# Patient Record
Sex: FEMALE | Race: WHITE | Employment: OTHER | URBAN - METROPOLITAN AREA
[De-identification: names, ages, dates, MRNs, and addresses within clinical notes are randomized per-mention and may not be internally consistent; named-entity substitution may affect disease eponyms.]

---

## 2017-02-09 ENCOUNTER — ED HISTORICAL/CONVERTED ENCOUNTER (OUTPATIENT)
Dept: OTHER | Age: 67
End: 2017-02-09

## 2017-02-09 ENCOUNTER — OP HISTORICAL/CONVERTED ENCOUNTER (OUTPATIENT)
Dept: OTHER | Age: 67
End: 2017-02-09

## 2017-05-31 ENCOUNTER — IP HISTORICAL/CONVERTED ENCOUNTER (OUTPATIENT)
Dept: OTHER | Age: 67
End: 2017-05-31

## 2017-07-11 ENCOUNTER — OP HISTORICAL/CONVERTED ENCOUNTER (OUTPATIENT)
Dept: OTHER | Age: 67
End: 2017-07-11

## 2017-09-18 ENCOUNTER — ED HISTORICAL/CONVERTED ENCOUNTER (OUTPATIENT)
Dept: OTHER | Age: 67
End: 2017-09-18

## 2017-09-29 ENCOUNTER — OP HISTORICAL/CONVERTED ENCOUNTER (OUTPATIENT)
Dept: OTHER | Age: 67
End: 2017-09-29

## 2017-11-07 ENCOUNTER — IP HISTORICAL/CONVERTED ENCOUNTER (OUTPATIENT)
Dept: OTHER | Age: 67
End: 2017-11-07

## 2017-12-12 ENCOUNTER — IP HISTORICAL/CONVERTED ENCOUNTER (OUTPATIENT)
Dept: OTHER | Age: 67
End: 2017-12-12

## 2018-02-13 ENCOUNTER — OP HISTORICAL/CONVERTED ENCOUNTER (OUTPATIENT)
Dept: OTHER | Age: 68
End: 2018-02-13

## 2018-05-08 ENCOUNTER — OP HISTORICAL/CONVERTED ENCOUNTER (OUTPATIENT)
Dept: OTHER | Age: 68
End: 2018-05-08

## 2018-07-20 ENCOUNTER — OP HISTORICAL/CONVERTED ENCOUNTER (OUTPATIENT)
Dept: OTHER | Age: 68
End: 2018-07-20

## 2018-07-25 ENCOUNTER — OP HISTORICAL/CONVERTED ENCOUNTER (OUTPATIENT)
Dept: OTHER | Age: 68
End: 2018-07-25

## 2018-08-03 ENCOUNTER — OP HISTORICAL/CONVERTED ENCOUNTER (OUTPATIENT)
Dept: OTHER | Age: 68
End: 2018-08-03

## 2018-08-08 ENCOUNTER — IP HISTORICAL/CONVERTED ENCOUNTER (OUTPATIENT)
Dept: OTHER | Age: 68
End: 2018-08-08

## 2018-09-12 ENCOUNTER — OP HISTORICAL/CONVERTED ENCOUNTER (OUTPATIENT)
Dept: OTHER | Age: 68
End: 2018-09-12

## 2018-10-01 ENCOUNTER — OP HISTORICAL/CONVERTED ENCOUNTER (OUTPATIENT)
Dept: OTHER | Age: 68
End: 2018-10-01

## 2018-10-04 ENCOUNTER — OP HISTORICAL/CONVERTED ENCOUNTER (OUTPATIENT)
Dept: OTHER | Age: 68
End: 2018-10-04

## 2018-10-23 ENCOUNTER — OP HISTORICAL/CONVERTED ENCOUNTER (OUTPATIENT)
Dept: OTHER | Age: 68
End: 2018-10-23

## 2018-10-31 ENCOUNTER — OP HISTORICAL/CONVERTED ENCOUNTER (OUTPATIENT)
Dept: OTHER | Age: 68
End: 2018-10-31

## 2018-11-01 ENCOUNTER — OP HISTORICAL/CONVERTED ENCOUNTER (OUTPATIENT)
Dept: OTHER | Age: 68
End: 2018-11-01

## 2018-11-07 ENCOUNTER — OP HISTORICAL/CONVERTED ENCOUNTER (OUTPATIENT)
Dept: OTHER | Age: 68
End: 2018-11-07

## 2018-11-14 ENCOUNTER — IP HISTORICAL/CONVERTED ENCOUNTER (OUTPATIENT)
Dept: OTHER | Age: 68
End: 2018-11-14

## 2018-11-28 ENCOUNTER — OP HISTORICAL/CONVERTED ENCOUNTER (OUTPATIENT)
Dept: OTHER | Age: 68
End: 2018-11-28

## 2018-12-01 ENCOUNTER — OP HISTORICAL/CONVERTED ENCOUNTER (OUTPATIENT)
Dept: OTHER | Age: 68
End: 2018-12-01

## 2019-01-01 ENCOUNTER — OP HISTORICAL/CONVERTED ENCOUNTER (OUTPATIENT)
Dept: OTHER | Age: 69
End: 2019-01-01

## 2019-03-05 ENCOUNTER — OP HISTORICAL/CONVERTED ENCOUNTER (OUTPATIENT)
Dept: OTHER | Age: 69
End: 2019-03-05

## 2019-04-24 ENCOUNTER — OP HISTORICAL/CONVERTED ENCOUNTER (OUTPATIENT)
Dept: OTHER | Age: 69
End: 2019-04-24

## 2019-04-29 ENCOUNTER — OP HISTORICAL/CONVERTED ENCOUNTER (OUTPATIENT)
Dept: OTHER | Age: 69
End: 2019-04-29

## 2019-05-16 ENCOUNTER — OP HISTORICAL/CONVERTED ENCOUNTER (OUTPATIENT)
Dept: OTHER | Age: 69
End: 2019-05-16

## 2019-05-21 ENCOUNTER — ED HISTORICAL/CONVERTED ENCOUNTER (OUTPATIENT)
Dept: OTHER | Age: 69
End: 2019-05-21

## 2019-08-20 ENCOUNTER — OP HISTORICAL/CONVERTED ENCOUNTER (OUTPATIENT)
Dept: OTHER | Age: 69
End: 2019-08-20

## 2019-09-05 ENCOUNTER — OP HISTORICAL/CONVERTED ENCOUNTER (OUTPATIENT)
Dept: OTHER | Age: 69
End: 2019-09-05

## 2019-10-30 ENCOUNTER — OP HISTORICAL/CONVERTED ENCOUNTER (OUTPATIENT)
Dept: OTHER | Age: 69
End: 2019-10-30

## 2019-11-12 ENCOUNTER — OP HISTORICAL/CONVERTED ENCOUNTER (OUTPATIENT)
Dept: OTHER | Age: 69
End: 2019-11-12

## 2019-12-13 ENCOUNTER — ED HISTORICAL/CONVERTED ENCOUNTER (OUTPATIENT)
Dept: OTHER | Age: 69
End: 2019-12-13

## 2019-12-26 ENCOUNTER — OP HISTORICAL/CONVERTED ENCOUNTER (OUTPATIENT)
Dept: OTHER | Age: 69
End: 2019-12-26

## 2019-12-29 ENCOUNTER — ED HISTORICAL/CONVERTED ENCOUNTER (OUTPATIENT)
Dept: OTHER | Age: 69
End: 2019-12-29

## 2020-02-19 ENCOUNTER — IP HISTORICAL/CONVERTED ENCOUNTER (OUTPATIENT)
Dept: OTHER | Age: 70
End: 2020-02-19

## 2020-06-02 ENCOUNTER — OP HISTORICAL/CONVERTED ENCOUNTER (OUTPATIENT)
Dept: OTHER | Age: 70
End: 2020-06-02

## 2020-06-25 ENCOUNTER — OP HISTORICAL/CONVERTED ENCOUNTER (OUTPATIENT)
Dept: OTHER | Age: 70
End: 2020-06-25

## 2020-07-02 PROBLEM — R35.1 NOCTURIA: Status: ACTIVE | Noted: 2020-07-02

## 2020-07-02 PROBLEM — N39.46 MIXED STRESS AND URGE URINARY INCONTINENCE: Status: ACTIVE | Noted: 2020-07-02

## 2020-07-02 PROBLEM — N30.20 CHRONIC CYSTITIS: Status: ACTIVE | Noted: 2020-07-02

## 2020-07-02 PROBLEM — R33.9 INCOMPLETE EMPTYING OF BLADDER: Status: ACTIVE | Noted: 2020-07-02

## 2020-07-02 PROBLEM — N13.30 HYDRONEPHROSIS: Status: ACTIVE | Noted: 2020-07-02

## 2020-07-02 PROBLEM — R39.15 URINARY URGENCY: Status: ACTIVE | Noted: 2020-07-02

## 2020-07-02 PROBLEM — R35.0 INCREASED FREQUENCY OF URINATION: Status: ACTIVE | Noted: 2020-07-02

## 2020-07-02 PROBLEM — N31.9 NEUROGENIC BLADDER: Status: ACTIVE | Noted: 2020-07-02

## 2020-07-02 PROBLEM — N39.0 URINARY TRACT INFECTION WITHOUT HEMATURIA: Status: ACTIVE | Noted: 2020-07-02

## 2020-07-23 ENCOUNTER — OP HISTORICAL/CONVERTED ENCOUNTER (OUTPATIENT)
Dept: OTHER | Age: 70
End: 2020-07-23

## 2020-08-17 PROBLEM — J44.9 COPD (CHRONIC OBSTRUCTIVE PULMONARY DISEASE) (HCC): Status: ACTIVE | Noted: 2020-08-17

## 2020-08-17 PROBLEM — N39.0 URINARY TRACT INFECTION WITHOUT HEMATURIA: Status: RESOLVED | Noted: 2020-07-02 | Resolved: 2020-08-17

## 2020-08-17 PROBLEM — R35.1 NOCTURIA: Status: RESOLVED | Noted: 2020-07-02 | Resolved: 2020-08-17

## 2020-08-17 PROBLEM — R39.15 URINARY URGENCY: Status: RESOLVED | Noted: 2020-07-02 | Resolved: 2020-08-17

## 2020-08-17 PROBLEM — R35.0 INCREASED FREQUENCY OF URINATION: Status: RESOLVED | Noted: 2020-07-02 | Resolved: 2020-08-17

## 2020-08-17 PROBLEM — N39.46 MIXED STRESS AND URGE URINARY INCONTINENCE: Status: RESOLVED | Noted: 2020-07-02 | Resolved: 2020-08-17

## 2020-08-17 PROBLEM — R33.9 INCOMPLETE EMPTYING OF BLADDER: Status: RESOLVED | Noted: 2020-07-02 | Resolved: 2020-08-17

## 2020-08-17 PROBLEM — N30.20 CHRONIC CYSTITIS: Status: RESOLVED | Noted: 2020-07-02 | Resolved: 2020-08-17

## 2020-08-17 PROBLEM — Z96.0 RETAINED URETERAL STENT: Status: ACTIVE | Noted: 2020-08-17

## 2020-08-17 PROBLEM — N31.9 NEUROGENIC BLADDER: Status: RESOLVED | Noted: 2020-07-02 | Resolved: 2020-08-17

## 2020-08-18 ENCOUNTER — OFFICE VISIT (OUTPATIENT)
Dept: UROLOGY | Age: 70
End: 2020-08-18
Payer: MEDICARE

## 2020-08-18 VITALS
HEART RATE: 88 BPM | WEIGHT: 132 LBS | TEMPERATURE: 99.2 F | HEIGHT: 64 IN | DIASTOLIC BLOOD PRESSURE: 76 MMHG | BODY MASS INDEX: 22.53 KG/M2 | SYSTOLIC BLOOD PRESSURE: 120 MMHG

## 2020-08-18 DIAGNOSIS — N13.5 UPJ (URETEROPELVIC JUNCTION) OBSTRUCTION: ICD-10-CM

## 2020-08-18 DIAGNOSIS — N30.20 CHRONIC CYSTITIS: Primary | ICD-10-CM

## 2020-08-18 DIAGNOSIS — N31.9 NEUROGENIC BLADDER: ICD-10-CM

## 2020-08-18 DIAGNOSIS — R35.0 INCREASED FREQUENCY OF URINATION: ICD-10-CM

## 2020-08-18 DIAGNOSIS — Z96.0 RETAINED URETERAL STENT: ICD-10-CM

## 2020-08-18 DIAGNOSIS — R35.1 NOCTURIA: ICD-10-CM

## 2020-08-18 DIAGNOSIS — J43.9 PULMONARY EMPHYSEMA, UNSPECIFIED EMPHYSEMA TYPE (HCC): ICD-10-CM

## 2020-08-18 DIAGNOSIS — R39.15 URINARY URGENCY: ICD-10-CM

## 2020-08-18 DIAGNOSIS — R33.9 INCOMPLETE EMPTYING OF BLADDER: ICD-10-CM

## 2020-08-18 DIAGNOSIS — N30.00 ACUTE CYSTITIS WITHOUT HEMATURIA: ICD-10-CM

## 2020-08-18 DIAGNOSIS — N39.46 MIXED STRESS AND URGE URINARY INCONTINENCE: ICD-10-CM

## 2020-08-18 LAB
BILIRUB UR QL STRIP: NEGATIVE
GLUCOSE UR-MCNC: NEGATIVE MG/DL
KETONES P FAST UR STRIP-MCNC: NEGATIVE MG/DL
PH UR STRIP: 6 [PH] (ref 4.6–8)
PROT UR QL STRIP: NEGATIVE
SP GR UR STRIP: 1.01 (ref 1–1.03)
UA UROBILINOGEN AMB POC: NORMAL (ref 0.2–1)
URINALYSIS CLARITY POC: NORMAL
URINALYSIS COLOR POC: YELLOW
URINE BLOOD POC: NORMAL
URINE LEUKOCYTES POC: NORMAL
URINE NITRITES POC: POSITIVE

## 2020-08-18 PROCEDURE — 99024 POSTOP FOLLOW-UP VISIT: CPT | Performed by: UROLOGY

## 2020-08-18 PROCEDURE — 81003 URINALYSIS AUTO W/O SCOPE: CPT | Performed by: UROLOGY

## 2020-08-18 RX ORDER — INSULIN GLARGINE 300 U/ML
20 INJECTION, SOLUTION SUBCUTANEOUS
COMMUNITY
End: 2020-10-13 | Stop reason: ALTCHOICE

## 2020-08-18 RX ORDER — ASPIRIN 81 MG/1
81 TABLET ORAL DAILY
COMMUNITY
End: 2020-11-10

## 2020-08-18 RX ORDER — OXYBUTYNIN CHLORIDE 5 MG/1
5 TABLET ORAL 2 TIMES DAILY
COMMUNITY
End: 2021-09-04

## 2020-08-18 RX ORDER — GABAPENTIN 300 MG/1
300 CAPSULE ORAL 3 TIMES DAILY
COMMUNITY
End: 2020-10-13 | Stop reason: ALTCHOICE

## 2020-08-18 RX ORDER — ATORVASTATIN CALCIUM 20 MG/1
20 TABLET, FILM COATED ORAL DAILY
COMMUNITY
End: 2021-09-04

## 2020-08-18 RX ORDER — CLONAZEPAM 0.5 MG/1
0.5 TABLET ORAL 3 TIMES DAILY
COMMUNITY
End: 2021-09-04

## 2020-08-18 NOTE — PROGRESS NOTES
HISTORY OF PRESENT ILLNESS  Giovanny Anthony is a 68 y.o. female. She is a 68year old with a reported history of left UPJ obstruction managed with stent changes every 3 months. She was seen by Dr. Trisha Lopez for 5 years or more. She has COPD and has had difficulty with general anesthesia in the past.  She has had UTIs. She does not have lower tract irritation such as frequency, urgency or gross hematuria. Problem List  Date Reviewed: 8/18/2020          Codes Class Noted    UPJ (ureteropelvic junction) obstruction ICD-10-CM: N13.5  ICD-9-CM: 593.4  8/18/2020    Overview Addendum 8/18/2020  1:51 PM by Tiffany Romero MD       She has a left sided ureteral stent managed previously by Dr. Trisha Lopez. Stent secondary to chronic left UPJ obstruction with stent changes q 3 months. Last changed 6/2/2020. She has been having stents several years 5-6 per her recollection. Acute cystitis ICD-10-CM: N30.00  ICD-9-CM: 595.0  8/18/2020    Overview Signed 8/18/2020  1:52 PM by Tiffany Romero MD     She gets UTIs with her ureteral stents. She has been on abx. She had sepsis \"a couple years\" ago. Retained ureteral stent ICD-10-CM: Z96.0  ICD-9-CM: V43.89  8/17/2020    Overview Signed 8/17/2020 10:10 AM by Timmy Lopez NP       She has a left sided ureteral stent managed previously by Dr. Trisha Lopez. Stent secondary to chronic left UPJ obstruction with stent changes q 3 months. Last changed 6/2/2020. COPD (chronic obstructive pulmonary disease) ([facility] [address] ICD-10-CM: J44.9  ICD-9-CM: 496  8/17/2020    Overview Signed 8/17/2020 10:12 AM by Timmy Lopez NP     History of COPD with multiple hospitalizations due to exacerbations and hypoxia. Review of Systems   All other systems reviewed and are negative. Physical Exam  Vitals signs reviewed. Constitutional:       General: She is not in acute distress. Appearance: Normal appearance. She is obese.  She is not ill-appearing, toxic-appearing or diaphoretic. HENT:      Head: Normocephalic and atraumatic. Nose: Nose normal.      Mouth/Throat:      Mouth: Mucous membranes are moist.      Pharynx: Oropharynx is clear. Eyes:      Conjunctiva/sclera: Conjunctivae normal.      Pupils: Pupils are equal, round, and reactive to light. Neck:      Musculoskeletal: Normal range of motion. Cardiovascular:      Rate and Rhythm: Normal rate and regular rhythm. Pulmonary:      Effort: Pulmonary effort is normal. No respiratory distress. Breath sounds: Normal breath sounds. Abdominal:      General: Abdomen is flat. Bowel sounds are normal.      Palpations: Abdomen is soft. Musculoskeletal: Normal range of motion. Skin:     General: Skin is warm and dry. Neurological:      General: No focal deficit present. Mental Status: She is alert and oriented to person, place, and time. Psychiatric:         Mood and Affect: Mood normal.       ASSESSMENT and PLAN  Diagnoses and all orders for this visit:    1. Chronic cystitis    2. Retained ureteral stent  Assessment & Plan:  She is due for a ureteral stent change. We can evaluate her anatomy to see if there is another option for repair. Cystoscopy, ureteral stent change, retrograde pyelogram      3. Pulmonary emphysema, unspecified emphysema type ([address]    4. Incomplete emptying of bladder  -     AMB POC URINALYSIS DIP STICK AUTO W/O MICRO  -     CULTURE, URINE    5. Increased frequency of urination  -     AMB POC URINALYSIS DIP STICK AUTO W/O MICRO  -     CULTURE, URINE    6. Nocturia    7. Neurogenic bladder    8. Urinary urgency    9. Mixed stress and urge urinary incontinence    10. UPJ (ureteropelvic junction) obstruction  Assessment & Plan:  She has a reported history of UPJ stenosis. We should evaluate the ureter. She is amenable to that. 11. Acute cystitis without hematuria  Assessment & Plan:  She finished abx from her PCP.           Alicia Auguste Jp Rosas MD

## 2020-08-18 NOTE — ASSESSMENT & PLAN NOTE
She is due for a ureteral stent change. We can evaluate her anatomy to see if there is another option for repair.       Cystoscopy, ureteral stent change, retrograde pyelogram

## 2020-08-21 LAB — BACTERIA UR CULT: ABNORMAL

## 2020-08-25 DIAGNOSIS — N30.00 ACUTE CYSTITIS WITHOUT HEMATURIA: Primary | ICD-10-CM

## 2020-08-25 RX ORDER — DOXYCYCLINE HYCLATE 100 MG
100 TABLET ORAL 2 TIMES DAILY
Qty: 20 TAB | Refills: 0 | Status: SHIPPED | OUTPATIENT
Start: 2020-08-25 | End: 2020-09-04

## 2020-08-25 NOTE — PROGRESS NOTES
LVM for pt notifying of infection and new abx order for 10 days to [facility] in [place]. Asked for a call back for any questions or if pharmacy needs to be changed.

## 2020-08-25 NOTE — PROGRESS NOTES
Can you give her a call and let her know that her urine culture was positive for infection. I will call in abx RX. Lets do 10 days of doxycycline.

## 2020-08-25 NOTE — PROGRESS NOTES
I added [address] to the RX, let me know if that needs to be changed please.

## 2020-09-08 ENCOUNTER — OP HISTORICAL/CONVERTED ENCOUNTER (OUTPATIENT)
Dept: OTHER | Age: 70
End: 2020-09-08

## 2020-09-10 ENCOUNTER — DOCUMENTATION ONLY (OUTPATIENT)
Dept: UROLOGY | Age: 70
End: 2020-09-10

## 2020-09-10 DIAGNOSIS — N30.00 ACUTE CYSTITIS WITHOUT HEMATURIA: Primary | ICD-10-CM

## 2020-09-10 RX ORDER — NITROFURANTOIN 25; 75 MG/1; MG/1
100 CAPSULE ORAL 2 TIMES DAILY
Qty: 8 CAP | Refills: 0 | Status: SHIPPED | OUTPATIENT
Start: 2020-09-10 | End: 2020-09-14

## 2020-09-10 NOTE — PERIOP NOTES
[phone] Dr. Rosemarie Mancini office called, spoke with Sharmila Jacinto LPN, made aware of urine culture report, greater than 100,000 enterococcus faecalis, Justin Mccracken stated to get a message to Dr. Caitlin Joseph re: results.

## 2020-09-11 RX ORDER — BUDESONIDE AND FORMOTEROL FUMARATE DIHYDRATE 80; 4.5 UG/1; UG/1
2 AEROSOL RESPIRATORY (INHALATION)
COMMUNITY
End: 2020-10-13 | Stop reason: ALTCHOICE

## 2020-09-11 RX ORDER — SODIUM CHLORIDE, SODIUM LACTATE, POTASSIUM CHLORIDE, CALCIUM CHLORIDE 600; 310; 30; 20 MG/100ML; MG/100ML; MG/100ML; MG/100ML
20 INJECTION, SOLUTION INTRAVENOUS CONTINUOUS
Status: DISCONTINUED | OUTPATIENT
Start: 2020-09-14 | End: 2020-09-16 | Stop reason: HOSPADM

## 2020-09-11 RX ORDER — PANTOPRAZOLE SODIUM 20 MG/1
TABLET, DELAYED RELEASE ORAL DAILY
COMMUNITY
End: 2020-10-13 | Stop reason: ALTCHOICE

## 2020-09-11 RX ORDER — CALCIUM CARBONATE/VITAMIN D3 600 MG-125
1 TABLET ORAL DAILY
COMMUNITY
End: 2020-11-24

## 2020-09-11 RX ORDER — INSULIN LISPRO 100 [IU]/ML
INJECTION, SOLUTION INTRAVENOUS; SUBCUTANEOUS
Status: ON HOLD | COMMUNITY
End: 2021-08-31

## 2020-09-14 ENCOUNTER — OP HISTORICAL/CONVERTED ENCOUNTER (OUTPATIENT)
Dept: OTHER | Age: 70
End: 2020-09-14

## 2020-09-14 ENCOUNTER — ANESTHESIA (OUTPATIENT)
Dept: SURGERY | Age: 70
End: 2020-09-14
Payer: MEDICARE

## 2020-09-14 ENCOUNTER — HOSPITAL ENCOUNTER (OUTPATIENT)
Age: 70
Setting detail: OUTPATIENT SURGERY
Discharge: HOME | End: 2020-09-14
Attending: UROLOGY | Admitting: UROLOGY
Payer: MEDICARE

## 2020-09-14 ENCOUNTER — ANESTHESIA EVENT (OUTPATIENT)
Dept: SURGERY | Age: 70
End: 2020-09-14
Payer: MEDICARE

## 2020-09-14 VITALS
HEART RATE: 85 BPM | OXYGEN SATURATION: 98 % | RESPIRATION RATE: 17 BRPM | SYSTOLIC BLOOD PRESSURE: 139 MMHG | DIASTOLIC BLOOD PRESSURE: 72 MMHG | WEIGHT: 131.84 LBS | HEIGHT: 63 IN | TEMPERATURE: 98.1 F | BODY MASS INDEX: 23.36 KG/M2

## 2020-09-14 LAB
GLUCOSE BLD STRIP.AUTO-MCNC: 112 MG/DL (ref 65–100)
PERFORMED BY, TECHID: ABNORMAL

## 2020-09-14 PROCEDURE — C1758 CATHETER, URETERAL: HCPCS | Performed by: UROLOGY

## 2020-09-14 PROCEDURE — 84132 ASSAY OF SERUM POTASSIUM: CPT

## 2020-09-14 PROCEDURE — 74011250636 HC RX REV CODE- 250/636: Performed by: ANESTHESIOLOGY

## 2020-09-14 PROCEDURE — 74011000636 HC RX REV CODE- 636: Performed by: UROLOGY

## 2020-09-14 PROCEDURE — 77030020268 HC MISC GENERAL SUPPLY: Performed by: UROLOGY

## 2020-09-14 PROCEDURE — 82962 GLUCOSE BLOOD TEST: CPT

## 2020-09-14 PROCEDURE — 76210000023 HC REC RM PH II 2 TO 2.5 HR: Performed by: UROLOGY

## 2020-09-14 PROCEDURE — 74011000250 HC RX REV CODE- 250: Performed by: NURSE ANESTHETIST, CERTIFIED REGISTERED

## 2020-09-14 PROCEDURE — 74011250636 HC RX REV CODE- 250/636: Performed by: NURSE ANESTHETIST, CERTIFIED REGISTERED

## 2020-09-14 PROCEDURE — 2709999900 HC NON-CHARGEABLE SUPPLY: Performed by: UROLOGY

## 2020-09-14 PROCEDURE — 76210000006 HC OR PH I REC 0.5 TO 1 HR: Performed by: UROLOGY

## 2020-09-14 PROCEDURE — 76060000031 HC ANESTHESIA FIRST 0.5 HR: Performed by: UROLOGY

## 2020-09-14 PROCEDURE — 76010000154 HC OR TIME FIRST 0.5 HR: Performed by: UROLOGY

## 2020-09-14 PROCEDURE — 36415 COLL VENOUS BLD VENIPUNCTURE: CPT

## 2020-09-14 PROCEDURE — 74011250636 HC RX REV CODE- 250/636: Performed by: UROLOGY

## 2020-09-14 PROCEDURE — C2617 STENT, NON-COR, TEM W/O DEL: HCPCS | Performed by: UROLOGY

## 2020-09-14 PROCEDURE — 76060000032 HC ANESTHESIA 0.5 TO 1 HR: Performed by: UROLOGY

## 2020-09-14 DEVICE — UNIVERSA FIRM URETERAL STENT AND POSITIONER WITH HYDROPHILIC COATING AND MONOFILAMENT TETHER
Type: IMPLANTABLE DEVICE | Site: URETER | Status: FUNCTIONAL
Brand: UNIVERSA

## 2020-09-14 RX ORDER — PROPOFOL 10 MG/ML
INJECTION, EMULSION INTRAVENOUS AS NEEDED
Status: DISCONTINUED | OUTPATIENT
Start: 2020-09-14 | End: 2020-09-14 | Stop reason: HOSPADM

## 2020-09-14 RX ORDER — ONDANSETRON 2 MG/ML
INJECTION INTRAMUSCULAR; INTRAVENOUS AS NEEDED
Status: DISCONTINUED | OUTPATIENT
Start: 2020-09-14 | End: 2020-09-14 | Stop reason: HOSPADM

## 2020-09-14 RX ORDER — EPHEDRINE SULFATE/0.9% NACL/PF 50 MG/5 ML
SYRINGE (ML) INTRAVENOUS AS NEEDED
Status: DISCONTINUED | OUTPATIENT
Start: 2020-09-14 | End: 2020-09-14 | Stop reason: HOSPADM

## 2020-09-14 RX ORDER — LIDOCAINE HYDROCHLORIDE 20 MG/ML
INJECTION, SOLUTION EPIDURAL; INFILTRATION; INTRACAUDAL; PERINEURAL AS NEEDED
Status: DISCONTINUED | OUTPATIENT
Start: 2020-09-14 | End: 2020-09-14 | Stop reason: HOSPADM

## 2020-09-14 RX ORDER — ONDANSETRON 2 MG/ML
4 INJECTION INTRAMUSCULAR; INTRAVENOUS AS NEEDED
Status: DISCONTINUED | OUTPATIENT
Start: 2020-09-14 | End: 2020-09-16 | Stop reason: HOSPADM

## 2020-09-14 RX ORDER — FENTANYL CITRATE 50 UG/ML
50 INJECTION, SOLUTION INTRAMUSCULAR; INTRAVENOUS AS NEEDED
Status: CANCELLED | OUTPATIENT
Start: 2020-09-14

## 2020-09-14 RX ORDER — SODIUM CHLORIDE 0.9 % (FLUSH) 0.9 %
5-40 SYRINGE (ML) INJECTION EVERY 8 HOURS
Status: CANCELLED | OUTPATIENT
Start: 2020-09-14

## 2020-09-14 RX ORDER — MIDAZOLAM HYDROCHLORIDE 1 MG/ML
1 INJECTION, SOLUTION INTRAMUSCULAR; INTRAVENOUS AS NEEDED
Status: CANCELLED | OUTPATIENT
Start: 2020-09-14

## 2020-09-14 RX ORDER — SODIUM CHLORIDE 0.9 % (FLUSH) 0.9 %
5-40 SYRINGE (ML) INJECTION AS NEEDED
Status: CANCELLED | OUTPATIENT
Start: 2020-09-14

## 2020-09-14 RX ORDER — FENTANYL CITRATE 50 UG/ML
25 INJECTION, SOLUTION INTRAMUSCULAR; INTRAVENOUS
Status: COMPLETED | OUTPATIENT
Start: 2020-09-14 | End: 2020-09-14

## 2020-09-14 RX ORDER — DEXAMETHASONE SODIUM PHOSPHATE 4 MG/ML
INJECTION, SOLUTION INTRA-ARTICULAR; INTRALESIONAL; INTRAMUSCULAR; INTRAVENOUS; SOFT TISSUE AS NEEDED
Status: DISCONTINUED | OUTPATIENT
Start: 2020-09-14 | End: 2020-09-14 | Stop reason: HOSPADM

## 2020-09-14 RX ORDER — ACETAMINOPHEN 325 MG/1
650 TABLET ORAL
Status: DISCONTINUED | OUTPATIENT
Start: 2020-09-14 | End: 2020-09-16 | Stop reason: HOSPADM

## 2020-09-14 RX ORDER — LIDOCAINE HYDROCHLORIDE 10 MG/ML
0.1 INJECTION, SOLUTION EPIDURAL; INFILTRATION; INTRACAUDAL; PERINEURAL AS NEEDED
Status: CANCELLED | OUTPATIENT
Start: 2020-09-14

## 2020-09-14 RX ADMIN — SODIUM CHLORIDE 1 G: 9 INJECTION, SOLUTION INTRAVENOUS at 11:32

## 2020-09-14 RX ADMIN — FENTANYL CITRATE 25 MCG: 50 INJECTION, SOLUTION INTRAMUSCULAR; INTRAVENOUS at 12:20

## 2020-09-14 RX ADMIN — Medication 10 MG: at 11:56

## 2020-09-14 RX ADMIN — SODIUM CHLORIDE, POTASSIUM CHLORIDE, SODIUM LACTATE AND CALCIUM CHLORIDE: 600; 310; 30; 20 INJECTION, SOLUTION INTRAVENOUS at 11:32

## 2020-09-14 RX ADMIN — DEXAMETHASONE SODIUM PHOSPHATE 4 MG: 4 INJECTION, SOLUTION INTRA-ARTICULAR; INTRALESIONAL; INTRAMUSCULAR; INTRAVENOUS; SOFT TISSUE at 11:46

## 2020-09-14 RX ADMIN — ONDANSETRON 4 MG: 2 INJECTION INTRAMUSCULAR; INTRAVENOUS at 11:47

## 2020-09-14 RX ADMIN — LIDOCAINE HYDROCHLORIDE 60 MG: 20 INJECTION, SOLUTION EPIDURAL; INFILTRATION; INTRACAUDAL; PERINEURAL at 11:37

## 2020-09-14 RX ADMIN — PROPOFOL 120 MG: 10 INJECTION, EMULSION INTRAVENOUS at 11:37

## 2020-09-14 NOTE — OP NOTES
UROLOGY OPERATIVE NOTE    Patient: Ml Leiva MRN: [id number]  SSN: [id number]    YOB: 1950  Age: 65 y.o. Sex: female          Pre-operative Diagnosis: Obstruction of left ureter [N13.5]  Post-operative Diagnosis:UPJ obstruction, chronic UTI2  Surgeon: Patsy Alfaro MD  Procedure: Cystoscopy, left retrograde pyelogram, left ureteral stent exchange  Anesthesia:  General  Findings: Left UPJ obstruction with low-lying left kidney  Estimated Blood Loss:      None      Specimens: none   Implants:   Implant Name Type Inv. Item Serial No.  Lot No. LRB No. Used Action   SET URET STENT 6FR L22CM AQ FIRM MFIL TETH GRAD [facility] W/ PGTL - [id number] Stent SET URET STENT 6FR L22CM AQ FIRM MFIL TETH GRAD [facility] W/ PGTL  [place] [facility]_WD [id number] Left 1 Implanted              Procedure Details: The patient was seen in the pre-operative area. The risks, benefits, complications, alternative treatment options, and expected outcomes were again discussed with the patient. The possibilities of reaction to medication, pain, infection, bleeding, major cardiovascular event, death, damage to surrounding structures were specifically addressed. Informed consent was then obtained. Upon arrival to the operative suite, the patient, procedure, and side were confirmed via a pre-operative \"time-out\". All were in agreement. The patient was carefully positioned and anethesia was undertaken, . Sterile prep and drape was accomplished. Patient is placed in a lithotomy position and prepped and draped in your sterile manner. Using a 21 [place] Mary scope cystourethroscopy was performed. The bladder urine was cloudy and was irrigated and aspirated to clear. The left ureteral stent was visualized. Fluoroscopic imaging revealed to be in adequate position. Stent was grasped withdrawn intact. An open ended catheter was used and gentle retrograde pyelogram performed.   There is a mucus coming out of the orifice and there was a filling defect in the distal ureter consistent with mucousy debris. The proximal ureter is patent up to the UPJ where this narrowed down to a tight jet of contrast.  The renal pelvis was dilated. The calyces were moderate to severely dilated. A 0.035 guidewire was placed. The catheter was removed and over the wire a 6 [place] Mary by 22 cm double-J ureteral stent was placed. The proximal curl was noted in the upper renal pelvis and in the bladder on fluoroscopy and direct visualization. The patient tolerated well and was transported in stable motion recovery.      Eric Simon MD

## 2020-09-14 NOTE — ANESTHESIA PREPROCEDURE EVALUATION
Relevant Problems   No relevant active problems       Anesthetic History     PONV          Review of Systems / Medical History  Patient summary reviewed and pertinent labs reviewed    Pulmonary    COPD: severe    Sleep apnea  Pneumonia (ON HOME O2 AT 3l/m at night) and smoker ([employer])         Neuro/Psych         Psychiatric history    Comments: HEARING LOSS Cardiovascular    Hypertension          CAD and hyperlipidemia         GI/Hepatic/Renal     GERD          Comments: DYSPHAGIA.  Endo/Other    Diabetes    Arthritis     Other Findings            Physical Exam    Airway  Mallampati: I  TM Distance: > 6 cm  Neck ROM: normal range of motion        Cardiovascular    Rhythm: regular  Rate: normal         Dental    Dentition: Poor dentition and Bridges     Pulmonary    Rhonchi:bilateral             Abdominal         Other Findings            Anesthetic Plan    ASA: 3  Anesthesia type: general            Anesthetic plan and risks discussed with: Patient

## 2020-09-14 NOTE — ANESTHESIA POSTPROCEDURE EVALUATION
Procedure(s):  Cystourethroscopy With Left Ureteral Stent Change And Left Retrograde Pyelogram.    general    Anesthesia Post Evaluation      Multimodal analgesia: multimodal analgesia used between 6 hours prior to anesthesia start to PACU discharge  Patient location during evaluation: PACU  Patient participation: complete - patient participated  Level of consciousness: awake  Pain score: 0  Pain management: adequate  Airway patency: patent  Anesthetic complications: no  Cardiovascular status: acceptable  Respiratory status: acceptable  Hydration status: acceptable  Post anesthesia nausea and vomiting:  controlled  Final Post Anesthesia Temperature Assessment:  Normothermia (36.0-37.5 degrees C)      INITIAL Post-op Vital signs:   Vitals Value Taken Time   /63 9/14/2020 12:10 PM   Temp 36.7 °C (98.1 °F) 9/14/2020 12:05 PM   Pulse 74 9/14/2020 12:10 PM   Resp 20 9/14/2020 12:10 PM   SpO2 98 % 9/14/2020 12:10 PM

## 2020-10-01 ENCOUNTER — OP HISTORICAL/CONVERTED ENCOUNTER (OUTPATIENT)
Dept: OTHER | Age: 70
End: 2020-10-01

## 2020-10-05 ENCOUNTER — HOSPITAL ENCOUNTER (OUTPATIENT)
Dept: PREADMISSION TESTING | Age: 70
Discharge: HOME | End: 2020-10-05
Payer: MEDICARE

## 2020-10-05 LAB — SARS-COV-2, COV2: NORMAL

## 2020-10-05 PROCEDURE — 87635 SARS-COV-2 COVID-19 AMP PRB: CPT

## 2020-10-07 LAB — SARS-COV-2, COV2NT: NOT DETECTED

## 2020-10-09 ENCOUNTER — APPOINTMENT (OUTPATIENT)
Dept: ENDOSCOPY | Age: 70
End: 2020-10-09
Attending: INTERNAL MEDICINE
Payer: MEDICARE

## 2020-10-09 ENCOUNTER — ANESTHESIA EVENT (OUTPATIENT)
Dept: ENDOSCOPY | Age: 70
End: 2020-10-09
Payer: MEDICARE

## 2020-10-09 ENCOUNTER — ANESTHESIA (OUTPATIENT)
Dept: ENDOSCOPY | Age: 70
End: 2020-10-09
Payer: MEDICARE

## 2020-10-09 ENCOUNTER — HOSPITAL ENCOUNTER (OUTPATIENT)
Age: 70
Setting detail: OUTPATIENT SURGERY
Discharge: HOME | End: 2020-10-09
Attending: INTERNAL MEDICINE | Admitting: INTERNAL MEDICINE
Payer: MEDICARE

## 2020-10-09 VITALS
HEIGHT: 63 IN | TEMPERATURE: 97.5 F | DIASTOLIC BLOOD PRESSURE: 68 MMHG | BODY MASS INDEX: 24.1 KG/M2 | WEIGHT: 136 LBS | HEART RATE: 75 BPM | SYSTOLIC BLOOD PRESSURE: 120 MMHG | OXYGEN SATURATION: 100 % | RESPIRATION RATE: 18 BRPM

## 2020-10-09 LAB
GLUCOSE BLD STRIP.AUTO-MCNC: 136 MG/DL (ref 65–100)
PERFORMED BY, TECHID: ABNORMAL
POTASSIUM SERPL-SCNC: 4.6 MMOL/L (ref 3.5–5.1)

## 2020-10-09 PROCEDURE — 76040000007: Performed by: INTERNAL MEDICINE

## 2020-10-09 PROCEDURE — 74011250636 HC RX REV CODE- 250/636: Performed by: NURSE ANESTHETIST, CERTIFIED REGISTERED

## 2020-10-09 PROCEDURE — 76060000032 HC ANESTHESIA 0.5 TO 1 HR: Performed by: INTERNAL MEDICINE

## 2020-10-09 PROCEDURE — 74011000250 HC RX REV CODE- 250: Performed by: NURSE ANESTHETIST, CERTIFIED REGISTERED

## 2020-10-09 PROCEDURE — 88305 TISSUE EXAM BY PATHOLOGIST: CPT

## 2020-10-09 PROCEDURE — 74011250636 HC RX REV CODE- 250/636: Performed by: INTERNAL MEDICINE

## 2020-10-09 PROCEDURE — 88312 SPECIAL STAINS GROUP 1: CPT

## 2020-10-09 PROCEDURE — 2709999900 HC NON-CHARGEABLE SUPPLY: Performed by: INTERNAL MEDICINE

## 2020-10-09 PROCEDURE — 82962 GLUCOSE BLOOD TEST: CPT

## 2020-10-09 PROCEDURE — 77030019988 HC FCPS ENDOSC DISP BSC -B: Performed by: INTERNAL MEDICINE

## 2020-10-09 RX ORDER — SODIUM CHLORIDE 9 MG/ML
25 INJECTION, SOLUTION INTRAVENOUS CONTINUOUS
Status: DISCONTINUED | OUTPATIENT
Start: 2020-10-09 | End: 2020-10-09 | Stop reason: HOSPADM

## 2020-10-09 RX ORDER — MIDAZOLAM HYDROCHLORIDE 1 MG/ML
INJECTION, SOLUTION INTRAMUSCULAR; INTRAVENOUS AS NEEDED
Status: DISCONTINUED | OUTPATIENT
Start: 2020-10-09 | End: 2020-10-09 | Stop reason: HOSPADM

## 2020-10-09 RX ORDER — SODIUM CHLORIDE, SODIUM LACTATE, POTASSIUM CHLORIDE, CALCIUM CHLORIDE 600; 310; 30; 20 MG/100ML; MG/100ML; MG/100ML; MG/100ML
50 INJECTION, SOLUTION INTRAVENOUS CONTINUOUS
Status: DISCONTINUED | OUTPATIENT
Start: 2020-10-09 | End: 2020-10-09 | Stop reason: HOSPADM

## 2020-10-09 RX ORDER — PROPOFOL 10 MG/ML
INJECTION, EMULSION INTRAVENOUS
Status: COMPLETED
Start: 2020-10-09 | End: 2020-10-09

## 2020-10-09 RX ORDER — LIDOCAINE HYDROCHLORIDE 20 MG/ML
INJECTION, SOLUTION EPIDURAL; INFILTRATION; INTRACAUDAL; PERINEURAL
Status: COMPLETED
Start: 2020-10-09 | End: 2020-10-09

## 2020-10-09 RX ORDER — LIDOCAINE HYDROCHLORIDE 20 MG/ML
INJECTION, SOLUTION EPIDURAL; INFILTRATION; INTRACAUDAL; PERINEURAL AS NEEDED
Status: DISCONTINUED | OUTPATIENT
Start: 2020-10-09 | End: 2020-10-09 | Stop reason: HOSPADM

## 2020-10-09 RX ORDER — PROPOFOL 10 MG/ML
INJECTION, EMULSION INTRAVENOUS AS NEEDED
Status: DISCONTINUED | OUTPATIENT
Start: 2020-10-09 | End: 2020-10-09 | Stop reason: HOSPADM

## 2020-10-09 RX ORDER — MIDAZOLAM HYDROCHLORIDE 1 MG/ML
INJECTION, SOLUTION INTRAMUSCULAR; INTRAVENOUS
Status: DISCONTINUED
Start: 2020-10-09 | End: 2020-10-09 | Stop reason: HOSPADM

## 2020-10-09 RX ADMIN — PROPOFOL 70 MG: 10 INJECTION, EMULSION INTRAVENOUS at 09:03

## 2020-10-09 RX ADMIN — PROPOFOL 20 MG: 10 INJECTION, EMULSION INTRAVENOUS at 09:26

## 2020-10-09 RX ADMIN — MIDAZOLAM HYDROCHLORIDE 2 MG: 2 INJECTION, SOLUTION INTRAMUSCULAR; INTRAVENOUS at 09:02

## 2020-10-09 RX ADMIN — PROPOFOL 30 MG: 10 INJECTION, EMULSION INTRAVENOUS at 09:21

## 2020-10-09 RX ADMIN — PROPOFOL 30 MG: 10 INJECTION, EMULSION INTRAVENOUS at 09:18

## 2020-10-09 RX ADMIN — LIDOCAINE HYDROCHLORIDE 100 MG: 20 INJECTION, SOLUTION EPIDURAL; INFILTRATION; INTRACAUDAL; PERINEURAL at 09:03

## 2020-10-09 RX ADMIN — SODIUM CHLORIDE, POTASSIUM CHLORIDE, SODIUM LACTATE AND CALCIUM CHLORIDE 50 ML/HR: 600; 310; 30; 20 INJECTION, SOLUTION INTRAVENOUS at 08:31

## 2020-10-09 NOTE — ANESTHESIA POSTPROCEDURE EVALUATION
Procedure(s):  COLONOSCOPY  ESOPHAGOGASTRODUODENOSCOPY (EGD).     total IV anesthesia, general - backup    Anesthesia Post Evaluation      Multimodal analgesia: multimodal analgesia not used between 6 hours prior to anesthesia start to PACU discharge  Patient location during evaluation: bedside  Level of consciousness: sleepy but conscious  Pain score: 0  Airway patency: patent  Anesthetic complications: no  Cardiovascular status: stable  Respiratory status: spontaneous ventilation  Hydration status: stable  Comments: stable  Post anesthesia nausea and vomiting:  none  Final Post Anesthesia Temperature Assessment:  Normothermia (36.0-37.5 degrees C)      INITIAL Post-op Vital signs:   Vitals Value Taken Time   /66 10/9/2020  9:29 AM   Temp 36.5 °C (97.7 °F) 10/9/2020  9:29 AM   Pulse 69 10/9/2020  9:29 AM   Resp 18 10/9/2020  9:29 AM   SpO2 100 % 10/9/2020  9:29 AM

## 2020-10-09 NOTE — PROGRESS NOTES
Spoke with Dr. Pruitt  and informed him of pt's axillary temp of 95.9. Heated blankets applied and Arjun Paws in current use. No new orders given. Will continue to monitor.

## 2020-10-09 NOTE — ANESTHESIA PREPROCEDURE EVALUATION
Relevant Problems   No relevant active problems       Anesthetic History     PONV          Review of Systems / Medical History  Patient summary reviewed, nursing notes reviewed and pertinent labs reviewed    Pulmonary    COPD    Sleep apnea        Comments: On home O2 3 l/m at night. .    Neuro/Psych         Psychiatric history (SUICIDAL THOUGHTS. )    Comments: MACULAR DEGENERATION. hearing loss Cardiovascular    Hypertension          CAD         GI/Hepatic/Renal     GERD           Endo/Other    Diabetes    Arthritis     Other Findings   Comments: DDD         Physical Exam    Airway  Mallampati: I  TM Distance: > 6 cm    Mouth opening: Normal     Cardiovascular    Rhythm: regular  Rate: normal         Dental    Dentition: Full upper dentures     Pulmonary    Rhonchi  Decreased breath sounds           Abdominal         Other Findings            Anesthetic Plan    ASA: 3  Anesthesia type: total IV anesthesia and general - backup          Induction: Intravenous  Anesthetic plan and risks discussed with: Patient

## 2020-10-13 ENCOUNTER — OFFICE VISIT (OUTPATIENT)
Dept: UROLOGY | Age: 70
End: 2020-10-13
Payer: MEDICARE

## 2020-10-13 VITALS — WEIGHT: 132 LBS | TEMPERATURE: 98.1 F | BODY MASS INDEX: 22.53 KG/M2 | HEIGHT: 64 IN

## 2020-10-13 DIAGNOSIS — N30.00 ACUTE CYSTITIS WITHOUT HEMATURIA: ICD-10-CM

## 2020-10-13 DIAGNOSIS — N13.5 UPJ (URETEROPELVIC JUNCTION) OBSTRUCTION: ICD-10-CM

## 2020-10-13 DIAGNOSIS — Z96.0 RETAINED URETERAL STENT: ICD-10-CM

## 2020-10-13 DIAGNOSIS — J43.9 PULMONARY EMPHYSEMA, UNSPECIFIED EMPHYSEMA TYPE (HCC): ICD-10-CM

## 2020-10-13 LAB
BILIRUB UR QL STRIP: NEGATIVE
GLUCOSE UR-MCNC: NORMAL MG/DL
KETONES P FAST UR STRIP-MCNC: NEGATIVE MG/DL
PH UR STRIP: 5.5 [PH] (ref 4.6–8)
PROT UR QL STRIP: NORMAL
SP GR UR STRIP: 1.02 (ref 1–1.03)
UA UROBILINOGEN AMB POC: NORMAL (ref 0.2–1)
URINALYSIS CLARITY POC: CLEAR
URINALYSIS COLOR POC: YELLOW
URINE BLOOD POC: NORMAL
URINE LEUKOCYTES POC: NORMAL
URINE NITRITES POC: NEGATIVE

## 2020-10-13 PROCEDURE — 99214 OFFICE O/P EST MOD 30 MIN: CPT | Performed by: UROLOGY

## 2020-10-13 PROCEDURE — 81003 URINALYSIS AUTO W/O SCOPE: CPT | Performed by: UROLOGY

## 2020-10-13 PROCEDURE — G8427 DOCREV CUR MEDS BY ELIG CLIN: HCPCS | Performed by: UROLOGY

## 2020-10-13 NOTE — PROGRESS NOTES
HISTORY OF PRESENT ILLNESS  Luc Muñiz is a 65 y.o. female. Chief Complaint   Patient presents with    Post OP Follow Up     She is s/p cystoscopy, left retrograde pyelogram, left ureteral stent exchange on 9/14/2020. Findings: left UPJ obstruction with low-lying left kidney. She is on antibiotics now for a UTI; Cipro prescribed by PCP last week for 7 day course. She was symptomatic in that she had frequency and incontinence. She still feels bad- tired and incontinent of urine. She is on oxybutynin which helps. Her UA dip positive for blood and leukocytes today (with ureteral stent). Problem List  Date Reviewed: 8/18/2020          Codes Class Noted    UPJ (ureteropelvic junction) obstruction ICD-10-CM: N13.5  ICD-9-CM: 593.4  8/18/2020    Overview Addendum 10/9/2020  2:29 PM by Lorenza Muñoz NP       She has a left sided ureteral stent managed previously by Dr. Kerwin Stephen. Stent secondary to chronic left UPJ obstruction with stent changes q 3 months. Last changed 6/2/2020. She has been having stents several years 5-6 per her recollection. She is s/p cystoscopy, left retrograde pyelogram, left ureteral stent exchange on 9/14/2020. Findings: left UPJ obstruction with low-lying left kidney. Acute cystitis ICD-10-CM: N30.00  ICD-9-CM: 595.0  8/18/2020    Overview Addendum 10/9/2020  2:30 PM by Lorenza Muñoz NP     She gets UTIs with her ureteral stents. She has been on abx. She had sepsis \"a couple years\" ago. Urine culture 8/18/2020: klebsiella oxytoca. Retained ureteral stent ICD-10-CM: Z96.0  ICD-9-CM: V43.89  8/17/2020    Overview Addendum 10/9/2020  2:28 PM by Lorenza Muñoz NP       She has a left sided ureteral stent managed previously by Dr. Kerwin Stephen. Stent secondary to chronic left UPJ obstruction with stent changes q 3 months. Last changed on 9/14/2020.              COPD (chronic obstructive pulmonary disease) (HCC) ICD-10-CM: J44.9  ICD-9-CM: 496  8/17/2020    Overview Signed 8/17/2020 10:12 AM by Mane Vitale NP     History of COPD with multiple hospitalizations due to exacerbations and hypoxia. Review of Systems   Constitutional: Positive for malaise/fatigue. HENT: Positive for hearing loss. Gastrointestinal:        Per HPI   Genitourinary:        Per HPI   All other systems reviewed and are negative. Past Medical History:   Diagnosis Date    Arthritis     Bacterial meningitis     Hx of    CAD (coronary artery disease)     Chronic cystitis 7/2/2020    Chronic kidney disease     stent in kidney due to several UTI.   Change stent every 6mos    Chronic obstructive pulmonary disease (HCC)     Chronic pain     DDD (degenerative disc disease), cervical     Diabetes ([address]     Environmental allergies     Esophageal disorder     GERD (gastroesophageal reflux disease)     Hydronephrosis 7/2/2020    Hypercholesterolemia     Hypertension     Ill-defined condition     dysphagia    Incomplete emptying of bladder 7/2/2020    Increased frequency of urination 7/2/2020    Kidney stones     hx of    Macular degeneration     Mixed stress and urge urinary incontinence 7/2/2020    Nausea & vomiting     Neurogenic bladder 7/2/2020    Nocturia 7/2/2020    Oxygen dependent     uses 2-3 liters as needed    Psychiatric disorder     Rheumatoid arthritis (HCC)     Scoliosis     Shingles     Hx of    Shortness of breath on exertion     Sleep apnea     Suicidal thoughts     pt stated during PAT visit , she has hx of suicidal thoughts age 17's, pt stated never attempted and further thoughts of suicide since and none that day    Urinary tract infection without hematuria 7/2/2020    Urinary urgency 7/2/2020      Past Surgical History:   Procedure Laterality Date    BREAST SURGERY PROCEDURE UNLISTED      COLONOSCOPY N/A 10/9/2020    COLONOSCOPY performed by Chloe Pina MD at [address] [place] [address] URETERAL STENT  5/8/2018, 7/11/2017, 2/9/2019, 10/11/2016, 6/7/2016, 1/14/2016, 7/14/2015    HX CARPAL TUNNEL RELEASE      HX DILATION AND CURETTAGE      HX JOINT REPLACEMENT SURGERY      knee both sides    HX LUMBAR FUSION      HX ORTHOPAEDIC      total knee replacements    HX ORTHOPAEDIC      back surgery     HX UROLOGICAL      stent in ureter    HX UROLOGICAL  09/14/2020    Cystoscopy    HX UROLOGICAL Left 09/14/2020    retrograde pyelogram    HX UROLOGICAL Left 09/14/2020    ureteral stent exchange    NEUROLOGICAL PROCEDURE UNLISTED      back surg     Family History   Problem Relation Age of Onset    Hypertension Mother     Heart Disease Mother     Diabetes Mother     Liver Disease Mother     Diabetes Father     Heart Disease Father     Heart Disease Brother     Diabetes Brother     COPD Brother     Liver Disease Brother     Hypertension Sister     Elevated Lipids Sister         Physical Exam  Vitals signs and nursing note reviewed. Constitutional:       Appearance: Normal appearance. HENT:      Head: Normocephalic and atraumatic. Eyes:      Extraocular Movements: Extraocular movements intact. Neck:      Musculoskeletal: Normal range of motion. Cardiovascular:      Heart sounds: Normal heart sounds. Pulmonary:      Breath sounds: Normal breath sounds. Abdominal:      Palpations: Abdomen is soft. Musculoskeletal: Normal range of motion. Comments: Walks with cane   Skin:     General: Skin is warm and dry. Neurological:      General: No focal deficit present. Mental Status: She is alert and oriented to person, place, and time. Psychiatric:         Mood and Affect: Mood normal.         Behavior: Behavior normal.         Thought Content: Thought content normal.         Judgment: Judgment normal.                     ASSESSMENT and PLAN  Diagnoses and all orders for this visit:    1.  UPJ (ureteropelvic junction) obstruction  Assessment & Plan:  She is s/p cystoscopy, left RPG and left ureteral stent change on 9/14/2020. We discussed surgical correction with a robotic left pyeloplasty. The patient was counseled on the risks, benefits and expected course of surgery. Surgery has risks of bleeding, infection, injury, pain, death or other consequences. Some specific risks of surgery were discussed as well. She will see her pulmonologist for preoperative evaluation. She wishes to proceed. 2. Retained ureteral stent  Assessment & Plan:  Retained left sided ureteral stent. Last changed 9/14/2020. She will need a stent perioperatively and hopefully remove it a few weeks postop. 3. Acute cystitis without hematuria  Assessment & Plan:  Currently on a course of antibiotics from PCP.  7 day course of Cipro. Patient reports culture was done and Cipro was prescribed. I advised the patient and daughter that diagnosis is more complicated due to an internal stent. Symptoms of a ureteral stent as well as urine dipstick signs mimic a UTI when a patient has a stent. We should go by symptoms and culture to ascertain the correct diagnosis. Send urine culture today and fungal culture. Treat as appropriate. Orders:  -     AMB POC URINALYSIS DIP STICK AUTO W/O MICRO  -     CULTURE, URINE  -     CULTURE, FUNGUS    4. Pulmonary emphysema, unspecified emphysema type ([address]  Assessment & Plan:  She is stable currently. Home O2 at night. She has tolerated her last anesthetic. Pulmonary evaluation prior to next procedure.      Orders:  -     Maddy Alatorre MD

## 2020-10-13 NOTE — ASSESSMENT & PLAN NOTE
She is stable currently. Home O2 at night. She has tolerated her last anesthetic. Pulmonary evaluation prior to next procedure.

## 2020-10-13 NOTE — ASSESSMENT & PLAN NOTE
Currently on a course of antibiotics from PCP.  7 day course of Cipro. Patient reports culture was done and Cipro was prescribed. I advised the patient and daughter that diagnosis is more complicated due to an internal stent. Symptoms of a ureteral stent as well as urine dipstick signs mimic a UTI when a patient has a stent. We should go by symptoms and culture to ascertain the correct diagnosis. Send urine culture today and fungal culture. Treat as appropriate.

## 2020-10-13 NOTE — ASSESSMENT & PLAN NOTE
Retained left sided ureteral stent. Last changed 9/14/2020. She will need a stent perioperatively and hopefully remove it a few weeks postop.

## 2020-10-13 NOTE — ASSESSMENT & PLAN NOTE
She is s/p cystoscopy, left RPG and left ureteral stent change on 9/14/2020. We discussed surgical correction with a robotic left pyeloplasty. The patient was counseled on the risks, benefits and expected course of surgery. Surgery has risks of bleeding, infection, injury, pain, death or other consequences. Some specific risks of surgery were discussed as well. She will see her pulmonologist for preoperative evaluation. She wishes to proceed.

## 2020-10-15 LAB — BACTERIA UR CULT: NORMAL

## 2020-10-20 ENCOUNTER — TELEPHONE (OUTPATIENT)
Dept: UROLOGY | Age: 70
End: 2020-10-20

## 2020-10-21 NOTE — TELEPHONE ENCOUNTER
Her culture had mixed greg 10-25K cfu/ml. This could indicated contamination or in some cases mixed bacterial infection. She should've finished her course of antibiotics now.     Her fungal urine culture has not resulted yet, and can take sometimes a month to result per Chance Tolentino at [address] [place] [address].

## 2020-10-21 NOTE — TELEPHONE ENCOUNTER
Pt left messages requesting urine culture results. Pt says she is hurting in lower part of stomach when using bathroom. She is miserable!

## 2020-10-22 ENCOUNTER — CLINICAL SUPPORT (OUTPATIENT)
Dept: UROLOGY | Age: 70
End: 2020-10-22
Payer: MEDICARE

## 2020-10-22 DIAGNOSIS — R31.9 URINARY TRACT INFECTION WITH HEMATURIA, SITE UNSPECIFIED: Primary | ICD-10-CM

## 2020-10-22 DIAGNOSIS — N39.0 URINARY TRACT INFECTION WITH HEMATURIA, SITE UNSPECIFIED: Primary | ICD-10-CM

## 2020-10-22 LAB
BILIRUB UR QL STRIP: NEGATIVE
GLUCOSE UR-MCNC: NEGATIVE MG/DL
KETONES P FAST UR STRIP-MCNC: NEGATIVE MG/DL
PH UR STRIP: 5.5 [PH] (ref 4.6–8)
PROT UR QL STRIP: NEGATIVE
SP GR UR STRIP: 1.01 (ref 1–1.03)
UA UROBILINOGEN AMB POC: NORMAL (ref 0.2–1)
URINALYSIS CLARITY POC: CLEAR
URINALYSIS COLOR POC: YELLOW
URINE BLOOD POC: NORMAL
URINE LEUKOCYTES POC: NORMAL
URINE NITRITES POC: NEGATIVE

## 2020-10-22 PROCEDURE — 81003 URINALYSIS AUTO W/O SCOPE: CPT | Performed by: UROLOGY

## 2020-10-22 RX ORDER — CIPROFLOXACIN 500 MG/1
500 TABLET ORAL 2 TIMES DAILY
Qty: 20 TAB | Refills: 0 | Status: SHIPPED | OUTPATIENT
Start: 2020-10-22 | End: 2020-11-01

## 2020-10-22 NOTE — TELEPHONE ENCOUNTER
She feels like she has a UTI; frequency, small volume voiding, dysuria, suprapubic discomfort. Some incontinence. She reports it is small volume, like she normally has when she has a UTI. She is now off of antibiotics. She will drop a urine sample off today-please send for culture and let me know. I will send RX after.

## 2020-10-23 LAB — FUNGUS SPEC CULT: ABNORMAL

## 2020-10-23 NOTE — PROGRESS NOTES
Large leukocytes; sent culture as patient is symptomatic. Treated with Cipro. Pyeloplasty is scheduled. I will have [facility] look into pulmonary evaluation.

## 2020-10-24 LAB — BACTERIA UR CULT: NORMAL

## 2020-10-27 ENCOUNTER — APPOINTMENT (OUTPATIENT)
Dept: GENERAL RADIOLOGY | Age: 70
End: 2020-10-27
Attending: EMERGENCY MEDICINE
Payer: MEDICARE

## 2020-10-27 ENCOUNTER — HOSPITAL ENCOUNTER (EMERGENCY)
Age: 70
Discharge: HOME | End: 2020-10-27
Payer: MEDICARE

## 2020-10-27 VITALS
HEART RATE: 88 BPM | RESPIRATION RATE: 20 BRPM | BODY MASS INDEX: 22.53 KG/M2 | SYSTOLIC BLOOD PRESSURE: 121 MMHG | OXYGEN SATURATION: 100 % | HEIGHT: 64 IN | TEMPERATURE: 98.3 F | DIASTOLIC BLOOD PRESSURE: 70 MMHG | WEIGHT: 132 LBS

## 2020-10-27 DIAGNOSIS — R10.2 SUPRAPUBIC PAIN: ICD-10-CM

## 2020-10-27 DIAGNOSIS — N13.5 URETERAL STRICTURE, LEFT: ICD-10-CM

## 2020-10-27 DIAGNOSIS — N30.01 ACUTE CYSTITIS WITH HEMATURIA: Primary | ICD-10-CM

## 2020-10-27 DIAGNOSIS — N30.00 ACUTE CYSTITIS WITHOUT HEMATURIA: Primary | ICD-10-CM

## 2020-10-27 LAB
ALBUMIN SERPL-MCNC: 2.9 G/DL (ref 3.5–5)
ALBUMIN/GLOB SERPL: 0.6 {RATIO} (ref 1.1–2.2)
ALP SERPL-CCNC: 100 U/L (ref 45–117)
ALT SERPL-CCNC: 14 U/L (ref 12–78)
ANION GAP SERPL CALC-SCNC: 6 MMOL/L (ref 5–15)
AST SERPL W P-5'-P-CCNC: 8 U/L (ref 15–37)
BASOPHILS # BLD: 0 K/UL (ref 0–0.1)
BASOPHILS NFR BLD: 0 % (ref 0–1)
BILIRUB SERPL-MCNC: 0.3 MG/DL (ref 0.2–1)
BUN SERPL-MCNC: 19 MG/DL (ref 6–20)
BUN/CREAT SERPL: 17 (ref 12–20)
CA-I BLD-MCNC: 9.4 MG/DL (ref 8.5–10.1)
CHLORIDE SERPL-SCNC: 102 MMOL/L (ref 97–108)
CO2 SERPL-SCNC: 28 MMOL/L (ref 21–32)
CREAT SERPL-MCNC: 1.13 MG/DL (ref 0.55–1.02)
DIFFERENTIAL METHOD BLD: ABNORMAL
EOSINOPHIL # BLD: 0 K/UL (ref 0–0.4)
EOSINOPHIL NFR BLD: 0 % (ref 0–7)
ERYTHROCYTE [DISTWIDTH] IN BLOOD BY AUTOMATED COUNT: 12.7 % (ref 11.5–14.5)
GLOBULIN SER CALC-MCNC: 4.5 G/DL (ref 2–4)
GLUCOSE SERPL-MCNC: 148 MG/DL (ref 65–100)
HCT VFR BLD AUTO: 38.1 % (ref 35–47)
HGB BLD-MCNC: 12.5 G/DL (ref 11.5–16)
IMM GRANULOCYTES # BLD AUTO: 0 K/UL (ref 0–0.04)
IMM GRANULOCYTES NFR BLD AUTO: 0 % (ref 0–0.5)
LACTATE SERPL-SCNC: 1.1 MMOL/L (ref 0.4–2)
LYMPHOCYTES # BLD: 1.5 K/UL (ref 0.8–3.5)
LYMPHOCYTES NFR BLD: 18 % (ref 12–49)
MCH RBC QN AUTO: 30.9 PG (ref 26–34)
MCHC RBC AUTO-ENTMCNC: 32.8 G/DL (ref 30–36.5)
MCV RBC AUTO: 94.3 FL (ref 80–99)
MONOCYTES # BLD: 1.2 K/UL (ref 0–1)
MONOCYTES NFR BLD: 14 % (ref 5–13)
NEUTS SEG # BLD: 5.7 K/UL (ref 1.8–8)
NEUTS SEG NFR BLD: 68 % (ref 32–75)
PLATELET # BLD AUTO: 304 K/UL (ref 150–400)
PMV BLD AUTO: 11.5 FL (ref 8.9–12.9)
POTASSIUM SERPL-SCNC: 4.7 MMOL/L (ref 3.5–5.1)
PROT SERPL-MCNC: 7.4 G/DL (ref 6.4–8.2)
RBC # BLD AUTO: 4.04 M/UL (ref 3.8–5.2)
SODIUM SERPL-SCNC: 136 MMOL/L (ref 136–145)
WBC # BLD AUTO: 8.4 K/UL (ref 3.6–11)

## 2020-10-27 PROCEDURE — 74011250636 HC RX REV CODE- 250/636: Performed by: EMERGENCY MEDICINE

## 2020-10-27 PROCEDURE — 83605 ASSAY OF LACTIC ACID: CPT

## 2020-10-27 PROCEDURE — 96374 THER/PROPH/DIAG INJ IV PUSH: CPT

## 2020-10-27 PROCEDURE — 99284 EMERGENCY DEPT VISIT MOD MDM: CPT

## 2020-10-27 PROCEDURE — 85025 COMPLETE CBC W/AUTO DIFF WBC: CPT

## 2020-10-27 PROCEDURE — 74011000258 HC RX REV CODE- 258: Performed by: PHYSICIAN ASSISTANT

## 2020-10-27 PROCEDURE — 80053 COMPREHEN METABOLIC PANEL: CPT

## 2020-10-27 PROCEDURE — 71045 X-RAY EXAM CHEST 1 VIEW: CPT

## 2020-10-27 PROCEDURE — 87040 BLOOD CULTURE FOR BACTERIA: CPT

## 2020-10-27 PROCEDURE — 36415 COLL VENOUS BLD VENIPUNCTURE: CPT

## 2020-10-27 PROCEDURE — 74011250636 HC RX REV CODE- 250/636: Performed by: PHYSICIAN ASSISTANT

## 2020-10-27 RX ORDER — FLUCONAZOLE 200 MG/1
200 TABLET ORAL DAILY
Qty: 7 TAB | Refills: 0 | Status: SHIPPED | OUTPATIENT
Start: 2020-10-27 | End: 2020-11-03

## 2020-10-27 RX ADMIN — CEFTRIAXONE 1 G: 1 INJECTION, POWDER, FOR SOLUTION INTRAMUSCULAR; INTRAVENOUS at 19:13

## 2020-10-27 RX ADMIN — SODIUM CHLORIDE 500 ML: 9 INJECTION, SOLUTION INTRAVENOUS at 19:17

## 2020-10-27 NOTE — ED TRIAGE NOTES
Pt states x1 week she's had fevers, general malaise, decreased urination. Being treated for UTI and seen by Dr. Julio Wilkinson for a \"blocked kidney\".

## 2020-10-28 NOTE — ED PROVIDER NOTES
EMERGENCY DEPARTMENT HISTORY AND PHYSICAL EXAM      Date: 10/27/2020  Patient Name: Danette Godoy    History of Presenting Illness     Chief Complaint   Patient presents with    Fever    Bladder Infection       History Provided By: Patient and patient's neice    HPI: Danette Godoy, 65 y.o. female with a past medical history significant diabetes and COPD, Anxiety, CKD, left ureteral stricture with frequent stent placement presents to the ED with cc of intermittent fever, generalized weakness, body aches x1 week. Family at bedside also reporting some mild confusion with high fevers at home. Fevers treated with anti-pyretics at home. Patient is currently taking Cipro for a diagnosed UTI. This was diagnosed by her urologist, Dr. Rosario Deng. She has a longstanding urological history to include left-sided ureteral stricture and stenosis with multiple stent placements and replacements. She states that Dr. Rosario Deng may need to perform a surgery soon. She was also called this morning to be told that yeast was found in her urine. Dr. Rosario Deng called in 1 week of Diflucan. Her family became concerned today when she continued to state that she did not feel well. Patient tachycardic and slightly hypotensive on arrival.  She states that her blood pressure is always low. Associated symptoms include suprapubic pressure. She specifically denies chest pain, shortness of breath, recent travel, URI symptoms, syncope, nausea, vomiting, diarrhea. There are no other complaints, changes, or physical findings at this time. PCP: Patrice Gonzales MD    Current Outpatient Medications   Medication Sig Dispense Refill    fluconazole (DIFLUCAN) 200 mg tablet Take 1 Tab by mouth daily for 7 days. FDA advises cautious prescribing of oral fluconazole in pregnancy. 7 Tab 0    ciprofloxacin HCl (CIPRO) 500 mg tablet Take 1 Tab by mouth two (2) times a day for 10 days.  20 Tab 0    insulin lispro (HUMALOG) 100 unit/mL injection by SubCUTAneous route. 5 units before lunch, 8 units before supper and 3 units before breakfast      calcium-cholecalciferol, d3, (CALCIUM 600 + D) 600-125 mg-unit tab Take 1 Tab by mouth daily.  clonazePAM (KlonoPIN) 0.5 mg tablet Take  by mouth nightly as needed for Anxiety.  oxybutynin (DITROPAN) 5 mg tablet Take 5 mg by mouth three (3) times daily.  atorvastatin (LIPITOR) 20 mg tablet Take  by mouth daily.  aspirin delayed-release 81 mg tablet Take  by mouth daily.  insulin lispro protamine/insulin lispro (HumaLOG Mix 50-50 Insuln U-100) 100 unit/mL (50-50) injection by SubCUTAneous route.  furosemide (LASIX) 20 mg tablet Take 20 mg by mouth daily.  FLUoxetine (PROZAC) 40 mg capsule Take 40 mg by mouth daily.  montelukast (SINGULAIR) 10 mg tablet Take 10 mg by mouth daily.  pramipexole (MIRAPEX) 0.5 mg tablet Take 0.5 mg by mouth three (3) times daily.  DULoxetine (CYMBALTA) 20 mg capsule Take 20 mg by mouth daily.  albuterol (PROVENTIL HFA, VENTOLIN HFA, PROAIR HFA) 90 mcg/actuation inhaler Take 1 Puff by inhalation.  ipratropium-albuterol (COMBIVENT)  mcg/actuation inhaler Take 1 Puff by inhalation every six (6) hours as needed for Wheezing or Shortness of Breath. Indications: CHRONIC OBSTRUCTIVE PULMONARY DISEASE WITH BRONCHOSPASMS      Biotin 2,500 mcg cap Take 5,000 mcg/day by mouth daily. Past History     Past Medical History:  Past Medical History:   Diagnosis Date    Arthritis     Bacterial meningitis     Hx of    CAD (coronary artery disease)     Chronic cystitis 7/2/2020    Chronic kidney disease     stent in kidney due to several UTI.   Change stent every 6mos    Chronic obstructive pulmonary disease (HCC)     Chronic pain     DDD (degenerative disc disease), cervical     Diabetes ([facility] [address]     Environmental allergies     Esophageal disorder     GERD (gastroesophageal reflux disease)     Hydronephrosis 7/2/2020    Hypercholesterolemia     Hypertension     Ill-defined condition     dysphagia    Incomplete emptying of bladder 7/2/2020    Increased frequency of urination 7/2/2020    Kidney stones     hx of    Macular degeneration     Mixed stress and urge urinary incontinence 7/2/2020    Nausea & vomiting     Neurogenic bladder 7/2/2020    Nocturia 7/2/2020    Oxygen dependent     uses 2-3 liters as needed    Psychiatric disorder     Rheumatoid arthritis ([address]     Scoliosis     Shingles     Hx of    Shortness of breath on exertion     Sleep apnea     Suicidal thoughts     pt stated during PAT visit , she has hx of suicidal thoughts age 17's, pt stated never attempted and further thoughts of suicide since and none that day    Urinary tract infection without hematuria 7/2/2020    Urinary urgency 7/2/2020       Past Surgical History:  Past Surgical History:   Procedure Laterality Date    BREAST SURGERY PROCEDURE UNLISTED      COLONOSCOPY N/A 10/9/2020    COLONOSCOPY performed by Azucena Cole MD at [address] [place] [address]  5/8/2018, 7/11/2017, 2/9/2019, 10/11/2016, 6/7/2016, 1/14/2016, 7/14/2015    HX CARPAL TUNNEL RELEASE      HX DILATION AND CURETTAGE      HX JOINT REPLACEMENT SURGERY      knee both sides    HX LUMBAR FUSION      HX ORTHOPAEDIC      total knee replacements    HX ORTHOPAEDIC      back surgery     HX UROLOGICAL      stent in ureter    HX UROLOGICAL  09/14/2020    Cystoscopy    HX UROLOGICAL Left 09/14/2020    retrograde pyelogram    HX UROLOGICAL Left 09/14/2020    ureteral stent exchange    NEUROLOGICAL PROCEDURE UNLISTED      back surg       Family History:  Family History   Problem Relation Age of Onset    Hypertension Mother     Heart Disease Mother     Diabetes Mother     Liver Disease Mother     Diabetes Father     Heart Disease Father     Heart Disease Brother     Diabetes Brother     COPD Brother     Liver Disease Brother     Hypertension Sister     Elevated Lipids Sister        Social History:  Social History     Tobacco Use    Smoking status: Former Smoker     Years: 55.00     Last attempt to quit: 5/4/2005     Years since quitting: 15.4    Smokeless tobacco: Never Used   Substance Use Topics    Alcohol use: Yes     Comment: special occasions    Drug use: Never       Allergies: Allergies   Allergen Reactions    Codeine Other (comments)     hallucinations         Review of Systems   Review of Systems   Constitutional: Positive for activity change, fatigue and fever. Negative for chills. Body aches   HENT: Negative for congestion, ear pain, rhinorrhea, sneezing and sore throat. Eyes: Negative for pain and visual disturbance. Respiratory: Negative for cough and shortness of breath. Cardiovascular: Negative for chest pain. Gastrointestinal: Positive for abdominal pain. Negative for diarrhea, nausea and vomiting. Genitourinary: Positive for difficulty urinating and dysuria. Negative for flank pain and hematuria. Musculoskeletal: Negative for gait problem. Skin: Negative for rash. Neurological: Positive for weakness. Negative for speech difficulty and headaches. Psychiatric/Behavioral: The patient is not nervous/anxious. All other systems reviewed and are negative. Physical Exam   Physical Exam  Vitals signs and nursing note reviewed. Constitutional:       General: She is not in acute distress. Appearance: Normal appearance. She is not toxic-appearing. HENT:      Head: Normocephalic and atraumatic. Nose: Nose normal.      Mouth/Throat:      Mouth: Mucous membranes are moist.   Eyes:      Extraocular Movements: Extraocular movements intact. Conjunctiva/sclera: Conjunctivae normal.      Pupils: Pupils are equal, round, and reactive to light. Neck:      Musculoskeletal: Normal range of motion. Cardiovascular:      Rate and Rhythm: Normal rate. Pulses: Normal pulses.       Heart sounds: Normal heart sounds. Pulmonary:      Effort: Pulmonary effort is normal. No respiratory distress. Breath sounds: Normal breath sounds. Abdominal:      General: Bowel sounds are normal. There is distension. Palpations: Abdomen is soft. Tenderness: There is abdominal tenderness in the suprapubic area. There is no right CVA tenderness, left CVA tenderness or guarding. Comments: Mild LLQ tenderness to palpation without rebound or guarding   Musculoskeletal: Normal range of motion. General: No deformity or signs of injury. Skin:     General: Skin is warm and dry. Capillary Refill: Capillary refill takes less than 2 seconds. Findings: No rash. Neurological:      General: No focal deficit present. Mental Status: She is alert and oriented to person, place, and time. Cranial Nerves: No cranial nerve deficit. Psychiatric:         Mood and Affect: Mood normal.         Diagnostic Study Results     Labs -     Recent Results (from the past 48 hour(s))   CBC WITH AUTOMATED DIFF    Collection Time: 10/27/20  7:30 PM   Result Value Ref Range    WBC 8.4 3.6 - 11.0 K/uL    RBC 4.04 3.80 - 5.20 M/uL    HGB 12.5 11.5 - 16.0 g/dL    HCT 38.1 35.0 - 47.0 %    MCV 94.3 80.0 - 99.0 FL    MCH 30.9 26.0 - 34.0 PG    MCHC 32.8 30.0 - 36.5 g/dL    RDW 12.7 11.5 - 14.5 %    PLATELET 527 887 - 867 K/uL    MPV 11.5 8.9 - 12.9 FL    NEUTROPHILS 68 32 - 75 %    LYMPHOCYTES 18 12 - 49 %    MONOCYTES 14 (H) 5 - 13 %    EOSINOPHILS 0 0 - 7 %    BASOPHILS 0 0 - 1 %    IMMATURE GRANULOCYTES 0 0.0 - 0.5 %    ABS. NEUTROPHILS 5.7 1.8 - 8.0 K/UL    ABS. LYMPHOCYTES 1.5 0.8 - 3.5 K/UL    ABS. MONOCYTES 1.2 (H) 0.0 - 1.0 K/UL    ABS. EOSINOPHILS 0.0 0.0 - 0.4 K/UL    ABS. BASOPHILS 0.0 0.0 - 0.1 K/UL    ABS. IMM.  GRANS. 0.0 0.00 - 0.04 K/UL    DF AUTOMATED     METABOLIC PANEL, COMPREHENSIVE    Collection Time: 10/27/20  7:30 PM   Result Value Ref Range    Sodium 136 136 - 145 mmol/L Potassium 4.7 3.5 - 5.1 mmol/L    Chloride 102 97 - 108 mmol/L    CO2 28 21 - 32 mmol/L    Anion gap 6 5 - 15 mmol/L    Glucose 148 (H) 65 - 100 mg/dL    BUN 19 6 - 20 mg/dL    Creatinine 1.13 (H) 0.55 - 1.02 mg/dL    BUN/Creatinine ratio 17 12 - 20      GFR est AA 57 (L) >60 ml/min/1.73m2    GFR est non-AA 47 (L) >60 ml/min/1.73m2    Calcium 9.4 8.5 - 10.1 mg/dL    Bilirubin, total 0.3 0.2 - 1.0 mg/dL    AST (SGOT) 8 (L) 15 - 37 U/L    ALT (SGPT) 14 12 - 78 U/L    Alk. phosphatase 100 45 - 117 U/L    Protein, total 7.4 6.4 - 8.2 g/dL    Albumin 2.9 (L) 3.5 - 5.0 g/dL    Globulin 4.5 (H) 2.0 - 4.0 g/dL    A-G Ratio 0.6 (L) 1.1 - 2.2     LACTIC ACID    Collection Time: 10/27/20  7:30 PM   Result Value Ref Range    Lactic acid 1.1 0.4 - 2.0 mmol/L       Radiologic Studies -   XR Results (most recent):  Results from Hospital Encounter encounter on 10/27/20   XR CHEST SNGL V    Narrative 1 view comparison July 23    Essentially clear lungs. No effusion or pneumothorax. Normal cardiomediastinal      CT Results  (Last 48 hours)    None            Medical Decision Making and ED Course   I am the first provider for this patient. I reviewed the vital signs, available nursing notes, past medical history, past surgical history, family history and social history. Vital Signs-Reviewed the patient's vital signs.   Patient Vitals for the past 12 hrs:   Temp Pulse Resp BP SpO2   10/27/20 2242 98.3 °F (36.8 °C) 88 -- 121/70 100 %   10/27/20 1830 99 °F (37.2 °C) 90 20 124/75 100 %   10/27/20 1716 98.3 °F (36.8 °C) (!) 112 22 (!) 98/56 99 %       Records Reviewed: Nursing Notes, Old Medical Records and Previous Laboratory Studies    Provider Notes (Medical Decision Making):       MDM  Number of Diagnoses or Management Options  Acute cystitis without hematuria:   Suprapubic pain:   Ureteral stricture, left:   Diagnosis management comments: DDX: Sepsis, bacteremia, failure of outpatient treatment, worsening cystitis, hydronephrosis, pyelonephritis, constipation    Patient presented with mild hypotension, tachycardia, and reports of recent fevers and generalized weakness. Currently being treated for UTI outpatient. Urine culture resulted. Extensive urologic hx. No other signs or sxs of infection. No leukocytosis. No known sick contacts. Lactic acid without signs of end organ damage. Patient and family members agreeable to discharge home with close f/u with Dr. Vale Proctor. Close return precautions given to include fevers not amendable to anti-pyretics, worsening sxs, vomiting, worsening abdominal pain, severe hypotension. Patient and family voice understanding. Patient was provided with dose of Rocephin prior to d/c. Amount and/or Complexity of Data Reviewed  Clinical lab tests: ordered and reviewed  Tests in the radiology section of CPT®: ordered and reviewed  Review and summarize past medical records: yes          ED Course:   Initial assessment performed. The patients presenting problems have been discussed, and they are in agreement with the care plan formulated and outlined with them. I have encouraged them to ask questions as they arise throughout their visit. Consult Note:  10:11 PM  Cesar Juarez PA-C spoke with Dr. Maurilio Willams  Specialty: Internal Medicine  Discussed pt's hx, disposition, and available diagnostic and imaging results. Reviewed care plans. Dr. Maurilio Willams advises that without a white count and with improvement of sxs and VS after IV fluids, she feels the patient is safe for discharge with close follow up with Dr. Vale Proctor outpatient. Patient is no longer taking Lasix. We reviewed the patient's urine culture which revealed mixed greg. Will have her continue Abx as prescribed by Dr. Vale Proctor. Procedures       Cesar Juarez PA-C    Procedures     Cesar Juarez PA-C        Disposition       Discharged      DISCHARGE PLAN:  1.    Discharge Medication List as of 10/27/2020 10:33 PM        Continue Cipro and Diflucan     2. Follow-up Information     Follow up With Specialties Details Why Contact Info    Catrachito Hammer MD Urology Schedule an appointment as soon as possible for a visit for follow up from ER visit [facility] [id number] [phone]      [address] [place] [address] DEPT Emergency Medicine  As needed, If symptoms worsen [address] [facility] [address] [phone]        3. Return to ED if worse     Diagnosis     Clinical Impression:   1. Acute cystitis without hematuria    2. Suprapubic pain    3. Ureteral stricture, left        Attestations:    Salazar Alicea PA-C    Please note that this dictation was completed with [employer], the computer voice recognition software. Quite often unanticipated grammatical, syntax, homophones, and other interpretive errors are inadvertently transcribed by the computer software. Please disregard these errors. Please excuse any errors that have escaped final proofreading. Thank you.

## 2020-10-28 NOTE — PROGRESS NOTES
Reviewed; fungal culture positive. Discussed results with patient who stopped by clinic on 10/27/2020. Reported fevers, feeling bad, possible hallucinations- recommended that she be seen and formally evaluated in the ER.

## 2020-10-28 NOTE — DISCHARGE INSTRUCTIONS

## 2020-10-29 ENCOUNTER — HOSPITAL ENCOUNTER (OUTPATIENT)
Dept: PREADMISSION TESTING | Age: 70
Discharge: HOME | End: 2020-10-29
Payer: MEDICARE

## 2020-10-29 LAB — SARS-COV-2, COV2: NORMAL

## 2020-10-29 PROCEDURE — 87635 SARS-COV-2 COVID-19 AMP PRB: CPT

## 2020-10-31 LAB — SARS-COV-2, COV2NT: NOT DETECTED

## 2020-11-02 ENCOUNTER — HOSPITAL ENCOUNTER (OUTPATIENT)
Dept: PULMONOLOGY | Age: 70
Discharge: HOME | End: 2020-11-02
Payer: MEDICARE

## 2020-11-02 PROCEDURE — 94727 GAS DIL/WSHOT DETER LNG VOL: CPT

## 2020-11-02 PROCEDURE — 94729 DIFFUSING CAPACITY: CPT

## 2020-11-02 PROCEDURE — 94060 EVALUATION OF WHEEZING: CPT

## 2020-11-04 LAB
BACTERIA SPEC CULT: NORMAL
SPECIAL REQUESTS,SREQ: NORMAL

## 2020-11-05 ENCOUNTER — HOSPITAL ENCOUNTER (OUTPATIENT)
Dept: GENERAL RADIOLOGY | Age: 70
Discharge: HOME | End: 2020-11-05
Attending: UROLOGY
Payer: MEDICARE

## 2020-11-05 ENCOUNTER — HOSPITAL ENCOUNTER (OUTPATIENT)
Dept: PREADMISSION TESTING | Age: 70
Discharge: HOME | End: 2020-11-05
Payer: MEDICARE

## 2020-11-05 VITALS
WEIGHT: 131.17 LBS | SYSTOLIC BLOOD PRESSURE: 126 MMHG | RESPIRATION RATE: 18 BRPM | OXYGEN SATURATION: 98 % | HEART RATE: 78 BPM | TEMPERATURE: 97.3 F | HEIGHT: 63 IN | BODY MASS INDEX: 23.24 KG/M2 | DIASTOLIC BLOOD PRESSURE: 84 MMHG

## 2020-11-05 LAB
ABO + RH BLD: NORMAL
ANION GAP SERPL CALC-SCNC: 4 MMOL/L (ref 5–15)
APPEARANCE UR: ABNORMAL
BACTERIA URNS QL MICRO: ABNORMAL /HPF
BILIRUB UR QL: NEGATIVE
BLOOD GROUP ANTIBODIES SERPL: NEGATIVE
BUN SERPL-MCNC: 22 MG/DL (ref 6–20)
BUN/CREAT SERPL: 22 (ref 12–20)
CA-I BLD-MCNC: 9.2 MG/DL (ref 8.5–10.1)
CHLORIDE SERPL-SCNC: 102 MMOL/L (ref 97–108)
CO2 SERPL-SCNC: 32 MMOL/L (ref 21–32)
COLOR UR: ABNORMAL
CREAT SERPL-MCNC: 1.02 MG/DL (ref 0.55–1.02)
ERYTHROCYTE [DISTWIDTH] IN BLOOD BY AUTOMATED COUNT: 13 % (ref 11.5–14.5)
GLUCOSE SERPL-MCNC: 118 MG/DL (ref 65–100)
GLUCOSE UR STRIP.AUTO-MCNC: NEGATIVE MG/DL
HCT VFR BLD AUTO: 39.7 % (ref 35–47)
HGB BLD-MCNC: 12.5 G/DL (ref 11.5–16)
HGB UR QL STRIP: ABNORMAL
KETONES UR QL STRIP.AUTO: NEGATIVE MG/DL
LEUKOCYTE ESTERASE UR QL STRIP.AUTO: ABNORMAL
MCH RBC QN AUTO: 30 PG (ref 26–34)
MCHC RBC AUTO-ENTMCNC: 31.5 G/DL (ref 30–36.5)
MCV RBC AUTO: 95.4 FL (ref 80–99)
NITRITE UR QL STRIP.AUTO: NEGATIVE
PH UR STRIP: 5 [PH] (ref 5–8)
PLATELET # BLD AUTO: 387 K/UL (ref 150–400)
PMV BLD AUTO: 10.2 FL (ref 8.9–12.9)
POTASSIUM SERPL-SCNC: 5.1 MMOL/L (ref 3.5–5.1)
PROT UR STRIP-MCNC: 30 MG/DL
RBC # BLD AUTO: 4.16 M/UL (ref 3.8–5.2)
RBC #/AREA URNS HPF: ABNORMAL /HPF (ref 0–5)
SARS-COV-2, COV2: NORMAL
SODIUM SERPL-SCNC: 138 MMOL/L (ref 136–145)
SP GR UR REFRACTOMETRY: 1.01 (ref 1–1.03)
SPECIMEN EXP DATE BLD: NORMAL
UROBILINOGEN UR QL STRIP.AUTO: 0.1 EU/DL (ref 0.1–1)
WBC # BLD AUTO: 7.6 K/UL (ref 3.6–11)
WBC URNS QL MICRO: >100 /HPF (ref 0–4)
YEAST URNS QL MICRO: PRESENT

## 2020-11-05 PROCEDURE — 80048 BASIC METABOLIC PNL TOTAL CA: CPT

## 2020-11-05 PROCEDURE — 87635 SARS-COV-2 COVID-19 AMP PRB: CPT

## 2020-11-05 PROCEDURE — 85027 COMPLETE CBC AUTOMATED: CPT

## 2020-11-05 PROCEDURE — 81001 URINALYSIS AUTO W/SCOPE: CPT

## 2020-11-05 PROCEDURE — 86900 BLOOD TYPING SEROLOGIC ABO: CPT

## 2020-11-05 PROCEDURE — 87086 URINE CULTURE/COLONY COUNT: CPT

## 2020-11-05 PROCEDURE — 71046 X-RAY EXAM CHEST 2 VIEWS: CPT

## 2020-11-05 PROCEDURE — 36415 COLL VENOUS BLD VENIPUNCTURE: CPT

## 2020-11-05 RX ORDER — ASPIRIN 325 MG
TABLET, DELAYED RELEASE (ENTERIC COATED) ORAL
COMMUNITY
End: 2021-09-04

## 2020-11-05 NOTE — PROGRESS NOTES
She has ureteral stents, on abx and antifungal.  Please review and let me know if you want additional treatment.

## 2020-11-05 NOTE — PERIOP NOTES
[address] Dr[address] Shant Nova office called, spoke with Claudette Osuna, RNA, made aware pt stated she stopped her apsirin, but could not remember how long she has held it, Keily to verify length of time pt to hold aspirin and call patient to review instructions, also made aware of urinalysis result and urine culture done today, Rashid Kimble stated Dr. Archer Prom will review lab results.

## 2020-11-07 LAB — SARS-COV-2, COV2NT: NOT DETECTED

## 2020-11-08 LAB
BACTERIA SPEC CULT: NORMAL
SPECIAL REQUESTS,SREQ: NORMAL

## 2020-11-09 ENCOUNTER — ANESTHESIA EVENT (OUTPATIENT)
Dept: SURGERY | Age: 70
End: 2020-11-09
Payer: MEDICARE

## 2020-11-09 ENCOUNTER — ANESTHESIA (OUTPATIENT)
Dept: SURGERY | Age: 70
End: 2020-11-09
Payer: MEDICARE

## 2020-11-09 ENCOUNTER — HOSPITAL ENCOUNTER (OUTPATIENT)
Age: 70
Discharge: HOME | End: 2020-11-10
Attending: UROLOGY | Admitting: UROLOGY
Payer: MEDICARE

## 2020-11-09 ENCOUNTER — APPOINTMENT (OUTPATIENT)
Dept: GENERAL RADIOLOGY | Age: 70
End: 2020-11-09
Attending: UROLOGY
Payer: MEDICARE

## 2020-11-09 DIAGNOSIS — Z96.0 RETAINED URETERAL STENT: ICD-10-CM

## 2020-11-09 DIAGNOSIS — N30.00 ACUTE CYSTITIS WITHOUT HEMATURIA: ICD-10-CM

## 2020-11-09 DIAGNOSIS — N13.5 UPJ (URETEROPELVIC JUNCTION) OBSTRUCTION: ICD-10-CM

## 2020-11-09 DIAGNOSIS — N13.5 STRICTURE OR KINKING OF URETER: Primary | ICD-10-CM

## 2020-11-09 LAB
APPEARANCE UR: ABNORMAL
BACTERIA URNS QL MICRO: NEGATIVE /HPF
BILIRUB UR QL: NEGATIVE
COLOR UR: ABNORMAL
ERYTHROCYTE [DISTWIDTH] IN BLOOD BY AUTOMATED COUNT: 13.4 % (ref 11.5–14.5)
GLUCOSE BLD STRIP.AUTO-MCNC: 142 MG/DL (ref 65–100)
GLUCOSE BLD STRIP.AUTO-MCNC: 153 MG/DL (ref 65–100)
GLUCOSE BLD STRIP.AUTO-MCNC: 204 MG/DL (ref 65–100)
GLUCOSE BLD STRIP.AUTO-MCNC: 413 MG/DL (ref 65–100)
GLUCOSE UR STRIP.AUTO-MCNC: NEGATIVE MG/DL
HCT VFR BLD AUTO: 38.6 % (ref 35–47)
HGB BLD-MCNC: 12 G/DL (ref 11.5–16)
HGB UR QL STRIP: ABNORMAL
KETONES UR QL STRIP.AUTO: NEGATIVE MG/DL
LEUKOCYTE ESTERASE UR QL STRIP.AUTO: ABNORMAL
MCH RBC QN AUTO: 30.2 PG (ref 26–34)
MCHC RBC AUTO-ENTMCNC: 31.1 G/DL (ref 30–36.5)
MCV RBC AUTO: 97 FL (ref 80–99)
NITRITE UR QL STRIP.AUTO: NEGATIVE
PERFORMED BY, TECHID: ABNORMAL
PH UR STRIP: 5 [PH] (ref 5–8)
PLATELET # BLD AUTO: 310 K/UL (ref 150–400)
PMV BLD AUTO: 10.9 FL (ref 8.9–12.9)
PROT UR STRIP-MCNC: NEGATIVE MG/DL
RBC # BLD AUTO: 3.98 M/UL (ref 3.8–5.2)
RBC #/AREA URNS HPF: ABNORMAL /HPF (ref 0–5)
SP GR UR REFRACTOMETRY: <1.005 (ref 1–1.03)
UROBILINOGEN UR QL STRIP.AUTO: 0.1 EU/DL (ref 0.1–1)
WBC # BLD AUTO: 10 K/UL (ref 3.6–11)
WBC URNS QL MICRO: >100 /HPF (ref 0–4)

## 2020-11-09 PROCEDURE — 77030016151 HC PROTCTR LNS DFOG COVD -B: Performed by: UROLOGY

## 2020-11-09 PROCEDURE — 74011250636 HC RX REV CODE- 250/636: Performed by: NURSE ANESTHETIST, CERTIFIED REGISTERED

## 2020-11-09 PROCEDURE — 74011250636 HC RX REV CODE- 250/636: Performed by: UROLOGY

## 2020-11-09 PROCEDURE — 76210000016 HC OR PH I REC 1 TO 1.5 HR: Performed by: UROLOGY

## 2020-11-09 PROCEDURE — 85027 COMPLETE CBC AUTOMATED: CPT

## 2020-11-09 PROCEDURE — 77030005515 HC CATH URETH FOL14 BARD -B: Performed by: UROLOGY

## 2020-11-09 PROCEDURE — 74011250637 HC RX REV CODE- 250/637: Performed by: ANESTHESIOLOGY

## 2020-11-09 PROCEDURE — 2709999900 HC NON-CHARGEABLE SUPPLY: Performed by: UROLOGY

## 2020-11-09 PROCEDURE — 74011250637 HC RX REV CODE- 250/637: Performed by: UROLOGY

## 2020-11-09 PROCEDURE — 77030008606 HC TRCR ENDOSC KII AMR -B: Performed by: UROLOGY

## 2020-11-09 PROCEDURE — 77030020703 HC SEAL CANN DISP INTU -B: Performed by: UROLOGY

## 2020-11-09 PROCEDURE — C1758 CATHETER, URETERAL: HCPCS | Performed by: UROLOGY

## 2020-11-09 PROCEDURE — C1769 GUIDE WIRE: HCPCS | Performed by: UROLOGY

## 2020-11-09 PROCEDURE — 74011000636 HC RX REV CODE- 636: Performed by: UROLOGY

## 2020-11-09 PROCEDURE — 77030002935 HC SUT MCRYL J&J -C: Performed by: UROLOGY

## 2020-11-09 PROCEDURE — 81001 URINALYSIS AUTO W/SCOPE: CPT

## 2020-11-09 PROCEDURE — 77030008518 HC TBNG INSUF ENDO STRY -B: Performed by: UROLOGY

## 2020-11-09 PROCEDURE — 77030031139 HC SUT VCRL2 J&J -A: Performed by: UROLOGY

## 2020-11-09 PROCEDURE — 87086 URINE CULTURE/COLONY COUNT: CPT

## 2020-11-09 PROCEDURE — C2617 STENT, NON-COR, TEM W/O DEL: HCPCS | Performed by: UROLOGY

## 2020-11-09 PROCEDURE — 76000 FLUOROSCOPY <1 HR PHYS/QHP: CPT

## 2020-11-09 PROCEDURE — 77030010507 HC ADH SKN DERMBND J&J -B: Performed by: UROLOGY

## 2020-11-09 PROCEDURE — 84132 ASSAY OF SERUM POTASSIUM: CPT

## 2020-11-09 PROCEDURE — 74011000250 HC RX REV CODE- 250: Performed by: NURSE ANESTHETIST, CERTIFIED REGISTERED

## 2020-11-09 PROCEDURE — 77030018813 HC SCIS LAPSCP EPIX DISP AMR -B: Performed by: UROLOGY

## 2020-11-09 PROCEDURE — 74011000258 HC RX REV CODE- 258: Performed by: NURSE ANESTHETIST, CERTIFIED REGISTERED

## 2020-11-09 PROCEDURE — 77030041703 HC SLV COMPR DVT ARJO -B: Performed by: UROLOGY

## 2020-11-09 PROCEDURE — 74011250636 HC RX REV CODE- 250/636: Performed by: ANESTHESIOLOGY

## 2020-11-09 PROCEDURE — 77030003578 HC NDL INSUF VERES AMR -B: Performed by: UROLOGY

## 2020-11-09 PROCEDURE — 82962 GLUCOSE BLOOD TEST: CPT

## 2020-11-09 PROCEDURE — 82803 BLOOD GASES ANY COMBINATION: CPT

## 2020-11-09 PROCEDURE — 77030002933 HC SUT MCRYL J&J -A: Performed by: UROLOGY

## 2020-11-09 PROCEDURE — 52332 CYSTOSCOPY AND TREATMENT: CPT | Performed by: UROLOGY

## 2020-11-09 PROCEDURE — 77030010935 HC CLP LIG ABSRB TELE -B: Performed by: UROLOGY

## 2020-11-09 PROCEDURE — 76060000037 HC ANESTHESIA 3 TO 3.5 HR: Performed by: UROLOGY

## 2020-11-09 PROCEDURE — 50544 LAPAROSCOPY PYELOPLASTY: CPT | Performed by: UROLOGY

## 2020-11-09 PROCEDURE — 74011636637 HC RX REV CODE- 636/637: Performed by: UROLOGY

## 2020-11-09 PROCEDURE — 88305 TISSUE EXAM BY PATHOLOGIST: CPT

## 2020-11-09 PROCEDURE — 76010000878 HC OR TIME 3 TO 3.5HR INTENSV - TIER 2: Performed by: UROLOGY

## 2020-11-09 PROCEDURE — 77030035489 HC REDUCR CANN ENDOWR INTU -C: Performed by: UROLOGY

## 2020-11-09 PROCEDURE — 74011000272 HC RX REV CODE- 272: Performed by: UROLOGY

## 2020-11-09 PROCEDURE — 36415 COLL VENOUS BLD VENIPUNCTURE: CPT

## 2020-11-09 PROCEDURE — 77030008756 HC TU IRR SUC STRY -B: Performed by: UROLOGY

## 2020-11-09 PROCEDURE — 74011000258 HC RX REV CODE- 258: Performed by: UROLOGY

## 2020-11-09 PROCEDURE — S2900 ROBOTIC SURGICAL SYSTEM: HCPCS | Performed by: UROLOGY

## 2020-11-09 DEVICE — IMPLANTABLE DEVICE: Type: IMPLANTABLE DEVICE | Site: URETER | Status: FUNCTIONAL

## 2020-11-09 RX ORDER — SODIUM CHLORIDE 9 MG/ML
INJECTION, SOLUTION INTRAVENOUS
Status: COMPLETED | OUTPATIENT
Start: 2020-11-09 | End: 2020-11-09

## 2020-11-09 RX ORDER — FENTANYL CITRATE 50 UG/ML
50 INJECTION, SOLUTION INTRAMUSCULAR; INTRAVENOUS
Status: DISCONTINUED | OUTPATIENT
Start: 2020-11-09 | End: 2020-11-09 | Stop reason: HOSPADM

## 2020-11-09 RX ORDER — ATORVASTATIN CALCIUM 20 MG/1
20 TABLET, FILM COATED ORAL DAILY
Status: DISCONTINUED | OUTPATIENT
Start: 2020-11-10 | End: 2020-11-10 | Stop reason: HOSPADM

## 2020-11-09 RX ORDER — MIDAZOLAM HYDROCHLORIDE 1 MG/ML
INJECTION, SOLUTION INTRAMUSCULAR; INTRAVENOUS AS NEEDED
Status: DISCONTINUED | OUTPATIENT
Start: 2020-11-09 | End: 2020-11-09 | Stop reason: HOSPADM

## 2020-11-09 RX ORDER — PANTOPRAZOLE SODIUM 40 MG/1
40 TABLET, DELAYED RELEASE ORAL
Status: DISCONTINUED | OUTPATIENT
Start: 2020-11-10 | End: 2020-11-10 | Stop reason: HOSPADM

## 2020-11-09 RX ORDER — SODIUM CHLORIDE 0.9 % (FLUSH) 0.9 %
5-40 SYRINGE (ML) INJECTION EVERY 8 HOURS
Status: DISCONTINUED | OUTPATIENT
Start: 2020-11-09 | End: 2020-11-09 | Stop reason: HOSPADM

## 2020-11-09 RX ORDER — INSULIN LISPRO 100 [IU]/ML
5 INJECTION, SOLUTION INTRAVENOUS; SUBCUTANEOUS
Status: DISCONTINUED | OUTPATIENT
Start: 2020-11-10 | End: 2020-11-10 | Stop reason: HOSPADM

## 2020-11-09 RX ORDER — GENTAMICIN SULFATE 80 MG/100ML
INJECTION, SOLUTION INTRAVENOUS AS NEEDED
Status: DISCONTINUED | OUTPATIENT
Start: 2020-11-09 | End: 2020-11-09 | Stop reason: HOSPADM

## 2020-11-09 RX ORDER — OXYBUTYNIN CHLORIDE 5 MG/1
5 TABLET ORAL 2 TIMES DAILY
Status: DISCONTINUED | OUTPATIENT
Start: 2020-11-09 | End: 2020-11-10 | Stop reason: HOSPADM

## 2020-11-09 RX ORDER — PROPOFOL 10 MG/ML
INJECTION, EMULSION INTRAVENOUS AS NEEDED
Status: DISCONTINUED | OUTPATIENT
Start: 2020-11-09 | End: 2020-11-09 | Stop reason: HOSPADM

## 2020-11-09 RX ORDER — CEFAZOLIN SODIUM 1 G/3ML
INJECTION, POWDER, FOR SOLUTION INTRAMUSCULAR; INTRAVENOUS AS NEEDED
Status: DISCONTINUED | OUTPATIENT
Start: 2020-11-09 | End: 2020-11-09 | Stop reason: HOSPADM

## 2020-11-09 RX ORDER — ONDANSETRON 2 MG/ML
INJECTION INTRAMUSCULAR; INTRAVENOUS AS NEEDED
Status: DISCONTINUED | OUTPATIENT
Start: 2020-11-09 | End: 2020-11-09 | Stop reason: HOSPADM

## 2020-11-09 RX ORDER — LABETALOL HCL 20 MG/4 ML
10 SYRINGE (ML) INTRAVENOUS
Status: DISCONTINUED | OUTPATIENT
Start: 2020-11-09 | End: 2020-11-10 | Stop reason: HOSPADM

## 2020-11-09 RX ORDER — DEXAMETHASONE SODIUM PHOSPHATE 4 MG/ML
INJECTION, SOLUTION INTRA-ARTICULAR; INTRALESIONAL; INTRAMUSCULAR; INTRAVENOUS; SOFT TISSUE AS NEEDED
Status: DISCONTINUED | OUTPATIENT
Start: 2020-11-09 | End: 2020-11-09 | Stop reason: HOSPADM

## 2020-11-09 RX ORDER — ONDANSETRON 2 MG/ML
4 INJECTION INTRAMUSCULAR; INTRAVENOUS AS NEEDED
Status: DISCONTINUED | OUTPATIENT
Start: 2020-11-09 | End: 2020-11-09 | Stop reason: HOSPADM

## 2020-11-09 RX ORDER — INSULIN LISPRO 100 [IU]/ML
8 INJECTION, SOLUTION INTRAVENOUS; SUBCUTANEOUS
Status: DISCONTINUED | OUTPATIENT
Start: 2020-11-10 | End: 2020-11-10 | Stop reason: HOSPADM

## 2020-11-09 RX ORDER — MONTELUKAST SODIUM 10 MG/1
10 TABLET ORAL
Status: DISCONTINUED | OUTPATIENT
Start: 2020-11-09 | End: 2020-11-10 | Stop reason: HOSPADM

## 2020-11-09 RX ORDER — SODIUM CHLORIDE 0.9 % (FLUSH) 0.9 %
5-40 SYRINGE (ML) INJECTION AS NEEDED
Status: DISCONTINUED | OUTPATIENT
Start: 2020-11-09 | End: 2020-11-10 | Stop reason: HOSPADM

## 2020-11-09 RX ORDER — DEXTROSE, SODIUM CHLORIDE, AND POTASSIUM CHLORIDE 5; .45; .15 G/100ML; G/100ML; G/100ML
100 INJECTION INTRAVENOUS CONTINUOUS
Status: DISCONTINUED | OUTPATIENT
Start: 2020-11-09 | End: 2020-11-10 | Stop reason: HOSPADM

## 2020-11-09 RX ORDER — HYDROMORPHONE HYDROCHLORIDE 1 MG/ML
0.5 INJECTION, SOLUTION INTRAMUSCULAR; INTRAVENOUS; SUBCUTANEOUS
Status: DISCONTINUED | OUTPATIENT
Start: 2020-11-09 | End: 2020-11-09 | Stop reason: HOSPADM

## 2020-11-09 RX ORDER — DULOXETIN HYDROCHLORIDE 20 MG/1
20 CAPSULE, DELAYED RELEASE ORAL DAILY
Status: DISCONTINUED | OUTPATIENT
Start: 2020-11-10 | End: 2020-11-10 | Stop reason: HOSPADM

## 2020-11-09 RX ORDER — SODIUM CHLORIDE 9 MG/ML
20 INJECTION, SOLUTION INTRAVENOUS CONTINUOUS
Status: DISCONTINUED | OUTPATIENT
Start: 2020-11-09 | End: 2020-11-09 | Stop reason: HOSPADM

## 2020-11-09 RX ORDER — GUAIFENESIN 100 MG/5ML
81 LIQUID (ML) ORAL DAILY
Status: DISCONTINUED | OUTPATIENT
Start: 2020-11-10 | End: 2020-11-10 | Stop reason: HOSPADM

## 2020-11-09 RX ORDER — SUCCINYLCHOLINE CHLORIDE 20 MG/ML
INJECTION INTRAMUSCULAR; INTRAVENOUS AS NEEDED
Status: DISCONTINUED | OUTPATIENT
Start: 2020-11-09 | End: 2020-11-09 | Stop reason: HOSPADM

## 2020-11-09 RX ORDER — CALCIUM CARBONATE/VITAMIN D3 600MG-5MCG
1 TABLET ORAL 2 TIMES DAILY WITH MEALS
Status: DISCONTINUED | OUTPATIENT
Start: 2020-11-10 | End: 2020-11-10 | Stop reason: HOSPADM

## 2020-11-09 RX ORDER — CLONAZEPAM 0.5 MG/1
0.5 TABLET ORAL 3 TIMES DAILY
Status: DISCONTINUED | OUTPATIENT
Start: 2020-11-09 | End: 2020-11-10 | Stop reason: HOSPADM

## 2020-11-09 RX ORDER — CEFAZOLIN SODIUM 2 G/100ML
2 INJECTION, SOLUTION INTRAVENOUS ONCE
Status: DISCONTINUED | OUTPATIENT
Start: 2020-11-09 | End: 2020-11-09 | Stop reason: HOSPADM

## 2020-11-09 RX ORDER — DIPHENHYDRAMINE HYDROCHLORIDE 50 MG/ML
25 INJECTION, SOLUTION INTRAMUSCULAR; INTRAVENOUS
Status: DISCONTINUED | OUTPATIENT
Start: 2020-11-09 | End: 2020-11-10 | Stop reason: HOSPADM

## 2020-11-09 RX ORDER — PRAMIPEXOLE DIHYDROCHLORIDE 1 MG/1
1 TABLET ORAL
Status: DISCONTINUED | OUTPATIENT
Start: 2020-11-09 | End: 2020-11-10 | Stop reason: HOSPADM

## 2020-11-09 RX ORDER — FUROSEMIDE 40 MG/1
20 TABLET ORAL DAILY
Status: DISCONTINUED | OUTPATIENT
Start: 2020-11-10 | End: 2020-11-10 | Stop reason: HOSPADM

## 2020-11-09 RX ORDER — FLUCONAZOLE 100 MG/1
200 TABLET ORAL
Status: COMPLETED | OUTPATIENT
Start: 2020-11-09 | End: 2020-11-09

## 2020-11-09 RX ORDER — INSULIN LISPRO 100 [IU]/ML
3 INJECTION, SOLUTION INTRAVENOUS; SUBCUTANEOUS
Status: DISCONTINUED | OUTPATIENT
Start: 2020-11-10 | End: 2020-11-10 | Stop reason: HOSPADM

## 2020-11-09 RX ORDER — SODIUM CHLORIDE 9 MG/ML
INJECTION, SOLUTION INTRAVENOUS
Status: DISCONTINUED | OUTPATIENT
Start: 2020-11-09 | End: 2020-11-09 | Stop reason: HOSPADM

## 2020-11-09 RX ORDER — MORPHINE SULFATE 2 MG/ML
2 INJECTION, SOLUTION INTRAMUSCULAR; INTRAVENOUS
Status: DISCONTINUED | OUTPATIENT
Start: 2020-11-09 | End: 2020-11-10 | Stop reason: HOSPADM

## 2020-11-09 RX ORDER — ROCURONIUM BROMIDE 10 MG/ML
INJECTION, SOLUTION INTRAVENOUS AS NEEDED
Status: DISCONTINUED | OUTPATIENT
Start: 2020-11-09 | End: 2020-11-09 | Stop reason: HOSPADM

## 2020-11-09 RX ORDER — WATER 1 ML/ML
IRRIGANT IRRIGATION AS NEEDED
Status: DISCONTINUED | OUTPATIENT
Start: 2020-11-09 | End: 2020-11-09 | Stop reason: HOSPADM

## 2020-11-09 RX ORDER — KETAMINE HYDROCHLORIDE 10 MG/ML
INJECTION, SOLUTION INTRAMUSCULAR; INTRAVENOUS AS NEEDED
Status: DISCONTINUED | OUTPATIENT
Start: 2020-11-09 | End: 2020-11-09 | Stop reason: HOSPADM

## 2020-11-09 RX ORDER — SODIUM CHLORIDE 9 MG/ML
1000 INJECTION, SOLUTION INTRAVENOUS CONTINUOUS
Status: DISCONTINUED | OUTPATIENT
Start: 2020-11-09 | End: 2020-11-09 | Stop reason: HOSPADM

## 2020-11-09 RX ORDER — EPHEDRINE SULFATE/0.9% NACL/PF 50 MG/5 ML
SYRINGE (ML) INTRAVENOUS AS NEEDED
Status: DISCONTINUED | OUTPATIENT
Start: 2020-11-09 | End: 2020-11-09 | Stop reason: HOSPADM

## 2020-11-09 RX ORDER — METOPROLOL TARTRATE 5 MG/5ML
2.5 INJECTION INTRAVENOUS
Status: DISCONTINUED | OUTPATIENT
Start: 2020-11-09 | End: 2020-11-10 | Stop reason: HOSPADM

## 2020-11-09 RX ORDER — FLUOXETINE HYDROCHLORIDE 20 MG/1
40 CAPSULE ORAL DAILY
Status: DISCONTINUED | OUTPATIENT
Start: 2020-11-10 | End: 2020-11-10 | Stop reason: HOSPADM

## 2020-11-09 RX ORDER — DIPHENHYDRAMINE HCL 25 MG
25 TABLET ORAL
Status: DISCONTINUED | OUTPATIENT
Start: 2020-11-09 | End: 2020-11-10 | Stop reason: HOSPADM

## 2020-11-09 RX ORDER — ACETAMINOPHEN 325 MG/1
650 TABLET ORAL
Status: DISCONTINUED | OUTPATIENT
Start: 2020-11-09 | End: 2020-11-10 | Stop reason: HOSPADM

## 2020-11-09 RX ORDER — INSULIN GLARGINE 100 [IU]/ML
20 INJECTION, SOLUTION SUBCUTANEOUS
Status: DISCONTINUED | OUTPATIENT
Start: 2020-11-09 | End: 2020-11-10 | Stop reason: HOSPADM

## 2020-11-09 RX ORDER — SODIUM CHLORIDE 0.9 % (FLUSH) 0.9 %
5-40 SYRINGE (ML) INJECTION AS NEEDED
Status: DISCONTINUED | OUTPATIENT
Start: 2020-11-09 | End: 2020-11-09 | Stop reason: HOSPADM

## 2020-11-09 RX ORDER — SODIUM CHLORIDE 0.9 % (FLUSH) 0.9 %
5-40 SYRINGE (ML) INJECTION EVERY 8 HOURS
Status: DISCONTINUED | OUTPATIENT
Start: 2020-11-09 | End: 2020-11-10 | Stop reason: HOSPADM

## 2020-11-09 RX ORDER — FENTANYL CITRATE 50 UG/ML
INJECTION, SOLUTION INTRAMUSCULAR; INTRAVENOUS AS NEEDED
Status: DISCONTINUED | OUTPATIENT
Start: 2020-11-09 | End: 2020-11-09 | Stop reason: HOSPADM

## 2020-11-09 RX ORDER — HYDROCODONE BITARTRATE AND ACETAMINOPHEN 5; 325 MG/1; MG/1
1 TABLET ORAL
Status: DISCONTINUED | OUTPATIENT
Start: 2020-11-09 | End: 2020-11-10 | Stop reason: HOSPADM

## 2020-11-09 RX ADMIN — FENTANYL CITRATE 25 MCG: 50 INJECTION, SOLUTION INTRAMUSCULAR; INTRAVENOUS at 11:09

## 2020-11-09 RX ADMIN — Medication 10 ML: at 23:05

## 2020-11-09 RX ADMIN — NITROGLYCERIN 0.5 INCH: 20 OINTMENT TOPICAL at 17:14

## 2020-11-09 RX ADMIN — SODIUM CHLORIDE: 9 INJECTION, SOLUTION INTRAVENOUS at 12:47

## 2020-11-09 RX ADMIN — ROCURONIUM BROMIDE 10 MG: 10 SOLUTION INTRAVENOUS at 11:18

## 2020-11-09 RX ADMIN — FENTANYL CITRATE 25 MCG: 50 INJECTION, SOLUTION INTRAMUSCULAR; INTRAVENOUS at 11:47

## 2020-11-09 RX ADMIN — KETAMINE HYDROCHLORIDE 20 MG: 10 INJECTION INTRAMUSCULAR; INTRAVENOUS at 11:25

## 2020-11-09 RX ADMIN — PHENYLEPHRINE HYDROCHLORIDE 50 MCG: 10 INJECTION INTRAVENOUS at 11:13

## 2020-11-09 RX ADMIN — PROPOFOL 130 MG: 10 INJECTION, EMULSION INTRAVENOUS at 11:09

## 2020-11-09 RX ADMIN — CEFAZOLIN SODIUM 2 G: 1 INJECTION, POWDER, FOR SOLUTION INTRAMUSCULAR; INTRAVENOUS at 11:15

## 2020-11-09 RX ADMIN — GENTAMICIN SULFATE 80 MG: 80 INJECTION, SOLUTION INTRAVENOUS at 11:32

## 2020-11-09 RX ADMIN — INSULIN GLARGINE 20 UNITS: 100 INJECTION, SOLUTION SUBCUTANEOUS at 21:50

## 2020-11-09 RX ADMIN — MIDAZOLAM HYDROCHLORIDE 1 MG: 2 INJECTION, SOLUTION INTRAMUSCULAR; INTRAVENOUS at 11:05

## 2020-11-09 RX ADMIN — FENTANYL CITRATE 50 MCG: 50 INJECTION, SOLUTION INTRAMUSCULAR; INTRAVENOUS at 14:52

## 2020-11-09 RX ADMIN — NITROGLYCERIN 0.5 INCH: 20 OINTMENT TOPICAL at 22:48

## 2020-11-09 RX ADMIN — KETAMINE HYDROCHLORIDE 20 MG: 10 INJECTION INTRAMUSCULAR; INTRAVENOUS at 12:03

## 2020-11-09 RX ADMIN — PRAMIPEXOLE DIHYDROCHLORIDE 1 MG: 1 TABLET ORAL at 22:47

## 2020-11-09 RX ADMIN — FENTANYL CITRATE 50 MCG: 50 INJECTION, SOLUTION INTRAMUSCULAR; INTRAVENOUS at 14:31

## 2020-11-09 RX ADMIN — PIPERACILLIN AND TAZOBACTAM 3.38 G: 3; .375 INJECTION, POWDER, LYOPHILIZED, FOR SOLUTION INTRAVENOUS at 17:13

## 2020-11-09 RX ADMIN — PHENYLEPHRINE HYDROCHLORIDE 200 MCG: 10 INJECTION INTRAVENOUS at 13:13

## 2020-11-09 RX ADMIN — OXYBUTYNIN CHLORIDE 5 MG: 5 TABLET ORAL at 22:47

## 2020-11-09 RX ADMIN — DEXMEDETOMIDINE HYDROCHLORIDE 5 MCG: 100 INJECTION, SOLUTION, CONCENTRATE INTRAVENOUS at 13:40

## 2020-11-09 RX ADMIN — Medication 5 MG: at 11:54

## 2020-11-09 RX ADMIN — PHENYLEPHRINE HYDROCHLORIDE 50 MCG: 10 INJECTION INTRAVENOUS at 13:52

## 2020-11-09 RX ADMIN — KETAMINE HYDROCHLORIDE 20 MG: 10 INJECTION INTRAMUSCULAR; INTRAVENOUS at 12:35

## 2020-11-09 RX ADMIN — DEXAMETHASONE SODIUM PHOSPHATE 4 MG: 4 INJECTION, SOLUTION INTRA-ARTICULAR; INTRALESIONAL; INTRAMUSCULAR; INTRAVENOUS; SOFT TISSUE at 11:16

## 2020-11-09 RX ADMIN — PHENYLEPHRINE HYDROCHLORIDE 50 MCG: 10 INJECTION INTRAVENOUS at 13:28

## 2020-11-09 RX ADMIN — PHENYLEPHRINE HYDROCHLORIDE 100 MCG: 10 INJECTION INTRAVENOUS at 11:32

## 2020-11-09 RX ADMIN — PHENYLEPHRINE HYDROCHLORIDE 100 MCG: 10 INJECTION INTRAVENOUS at 11:54

## 2020-11-09 RX ADMIN — SUCCINYLCHOLINE CHLORIDE 100 MG: 20 INJECTION, SOLUTION INTRAMUSCULAR; INTRAVENOUS at 11:10

## 2020-11-09 RX ADMIN — SUGAMMADEX 100 MG: 100 INJECTION, SOLUTION INTRAVENOUS at 13:44

## 2020-11-09 RX ADMIN — SODIUM CHLORIDE: 9 INJECTION, SOLUTION INTRAVENOUS at 10:30

## 2020-11-09 RX ADMIN — ONDANSETRON 4 MG: 2 INJECTION INTRAMUSCULAR; INTRAVENOUS at 13:36

## 2020-11-09 RX ADMIN — FENTANYL CITRATE 25 MCG: 50 INJECTION, SOLUTION INTRAMUSCULAR; INTRAVENOUS at 12:42

## 2020-11-09 RX ADMIN — HYDROCODONE BITARTRATE AND ACETAMINOPHEN 1 TABLET: 5; 325 TABLET ORAL at 21:50

## 2020-11-09 RX ADMIN — HYDROCODONE BITARTRATE AND ACETAMINOPHEN 1 TABLET: 5; 325 TABLET ORAL at 17:31

## 2020-11-09 RX ADMIN — DEXMEDETOMIDINE HYDROCHLORIDE 5 MCG: 100 INJECTION, SOLUTION, CONCENTRATE INTRAVENOUS at 11:10

## 2020-11-09 RX ADMIN — POTASSIUM CHLORIDE, DEXTROSE MONOHYDRATE AND SODIUM CHLORIDE 100 ML/HR: 150; 5; 450 INJECTION, SOLUTION INTRAVENOUS at 17:13

## 2020-11-09 RX ADMIN — FENTANYL CITRATE 25 MCG: 50 INJECTION, SOLUTION INTRAMUSCULAR; INTRAVENOUS at 12:13

## 2020-11-09 RX ADMIN — MONTELUKAST 10 MG: 10 TABLET, FILM COATED ORAL at 21:50

## 2020-11-09 RX ADMIN — ROCURONIUM BROMIDE 20 MG: 10 SOLUTION INTRAVENOUS at 11:47

## 2020-11-09 RX ADMIN — PHENYLEPHRINE HYDROCHLORIDE 50 MCG: 10 INJECTION INTRAVENOUS at 11:19

## 2020-11-09 RX ADMIN — KETAMINE HYDROCHLORIDE 20 MG: 10 INJECTION INTRAMUSCULAR; INTRAVENOUS at 13:23

## 2020-11-09 RX ADMIN — SODIUM CHLORIDE 20 ML/HR: 9 INJECTION, SOLUTION INTRAVENOUS at 09:18

## 2020-11-09 RX ADMIN — Medication 5 MG: at 11:35

## 2020-11-09 RX ADMIN — MIDAZOLAM HYDROCHLORIDE 1 MG: 2 INJECTION, SOLUTION INTRAMUSCULAR; INTRAVENOUS at 11:48

## 2020-11-09 RX ADMIN — FLUCONAZOLE 200 MG: 100 TABLET ORAL at 21:50

## 2020-11-09 RX ADMIN — PROPOFOL 20 MG: 10 INJECTION, EMULSION INTRAVENOUS at 11:47

## 2020-11-09 RX ADMIN — ROCURONIUM BROMIDE 10 MG: 10 SOLUTION INTRAVENOUS at 12:33

## 2020-11-09 RX ADMIN — CLONAZEPAM 0.5 MG: 0.5 TABLET ORAL at 22:47

## 2020-11-09 NOTE — PROGRESS NOTES
4 3Y3 SKIN ASSESSMENT DONE WITH TUNDE CEDILLO RN SKIN WARM DRY AND INTACT.  KACY FEET RED, BLANCHES UNDER 3SECS. 5 INCISION SITES WITH DERMABOND

## 2020-11-09 NOTE — ANESTHESIA PREPROCEDURE EVALUATION
Relevant Problems   No relevant active problems       Anesthetic History     PONV          Review of Systems / Medical History  Patient summary reviewed, nursing notes reviewed and pertinent labs reviewed    Pulmonary    COPD: severe    Sleep apnea  Shortness of breath      Comments: Home Oxygen   Neuro/Psych         Psychiatric history    Comments: Anxiety   Depression  Fibromyalgia Cardiovascular    Hypertension          CAD and hyperlipidemia         GI/Hepatic/Renal     GERD    Renal disease: CRI      Comments: Chronic Cystitis  Hydronephrosis  Neurogenic Bladder  Urge and Stress incontinence\  Dysphagia Endo/Other    Diabetes: type 2    Arthritis    Comments: RA Other Findings            Physical Exam    Airway  Mallampati: II  TM Distance: 4 - 6 cm  Neck ROM: normal range of motion   Mouth opening: Normal     Cardiovascular    Rhythm: regular  Rate: normal         Dental    Dentition: Poor dentition     Pulmonary    Rhonchi  Decreased breath sounds      Prolonged expiration     Abdominal  GI exam deferred       Other Findings            Anesthetic Plan    ASA: 4  Anesthesia type: general          Induction: Intravenous  Anesthetic plan and risks discussed with: Patient

## 2020-11-09 NOTE — OP NOTES
UROLOGY OPERATIVE NOTE    Patient: Ana Ryan MRN: [id number]  SSN: [id number]    YOB: 1950  Age: 65 y.o. Sex: female          Pre-operative Diagnosis: left UPJ obstruction, chronic pyelonephritis, incontinence  Post-operative Diagnosis: same  Procedure: Cystoscopy, retrograde pyelograms  robotic left pyeloplasty  LEFT  ureteral stent exchange    Surgeon: Nani Simmons MD    Anesthesia:  General  Findings: About 2cm stricture at the UPJ and proximal ureter, cloudy urine   Estimated Blood Loss:       <30cc   Drains: Urinary Catheter (Aguilar)  Specimens: UPJ for pathology, urine culture  Implants:   Implant Name Type Inv. Item Serial No.  Lot No. LRB No. Used Action   STENT URET 8FR L22CM LUBRICIOUS HYDRPHLC TAPR TIP SMOOTH - SN/A  STENT URET 8FR L22CM LUBRICIOUS HYDRPHLC TAPR TIP SMOOTH N/A [employer][id number] Left 1 Implanted     Complications: none           Procedure Details: The patient was seen in the pre-operative area. The risks, benefits, complications, alternative treatment options, and expected outcomes were again discussed with the patient. The possibilities of reaction to medication, pain, infection, bleeding, major cardiovascular event, death, damage to surrounding structures were specifically addressed. Informed consent was then obtained. Upon arrival to the operative suite, the patient, procedure, and side were confirmed via a pre-operative \"time-out\". All were in agreement. The patient was carefully positioned and anesthesia was undertaken. Sterile prep and drape was accomplished. The patient was placed in the lithotomy position. Using a 21 F cystoscope, cystourethroscopy was performed. She had a left ureteral stent crossing the midline. There was cloudy urine. A urine sample was obtained for culture. The bladder was irrigated to clear. The ureteral stent was grasped and withdrawn intact.       Using a 5F upen ended catheter, a gentle left retrograde pyelogram was performed. There distal ureter appeared patent. At the proximal ureter was about a 2cm stricture with a tight jet of contrast into a dilated renal pelvis. The catheter was advanced over a 0.035 guidewire to the renal pelvis, which was about 21cm from the ureteral orifice. Renal urine was trace cloudy. The wire was left in place. Over the wire a 6F x22cm ureteral stent was placed. A chowdhury catheter was inserted and placed to bag drainage. She was positioned supine with a bump under the left flank. All her pressure points were padded and she was secured to the table. The abdomen was prepped and draped in the usual sterile manner. An 8mm incision was made it the left lower quadrant and a Veress needle was inserted. CO2 insufflation was started to 15mmHG pressure. 8mm robotic trocars were inserted in the left upper and lower quadrants for a total of 4 robotic trocars. A 25EV subumbilical trocar was inserted. The patient was tilted left side up. The Skinny Momi XI surgical robot was docked to the patient. The white line of Toldt was incised and the colon was mobilized to the splenic flexure. There was edema and inflammation around the kidney at [facility] fascia. The ureter was mobilized from below the iliac crossing to the UPJ. The renal pelvis was mobilized off the hilum. This was more tedious due to inflammation. There were some crossing vessels anterior to the UPJ. The kidney was mobilized by incising the fascial attachments at the superior pole and laterally. The ureter was incised and the UPJ was dismembered. The scarred UPJ was excised and sent for specimen. The UPJ was opened medially. The ureter was trimmed and spatulated laterally. The ureter and renal pelvis were thickened with scar. The renal pelvis and ureter were approximated with 3-0 Vicryl. 4-0 monocryl was used to anastomosed the ureter to the renal pelvis.   This appeared under no significant tension and water tight. The robot was then undocked from the patient. The trocars were removed. 0 Vicryl was used to close the subumbilical fascia. The skin incisions were closed with 4-0 Monocryl suture and Dermabond skin glue. At the conclusion of the case, all needle counts, instrument counts, and sponge counts were correct. The patient was transported in stable condition to the recovery room.        Aneesh Paul MD

## 2020-11-09 NOTE — ANESTHESIA POSTPROCEDURE EVALUATION
Procedure(s):  Robotic Assisted Laparoscopic Left Pyeloplasty With Cystourethroscopy, Left Retrograde Pyelogram & Left Ureteral Stent Exchange.     general    Anesthesia Post Evaluation      Multimodal analgesia: multimodal analgesia used between 6 hours prior to anesthesia start to PACU discharge  Patient location during evaluation: PACU  Patient participation: complete - patient participated  Level of consciousness: awake  Pain score: 0  Pain management: adequate  Airway patency: patent  Anesthetic complications: no  Cardiovascular status: acceptable  Respiratory status: acceptable  Hydration status: acceptable  Post anesthesia nausea and vomiting:  controlled  Final Post Anesthesia Temperature Assessment:  Normothermia (36.0-37.5 degrees C)      INITIAL Post-op Vital signs:   Vitals Value Taken Time   /74 11/9/2020  2:45 PM   Temp 36.6 °C (97.8 °F) 11/9/2020  2:06 PM   Pulse 86 11/9/2020  2:45 PM   Resp 17 11/9/2020  2:45 PM   SpO2 97 % 11/9/2020  2:45 PM

## 2020-11-10 VITALS
DIASTOLIC BLOOD PRESSURE: 64 MMHG | TEMPERATURE: 98 F | SYSTOLIC BLOOD PRESSURE: 132 MMHG | HEIGHT: 62 IN | RESPIRATION RATE: 16 BRPM | WEIGHT: 131 LBS | HEART RATE: 74 BPM | BODY MASS INDEX: 24.11 KG/M2 | OXYGEN SATURATION: 94 %

## 2020-11-10 LAB
ANION GAP SERPL CALC-SCNC: 4 MMOL/L (ref 5–15)
BUN SERPL-MCNC: 22 MG/DL (ref 6–20)
BUN/CREAT SERPL: 18 (ref 12–20)
CA-I BLD-MCNC: 8.7 MG/DL (ref 8.5–10.1)
CHLORIDE SERPL-SCNC: 102 MMOL/L (ref 97–108)
CO2 SERPL-SCNC: 30 MMOL/L (ref 21–32)
CREAT SERPL-MCNC: 1.22 MG/DL (ref 0.55–1.02)
GLUCOSE BLD STRIP.AUTO-MCNC: 178 MG/DL (ref 65–100)
GLUCOSE BLD STRIP.AUTO-MCNC: 424 MG/DL (ref 65–100)
GLUCOSE SERPL-MCNC: 260 MG/DL (ref 65–100)
PERFORMED BY, TECHID: ABNORMAL
PERFORMED BY, TECHID: ABNORMAL
POTASSIUM SERPL-SCNC: 4.4 MMOL/L (ref 3.5–5.1)
POTASSIUM SERPL-SCNC: 5.4 MMOL/L (ref 3.5–5.1)
SODIUM SERPL-SCNC: 136 MMOL/L (ref 136–145)

## 2020-11-10 PROCEDURE — 74011250636 HC RX REV CODE- 250/636: Performed by: UROLOGY

## 2020-11-10 PROCEDURE — 74011636637 HC RX REV CODE- 636/637: Performed by: UROLOGY

## 2020-11-10 PROCEDURE — 82962 GLUCOSE BLOOD TEST: CPT

## 2020-11-10 PROCEDURE — 74011000258 HC RX REV CODE- 258: Performed by: UROLOGY

## 2020-11-10 PROCEDURE — 80048 BASIC METABOLIC PNL TOTAL CA: CPT

## 2020-11-10 PROCEDURE — 74011250637 HC RX REV CODE- 250/637: Performed by: UROLOGY

## 2020-11-10 RX ORDER — HYDROCODONE BITARTRATE AND ACETAMINOPHEN 5; 325 MG/1; MG/1
1 TABLET ORAL
Qty: 10 TAB | Refills: 0 | Status: SHIPPED | OUTPATIENT
Start: 2020-11-10 | End: 2020-11-17

## 2020-11-10 RX ORDER — FACIAL-BODY WIPES
10 EACH TOPICAL DAILY
Qty: 10 SUPPOSITORY | Refills: 1 | Status: SHIPPED | OUTPATIENT
Start: 2020-11-10 | End: 2020-11-20

## 2020-11-10 RX ORDER — CEPHALEXIN 500 MG/1
500 CAPSULE ORAL 2 TIMES DAILY
Qty: 10 CAP | Refills: 0 | Status: SHIPPED | OUTPATIENT
Start: 2020-11-10 | End: 2020-11-15

## 2020-11-10 RX ORDER — DOCUSATE SODIUM 100 MG/1
100 CAPSULE, LIQUID FILLED ORAL 2 TIMES DAILY
Qty: 20 CAP | Refills: 0 | Status: SHIPPED | OUTPATIENT
Start: 2020-11-10 | End: 2020-11-20

## 2020-11-10 RX ADMIN — PANTOPRAZOLE SODIUM 40 MG: 40 TABLET, DELAYED RELEASE ORAL at 08:37

## 2020-11-10 RX ADMIN — INSULIN LISPRO 5 UNITS: 100 INJECTION, SOLUTION INTRAVENOUS; SUBCUTANEOUS at 12:33

## 2020-11-10 RX ADMIN — FUROSEMIDE 20 MG: 40 TABLET ORAL at 08:37

## 2020-11-10 RX ADMIN — FLUOXETINE 40 MG: 20 CAPSULE ORAL at 08:37

## 2020-11-10 RX ADMIN — INSULIN LISPRO 3 UNITS: 100 INJECTION, SOLUTION INTRAVENOUS; SUBCUTANEOUS at 08:37

## 2020-11-10 RX ADMIN — OXYBUTYNIN CHLORIDE 5 MG: 5 TABLET ORAL at 08:37

## 2020-11-10 RX ADMIN — HYDROCODONE BITARTRATE AND ACETAMINOPHEN 1 TABLET: 5; 325 TABLET ORAL at 12:19

## 2020-11-10 RX ADMIN — CLONAZEPAM 0.5 MG: 0.5 TABLET ORAL at 08:40

## 2020-11-10 RX ADMIN — PIPERACILLIN AND TAZOBACTAM 3.38 G: 3; .375 INJECTION, POWDER, LYOPHILIZED, FOR SOLUTION INTRAVENOUS at 01:32

## 2020-11-10 RX ADMIN — PIPERACILLIN AND TAZOBACTAM 3.38 G: 3; .375 INJECTION, POWDER, LYOPHILIZED, FOR SOLUTION INTRAVENOUS at 08:37

## 2020-11-10 RX ADMIN — Medication 1 TABLET: at 08:00

## 2020-11-10 RX ADMIN — HYDROCODONE BITARTRATE AND ACETAMINOPHEN 1 TABLET: 5; 325 TABLET ORAL at 02:08

## 2020-11-10 RX ADMIN — DULOXETINE HYDROCHLORIDE 20 MG: 20 CAPSULE, DELAYED RELEASE ORAL at 08:37

## 2020-11-10 RX ADMIN — Medication 10 ML: at 07:01

## 2020-11-10 RX ADMIN — ASPIRIN 81 MG CHEWABLE TABLET 81 MG: 81 TABLET CHEWABLE at 08:37

## 2020-11-10 NOTE — PROGRESS NOTES
Pt discharged home per md order. Discharge instructions reviewed with patient/. She states understanding of follow up appt  date and time, she is aware that rx's were electronically sent to [facility] and which location to pick them up. She states understanding of restrictions for lifting and bathing and was instructed not to drive until  after  her follow up appt with Dr Leobardo Barney and he releases her to do so. I will be wheelingher to her ubigrate private vehicle for discharge when she arrives.

## 2020-11-10 NOTE — DISCHARGE INSTRUCTIONS
Patient Education        Urinary Tract Infection in Women: Care Instructions  Your Care Instructions     A urinary tract infection, or UTI, is a general term for an infection anywhere between the kidneys and the urethra (where urine comes out). Most UTIs are bladder infections. They often cause pain or burning when you urinate. UTIs are caused by bacteria and can be cured with antibiotics. Be sure to complete your treatment so that the infection goes away. Follow-up care is a key part of your treatment and safety. Be sure to make and go to all appointments, and call your doctor if you are having problems. It's also a good idea to know your test results and keep a list of the medicines you take. How can you care for yourself at home? · Take your antibiotics as directed. Do not stop taking them just because you feel better. You need to take the full course of antibiotics. · Drink extra water and other fluids for the next day or two. This may help wash out the bacteria that are causing the infection. (If you have kidney, heart, or liver disease and have to limit fluids, talk with your doctor before you increase your fluid intake.)  · Avoid drinks that are carbonated or have caffeine. They can irritate the bladder. · Urinate often. Try to empty your bladder each time. · To relieve pain, take a hot bath or lay a heating pad set on low over your lower belly or genital area. Never go to sleep with a heating pad in place. To prevent UTIs  · Drink plenty of water each day. This helps you urinate often, which clears bacteria from your system. (If you have kidney, heart, or liver disease and have to limit fluids, talk with your doctor before you increase your fluid intake.)  · Urinate when you need to. · Urinate right after you have sex. · Change sanitary pads often. · Avoid douches, bubble baths, feminine hygiene sprays, and other feminine hygiene products that have deodorants.   · After going to the bathroom, wipe from front to back. When should you call for help? Call your doctor now or seek immediate medical care if:    · Symptoms such as fever, chills, nausea, or vomiting get worse or appear for the first time.     · You have new pain in your back just below your rib cage. This is called flank pain.     · There is new blood or pus in your urine.     · You have any problems with your antibiotic medicine. Watch closely for changes in your health, and be sure to contact your doctor if:    · You are not getting better after taking an antibiotic for 2 days.     · Your symptoms go away but then come back. Where can you learn more? Go to http://www.gray.com/  Enter W053 in the search box to learn more about \"Urinary Tract Infection in Women: Care Instructions. \"  Current as of: June 29, 2020               Content Version: 12.6  © 5316-7011 [employer]. Care instructions adapted under license by [employer] (which disclaims liability or warranty for this information). If you have questions about a medical condition or this instruction, always ask your healthcare professional. [place] [address] any warranty or liability for your use of this information. LEAVING THE HOSPITAL AFTER YOUR PYELOPLASTY  SURGICAL RECONSTRUCTION OF THE RENAL PELVIS  DISCHARGE INSTRUCTIONS FOR DR. HAYES'S PATIENTS    Activity   Refrain from vigorous activity (running, golfing, exercising, horseback riding, bicycling) for 4-6 weeks after your surgery. Walking is fine. You may gradually increase your pace and distance as you feel able.  Do not lift anything over 10 lbs for at least 2 weeks.  Avoid sitting still in one position for prolonged periods of time (more than 45 minutes).  Do not drive while you are taking narcotic pain medications. They may make you drowsy.  Driving, in general, should be avoided for 1-2 weeks if possible.      Most patients can return to work in 2-4 weeks. Bathing   Do not soak in tubs, swim, or submerge yourself in water.  You may shower as soon as you get home from the hospital. Keep the incision sites clean and dry, but do not scrub the glue. It will peel off with time. Use mild soap and water to clean your sites and pat yourself dry. Work   When you may return to work is variable. It will depend on your occupation and how quickly you recover. Specific questions can be addressed at your follow up appointment. Medication   Most patients experience only minimal discomfort. Dr. Kamila Douglas prefers you treat this with Tylenol (avoid ibuprofen or aspirin or other NSAID's as they may cause additional bleeding).  You will be sent home with a stronger, prescription pain reliever. However, it is important to note that these medications tend to be EXTREMELY constipating. It is better to avoid them, and only take them if you are having significant pain.  You may also have several days of an antibiotic.  You can resume most of your home medications as soon as you leave the hospital except for anti-coagulants like Coumadin, aspirin, or Eloquis. Some diabetic medications are also not ok to resume (Metformin). If you have questions about your regular medications, please call the office.  At the time of discharge, you will also have a prescription for the stool softener and the suppository you received during your hospital stay. You may take these daily until you have a bowel movement. You may continue taking the stool softener as needed to combat constipation. Food   We recommend you stick to a bland, soft diet immediately after you are discharged from the hospital.     Do not force yourself to eat. It can contribute to the abdominal bloating you may feel. Once you have had a bowel movement, you can start eating normally, as you feel comfortable.    Avoid gas producing foods (beans, flour, broccoli) that can make you more uncomfortable.  Spread out eating throughout the day with snacks and small meals. Avoid eating large meals at first.    Grady Donohue will likely have a stent in place. This is a small, flexible plastic internal tube placed into the ureter to promote drainage of your kidney down to the bladder.  The amount of time that the stent remains in place is variable.  Usually, you will have your stent removed in the office at one of your follow up visits.  Anytime you have a stent in place, it is important that you urinate often. It is not uncommon to feel like you need to go more frequently or have a fullness in your abdomen or flank.  Do not hold your urine. Do not allow your bladder to get full and uncomfortable. Empty your bladder often and stay well hydrated. THINGS YOU MAY ENCOUNTER AFTER SURGERY   Abdominal distension, constipation, or bloating. Make sure you are taking your stool softener as directed and drinking plenty of water. Avoid carbonated beverages and gas-producing foods. You can use a suppository daily. The prescription pain medicine can contribute to this. Therefore, only take it when you are having significant pain. Walking and moving around help to move the gas out of your belly.  Blood in the urine. You may have blood in your urine off and on for several weeks after a urologic procedure or with a stent in place. The blood can make your urine look tea-colored or pink. This is normal.     Nausea/Fatigue. You may experience some transient nausea in the first few days following surgery with anesthesia. You may also feel fatigued after surgery which will subside with time (4-8 weeks).     WHEN TO CALL THE DOCTOR:   You have a fever over 100.5F   You have nausea or vomiting for more than 24 hours   It becomes hard to breathe   You cannot urinate    [facility] [address], [address]

## 2020-11-10 NOTE — DISCHARGE SUMMARY
UROLOGY Progress Note         OZZY Terry, FNP-C  Urology, Psychiatric  Supervising Physician: Dr. Danyelle Lazo MD  [phone]        PROVIDER DISCHARGE SUMMARY     Patient ID:    Jeanmarie Lopez  [id number]  65 y.o.  1950    Admit date: 11/9/2020    Discharge date : 11/10/2020    Diagnoses:   Obstruction of both ureters [N13.5]  Stricture or kinking of ureter [N13.5]    Reason for Hospitalization: Surgical repair left UPJ obstruction    Procedures: Cystoscopy, retrograde pyelograms, robotic left pyeloplasty, left ureteral stent exchange. Hospital Course: Uncomplicated. She has recovered well overnight with some mild hypotension (asymptomatic and runs low at baseline). She is tolerating a diet. She is ambulatory. Incision sites CDI. BGM elevated; started on insulin  Resume home medications on discharge- hold Metformin (not on med list x 48 hours). Follow up Care: Your appointment with us is on: 11/24/2020 at 08:00    Discharge Medications:   Current Discharge Medication List      START taking these medications    Details   docusate sodium (COLACE) 100 mg capsule Take 1 Cap by mouth two (2) times a day for 10 days. Qty: 20 Cap, Refills: 0      bisacodyL (DULCOLAX) 10 mg supp Insert 10 mg into rectum daily for 10 days. Qty: 10 Suppository, Refills: 1      cephALEXin (KEFLEX) 500 mg capsule Take 1 Cap by mouth two (2) times a day for 5 days. Qty: 10 Cap, Refills: 0      HYDROcodone-acetaminophen (NORCO) 5-325 mg per tablet Take 1 Tab by mouth every six (6) hours as needed for Pain for up to 10 doses. Max Daily Amount: 4 Tabs. Qty: 10 Tab, Refills: 0    Associated Diagnoses: Stricture or kinking of ureter; Retained ureteral stent; UPJ (ureteropelvic junction) obstruction; Acute cystitis without hematuria         CONTINUE these medications which have NOT CHANGED    Details   cholecalciferol (VITAMIN D3) (50,000 UNITS /1250 MCG) capsule Take  by mouth every seven (7) days. omeprazole magnesium (PRILOSEC OTC PO) Take 1 Tab by mouth two (2) times a day. insulin glargine U-300 conc (TOUJEO) 300 unit/mL (1.5 mL) inpn pen 20 Units by SubCUTAneous route nightly. insulin lispro (HUMALOG) 100 unit/mL injection by SubCUTAneous route. 5 units before lunch, 8 units before supper and 3 units before breakfast      calcium-cholecalciferol, d3, (CALCIUM 600 + D) 600-125 mg-unit tab Take 1 Tab by mouth daily. clonazePAM (KlonoPIN) 0.5 mg tablet Take 0.5 mg by mouth three (3) times daily. atorvastatin (LIPITOR) 20 mg tablet Take 20 mg by mouth daily. insulin lispro protamine/insulin lispro (HumaLOG Mix 50-50 Insuln U-100) 100 unit/mL (50-50) injection by SubCUTAneous route. FLUoxetine (PROZAC) 40 mg capsule Take 40 mg by mouth daily. pramipexole (MIRAPEX) 0.5 mg tablet Take 1 mg by mouth nightly. DULoxetine (CYMBALTA) 20 mg capsule Take 20 mg by mouth daily. albuterol (PROVENTIL HFA, VENTOLIN HFA, PROAIR HFA) 90 mcg/actuation inhaler Take 1 Puff by inhalation every six (6) hours as needed. ipratropium-albuterol (COMBIVENT)  mcg/actuation inhaler Take 1 Puff by inhalation every six (6) hours as needed for Wheezing or Shortness of Breath. Indications: CHRONIC OBSTRUCTIVE PULMONARY DISEASE WITH BRONCHOSPASMS      Biotin 2,500 mcg cap Take 5,000 mcg/day by mouth daily. furosemide (LASIX) 20 mg tablet Take 20 mg by mouth daily. As needed         STOP taking these medications       aspirin delayed-release 81 mg tablet Comments:   Reason for Stopping:               Diet:  Regular as tolerated; diabetic preferred    Activity: Per your discharge instructions and as discussed. No heavy lifting, no strenuous exercise, no driving while on narcotic pain meds. Disposition: home    Discharge Exam:  Physical Exam  Vitals signs and nursing note reviewed. Constitutional:       Appearance: Normal appearance.    HENT:      Head: Normocephalic and atraumatic. Ears:      Comments: [place]     Nose: Nose normal.      Mouth/Throat:      Pharynx: Oropharynx is clear. Eyes:      Extraocular Movements: Extraocular movements intact. Conjunctiva/sclera: Conjunctivae normal.      Pupils: Pupils are equal, round, and reactive to light. Neck:      Musculoskeletal: Normal range of motion. Cardiovascular:      Rate and Rhythm: Normal rate and regular rhythm. Heart sounds: Normal heart sounds. Pulmonary:      Effort: Pulmonary effort is normal.   Abdominal:      General: Abdomen is flat. Palpations: Abdomen is soft. Musculoskeletal: Normal range of motion. Skin:     General: Skin is warm and dry. Coloration: Skin is pale. Comments: Incisions CDI   Neurological:      Mental Status: She is alert and oriented to person, place, and time. Mental status is at baseline. Psychiatric:         Behavior: Behavior normal.          Visit Vitals  BP (!) 105/49   Pulse 73   Temp 98 °F (36.7 °C)   Resp 18   Ht 5' 2\" (1.575 m)   Wt 131 lb (59.4 kg)   SpO2 93%   BMI 23.96 kg/m²       CONSULTATIONS: none    Significant Diagnostic Studies:   11/9/2020: HCT 38.6 % (Ref range: 35.0 - 47.0 %); HGB 12.0 g/dL (Ref range: 11.5 - 16.0 g/dL); Potassium 4.4 mmol/L (Ref range: 3.5 - 5.1 mmol/L)  Recent Labs     11/09/20  2044   WBC 10.0   HGB 12.0   HCT 38.6        Recent Labs     11/10/20  0820 11/09/20  0838     --    K 5.4* 4.4     --    CO2 30  --    BUN 22*  --    CREA 1.22*  --    *  --    CA 8.7  --    Pre-op creatinine 10.2  Pre-op K+ was 4.4  K+ PAT labs was 5.1    Lab Results   Component Value Date/Time    Glucose (POC) 424 (H) 11/10/2020 07:43 AM    Glucose (POC) 413 (H) 11/09/2020 09:22 PM    Glucose (POC) 204 (H) 11/09/2020 05:24 PM    Glucose (POC) 153 (H) 11/09/2020 02:51 PM    Glucose (POC) 142 (H) 11/09/2020 09:01 AM     Restart home medications on discharge. SSI while inpatient. Diabetic diet.         Signed:  Virgilio Arellano Kevin Sutherland NP  11/10/2020  9:14 AM

## 2020-11-10 NOTE — PROGRESS NOTES
Labs reviewed. Urine culture 11/9/2020 pending. Patient has an upcoming appointment. Plan to discuss findings at appointment.

## 2020-11-11 ENCOUNTER — TELEPHONE (OUTPATIENT)
Dept: UROLOGY | Age: 70
End: 2020-11-11

## 2020-11-11 LAB
BACTERIA SPEC CULT: NORMAL
SPECIAL REQUESTS,SREQ: NORMAL

## 2020-11-11 NOTE — TELEPHONE ENCOUNTER
Pt stated she wants all of her prescriptions sent to [facility] on [address] in [place], [place]. Pt claims to have changed her pharmacy preference earlier and does not know why it wasn't changed. Thank You!

## 2020-11-12 NOTE — PROGRESS NOTES
Fibromuscular tissue with chronic inflammation. No evidence of epithelial neoplasm.   Can discuss at f/u visit

## 2020-11-23 PROBLEM — B49 FUNGUS PRESENT IN URINE: Status: ACTIVE | Noted: 2020-11-23

## 2020-11-23 PROBLEM — N13.5 STRICTURE OR KINKING OF URETER: Status: RESOLVED | Noted: 2020-11-09 | Resolved: 2020-11-23

## 2020-11-23 NOTE — PROGRESS NOTES
HISTORY OF PRESENT ILLNESS  Ewelina Turner is a 65 y.o. female. Chief Complaint   Patient presents with    Cystoscopy     She is here for routine post-operative follow up and possible left stent removal.    She underwent cystoscopy, retrograde pyelograms, robotic left pyeloplasty, left ureteral stent exchange on 11/9/2020. Patho signficant for fibromuscular tissue with chronic inflammation. No evidence of epithelial neoplasm.      She feels fine. Good appetite. No pain. Chronic Conditions Addressed Today     1. Retained ureteral stent     Overview        She has a left sided ureteral stent managed previously by Dr. Daisy Donaldson. Stent secondary to chronic left UPJ obstruction with stent changes q 3 months. Last changed on 11/9/2020 s/p pyeloplasty. Current Assessment & Plan      Stent removed today. 2. UPJ (ureteropelvic junction) obstruction     Overview        She has a left sided ureteral stent managed previously by Dr. Daisy Donaldson. Stent secondary to chronic left UPJ obstruction with stent changes q 3 months. Last changed 6/2/2020. She has been having stents several years 5-6 per her recollection. She is s/p cystoscopy, left retrograde pyelogram, left ureteral stent exchange on 9/14/2020. Findings: left UPJ obstruction with low-lying left kidney. She is s/p cystoscopy, retrograde pyelograms, robotic left pyeloplasty, left ureteral stent exchange on 11/10/2020. Pathology significant for fibromuscular tissue with chronic inflammation. No evidence of epithelial neoplasm.            Current Assessment & Plan      She is 2-week status post a robotic pyeloplasty. Her stent was removed today. She is doing well. Advised to hydrate well. Plan lasix renogram in . Relevant Orders     NM RENAL SCAN FLOW/FUNC W PHARM SNGL    3. Acute cystitis     Overview      She gets UTIs with her ureteral stents. She has been on abx. She had sepsis \"a couple years\" ago.      Urine culture 8/18/2020: klebsiella oxytoca. Urine culture 10/22/2020: mixed greg. Urine culture 11/5/2020: no growth. Urine culture 11/9/2020: no growth. Current Assessment & Plan      Cloudy urine today. Daughter notes diabetes may not be well controlled. Possible funguria. Repeat course of diflucan. Relevant Medications     fluconazole (DIFLUCAN) 200 mg tablet     doxycycline (ADOXA) 100 mg tablet     Other Relevant Orders     CULTURE, URINE     CULTURE, FUNGUS    4. Fungus present in urine     Overview      She had moderate growth of candida albicans on 10/13/2020 culture. She was treated with Diflucan. Current Assessment & Plan      Fungal culture. Diflucan x 7 days. Relevant Medications     fluconazole (DIFLUCAN) 200 mg tablet     doxycycline (ADOXA) 100 mg tablet     Other Relevant Orders     CULTURE, URINE     CULTURE, FUNGUS            Review of Systems   All other systems reviewed and are negative. Past Medical History:   Diagnosis Date    Arthritis     Bacterial meningitis     Hx of    CAD (coronary artery disease)     Chronic cystitis 7/2/2020    Chronic kidney disease     stent in kidney due to several UTI.   Change stent every 6mos    Chronic obstructive pulmonary disease (HCC)     Chronic pain     DDD (degenerative disc disease), cervical     Diabetes ([address]     Environmental allergies     Esophageal disorder     Fibromyalgia     GERD (gastroesophageal reflux disease)     Hydronephrosis 7/2/2020    Hypercholesterolemia     Hypertension     Ill-defined condition     dysphagia    Incomplete emptying of bladder 7/2/2020    Increased frequency of urination 7/2/2020    Kidney stones     hx of    Macular degeneration     Mixed stress and urge urinary incontinence 7/2/2020    Nausea & vomiting     Neurogenic bladder 7/2/2020    Nocturia 7/2/2020    Oxygen dependent     uses 2-3 liters as needed    Psychiatric disorder     Rheumatoid arthritis ([address]     Scoliosis     Shingles     Hx of    Shortness of breath on exertion     Sleep apnea     pt states uses CPAP    Suicidal thoughts     pt stated during PAT visit , she has hx of suicidal thoughts age 17's, pt stated never attempted and further thoughts of suicide since and none that day    Torn rotator cuff     pt states on right side    Urinary tract infection without hematuria 7/2/2020    Urinary urgency 7/2/2020      Past Surgical History:   Procedure Laterality Date    BREAST SURGERY PROCEDURE UNLISTED      COLONOSCOPY N/A 10/9/2020    COLONOSCOPY performed by Dwaine Matthews MD at [address] [place] [address]  5/8/2018, 7/11/2017, 2/9/2019, 10/11/2016, 6/7/2016, 1/14/2016, 7/14/2015    HX CARPAL TUNNEL RELEASE      HX DILATION AND CURETTAGE      HX JOINT REPLACEMENT SURGERY      knee both sides    HX LUMBAR FUSION      HX ORTHOPAEDIC      total knee replacements    HX ORTHOPAEDIC      back surgery     HX UROLOGICAL      stent in ureter    HX UROLOGICAL  09/14/2020    Cystoscopy    HX UROLOGICAL Left 09/14/2020    retrograde pyelogram    HX UROLOGICAL Left 09/14/2020    ureteral stent exchange    HX UROLOGICAL  11/09/2020    Cystoscopy    HX UROLOGICAL  11/09/2020    retrograde pyelograms    HX UROLOGICAL Left 11/09/2020    robotic left pyeloplasty    HX UROLOGICAL Left 11/09/2020    ureteral stent exchange    HX UROLOGICAL  11/24/2020    cystoscopy stent removal     NEUROLOGICAL PROCEDURE UNLISTED      back surg     Family History   Problem Relation Age of Onset    Hypertension Mother     Heart Disease Mother     Diabetes Mother     Liver Disease Mother     Diabetes Father     Heart Disease Father     Heart Disease Brother     Diabetes Brother     COPD Brother     Liver Disease Brother     Hypertension Sister     Elevated Lipids Sister         Physical Exam    CYSTOSCOPY/ STENT REMOVAL  Patient presented for cystoscopy and ureteral stent removal.  The patient was placed supine on the procedure table and the genitals were prepped and with Betadine. Using 16 Japanese flexible cystoscope, cystourethroscopy was performed. The urethra was patent without stricturing. The visualized portions of the bladder mucosa was without trabeculation, tumors or concerning erythema. There was a stent in the left ureter. It was grasped and withdrawn intact. ASSESSMENT and PLAN  Diagnoses and all orders for this visit:    1. UPJ (ureteropelvic junction) obstruction  Assessment & Plan:  She is 2-week status post a robotic pyeloplasty. Her stent was removed today. She is doing well. Advised to hydrate well. Plan lasix renogram in 3m. Orders:  -     NM RENAL SCAN FLOW/FUNC W PHARM SNGL; Future    2. Fungus present in urine  Assessment & Plan:  Fungal culture. Diflucan x 7 days. Orders:  -     CULTURE, URINE  -     CULTURE, FUNGUS    3. Acute cystitis without hematuria  Assessment & Plan:  Cloudy urine today. Daughter notes diabetes may not be well controlled. Possible funguria. Repeat course of diflucan. Orders:  -     CULTURE, URINE  -     CULTURE, FUNGUS    4. Retained ureteral stent  Assessment & Plan:  Stent removed today. Other orders  -     fluconazole (DIFLUCAN) 200 mg tablet; Take 1 Tab by mouth daily for 7 days. FDA advises cautious prescribing of oral fluconazole in pregnancy. -     doxycycline (ADOXA) 100 mg tablet; Take 1 Tab by mouth two (2) times a day.              Stef Langston MD

## 2020-11-24 ENCOUNTER — OFFICE VISIT (OUTPATIENT)
Dept: UROLOGY | Age: 70
End: 2020-11-24
Payer: MEDICARE

## 2020-11-24 VITALS — TEMPERATURE: 97.2 F

## 2020-11-24 DIAGNOSIS — B49 FUNGUS PRESENT IN URINE: ICD-10-CM

## 2020-11-24 DIAGNOSIS — Z96.0 RETAINED URETERAL STENT: ICD-10-CM

## 2020-11-24 DIAGNOSIS — N30.00 ACUTE CYSTITIS WITHOUT HEMATURIA: ICD-10-CM

## 2020-11-24 DIAGNOSIS — N13.5 UPJ (URETEROPELVIC JUNCTION) OBSTRUCTION: ICD-10-CM

## 2020-11-24 PROCEDURE — 99024 POSTOP FOLLOW-UP VISIT: CPT | Performed by: UROLOGY

## 2020-11-24 PROCEDURE — 50386 REMOVE STENT VIA TRANSURETH: CPT | Performed by: UROLOGY

## 2020-11-24 RX ORDER — FLUCONAZOLE 200 MG/1
200 TABLET ORAL DAILY
Qty: 7 TAB | Refills: 0 | Status: SHIPPED | OUTPATIENT
Start: 2020-11-24 | End: 2020-12-01

## 2020-11-24 RX ORDER — DOXYCYCLINE 100 MG/1
100 TABLET ORAL 2 TIMES DAILY
Qty: 10 TAB | Refills: 0 | Status: ON HOLD
Start: 2020-11-24 | End: 2021-08-31

## 2020-11-24 NOTE — ASSESSMENT & PLAN NOTE
Cloudy urine today. Daughter notes diabetes may not be well controlled. Possible funguria. Repeat course of diflucan.

## 2020-11-24 NOTE — ASSESSMENT & PLAN NOTE
She is 2-week status post a robotic pyeloplasty. Her stent was removed today. She is doing well. Advised to hydrate well. Plan lasix renogram in .

## 2020-12-07 ENCOUNTER — HOSPITAL ENCOUNTER (OUTPATIENT)
Dept: NUCLEAR MEDICINE | Age: 70
Discharge: HOME | End: 2020-12-07
Attending: UROLOGY
Payer: MEDICARE

## 2020-12-07 DIAGNOSIS — N13.5 UPJ (URETEROPELVIC JUNCTION) OBSTRUCTION: ICD-10-CM

## 2020-12-07 LAB
BACTERIA UR CULT: NORMAL
FUNGUS SPEC CULT: NORMAL

## 2020-12-07 PROCEDURE — 78708 K FLOW/FUNCT IMAGE W/DRUG: CPT

## 2020-12-07 PROCEDURE — 74011250636 HC RX REV CODE- 250/636

## 2020-12-07 RX ORDER — FUROSEMIDE 10 MG/ML
INJECTION INTRAMUSCULAR; INTRAVENOUS
Status: COMPLETED
Start: 2020-12-07 | End: 2020-12-07

## 2020-12-07 RX ADMIN — FUROSEMIDE 30.7 MG: 10 INJECTION, SOLUTION INTRAMUSCULAR; INTRAVENOUS at 13:00

## 2021-01-02 ENCOUNTER — APPOINTMENT (OUTPATIENT)
Dept: GENERAL RADIOLOGY | Age: 71
DRG: 190 | End: 2021-01-02
Attending: EMERGENCY MEDICINE
Payer: MEDICARE

## 2021-01-02 ENCOUNTER — HOSPITAL ENCOUNTER (INPATIENT)
Age: 71
LOS: 4 days | Discharge: HOME | DRG: 190 | End: 2021-01-06
Attending: EMERGENCY MEDICINE | Admitting: FAMILY MEDICINE
Payer: MEDICARE

## 2021-01-02 ENCOUNTER — APPOINTMENT (OUTPATIENT)
Dept: CT IMAGING | Age: 71
DRG: 190 | End: 2021-01-02
Attending: EMERGENCY MEDICINE
Payer: MEDICARE

## 2021-01-02 DIAGNOSIS — R06.00 DYSPNEA, UNSPECIFIED TYPE: ICD-10-CM

## 2021-01-02 DIAGNOSIS — R09.02 HYPOXEMIA: ICD-10-CM

## 2021-01-02 DIAGNOSIS — J44.1 COPD EXACERBATION (HCC): Primary | ICD-10-CM

## 2021-01-02 DIAGNOSIS — Z20.822 SUSPECTED COVID-19 VIRUS INFECTION: ICD-10-CM

## 2021-01-02 LAB
ALBUMIN SERPL-MCNC: 3.5 G/DL (ref 3.5–5)
ALBUMIN/GLOB SERPL: 0.9 {RATIO} (ref 1.1–2.2)
ALP SERPL-CCNC: 158 U/L (ref 45–117)
ALT SERPL-CCNC: 26 U/L (ref 12–78)
ANION GAP SERPL CALC-SCNC: 2 MMOL/L (ref 5–15)
AST SERPL W P-5'-P-CCNC: 21 U/L (ref 15–37)
BASOPHILS # BLD: 0 K/UL (ref 0–0.1)
BASOPHILS NFR BLD: 0 % (ref 0–1)
BILIRUB SERPL-MCNC: 0.5 MG/DL (ref 0.2–1)
BUN SERPL-MCNC: 29 MG/DL (ref 6–20)
BUN/CREAT SERPL: 34 (ref 12–20)
CA-I BLD-MCNC: 8.7 MG/DL (ref 8.5–10.1)
CHLORIDE SERPL-SCNC: 100 MMOL/L (ref 97–108)
CK SERPL-CCNC: 66 NG/ML (ref 26–192)
CO2 SERPL-SCNC: 35 MMOL/L (ref 21–32)
COVID-19 RAPID TEST, COVR: NOT DETECTED
CREAT SERPL-MCNC: 0.86 MG/DL (ref 0.55–1.02)
D DIMER PPP FEU-MCNC: 1.36 UG/ML(FEU)
DIFFERENTIAL METHOD BLD: ABNORMAL
EOSINOPHIL # BLD: 0 K/UL (ref 0–0.4)
EOSINOPHIL NFR BLD: 0 % (ref 0–7)
ERYTHROCYTE [DISTWIDTH] IN BLOOD BY AUTOMATED COUNT: 14.4 % (ref 11.5–14.5)
GLOBULIN SER CALC-MCNC: 3.7 G/DL (ref 2–4)
GLUCOSE SERPL-MCNC: 290 MG/DL (ref 65–100)
HCT VFR BLD AUTO: 43.6 % (ref 35–47)
HGB BLD-MCNC: 13.8 G/DL (ref 11.5–16)
IMM GRANULOCYTES # BLD AUTO: 0 K/UL (ref 0–0.04)
IMM GRANULOCYTES NFR BLD AUTO: 0 % (ref 0–0.5)
LYMPHOCYTES # BLD: 1 K/UL (ref 0.8–3.5)
LYMPHOCYTES NFR BLD: 17 % (ref 12–49)
MCH RBC QN AUTO: 30.1 PG (ref 26–34)
MCHC RBC AUTO-ENTMCNC: 31.7 G/DL (ref 30–36.5)
MCV RBC AUTO: 95.2 FL (ref 80–99)
MONOCYTES # BLD: 0.1 K/UL (ref 0–1)
MONOCYTES NFR BLD: 3 % (ref 5–13)
NEUTS SEG # BLD: 4.4 K/UL (ref 1.8–8)
NEUTS SEG NFR BLD: 80 % (ref 32–75)
PLATELET # BLD AUTO: 223 K/UL (ref 150–400)
PMV BLD AUTO: 12.3 FL (ref 8.9–12.9)
POTASSIUM SERPL-SCNC: 5.3 MMOL/L (ref 3.5–5.1)
PROT SERPL-MCNC: 7.2 G/DL (ref 6.4–8.2)
RBC # BLD AUTO: 4.58 M/UL (ref 3.8–5.2)
SARS-COV-2, COV2: NORMAL
SODIUM SERPL-SCNC: 137 MMOL/L (ref 136–145)
SPECIMEN SOURCE: NORMAL
TROPONIN I SERPL-MCNC: <0.05 NG/ML
WBC # BLD AUTO: 5.5 K/UL (ref 3.6–11)

## 2021-01-02 PROCEDURE — 80053 COMPREHEN METABOLIC PANEL: CPT

## 2021-01-02 PROCEDURE — 99285 EMERGENCY DEPT VISIT HI MDM: CPT

## 2021-01-02 PROCEDURE — 36415 COLL VENOUS BLD VENIPUNCTURE: CPT

## 2021-01-02 PROCEDURE — 84484 ASSAY OF TROPONIN QUANT: CPT

## 2021-01-02 PROCEDURE — 65270000029 HC RM PRIVATE

## 2021-01-02 PROCEDURE — 74011636637 HC RX REV CODE- 636/637: Performed by: FAMILY MEDICINE

## 2021-01-02 PROCEDURE — 74011250636 HC RX REV CODE- 250/636: Performed by: EMERGENCY MEDICINE

## 2021-01-02 PROCEDURE — 82550 ASSAY OF CK (CPK): CPT

## 2021-01-02 PROCEDURE — 94640 AIRWAY INHALATION TREATMENT: CPT

## 2021-01-02 PROCEDURE — 85025 COMPLETE CBC W/AUTO DIFF WBC: CPT

## 2021-01-02 PROCEDURE — 71275 CT ANGIOGRAPHY CHEST: CPT

## 2021-01-02 PROCEDURE — 74011000636 HC RX REV CODE- 636: Performed by: FAMILY MEDICINE

## 2021-01-02 PROCEDURE — 87635 SARS-COV-2 COVID-19 AMP PRB: CPT

## 2021-01-02 PROCEDURE — 71045 X-RAY EXAM CHEST 1 VIEW: CPT

## 2021-01-02 PROCEDURE — 96374 THER/PROPH/DIAG INJ IV PUSH: CPT

## 2021-01-02 PROCEDURE — 74011250637 HC RX REV CODE- 250/637: Performed by: EMERGENCY MEDICINE

## 2021-01-02 PROCEDURE — 93005 ELECTROCARDIOGRAM TRACING: CPT

## 2021-01-02 PROCEDURE — 85379 FIBRIN DEGRADATION QUANT: CPT

## 2021-01-02 RX ORDER — DEXAMETHASONE SODIUM PHOSPHATE 4 MG/ML
10 INJECTION, SOLUTION INTRA-ARTICULAR; INTRALESIONAL; INTRAMUSCULAR; INTRAVENOUS; SOFT TISSUE ONCE
Status: COMPLETED | OUTPATIENT
Start: 2021-01-02 | End: 2021-01-02

## 2021-01-02 RX ORDER — SODIUM CHLORIDE 0.9 % (FLUSH) 0.9 %
5-40 SYRINGE (ML) INJECTION EVERY 8 HOURS
Status: DISCONTINUED | OUTPATIENT
Start: 2021-01-02 | End: 2021-01-03

## 2021-01-02 RX ORDER — INSULIN GLARGINE 100 [IU]/ML
0.2 INJECTION, SOLUTION SUBCUTANEOUS
Status: DISCONTINUED | OUTPATIENT
Start: 2021-01-02 | End: 2021-01-06 | Stop reason: HOSPADM

## 2021-01-02 RX ORDER — ALBUTEROL SULFATE 90 UG/1
AEROSOL, METERED RESPIRATORY (INHALATION)
Status: DISPENSED
Start: 2021-01-02 | End: 2021-01-03

## 2021-01-02 RX ORDER — ENOXAPARIN SODIUM 100 MG/ML
40 INJECTION SUBCUTANEOUS DAILY
Status: DISCONTINUED | OUTPATIENT
Start: 2021-01-03 | End: 2021-01-06 | Stop reason: HOSPADM

## 2021-01-02 RX ORDER — ACETAMINOPHEN 325 MG/1
650 TABLET ORAL
Status: DISCONTINUED | OUTPATIENT
Start: 2021-01-02 | End: 2021-01-06 | Stop reason: HOSPADM

## 2021-01-02 RX ORDER — ONDANSETRON 2 MG/ML
4 INJECTION INTRAMUSCULAR; INTRAVENOUS
Status: DISCONTINUED | OUTPATIENT
Start: 2021-01-02 | End: 2021-01-06 | Stop reason: HOSPADM

## 2021-01-02 RX ORDER — IPRATROPIUM BROMIDE AND ALBUTEROL SULFATE 2.5; .5 MG/3ML; MG/3ML
3 SOLUTION RESPIRATORY (INHALATION)
Status: DISPENSED | OUTPATIENT
Start: 2021-01-02 | End: 2021-01-03

## 2021-01-02 RX ORDER — INSULIN LISPRO 100 [IU]/ML
INJECTION, SOLUTION INTRAVENOUS; SUBCUTANEOUS
Status: DISCONTINUED | OUTPATIENT
Start: 2021-01-03 | End: 2021-01-06 | Stop reason: HOSPADM

## 2021-01-02 RX ORDER — DEXTROSE 50 % IN WATER (D50W) INTRAVENOUS SYRINGE
25-50 AS NEEDED
Status: DISCONTINUED | OUTPATIENT
Start: 2021-01-02 | End: 2021-01-06 | Stop reason: HOSPADM

## 2021-01-02 RX ORDER — POLYETHYLENE GLYCOL 3350 17 G/17G
17 POWDER, FOR SOLUTION ORAL DAILY PRN
Status: DISCONTINUED | OUTPATIENT
Start: 2021-01-02 | End: 2021-01-06 | Stop reason: HOSPADM

## 2021-01-02 RX ORDER — ALBUTEROL SULFATE 90 UG/1
2 AEROSOL, METERED RESPIRATORY (INHALATION) ONCE
Status: COMPLETED | OUTPATIENT
Start: 2021-01-02 | End: 2021-01-02

## 2021-01-02 RX ORDER — ALBUTEROL SULFATE 90 UG/1
2 AEROSOL, METERED RESPIRATORY (INHALATION)
Status: DISCONTINUED | OUTPATIENT
Start: 2021-01-02 | End: 2021-01-04

## 2021-01-02 RX ORDER — ACETAMINOPHEN 650 MG/1
650 SUPPOSITORY RECTAL
Status: DISCONTINUED | OUTPATIENT
Start: 2021-01-02 | End: 2021-01-03

## 2021-01-02 RX ORDER — PROMETHAZINE HYDROCHLORIDE 25 MG/1
12.5 TABLET ORAL
Status: DISCONTINUED | OUTPATIENT
Start: 2021-01-02 | End: 2021-01-03

## 2021-01-02 RX ORDER — SODIUM CHLORIDE 0.9 % (FLUSH) 0.9 %
5-40 SYRINGE (ML) INJECTION AS NEEDED
Status: DISCONTINUED | OUTPATIENT
Start: 2021-01-02 | End: 2021-01-06 | Stop reason: HOSPADM

## 2021-01-02 RX ORDER — MAGNESIUM SULFATE 100 %
4 CRYSTALS MISCELLANEOUS AS NEEDED
Status: DISCONTINUED | OUTPATIENT
Start: 2021-01-02 | End: 2021-01-06 | Stop reason: HOSPADM

## 2021-01-02 RX ADMIN — INSULIN GLARGINE 12 UNITS: 100 INJECTION, SOLUTION SUBCUTANEOUS at 23:14

## 2021-01-02 RX ADMIN — DEXAMETHASONE SODIUM PHOSPHATE 10 MG: 4 INJECTION, SOLUTION INTRAMUSCULAR; INTRAVENOUS at 21:02

## 2021-01-02 RX ADMIN — ALBUTEROL SULFATE 2 PUFF: 108 AEROSOL, METERED RESPIRATORY (INHALATION) at 21:18

## 2021-01-02 RX ADMIN — FAMOTIDINE 20 MG: 10 INJECTION, SOLUTION INTRAVENOUS at 22:37

## 2021-01-02 RX ADMIN — Medication 10 ML: at 22:42

## 2021-01-02 RX ADMIN — SODIUM CHLORIDE 1000 ML: 9 INJECTION, SOLUTION INTRAVENOUS at 22:37

## 2021-01-02 RX ADMIN — IOPAMIDOL 100 ML: 755 INJECTION, SOLUTION INTRAVENOUS at 22:23

## 2021-01-02 NOTE — ED TRIAGE NOTES
Pt exposed to family member that tested positive for covid over jodi and is experiencing some shortness of breath, fever, chills, cough.

## 2021-01-03 LAB
ANION GAP SERPL CALC-SCNC: 7 MMOL/L (ref 5–15)
ATRIAL RATE: 69 BPM
BUN SERPL-MCNC: 29 MG/DL (ref 6–20)
BUN/CREAT SERPL: 27 (ref 12–20)
CA-I BLD-MCNC: 8.5 MG/DL (ref 8.5–10.1)
CALCULATED P AXIS, ECG09: 88 DEGREES
CALCULATED R AXIS, ECG10: 85 DEGREES
CALCULATED T AXIS, ECG11: -26 DEGREES
CHLORIDE SERPL-SCNC: 98 MMOL/L (ref 97–108)
CO2 SERPL-SCNC: 33 MMOL/L (ref 21–32)
CREAT SERPL-MCNC: 1.09 MG/DL (ref 0.55–1.02)
DIAGNOSIS, 93000: NORMAL
ERYTHROCYTE [DISTWIDTH] IN BLOOD BY AUTOMATED COUNT: 14.1 % (ref 11.5–14.5)
GLUCOSE BLD STRIP.AUTO-MCNC: 157 MG/DL (ref 65–100)
GLUCOSE BLD STRIP.AUTO-MCNC: 218 MG/DL (ref 65–100)
GLUCOSE BLD STRIP.AUTO-MCNC: 267 MG/DL (ref 65–100)
GLUCOSE BLD STRIP.AUTO-MCNC: 272 MG/DL (ref 65–100)
GLUCOSE SERPL-MCNC: 301 MG/DL (ref 65–100)
HCT VFR BLD AUTO: 41.8 % (ref 35–47)
HGB BLD-MCNC: 13.4 G/DL (ref 11.5–16)
MAGNESIUM SERPL-MCNC: 1.6 MG/DL (ref 1.6–2.4)
MCH RBC QN AUTO: 30.2 PG (ref 26–34)
MCHC RBC AUTO-ENTMCNC: 32.1 G/DL (ref 30–36.5)
MCV RBC AUTO: 94.4 FL (ref 80–99)
P-R INTERVAL, ECG05: 138 MS
PERFORMED BY, TECHID: ABNORMAL
PLATELET # BLD AUTO: 205 K/UL (ref 150–400)
PMV BLD AUTO: 12 FL (ref 8.9–12.9)
POTASSIUM SERPL-SCNC: 4.6 MMOL/L (ref 3.5–5.1)
Q-T INTERVAL, ECG07: 390 MS
QRS DURATION, ECG06: 110 MS
QTC CALCULATION (BEZET), ECG08: 417 MS
RBC # BLD AUTO: 4.43 M/UL (ref 3.8–5.2)
SODIUM SERPL-SCNC: 138 MMOL/L (ref 136–145)
VENTRICULAR RATE, ECG03: 69 BPM
WBC # BLD AUTO: 6.6 K/UL (ref 3.6–11)

## 2021-01-03 PROCEDURE — 83036 HEMOGLOBIN GLYCOSYLATED A1C: CPT

## 2021-01-03 PROCEDURE — 85027 COMPLETE CBC AUTOMATED: CPT

## 2021-01-03 PROCEDURE — 83735 ASSAY OF MAGNESIUM: CPT

## 2021-01-03 PROCEDURE — 74011636637 HC RX REV CODE- 636/637: Performed by: FAMILY MEDICINE

## 2021-01-03 PROCEDURE — 74011000250 HC RX REV CODE- 250: Performed by: NURSE PRACTITIONER

## 2021-01-03 PROCEDURE — 80048 BASIC METABOLIC PNL TOTAL CA: CPT

## 2021-01-03 PROCEDURE — 97116 GAIT TRAINING THERAPY: CPT | Performed by: PHYSICAL THERAPIST

## 2021-01-03 PROCEDURE — 97161 PT EVAL LOW COMPLEX 20 MIN: CPT | Performed by: PHYSICAL THERAPIST

## 2021-01-03 PROCEDURE — 74011250636 HC RX REV CODE- 250/636: Performed by: FAMILY MEDICINE

## 2021-01-03 PROCEDURE — 74011250636 HC RX REV CODE- 250/636: Performed by: EMERGENCY MEDICINE

## 2021-01-03 PROCEDURE — 94640 AIRWAY INHALATION TREATMENT: CPT

## 2021-01-03 PROCEDURE — 65270000029 HC RM PRIVATE

## 2021-01-03 PROCEDURE — 36415 COLL VENOUS BLD VENIPUNCTURE: CPT

## 2021-01-03 PROCEDURE — 77010033678 HC OXYGEN DAILY

## 2021-01-03 PROCEDURE — 94760 N-INVAS EAR/PLS OXIMETRY 1: CPT

## 2021-01-03 PROCEDURE — 82962 GLUCOSE BLOOD TEST: CPT

## 2021-01-03 PROCEDURE — 74011250637 HC RX REV CODE- 250/637: Performed by: NURSE PRACTITIONER

## 2021-01-03 RX ORDER — GABAPENTIN 300 MG/1
300 CAPSULE ORAL 3 TIMES DAILY
Status: ON HOLD | COMMUNITY
End: 2021-08-31

## 2021-01-03 RX ORDER — IPRATROPIUM BROMIDE AND ALBUTEROL SULFATE 2.5; .5 MG/3ML; MG/3ML
3 SOLUTION RESPIRATORY (INHALATION)
Status: DISCONTINUED | OUTPATIENT
Start: 2021-01-03 | End: 2021-01-06 | Stop reason: HOSPADM

## 2021-01-03 RX ORDER — BENZONATATE 100 MG/1
200 CAPSULE ORAL 3 TIMES DAILY
Status: DISCONTINUED | OUTPATIENT
Start: 2021-01-03 | End: 2021-01-06 | Stop reason: HOSPADM

## 2021-01-03 RX ORDER — IPRATROPIUM BROMIDE AND ALBUTEROL SULFATE 2.5; .5 MG/3ML; MG/3ML
3 SOLUTION RESPIRATORY (INHALATION) 4 TIMES DAILY
Status: DISCONTINUED | OUTPATIENT
Start: 2021-01-03 | End: 2021-01-03

## 2021-01-03 RX ORDER — PREDNISONE 10 MG/1
TABLET ORAL
COMMUNITY
End: 2021-01-06

## 2021-01-03 RX ADMIN — METHYLPREDNISOLONE SODIUM SUCCINATE 40 MG: 40 INJECTION, POWDER, FOR SOLUTION INTRAMUSCULAR; INTRAVENOUS at 08:53

## 2021-01-03 RX ADMIN — BENZONATATE 200 MG: 100 CAPSULE ORAL at 21:02

## 2021-01-03 RX ADMIN — ENOXAPARIN SODIUM 40 MG: 40 INJECTION SUBCUTANEOUS at 08:52

## 2021-01-03 RX ADMIN — METHYLPREDNISOLONE SODIUM SUCCINATE 40 MG: 40 INJECTION, POWDER, FOR SOLUTION INTRAMUSCULAR; INTRAVENOUS at 21:02

## 2021-01-03 RX ADMIN — SODIUM CHLORIDE 1000 ML: 9 INJECTION, SOLUTION INTRAVENOUS at 01:19

## 2021-01-03 RX ADMIN — BENZONATATE 200 MG: 100 CAPSULE ORAL at 12:48

## 2021-01-03 RX ADMIN — INSULIN LISPRO 2 UNITS: 100 INJECTION, SOLUTION INTRAVENOUS; SUBCUTANEOUS at 12:34

## 2021-01-03 RX ADMIN — INSULIN LISPRO 3 UNITS: 100 INJECTION, SOLUTION INTRAVENOUS; SUBCUTANEOUS at 08:52

## 2021-01-03 RX ADMIN — BENZONATATE 200 MG: 100 CAPSULE ORAL at 17:00

## 2021-01-03 RX ADMIN — INSULIN LISPRO 3 UNITS: 100 INJECTION, SOLUTION INTRAVENOUS; SUBCUTANEOUS at 21:01

## 2021-01-03 RX ADMIN — INSULIN GLARGINE 12 UNITS: 100 INJECTION, SOLUTION SUBCUTANEOUS at 21:01

## 2021-01-03 RX ADMIN — INSULIN LISPRO 5 UNITS: 100 INJECTION, SOLUTION INTRAVENOUS; SUBCUTANEOUS at 17:00

## 2021-01-03 RX ADMIN — IPRATROPIUM BROMIDE AND ALBUTEROL SULFATE 3 ML: .5; 3 SOLUTION RESPIRATORY (INHALATION) at 12:00

## 2021-01-03 RX ADMIN — IPRATROPIUM BROMIDE AND ALBUTEROL SULFATE 3 ML: .5; 3 SOLUTION RESPIRATORY (INHALATION) at 20:36

## 2021-01-03 RX ADMIN — METHYLPREDNISOLONE SODIUM SUCCINATE 40 MG: 40 INJECTION, POWDER, FOR SOLUTION INTRAMUSCULAR; INTRAVENOUS at 17:01

## 2021-01-03 RX ADMIN — Medication 10 ML: at 08:53

## 2021-01-03 NOTE — PROGRESS NOTES
Hospitalist Progress Note             Daily Progress Note: 1/3/2021      Subjective: The patient is seen for follow  up. Johnny Sharpe is a 65 y.o. female who presents with shortness of breath, nonproductive cough . COVID-19 results were negative. CTA of chest negative for PE or infiltrates. She was admitted for acute COPD exacerbation. Patient seen and examined at bedside  She is on oxygen at 2 L via nasal cannula  Reports nonproductive cough  Reports shortness of breath with exertion.       Problem List:  Patient Active Problem List   Diagnosis Code    Retained ureteral stent Z96.0    COPD (chronic obstructive pulmonary disease) ([facility] [address] J44.9    UPJ (ureteropelvic junction) obstruction N13.5    Acute cystitis N30.00    Fungus present in urine B49    COPD exacerbation ([facility]) J44.1         Medications reviewed  Current Facility-Administered Medications   Medication Dose Route Frequency    methylPREDNISolone (PF) (SOLU-MEDROL) injection 40 mg  40 mg IntraVENous Q8H    albuterol (PROVENTIL HFA, VENTOLIN HFA, PROAIR HFA) inhaler 2 Puff  2 Puff Inhalation Q4H PRN    sodium chloride (NS) flush 5-40 mL  5-40 mL IntraVENous PRN    acetaminophen (TYLENOL) tablet 650 mg  650 mg Oral Q6H PRN    Or    acetaminophen (TYLENOL) suppository 650 mg  650 mg Rectal Q6H PRN    polyethylene glycol (MIRALAX) packet 17 g  17 g Oral DAILY PRN    promethazine (PHENERGAN) tablet 12.5 mg  12.5 mg Oral Q6H PRN    Or    ondansetron (ZOFRAN) injection 4 mg  4 mg IntraVENous Q6H PRN    enoxaparin (LOVENOX) injection 40 mg  40 mg SubCUTAneous DAILY    glucose chewable tablet 16 g  4 Tab Oral PRN    dextrose (D50W) injection syrg 12.5-25 g  25-50 mL IntraVENous PRN    glucagon (GLUCAGEN) injection 1 mg  1 mg IntraMUSCular PRN    insulin glargine (LANTUS) injection 12 Units  0.2 Units/kg SubCUTAneous QHS    insulin lispro (HUMALOG) injection   SubCUTAneous AC&HS       Review of Systems: Review of Systems   Constitutional: Negative for chills, diaphoresis, fever, malaise/fatigue and weight loss. HENT: Negative for ear discharge, ear pain, hearing loss, nosebleeds, sinus pain and tinnitus. Respiratory: Positive for cough, shortness of breath and wheezing. Negative for hemoptysis and sputum production. Cardiovascular: Negative for chest pain, palpitations, orthopnea, claudication and leg swelling. Gastrointestinal: Negative for abdominal pain, constipation, diarrhea, heartburn, nausea and vomiting. Genitourinary: Negative for dysuria, flank pain, frequency, hematuria and urgency. Musculoskeletal: Negative for back pain, falls, joint pain, myalgias and neck pain. Neurological: Negative for dizziness, tingling, focal weakness, seizures, weakness and headaches. Psychiatric/Behavioral: Negative for depression, hallucinations, memory loss, substance abuse and suicidal ideas. The patient is not nervous/anxious. Objective:   Physical Exam  Constitutional:       General: She is not in acute distress. Appearance: Normal appearance. HENT:      Head: Normocephalic and atraumatic. Mouth/Throat:      Mouth: Mucous membranes are moist.   Eyes:      Pupils: Pupils are equal, round, and reactive to light. Neck:      Musculoskeletal: No neck rigidity. Cardiovascular:      Rate and Rhythm: Normal rate and regular rhythm. Pulses: Normal pulses. Heart sounds: Normal heart sounds. Pulmonary:      Effort: Pulmonary effort is normal. No respiratory distress. Breath sounds: Wheezing present. Abdominal:      General: Bowel sounds are normal.      Palpations: Abdomen is soft. There is no mass. Tenderness: There is no abdominal tenderness. Musculoskeletal:         General: No swelling, tenderness, deformity or signs of injury. Skin:     General: Skin is warm and dry. Findings: No bruising or erythema.    Neurological:      General: No focal deficit present. Mental Status: She is alert and oriented to person, place, and time. Motor: No weakness. Psychiatric:         Mood and Affect: Mood normal.          Visit Vitals  BP (!) 160/81 (BP 1 Location: Right arm, BP Patient Position: At rest)   Pulse 74   Temp 97.6 °F (36.4 °C)   Resp 20   Ht 5' 4\" (1.626 m)   Wt 61.2 kg (135 lb)   SpO2 97%   BMI 23.17 kg/m²           Data Review:       Recent Days:  Recent Labs     01/02/21 1915   WBC 5.5   HGB 13.8   HCT 43.6        Recent Labs     01/02/21 1915      K 5.3*      CO2 35*   *   BUN 29*   CREA 0.86   CA 8.7   ALB 3.5   TBILI 0.5   ALT 26         Assessment/Plan       1. Acute on chronic hypoxic respiratory failure  Secondary to exacerbation of COPD  CTA of chest negative for infiltrate or PE  Continue supplemental oxygen, add DuoNeb's, Tessalon and continue Solu-Medrol. 2.  COPD exacerbation  Secondary to  history of nicotine dependence  She quit smoking 6 years ago  CTA of chest negative for infiltrate or PE  Continue supplemental oxygen she does use oxygen 2 L at home via nasal cannula, add duo nebs and Tessalon Perles. 3.  Diabetes type 2  Continue sliding scale insulin. BMP in a.m. Lovenox for DVT prophylaxis    Meds: Continue Solu-Medrol and will add duo nebs and Tessalon Perles. She is on oxygen at 2 L via nasal cannula which is her baseline at home.       Nacho Langford, NP

## 2021-01-03 NOTE — ED PROVIDER NOTES
EMERGENCY DEPARTMENT HISTORY AND PHYSICAL EXAM      Date: 1/2/2021  Patient Name: Junior Bey    History of Presenting Illness     Chief Complaint   Patient presents with    Shortness of Breath    Fever       History Provided By: Patient    HPI: Junior Bey, 65 y.o. female presents to the ED with cc of   Chief Complaint   Patient presents with    Shortness of Breath    Fever   Pt exposed to family member that tested positive for covid over jodi and is experiencing some shortness of breath, fever, chills, cough. Patient with history of COPD and she thinks that she has an COPD exacerbation   Moderate severity, no known exacerbating or relieving factors, no other associated signs and symptoms. There are no other complaints, changes, or physical findings at this time. PCP: Romina Bower MD    No current facility-administered medications on file prior to encounter. Current Outpatient Medications on File Prior to Encounter   Medication Sig Dispense Refill    doxycycline (ADOXA) 100 mg tablet Take 1 Tab by mouth two (2) times a day. 10 Tab 0    cholecalciferol (VITAMIN D3) (50,000 UNITS /1250 MCG) capsule Take  by mouth every seven (7) days.  omeprazole magnesium (PRILOSEC OTC PO) Take 1 Tab by mouth two (2) times a day.  insulin glargine U-300 conc (TOUJEO) 300 unit/mL (1.5 mL) inpn pen 20 Units by SubCUTAneous route nightly.  insulin lispro (HUMALOG) 100 unit/mL injection by SubCUTAneous route. 5 units before lunch, 8 units before supper and 3 units before breakfast      clonazePAM (KlonoPIN) 0.5 mg tablet Take 0.5 mg by mouth three (3) times daily.  oxybutynin (DITROPAN) 5 mg tablet Take 5 mg by mouth two (2) times a day.  atorvastatin (LIPITOR) 20 mg tablet Take 20 mg by mouth daily.  insulin lispro protamine/insulin lispro (HumaLOG Mix 50-50 Insuln U-100) 100 unit/mL (50-50) injection by SubCUTAneous route.       furosemide (LASIX) 20 mg tablet Take 20 mg by mouth daily. As needed      FLUoxetine (PROZAC) 40 mg capsule Take 40 mg by mouth daily.  montelukast (SINGULAIR) 10 mg tablet Take 10 mg by mouth daily.  pramipexole (MIRAPEX) 0.5 mg tablet Take 1 mg by mouth nightly.  DULoxetine (CYMBALTA) 20 mg capsule Take 20 mg by mouth daily.  albuterol (PROVENTIL HFA, VENTOLIN HFA, PROAIR HFA) 90 mcg/actuation inhaler Take 1 Puff by inhalation every six (6) hours as needed.  ipratropium-albuterol (COMBIVENT)  mcg/actuation inhaler Take 1 Puff by inhalation every six (6) hours as needed for Wheezing or Shortness of Breath. Indications: CHRONIC OBSTRUCTIVE PULMONARY DISEASE WITH BRONCHOSPASMS      Biotin 2,500 mcg cap Take 5,000 mcg/day by mouth daily. Past History     Past Medical History:  Past Medical History:   Diagnosis Date    Arthritis     Bacterial meningitis     Hx of    CAD (coronary artery disease)     Chronic cystitis 7/2/2020    Chronic kidney disease     stent in kidney due to several UTI.   Change stent every 6mos    Chronic obstructive pulmonary disease (HCC)     Chronic pain     DDD (degenerative disc disease), cervical     Diabetes ([address]     Environmental allergies     Esophageal disorder     Fibromyalgia     GERD (gastroesophageal reflux disease)     Hydronephrosis 7/2/2020    Hypercholesterolemia     Hypertension     Ill-defined condition     dysphagia    Incomplete emptying of bladder 7/2/2020    Increased frequency of urination 7/2/2020    Kidney stones     hx of    Macular degeneration     Mixed stress and urge urinary incontinence 7/2/2020    Nausea & vomiting     Neurogenic bladder 7/2/2020    Nocturia 7/2/2020    Oxygen dependent     uses 2-3 liters as needed    Psychiatric disorder     Rheumatoid arthritis (HCC)     Scoliosis     Shingles     Hx of    Shortness of breath on exertion     Sleep apnea     pt states uses CPAP    Suicidal thoughts     pt stated during PAT visit , she has hx of suicidal thoughts age 17's, pt stated never attempted and further thoughts of suicide since and none that day    Torn rotator cuff     pt states on right side    Urinary tract infection without hematuria 7/2/2020    Urinary urgency 7/2/2020       Past Surgical History:  Past Surgical History:   Procedure Laterality Date    COLONOSCOPY N/A 10/9/2020    COLONOSCOPY performed by Manish Willis MD at [address] [facility]      HX DILATION AND CURETTAGE      HX JOINT REPLACEMENT SURGERY      knee both sides    HX LUMBAR FUSION      HX ORTHOPAEDIC      total knee replacements    HX ORTHOPAEDIC      back surgery     HX UROLOGICAL      stent in ureter    HX UROLOGICAL  09/14/2020    Cystoscopy    HX UROLOGICAL Left 09/14/2020    retrograde pyelogram    HX UROLOGICAL Left 09/14/2020    ureteral stent exchange    HX UROLOGICAL  11/09/2020    Cystoscopy    HX UROLOGICAL  11/09/2020    retrograde pyelograms    HX UROLOGICAL Left 11/09/2020    robotic left pyeloplasty    HX UROLOGICAL Left 11/09/2020    ureteral stent exchange    HX UROLOGICAL  11/24/2020    cystoscopy stent removal     NEUROLOGICAL PROCEDURE UNLISTED      back surg    CT BREAST SURGERY PROCEDURE UNLISTED      CT CYSTOSCOPY,INSERT URETERAL STENT  5/8/2018, 7/11/2017, 2/9/2019, 10/11/2016, 6/7/2016, 1/14/2016, 7/14/2015       Family History:  Family History   Problem Relation Age of Onset    Hypertension Mother     Heart Disease Mother     Diabetes Mother     Liver Disease Mother     Diabetes Father     Heart Disease Father     Heart Disease Brother     Diabetes Brother     COPD Brother     Liver Disease Brother     Hypertension Sister     Elevated Lipids Sister        Social History:  Social History     Tobacco Use    Smoking status: Former Smoker     Years: 55.00     Quit date: 5/4/2005     Years since quitting: 15.6    Smokeless tobacco: Never Used    Tobacco comment: edwin suarez quit 7 years ago   Substance Use Topics    Alcohol use: Yes     Comment: special occasions    Drug use: Never       Allergies: Allergies   Allergen Reactions    Codeine Other (comments)     hallucinations         Review of Systems   Review of Systems   Constitutional: Negative. HENT: Negative for congestion, nosebleeds, sinus pressure and sinus pain. Eyes: Negative. Negative for photophobia. Respiratory: Positive for cough, chest tightness and shortness of breath. Negative for apnea, choking, wheezing and stridor. Cardiovascular: Positive for palpitations. Negative for chest pain. Gastrointestinal: Negative. Genitourinary: Negative. Musculoskeletal: Negative. Negative for back pain and gait problem. Skin: Negative. Allergic/Immunologic: Negative. Neurological: Negative. Negative for weakness, light-headedness and numbness. Hematological: Negative. Psychiatric/Behavioral: Negative. Physical Exam   Physical Exam  Vitals signs and nursing note reviewed. Constitutional:       General: She is in acute distress (Mild). Appearance: Normal appearance. HENT:      Head: Normocephalic and atraumatic. Mouth/Throat:      Pharynx: Oropharynx is clear. Eyes:      Extraocular Movements: Extraocular movements intact. Pupils: Pupils are equal, round, and reactive to light. Neck:      Musculoskeletal: Normal range of motion and neck supple. Cardiovascular:      Rate and Rhythm: Normal rate and regular rhythm. Pulses: Normal pulses. Heart sounds: Normal heart sounds. Comments: Chest wall tenderness mild  Pulmonary:      Breath sounds: Examination of the right-upper field reveals wheezing. Examination of the left-upper field reveals wheezing. Examination of the right-middle field reveals wheezing. Examination of the left-middle field reveals wheezing. Wheezing present. Abdominal:      General: Abdomen is flat.  Bowel sounds are normal. Palpations: Abdomen is soft. Musculoskeletal: Normal range of motion. Skin:     General: Skin is warm and dry. Neurological:      General: No focal deficit present. Mental Status: She is alert and oriented to person, place, and time. Psychiatric:         Mood and Affect: Mood is anxious. Behavior: Behavior normal.         Diagnostic Study Results     Labs -     Recent Results (from the past 12 hour(s))   D DIMER    Collection Time: 01/02/21  7:11 PM   Result Value Ref Range    D DIMER 1.36 (H) <0.50 ug/ml(FEU)   CBC WITH AUTOMATED DIFF    Collection Time: 01/02/21  7:15 PM   Result Value Ref Range    WBC 5.5 3.6 - 11.0 K/uL    RBC 4.58 3.80 - 5.20 M/uL    HGB 13.8 11.5 - 16.0 g/dL    HCT 43.6 35.0 - 47.0 %    MCV 95.2 80.0 - 99.0 FL    MCH 30.1 26.0 - 34.0 PG    MCHC 31.7 30.0 - 36.5 g/dL    RDW 14.4 11.5 - 14.5 %    PLATELET 007 013 - 894 K/uL    MPV 12.3 8.9 - 12.9 FL    NEUTROPHILS 80 (H) 32 - 75 %    LYMPHOCYTES 17 12 - 49 %    MONOCYTES 3 (L) 5 - 13 %    EOSINOPHILS 0 0 - 7 %    BASOPHILS 0 0 - 1 %    IMMATURE GRANULOCYTES 0 0.0 - 0.5 %    ABS. NEUTROPHILS 4.4 1.8 - 8.0 K/UL    ABS. LYMPHOCYTES 1.0 0.8 - 3.5 K/UL    ABS. MONOCYTES 0.1 0.0 - 1.0 K/UL    ABS. EOSINOPHILS 0.0 0.0 - 0.4 K/UL    ABS. BASOPHILS 0.0 0.0 - 0.1 K/UL    ABS. IMM. GRANS. 0.0 0.00 - 0.04 K/UL    DF AUTOMATED     METABOLIC PANEL, COMPREHENSIVE    Collection Time: 01/02/21  7:15 PM   Result Value Ref Range    Sodium 137 136 - 145 mmol/L    Potassium 5.3 (H) 3.5 - 5.1 mmol/L    Chloride 100 97 - 108 mmol/L    CO2 35 (H) 21 - 32 mmol/L    Anion gap 2 (L) 5 - 15 mmol/L    Glucose 290 (H) 65 - 100 mg/dL    BUN 29 (H) 6 - 20 mg/dL    Creatinine 0.86 0.55 - 1.02 mg/dL    BUN/Creatinine ratio 34 (H) 12 - 20      GFR est AA >60 >60 ml/min/1.73m2    GFR est non-AA >60 >60 ml/min/1.73m2    Calcium 8.7 8.5 - 10.1 mg/dL    Bilirubin, total 0.5 0.2 - 1.0 mg/dL    AST (SGOT) 21 15 - 37 U/L    ALT (SGPT) 26 12 - 78 U/L    Alk. phosphatase 158 (H) 45 - 117 U/L    Protein, total 7.2 6.4 - 8.2 g/dL    Albumin 3.5 3.5 - 5.0 g/dL    Globulin 3.7 2.0 - 4.0 g/dL    A-G Ratio 0.9 (L) 1.1 - 2.2     CK W/ REFLX CKMB    Collection Time: 01/02/21  7:15 PM   Result Value Ref Range    CK 66.0 26 - 192 ng/mL   TROPONIN I    Collection Time: 01/02/21  7:15 PM   Result Value Ref Range    Troponin-I, Qt. <0.05 <0.05 ng/mL       Labs reviewed by me    Radiologic Studies -   XR CHEST PORT   Final Result   IMPRESSION: Essentially clear lungs. No effusion or pneumothorax. Normal heart   and mediastinum      CTA CHEST W OR W WO CONT    (Results Pending)     CT Results  (Last 48 hours)    None        CXR Results  (Last 48 hours)               01/02/21 1921  XR CHEST PORT Final result    Impression:  IMPRESSION: Essentially clear lungs. No effusion or pneumothorax. Normal heart   and mediastinum       Narrative:  1 view comparison underfilled                   Medical Decision Making     I am the first provider for this patient. I reviewed the vital signs, available nursing notes, past medical history, past surgical history, family history and social history. RADIOLOGY report and LABS reviewed by me    Vital Signs-Reviewed the patient's vital signs. Patient Vitals for the past 12 hrs:   Temp Pulse Resp BP SpO2   01/02/21 2108 97.8 °F (36.6 °C) 62 18 (!) 143/79 95 %   01/02/21 2033 -- 68 -- -- 95 %   01/02/21 2002 -- -- -- -- 94 %   01/02/21 1900 -- -- -- (!) 145/88 --   01/02/21 1855 97.7 °F (36.5 °C) 66 18 -- 94 %       EKG interpretation: (Preliminary)  EKG done at 1857 shows normal sinus rhythm with normal axis ST abnormality in the inferior and lateral leads, Q in septal leads, ventricular rate of 69 and no ectopy    Records Reviewed: Nurse's note.       Provider Notes (Medical Decision Making):    Patient presents with DIFF DX : Suspected COVID-19, pneumonia,  COVID-19 positive test (U07.1, COVID-19) with Acute Pneumonia (J12.89, Other viral pneumonia)  (If respiratory failure or sepsis present, add as separate assessment)    , COPD        ED Course:   Initial assessment performed. The patients presenting problems have been discussed, and they are in agreement with the care plan formulated and outlined with them. I have encouraged them to ask questions as they arise throughout their visit. TREATMENT RESPONSE -Toni Montes MD      Disposition:  Admitted   Diagnostic tests were reviewed and questions answered. Diagnosis, care plan and treatment options were discussed. The patient understand instructions and will follow up as directed. Condition stable    Admitting Provider:  No admitting provider for patient encounter. Consulting Provider:  No ref. provider found       DISCHARGE PLAN:  1. Current Discharge Medication List        2. Follow-up Information    None       3. Return to ED if worse     Diagnosis     Clinical Impression:     ICD-10-CM ICD-9-CM    1. [facility] exacerbation [facility]  J44.1 491.21    2. Suspected COVID-19 virus infection  Z20.822 V01.79    3. Dyspnea, unspecified type  R06.00 786.09    4. Hypoxemia  R09.02 799.02         Attestations:    Alexey Montes MD    Please note that this dictation was completed with [employer], the [employer] voice recognition software. Quite often unanticipated grammatical, syntax, homophones, and other interpretive errors are inadvertently transcribed by the computer software. Please disregard these errors. Please excuse any errors that have escaped final proofreading. Thank you.

## 2021-01-03 NOTE — PROGRESS NOTES
PHYSICAL THERAPY EVALUATION  Patient: Eduarda May (16 y.o. female)  Date: 1/3/2021  Primary Diagnosis: COPD exacerbation ([address] [J44.1]        Precautions: fall risk, [place]    ASSESSMENT  65 yof who came to [facility] on 1/2/21 due to SOB. Pt has had a COVID exposure. COVID results neg. Dx with COPD exacerbation. PMHx: cystosscopy, L retrograde pyelogram, L uretral stent, COPD with chronic hypoxic respiratory failure on home oxygen, insulin-dependent diabetes mellitus type 2 and obstructive sleep apnea with use of CPAP machine at night, CAD, HTN, ra, macular degeneration, fibro. Pt lives with family in 3 story home with 3 KARIN with R sided railing. She uses [facility] for short distances and [facility] for longer distances. She has a walk in shower with a shower chair. Owns reacher, sock aid, shoe horn. At PT eval, she was A&O x4. Supine to L roll Ind, L sidelayign to sitting EOB Ind, Sit <> stand from bed, commode, and chair at CGA, ambulation of 20 feet at CGA with SPC, no LOB noted. She does demo some weakness, and poor balance and reports that she feels weaker than prior to admission. PT recommends PT for pt but she declined acute PT and  PT. PLAN :  Recommendations and Planned Interventions: will not be seen for acute PT    Frequency/Duration: Patient will be followed by physical therapy: will not be seen for acute PT    Recommendation for discharge: (in order for the patient to meet his/her long term goals)  Home Ind    This discharge recommendation:  Has not yet been discussed the attending provider and/or case management    IF patient discharges home will need the following DME: none         SUBJECTIVE:   Patient stated i'm good. I've had PT several times, I know what to do. And it hasn't helped me with my pain anyways.     OBJECTIVE DATA SUMMARY:   HISTORY:    Past Medical History:   Diagnosis Date    Arthritis     Bacterial meningitis     Hx of    CAD (coronary artery disease)     Chronic obstructive pulmonary disease (HCC)     Chronic pain     DDD (degenerative disc disease), cervical     Diabetes (HCC)     Environmental allergies     Esophageal disorder     Fibromyalgia     GERD (gastroesophageal reflux disease)     Hypercholesterolemia     Hypertension     Kidney stones     hx of    Macular degeneration     Mixed stress and urge urinary incontinence 7/2/2020    Neurogenic bladder 7/2/2020    Psychiatric disorder     Rheumatoid arthritis ([address]     Scoliosis     Shingles     Hx of    Sleep apnea     pt states uses CPAP    Suicidal thoughts     pt stated during PAT visit , she has hx of suicidal thoughts age 17's, pt stated never attempted and further thoughts of suicide since and none that day    Torn rotator cuff     pt states on right side    Urinary tract infection without hematuria 7/2/2020     Past Surgical History:   Procedure Laterality Date    COLONOSCOPY N/A 10/9/2020    COLONOSCOPY performed by Manish Willis MD at [facility]      HX DILATION AND CURETTAGE      HX JOINT REPLACEMENT SURGERY      knee both sides    HX LUMBAR FUSION      HX ORTHOPAEDIC      total knee replacements    HX ORTHOPAEDIC      back surgery     HX UROLOGICAL      stent in ureter    HX UROLOGICAL  09/14/2020    Cystoscopy    HX UROLOGICAL Left 09/14/2020    retrograde pyelogram    HX UROLOGICAL Left 09/14/2020    ureteral stent exchange    HX UROLOGICAL  11/09/2020    Cystoscopy    HX UROLOGICAL  11/09/2020    retrograde pyelograms    HX UROLOGICAL Left 11/09/2020    robotic left pyeloplasty    HX UROLOGICAL Left 11/09/2020    ureteral stent exchange    HX UROLOGICAL  11/24/2020    cystoscopy stent removal     NEUROLOGICAL PROCEDURE UNLISTED      back surg    CO BREAST SURGERY PROCEDURE UNLISTED      CO CYSTOSCOPY,INSERT URETERAL STENT  5/8/2018, 7/11/2017, 2/9/2019, 10/11/2016, 6/7/2016, 1/14/2016, 7/14/2015       Personal factors and/or comorbidities impacting plan of care: chronic pain    Home Situation  Home Environment: Private residence  # Steps to Enter: 3  Rails to Enter: Yes  Hand Rails : Right  One/Two Story Residence: One story  Living Alone: No  Support Systems: Family member(s)  Patient Expects to be Discharged to[de-identified] Private residence  Current DME Used/Available at Home: CPAP, Cane, straight, Walker, rolling, Shower chair, Oxygen, portable, Adaptive dressing aides    PLOF: Pt MOD I for ADLS/IADLS, MOD I with mobility prior to admission. EXAMINATION/PRESENTATION/DECISION MAKING:   Critical Behavior:  Neurologic State: Alert  Orientation Level: Oriented X4     Safety/Judgement: Awareness of environment  Hearing: Auditory  Auditory Impairment: Hard of hearing, bilateral    Range Of Motion:      WFL                    Strength:        decreased               Functional Mobility:  Bed Mobility:  Rolling: Independent  Supine to Sit: Independent  Sit to Supine: Independent  Scooting: Independent  Transfers:  Sit to Stand: Contact guard assistance  Stand to Sit: Contact guard assistance  Stand Pivot Transfers: Contact guard assistance     Bed to Chair: Contact guard assistance              Balance:   Sitting: Intact  Standing: Impaired  Standing - Static: Good  Standing - Dynamic : Fair  Ambulation/Gait Training:  Distance (ft): 20 Feet (ft)  Assistive Device: Gait belt;Cane, straight  Ambulation - Level of Assistance: Contact guard assistance     Gait Description (WDL): Exceptions to WDL  Gait Abnormalities: Trunk sway increased        Base of Support: Widened  Stance: Left increased;Right increased  Speed/Loren: Slow  Step Length: Left shortened;Right shortened  Swing Pattern: Left symmetrical;Right symmetrical              Functional Measure:    [facility] AM-PAC 6 Clicks         Basic Mobility Inpatient Short Form  How much difficulty does the patient currently have. .. Unable A Lot A Little None   1.   Turning over in bed (including adjusting bedclothes, sheets and blankets)? [] 1   [] 2   [] 3   [x] 4   2. Sitting down on and standing up from a chair with arms ( e.g., wheelchair, bedside commode, etc.)   [] 1   [] 2   [x] 3   [] 4   3. Moving from lying on back to sitting on the side of the bed? [] 1   [] 2   [] 3   [x] 4          How much help from another person does the patient currently need. .. Total A Lot A Little None   4. Moving to and from a bed to a chair (including a wheelchair)? [] 1   [] 2   [x] 3   [] 4   5. Need to walk in hospital room? [] 1   [] 2   [x] 3   [] 4   6. Climbing 3-5 steps with a railing? [] 1   [] 2   [x] 3   [] 4   © , Trustees of [address] [place] [id number], under license to [employer]. All rights reserved     Score:  Initial: MS Most Recent: X (Date: -- )   Interpretation of Tool:  Represents activities that are increasingly more difficult (i.e. Bed mobility, Transfers, Gait). Score 24 23 22-20 19-15 14-10 9-7 6   Modifier CH CI CJ CK CL CM CN          Physical Therapy Evaluation Charge Determination   History Examination Presentation Decision-Making   MEDIUM  Complexity : 1-2 comorbidities / personal factors will impact the outcome/ POC  LOW Complexity : 1-2 Standardized tests and measures addressing body structure, function, activity limitation and / or participation in recreation  LOW Complexity : Stable, uncomplicated  Other Functional Measure [facility] 6 20      Based on the above components, the patient evaluation is determined to be of the following complexity level: LOW     Pain Ratin/10    Activity Tolerance:   Fair  Please refer to the flowsheet for vital signs taken during this treatment. After treatment patient left in no apparent distress:   Sitting in chair and Call bell within reach    COMMUNICATION/EDUCATION:   The patients plan of care was discussed with:  PCT .        Thank you for this referral.  Amy Chamorro   Time Calculation: 40 mins

## 2021-01-03 NOTE — H&P
History and Physical    Patient: Obdulio Swann MRN: [id number]  SSN: [id number]    YOB: 1950  Age: 65 y.o. Sex: female      Subjective:      Chief Complaint: Shortness of Breath. HPI: Obdulio Swann is a 65 y.o. female with past medical history of COPD with chronic hypoxic respiratory failure on home oxygen, insulin-dependent diabetes mellitus type 2 and obstructive sleep apnea with use of CPAP machine at night presenting to the ER with complaints of shortness of breath. Patient has been more short of breath with nonproductive cough and subjective fever over the past several days. There has been a COVID-19 exposure. Ms. Mena Powell denies recent chills, nausea, vomiting, diarrhea, sore throat, nasal congestion, rhinorrhea or abdominal pain. Symptoms prompted ER visit this evening. On arrival to the ER, temperature was 97.7 °F, pulse 66, respirations 18, blood pressure 145/88 and oxygen saturation was 94% on room air. Initial chest x-ray has no evidence of acute infiltrate or cardiopulmonary process. COVID-19 results are negative. Ms. Mena Powell was hydrated with 2 L of normal saline, and administered IV Decadron and given albuterol via inhaler. Symptoms improved however on reexamination, wheezing persisted. Hospital service has been asked to admit Ms. Mena Powell for further treatment and evaluation of COPD exacerbation. Past medical history, past surgical history, family history and social history was reviewed at the time of admission. Ms. Mena Powell was unable to verify home medication list at the time of admission. Patient is a full code.     Past Medical History:   Diagnosis Date    Arthritis     Bacterial meningitis     Hx of    CAD (coronary artery disease)     Chronic obstructive pulmonary disease (HCC)     Chronic pain     DDD (degenerative disc disease), cervical     Diabetes (HCC)     Environmental allergies     Esophageal disorder     Fibromyalgia     GERD (gastroesophageal reflux disease)    • Hypercholesterolemia    • Hypertension    • Kidney stones     hx of   • Macular degeneration    • Mixed stress and urge urinary incontinence 7/2/2020   • Neurogenic bladder 7/2/2020   • Psychiatric disorder    • Rheumatoid arthritis (HCC)    • Scoliosis    • Shingles     Hx of   • Sleep apnea     pt states uses CPAP   • Suicidal thoughts     pt stated during PAT visit , she has hx of suicidal thoughts age 18's, pt stated never attempted and further thoughts of suicide since and none that day   • Torn rotator cuff     pt states on right side   • Urinary tract infection without hematuria 7/2/2020     Past Surgical History:   Procedure Laterality Date   • COLONOSCOPY N/A 10/9/2020    COLONOSCOPY performed by Francy Willard MD at [facility] ENDOSCOPY   • HX CARPAL TUNNEL RELEASE     • HX DILATION AND CURETTAGE     • HX JOINT REPLACEMENT SURGERY      knee both sides   • HX LUMBAR FUSION     • HX ORTHOPAEDIC      total knee replacements   • HX ORTHOPAEDIC      back surgery    • HX UROLOGICAL      stent in ureter   • HX UROLOGICAL  09/14/2020    Cystoscopy   • HX UROLOGICAL Left 09/14/2020    retrograde pyelogram   • HX UROLOGICAL Left 09/14/2020    ureteral stent exchange   • HX UROLOGICAL  11/09/2020    Cystoscopy   • HX UROLOGICAL  11/09/2020    retrograde pyelograms   • HX UROLOGICAL Left 11/09/2020    robotic left pyeloplasty   • HX UROLOGICAL Left 11/09/2020    ureteral stent exchange   • HX UROLOGICAL  11/24/2020    cystoscopy stent removal    • NEUROLOGICAL PROCEDURE UNLISTED      back surg   • PA BREAST SURGERY PROCEDURE UNLISTED     • PA CYSTOSCOPY,INSERT URETERAL STENT  5/8/2018, 7/11/2017, 2/9/2019, 10/11/2016, 6/7/2016, 1/14/2016, 7/14/2015      Family History   Problem Relation Age of Onset   • Hypertension Mother    • Heart Disease Mother    • Diabetes Mother    • Liver Disease Mother    • Diabetes Father    • Heart Disease Father    • Heart Disease Brother    • Diabetes  Brother     COPD Brother     Liver Disease Brother     Hypertension Sister     Elevated Lipids Sister      Social History     Tobacco Use    Smoking status: Former Smoker     Years: 55.00     Quit date: 5/4/2005     Years since quitting: 15.6    Smokeless tobacco: Never Used    Tobacco comment: pt satclarissa quit 7 years ago   Substance Use Topics    Alcohol use: Yes     Comment: special occasions      Prior to Admission medications    Medication Sig Start Date End Date Taking? Authorizing Provider   doxycycline (ADOXA) 100 mg tablet Take 1 Tab by mouth two (2) times a day. 11/24/20   Raphael Roldan MD   cholecalciferol (VITAMIN D3) (50,000 UNITS /1250 MCG) capsule Take  by mouth every seven (7) days. Provider, Historical   omeprazole magnesium (PRILOSEC OTC PO) Take 1 Tab by mouth two (2) times a day. Provider, Historical   insulin glargine U-300 conc (TOUJEO) 300 unit/mL (1.5 mL) inpn pen 20 Units by SubCUTAneous route nightly. Provider, Historical   insulin lispro (HUMALOG) 100 unit/mL injection by SubCUTAneous route. 5 units before lunch, 8 units before supper and 3 units before breakfast    Provider, Historical   clonazePAM (KlonoPIN) 0.5 mg tablet Take 0.5 mg by mouth three (3) times daily. Provider, Historical   oxybutynin (DITROPAN) 5 mg tablet Take 5 mg by mouth two (2) times a day. Provider, Historical   atorvastatin (LIPITOR) 20 mg tablet Take 20 mg by mouth daily. Provider, Historical   insulin lispro protamine/insulin lispro (HumaLOG Mix 50-50 Insuln U-100) 100 unit/mL (50-50) injection by SubCUTAneous route. Provider, Historical   furosemide (LASIX) 20 mg tablet Take 20 mg by mouth daily. As needed    Provider, Historical   FLUoxetine (PROZAC) 40 mg capsule Take 40 mg by mouth daily. Provider, Historical   montelukast (SINGULAIR) 10 mg tablet Take 10 mg by mouth daily. Provider, Historical   pramipexole (MIRAPEX) 0.5 mg tablet Take 1 mg by mouth nightly.     Provider, Historical   DULoxetine (CYMBALTA) 20 mg capsule Take 20 mg by mouth daily. Provider, Historical   albuterol (PROVENTIL HFA, VENTOLIN HFA, PROAIR HFA) 90 mcg/actuation inhaler Take 1 Puff by inhalation every six (6) hours as needed. Provider, Historical   ipratropium-albuterol (COMBIVENT)  mcg/actuation inhaler Take 1 Puff by inhalation every six (6) hours as needed for Wheezing or Shortness of Breath. Indications: CHRONIC OBSTRUCTIVE PULMONARY DISEASE WITH BRONCHOSPASMS    Provider, Historical   Biotin 2,500 mcg cap Take 5,000 mcg/day by mouth daily. Provider, Historical        Allergies   Allergen Reactions    Codeine Other (comments)     hallucinations       Review of Systems:  Constitutional: Positive for subjective fever. Denies chills, fatigue, weakness, unexplained weight loss, night sweats. Head, Eyes, Ears, Nose, Mouth, Throat: Denies nasal congestion, sore throat, rhinorrhea, earache, ringing of the ears, difficulty hearing, facial pain, facial swelling. Respiratory: Positive for shortness of breath, non-productive cough. Denies sputum production, wheezing, hemoptysis. Denies use of oxygen at home. Cardiovascular: Denies chest pain, irregular heart beat, racing pulse, lower extremity edema, dizziness, dyspnea on exertion, orthopnea. Gastrointestinal: Denies nausea, vomiting, diarrhea, constipation, abdominal pain, loss of appetite, acid reflux, melena, hematochezia, change in bowel habits. Endocrine: Denies intolerance to heat or cold. Denies polyuria, polydipsia, polyphagia. Denies recent weight changes. Genitourinary: Denies increased urinary frequency, dysuria, hematuria, urinary incontinence, increased urinary frequency. Integument/Breast: Denies rash, itching or new skin lesions. Musculoskeletal: Denies joint swelling, joint pain, myalgias, neck pain, back pain.   Neurological: Denies headaches, dizziness, confusion, tremors, numbness/tingling, paresthesias, weakness, problems with balance, loss of consciousness. Hematologic: Denies easy bleeding, easy bruising, lymphadenopathy. Behavioral/Psychiatric: Denies anxiety, depression, increased irritability, mood swings, delusions, hallucination, SI/HI. Objective:     Vitals:    01/02/21 1900 01/02/21 2002 01/02/21 2033 01/02/21 2108   BP: (!) 145/88   (!) 143/79   Pulse:   68 62   Resp:    18   Temp:    97.8 °F (36.6 °C)   SpO2:  94% 95% 95%   Weight:       Height:            Physical Exam:  General: Alert and Oriented x 3. Cooperative and friendly. No acute distress. Nourished and well developed. Oxygen saturations are 98% on 2 liters of oxygen. Head/Eyes: Normocephalic, atraumatic, EOMI, PERRLA. Patient wears glasses. Nose/Mouth: Turbinates within normal limits, No drainage. Mucous membranes are moist.   Throat and Neck: Posterior pharynx without erythema or exudate. No masses, JVD, thyromegaly or lymphadenopathy appreciated. Cervical spine has good range of motion without pain. Lungs: Normal breath sounds with occasional, faint expiratory wheezing. No rhonchi or crackles. Good air movement bilaterally. Symmetric chest rise with respirations. Heart: Regular rate and rhythm. Normal S1/S2. No appreciated murmurs, rubs or gallops. Abdomen: Soft, non-tender, non-distended. Bowel sounds present in all four quadrants. No masses appreciated. Extremities:  Atraumatic. Able to move all extremities symmetrically. No abnormal bony protuberances appreciated. Back: No pain with palpation over spinous processes or paraspinal musculature. No CVA tenderness. Skin: Clean, dry and intact without appreciated lesions. Neurologic: A&Ox3. Cranial nerves 2-12 are grossly intact. Intact sensation and motor strength in all 4 extremities. No focal deficits. Psychiatric: Normal affect, normal thought process, good eye contact.      Recent Results (from the past 24 hour(s))   D DIMER    Collection Time: 01/02/21  7:11 PM   Result Value Ref Range D DIMER 1.36 (H) <0.50 ug/ml(FEU)   CBC WITH AUTOMATED DIFF    Collection Time: 01/02/21  7:15 PM   Result Value Ref Range    WBC 5.5 3.6 - 11.0 K/uL    RBC 4.58 3.80 - 5.20 M/uL    HGB 13.8 11.5 - 16.0 g/dL    HCT 43.6 35.0 - 47.0 %    MCV 95.2 80.0 - 99.0 FL    MCH 30.1 26.0 - 34.0 PG    MCHC 31.7 30.0 - 36.5 g/dL    RDW 14.4 11.5 - 14.5 %    PLATELET 950 888 - 422 K/uL    MPV 12.3 8.9 - 12.9 FL    NEUTROPHILS 80 (H) 32 - 75 %    LYMPHOCYTES 17 12 - 49 %    MONOCYTES 3 (L) 5 - 13 %    EOSINOPHILS 0 0 - 7 %    BASOPHILS 0 0 - 1 %    IMMATURE GRANULOCYTES 0 0.0 - 0.5 %    ABS. NEUTROPHILS 4.4 1.8 - 8.0 K/UL    ABS. LYMPHOCYTES 1.0 0.8 - 3.5 K/UL    ABS. MONOCYTES 0.1 0.0 - 1.0 K/UL    ABS. EOSINOPHILS 0.0 0.0 - 0.4 K/UL    ABS. BASOPHILS 0.0 0.0 - 0.1 K/UL    ABS. IMM. GRANS. 0.0 0.00 - 0.04 K/UL    DF AUTOMATED     METABOLIC PANEL, COMPREHENSIVE    Collection Time: 01/02/21  7:15 PM   Result Value Ref Range    Sodium 137 136 - 145 mmol/L    Potassium 5.3 (H) 3.5 - 5.1 mmol/L    Chloride 100 97 - 108 mmol/L    CO2 35 (H) 21 - 32 mmol/L    Anion gap 2 (L) 5 - 15 mmol/L    Glucose 290 (H) 65 - 100 mg/dL    BUN 29 (H) 6 - 20 mg/dL    Creatinine 0.86 0.55 - 1.02 mg/dL    BUN/Creatinine ratio 34 (H) 12 - 20      GFR est AA >60 >60 ml/min/1.73m2    GFR est non-AA >60 >60 ml/min/1.73m2    Calcium 8.7 8.5 - 10.1 mg/dL    Bilirubin, total 0.5 0.2 - 1.0 mg/dL    AST (SGOT) 21 15 - 37 U/L    ALT (SGPT) 26 12 - 78 U/L    Alk. phosphatase 158 (H) 45 - 117 U/L    Protein, total 7.2 6.4 - 8.2 g/dL    Albumin 3.5 3.5 - 5.0 g/dL    Globulin 3.7 2.0 - 4.0 g/dL    A-G Ratio 0.9 (L) 1.1 - 2.2     CK W/ REFLX CKMB    Collection Time: 01/02/21  7:15 PM   Result Value Ref Range    CK 66.0 26 - 192 ng/mL   TROPONIN I    Collection Time: 01/02/21  7:15 PM   Result Value Ref Range    Troponin-I, Qt. <0.05 <0.05 ng/mL       XR Results (maximum last 3):   Results from [place] encounter on 01/02/21   XR CHEST PORT    Narrative 1 view comparison underfilled      Impression IMPRESSION: Essentially clear lungs. No effusion or pneumothorax. Normal heart  and mediastinum   Results from Hospital Encounter encounter on 11/09/20   XR FLUOROSCOPY UNDER 60 MINUTES    Narrative Obstruction of both ureters, stricture or kinking of ureter. Comparison left retrograde ureterogram 9/14/2020. Study performed by Dr. Jennifer Kolb with limited images submitted for dictation. Proximal aspect of left ureteral stent, pigtailed in dilated appearing left  renal pelvis. Please correlate to dynamic fluoroscopic imaging performed by Dr. Jennifer Kolb, for any significant imaging findings, to include persistent filling  defects. Fluoroscopic time 84.4 seconds. 1 image provided. Results from [place] encounter on 11/05/20   XR CHEST PA LAT    Narrative 2 views chest:    History: COPD    COMPARISON: Chest 10/27/2020    The parenchymal/interstitial lung markings show mild prominence. No focal  infiltrate or pleural fluid. Heart is normal size. Calcified plaque involving  the aortic arch with aortic uncoiling. Thoracic spondylosis. There is hardware  applied to the lower thoracic and lumbar spine. There are spacers applied to  lumbar vertebra. Dextroconvex thoracic scoliosis. .       Impression Impression: No acute intrathoracic disease process. Assessment:     Doug Mitchell is a 65 y.o. female who presents with shortness of breath, nonproductive cough and subjective fever. COVID-19 results were negative. Admit for acute on chronic hypoxic respiratory failure secondary to acute on chronic COPD exacerbation. Plan:     1. Admit to telemetry bed. 2. Order albuterol PRN SOB or wheezing. Order scheduled IV Solumedrol. 3. Order oxygen via nasal cannula for oxygen saturations <93%. 4. There is no evidence of infectious process. Hold IV antibiotics. 5. For history of diabetes mellitus type 2, check blood glucose with meals and at bedtime.   Transition to basal bolus insulin regimen. 6. History of obstructive sleep apnea, order CPAP machine for use while sleeping. 7. Home medication list needs to be reconciled in the morning once medications can be verified. GI PPX: Diet ordered. DVT PPX: Lovenox SQ.      Signed By: Mary Jo Chisholm MD     January 2, 2021

## 2021-01-04 LAB
ANION GAP SERPL CALC-SCNC: 5 MMOL/L (ref 5–15)
BUN SERPL-MCNC: 30 MG/DL (ref 6–20)
BUN/CREAT SERPL: 29 (ref 12–20)
CA-I BLD-MCNC: 8.5 MG/DL (ref 8.5–10.1)
CHLORIDE SERPL-SCNC: 105 MMOL/L (ref 97–108)
CO2 SERPL-SCNC: 32 MMOL/L (ref 21–32)
CREAT SERPL-MCNC: 1.02 MG/DL (ref 0.55–1.02)
EST. AVERAGE GLUCOSE BLD GHB EST-MCNC: 148 MG/DL
GLUCOSE BLD STRIP.AUTO-MCNC: 146 MG/DL (ref 65–100)
GLUCOSE BLD STRIP.AUTO-MCNC: 188 MG/DL (ref 65–100)
GLUCOSE BLD STRIP.AUTO-MCNC: 301 MG/DL (ref 65–100)
GLUCOSE BLD STRIP.AUTO-MCNC: 355 MG/DL (ref 65–100)
GLUCOSE SERPL-MCNC: 249 MG/DL (ref 65–100)
HBA1C MFR BLD: 6.8 % (ref 4–5.6)
PERFORMED BY, TECHID: ABNORMAL
POTASSIUM SERPL-SCNC: 4.7 MMOL/L (ref 3.5–5.1)
SODIUM SERPL-SCNC: 142 MMOL/L (ref 136–145)

## 2021-01-04 PROCEDURE — 65270000029 HC RM PRIVATE

## 2021-01-04 PROCEDURE — 82962 GLUCOSE BLOOD TEST: CPT

## 2021-01-04 PROCEDURE — 74011250636 HC RX REV CODE- 250/636: Performed by: FAMILY MEDICINE

## 2021-01-04 PROCEDURE — 94760 N-INVAS EAR/PLS OXIMETRY 1: CPT

## 2021-01-04 PROCEDURE — 97116 GAIT TRAINING THERAPY: CPT

## 2021-01-04 PROCEDURE — 36415 COLL VENOUS BLD VENIPUNCTURE: CPT

## 2021-01-04 PROCEDURE — 80048 BASIC METABOLIC PNL TOTAL CA: CPT

## 2021-01-04 PROCEDURE — 74011250637 HC RX REV CODE- 250/637: Performed by: FAMILY MEDICINE

## 2021-01-04 PROCEDURE — 94640 AIRWAY INHALATION TREATMENT: CPT

## 2021-01-04 PROCEDURE — 74011000250 HC RX REV CODE- 250: Performed by: NURSE PRACTITIONER

## 2021-01-04 PROCEDURE — 74011250637 HC RX REV CODE- 250/637: Performed by: NURSE PRACTITIONER

## 2021-01-04 PROCEDURE — 74011636637 HC RX REV CODE- 636/637: Performed by: FAMILY MEDICINE

## 2021-01-04 RX ORDER — ALBUTEROL SULFATE 90 UG/1
2 AEROSOL, METERED RESPIRATORY (INHALATION)
Status: DISCONTINUED | OUTPATIENT
Start: 2021-01-04 | End: 2021-01-06 | Stop reason: HOSPADM

## 2021-01-04 RX ORDER — ATORVASTATIN CALCIUM 20 MG/1
20 TABLET, FILM COATED ORAL DAILY
Status: DISCONTINUED | OUTPATIENT
Start: 2021-01-05 | End: 2021-01-06 | Stop reason: HOSPADM

## 2021-01-04 RX ORDER — OXYBUTYNIN CHLORIDE 5 MG/1
5 TABLET ORAL 2 TIMES DAILY
Status: DISCONTINUED | OUTPATIENT
Start: 2021-01-04 | End: 2021-01-06 | Stop reason: HOSPADM

## 2021-01-04 RX ORDER — CLONAZEPAM 0.5 MG/1
0.5 TABLET ORAL 3 TIMES DAILY
Status: DISCONTINUED | OUTPATIENT
Start: 2021-01-04 | End: 2021-01-06 | Stop reason: HOSPADM

## 2021-01-04 RX ORDER — MONTELUKAST SODIUM 10 MG/1
10 TABLET ORAL DAILY
Status: DISCONTINUED | OUTPATIENT
Start: 2021-01-05 | End: 2021-01-06 | Stop reason: HOSPADM

## 2021-01-04 RX ORDER — PANTOPRAZOLE SODIUM 40 MG/1
40 TABLET, DELAYED RELEASE ORAL
Status: DISCONTINUED | OUTPATIENT
Start: 2021-01-05 | End: 2021-01-06 | Stop reason: HOSPADM

## 2021-01-04 RX ORDER — DULOXETIN HYDROCHLORIDE 20 MG/1
20 CAPSULE, DELAYED RELEASE ORAL DAILY
Status: DISCONTINUED | OUTPATIENT
Start: 2021-01-05 | End: 2021-01-06 | Stop reason: HOSPADM

## 2021-01-04 RX ORDER — PRAMIPEXOLE DIHYDROCHLORIDE 1 MG/1
1 TABLET ORAL
Status: DISCONTINUED | OUTPATIENT
Start: 2021-01-04 | End: 2021-01-06 | Stop reason: HOSPADM

## 2021-01-04 RX ORDER — FUROSEMIDE 40 MG/1
20 TABLET ORAL DAILY
Status: DISCONTINUED | OUTPATIENT
Start: 2021-01-05 | End: 2021-01-06 | Stop reason: HOSPADM

## 2021-01-04 RX ADMIN — BENZONATATE 200 MG: 100 CAPSULE ORAL at 09:14

## 2021-01-04 RX ADMIN — INSULIN LISPRO 4 UNITS: 100 INJECTION, SOLUTION INTRAVENOUS; SUBCUTANEOUS at 23:31

## 2021-01-04 RX ADMIN — METHYLPREDNISOLONE SODIUM SUCCINATE 40 MG: 40 INJECTION, POWDER, FOR SOLUTION INTRAMUSCULAR; INTRAVENOUS at 23:31

## 2021-01-04 RX ADMIN — IPRATROPIUM BROMIDE AND ALBUTEROL SULFATE 3 ML: .5; 3 SOLUTION RESPIRATORY (INHALATION) at 08:37

## 2021-01-04 RX ADMIN — OXYBUTYNIN CHLORIDE 5 MG: 5 TABLET ORAL at 23:31

## 2021-01-04 RX ADMIN — BENZONATATE 200 MG: 100 CAPSULE ORAL at 17:26

## 2021-01-04 RX ADMIN — BENZONATATE 200 MG: 100 CAPSULE ORAL at 23:31

## 2021-01-04 RX ADMIN — METHYLPREDNISOLONE SODIUM SUCCINATE 40 MG: 40 INJECTION, POWDER, FOR SOLUTION INTRAMUSCULAR; INTRAVENOUS at 14:31

## 2021-01-04 RX ADMIN — INSULIN GLARGINE 12 UNITS: 100 INJECTION, SOLUTION SUBCUTANEOUS at 23:31

## 2021-01-04 RX ADMIN — INSULIN LISPRO 2 UNITS: 100 INJECTION, SOLUTION INTRAVENOUS; SUBCUTANEOUS at 17:26

## 2021-01-04 RX ADMIN — METHYLPREDNISOLONE SODIUM SUCCINATE 40 MG: 40 INJECTION, POWDER, FOR SOLUTION INTRAMUSCULAR; INTRAVENOUS at 05:36

## 2021-01-04 RX ADMIN — CLONAZEPAM 0.5 MG: 0.5 TABLET ORAL at 23:31

## 2021-01-04 RX ADMIN — ACETAMINOPHEN 650 MG: 325 TABLET, FILM COATED ORAL at 12:26

## 2021-01-04 RX ADMIN — PRAMIPEXOLE DIHYDROCHLORIDE 1 MG: 1 TABLET ORAL at 23:31

## 2021-01-04 RX ADMIN — IPRATROPIUM BROMIDE AND ALBUTEROL SULFATE 3 ML: .5; 3 SOLUTION RESPIRATORY (INHALATION) at 19:41

## 2021-01-04 RX ADMIN — ENOXAPARIN SODIUM 40 MG: 40 INJECTION SUBCUTANEOUS at 09:14

## 2021-01-04 RX ADMIN — INSULIN LISPRO 7 UNITS: 100 INJECTION, SOLUTION INTRAVENOUS; SUBCUTANEOUS at 12:26

## 2021-01-04 RX ADMIN — ACETAMINOPHEN 650 MG: 325 TABLET, FILM COATED ORAL at 23:31

## 2021-01-04 RX ADMIN — INSULIN LISPRO 2 UNITS: 100 INJECTION, SOLUTION INTRAVENOUS; SUBCUTANEOUS at 09:14

## 2021-01-04 NOTE — PROGRESS NOTES
Hospitalist Progress Note             Daily Progress Note: 1/4/2021      Subjective:   The patient is seen for follow  up.     Dayana Hoover is a 70 y.o. female who presents with shortness of breath, nonproductive cough .  COVID-19 results were negative.  CTA of chest negative for PE or infiltrates.  She was admitted for acute COPD exacerbation.    Patient seen and examined at bedside  She is on oxygen at 2 L via nasal cannula  Wheezing slightly improved but still coughing      Problem List:  Patient Active Problem List   Diagnosis Code   • Retained ureteral stent Z96.0   • COPD (chronic obstructive pulmonary disease) ([facility]) J44.9   • UPJ (ureteropelvic junction) obstruction N13.5   • Acute cystitis N30.00   • Fungus present in urine B49   • COPD exacerbation ([facility]) J44.1         Medications reviewed  Current Facility-Administered Medications   Medication Dose Route Frequency   • methylPREDNISolone (PF) (SOLU-MEDROL) injection 40 mg  40 mg IntraVENous Q8H   • benzonatate (TESSALON) capsule 200 mg  200 mg Oral TID   • albuterol-ipratropium (DUO-NEB) 2.5 MG-0.5 MG/3 ML  3 mL Nebulization Q6H RT   • albuterol (PROVENTIL HFA, VENTOLIN HFA, PROAIR HFA) inhaler 2 Puff  2 Puff Inhalation Q4H PRN   • sodium chloride (NS) flush 5-40 mL  5-40 mL IntraVENous PRN   • acetaminophen (TYLENOL) tablet 650 mg  650 mg Oral Q6H PRN   • polyethylene glycol (MIRALAX) packet 17 g  17 g Oral DAILY PRN   • ondansetron (ZOFRAN) injection 4 mg  4 mg IntraVENous Q6H PRN   • enoxaparin (LOVENOX) injection 40 mg  40 mg SubCUTAneous DAILY   • glucose chewable tablet 16 g  4 Tab Oral PRN   • dextrose (D50W) injection syrg 12.5-25 g  25-50 mL IntraVENous PRN   • glucagon (GLUCAGEN) injection 1 mg  1 mg IntraMUSCular PRN   • insulin glargine (LANTUS) injection 12 Units  0.2 Units/kg SubCUTAneous QHS   • insulin lispro (HUMALOG) injection   SubCUTAneous AC&HS       Review of Systems:   Review of Systems  Constitutional: Negative for chills, diaphoresis, fever, malaise/fatigue and weight loss. HENT: Negative for ear discharge, ear pain, hearing loss, nosebleeds, sinus pain and tinnitus. Respiratory: Positive for cough, shortness of breath and wheezing. Negative for hemoptysis and sputum production. Cardiovascular: Negative for chest pain, palpitations, orthopnea, claudication and leg swelling. Gastrointestinal: Negative for abdominal pain, constipation, diarrhea, heartburn, nausea and vomiting. Genitourinary: Negative for dysuria, flank pain, frequency, hematuria and urgency. Musculoskeletal: Negative for back pain, falls, joint pain, myalgias and neck pain. Neurological: Negative for dizziness, tingling, focal weakness, seizures, weakness and headaches. Psychiatric/Behavioral: Negative for depression, hallucinations, memory loss, substance abuse and suicidal ideas. The patient is not nervous/anxious. Objective:   Physical Exam  Constitutional:       General: She is not in acute distress. Appearance: Normal appearance. HENT:      Head: Normocephalic and atraumatic. Mouth/Throat:      Mouth: Mucous membranes are moist.   Eyes:      Pupils: Pupils are equal, round, and reactive to light. Neck:      Musculoskeletal: No neck rigidity. Cardiovascular:      Rate and Rhythm: Normal rate and regular rhythm. Pulses: Normal pulses. Heart sounds: Normal heart sounds. Pulmonary:      Effort: Pulmonary effort is normal. No respiratory distress. Breath sounds: Wheezing present. Abdominal:      General: Bowel sounds are normal.      Palpations: Abdomen is soft. There is no mass. Tenderness: There is no abdominal tenderness. Musculoskeletal:         General: No swelling, tenderness, deformity or signs of injury. Skin:     General: Skin is warm and dry. Findings: No bruising or erythema. Neurological:      General: No focal deficit present.       Mental Status: She is alert and oriented to person, place, and time. Motor: No weakness. Psychiatric:         Mood and Affect: Mood normal.          Visit Vitals  BP (!) 149/71   Pulse (!) 54   Temp 98.6 °F (37 °C)   Resp 18   Ht 5' 4\" (1.626 m)   Wt 61.2 kg (135 lb)   SpO2 98%   BMI 23.17 kg/m²           Data Review:       Recent Days:  Recent Labs     01/03/21  1258 01/02/21 1915   WBC 6.6 5.5   HGB 13.4 13.8   HCT 41.8 43.6    223     Recent Labs     01/04/21  0615 01/03/21  1258 01/02/21 1915    138 137   K 4.7 4.6 5.3*    98 100   CO2 32 33* 35*   * 301* 290*   BUN 30* 29* 29*   CREA 1.02 1.09* 0.86   CA 8.5 8.5 8.7   MG  --  1.6  --    ALB  --   --  3.5   TBILI  --   --  0.5   ALT  --   --  26         Assessment/Plan       1. Acute on chronic hypoxic respiratory failure  Secondary to exacerbation of COPD  CTA of chest negative for infiltrate or PE  Continue supplemental oxygen, add DuoNeb's, Tessalon and continue Solu-Medrol. 2.  COPD exacerbation  Secondary to  history of nicotine dependence  She quit smoking 6 years ago  CTA of chest negative for infiltrate or PE  Continue supplemental oxygen, she does use oxygen 2 L at home via nasal cannula  Continue Solu-Medrol, duo nebs, and Tessalon Perles. 3.  Diabetes type 2  Blood sugar this morning was 267  Continue sliding scale insulin. 4.  Weakness  PT OT to evaluate and treat. BMP in a.m. Lovenox for DVT prophylaxis    Meds: Gradual improvement in clinical status, so will continue with Solu-Medrol, duo nebs, Tessalon Perles and Mucinex. She is already dependent on oxygen 2 L via nasal cannula at home which she will also continue.      Aj Gutierres NP

## 2021-01-04 NOTE — PROGRESS NOTES
Problem: Falls - Risk of  Goal: *Absence of Falls  Description: Document Jaylyn Regalado Fall Risk and appropriate interventions in the flowsheet.   Outcome: Progressing Towards Goal  Note: Fall Risk Interventions:  Mobility Interventions: Bed/chair exit alarm         Medication Interventions: Bed/chair exit alarm         History of Falls Interventions: Bed/chair exit alarm

## 2021-01-04 NOTE — PROGRESS NOTES
PHYSICAL THERAPY TREATMENT  Patient: Minnie Kaminski (13 y.o. female)  Date: 1/4/2021  Diagnosis: COPD exacerbation ([facility] [J44.1] <principal problem not specified>       Precautions:  fall  Chart, physical therapy assessment, plan of care and goals were reviewed. ASSESSMENT  Patient continues with skilled PT services and is progressing towards goals. Minnie Kaminski is a 65 y.o. female who presents with shortness of breath, nonproductive cough . COVID-19 results were negative. CTA of chest negative for PE or infiltrates. She was admitted for acute COPD exacerbation. She is on oxygen at 2 L via nasal cannula  Wheezing slightly improved but still coughing. Patient agreeable to PT eval and walk with PT.patient was evaled yesterday but has new orders and PA wanting patient to be reassessed patient able to perform bed mob and transfers with cg to sup. she reports she is not feeling that great. patient able to go to bathroom with cane and gait belt with cg . very flexed posture. patient is somewhat usteady on her feet. has gen weakness. willl benefit from 1-2 session. patient lives with family. patient uses cane at home will benefit from waklker at this time. has O2 at home. .     Current Level of Function Impacting Discharge (mobility/balance): gen weakness  Other factors to consider for discharge:HHPT.patient declined       PLAN :  Patient continues to benefit from skilled intervention to address the above impairments. Continue treatment per established plan of care. to address goals.     Recommendation for discharge: (in order for the patient to meet his/her long term goals)  Physical therapy at least 2 days/week in the home     This discharge recommendation:  Has been made in collaboration with the attending provider and/or case management    IF patient discharges home will need the following DME: straight cane and rolling walker       SUBJECTIVE:   Patient stated I will walk with you and sit in the chair.    OBJECTIVE DATA SUMMARY:   Critical Behavior:  Neurologic State: Alert  Orientation Level: Oriented X4     Safety/Judgement: Awareness of environment  Functional Mobility Training:  Bed Mobility:                    Transfers:                                   Balance:     Ambulation/Gait Training:                                                 Activity Tolerance:   Good  Please refer to the flowsheet for vital signs taken during this treatment. After treatment patient left in no apparent distress:   Sitting in chair and Call bell within reach    COMMUNICATION/COLLABORATION:   The patients plan of care was discussed with: Case management.      Dunia Srinivasan PT  Treatment time 30 mins

## 2021-01-05 LAB
ANION GAP SERPL CALC-SCNC: 6 MMOL/L (ref 5–15)
BUN SERPL-MCNC: 42 MG/DL (ref 6–20)
BUN/CREAT SERPL: 42 (ref 12–20)
CA-I BLD-MCNC: 8.3 MG/DL (ref 8.5–10.1)
CHLORIDE SERPL-SCNC: 97 MMOL/L (ref 97–108)
CO2 SERPL-SCNC: 33 MMOL/L (ref 21–32)
CREAT SERPL-MCNC: 0.99 MG/DL (ref 0.55–1.02)
GLUCOSE BLD STRIP.AUTO-MCNC: 164 MG/DL (ref 65–100)
GLUCOSE BLD STRIP.AUTO-MCNC: 271 MG/DL (ref 65–100)
GLUCOSE BLD STRIP.AUTO-MCNC: 352 MG/DL (ref 65–100)
GLUCOSE BLD STRIP.AUTO-MCNC: 385 MG/DL (ref 65–100)
GLUCOSE SERPL-MCNC: 267 MG/DL (ref 65–100)
PERFORMED BY, TECHID: ABNORMAL
POTASSIUM SERPL-SCNC: 4.2 MMOL/L (ref 3.5–5.1)
SODIUM SERPL-SCNC: 136 MMOL/L (ref 136–145)

## 2021-01-05 PROCEDURE — 36415 COLL VENOUS BLD VENIPUNCTURE: CPT

## 2021-01-05 PROCEDURE — 94640 AIRWAY INHALATION TREATMENT: CPT

## 2021-01-05 PROCEDURE — 97530 THERAPEUTIC ACTIVITIES: CPT

## 2021-01-05 PROCEDURE — 97116 GAIT TRAINING THERAPY: CPT

## 2021-01-05 PROCEDURE — 74011636637 HC RX REV CODE- 636/637: Performed by: NURSE PRACTITIONER

## 2021-01-05 PROCEDURE — 80048 BASIC METABOLIC PNL TOTAL CA: CPT

## 2021-01-05 PROCEDURE — 74011000250 HC RX REV CODE- 250: Performed by: NURSE PRACTITIONER

## 2021-01-05 PROCEDURE — 97165 OT EVAL LOW COMPLEX 30 MIN: CPT

## 2021-01-05 PROCEDURE — 82962 GLUCOSE BLOOD TEST: CPT

## 2021-01-05 PROCEDURE — 74011250636 HC RX REV CODE- 250/636: Performed by: FAMILY MEDICINE

## 2021-01-05 PROCEDURE — 74011636637 HC RX REV CODE- 636/637: Performed by: FAMILY MEDICINE

## 2021-01-05 PROCEDURE — 65270000029 HC RM PRIVATE

## 2021-01-05 PROCEDURE — 77010033678 HC OXYGEN DAILY

## 2021-01-05 PROCEDURE — 74011250637 HC RX REV CODE- 250/637: Performed by: NURSE PRACTITIONER

## 2021-01-05 PROCEDURE — 94760 N-INVAS EAR/PLS OXIMETRY 1: CPT

## 2021-01-05 RX ORDER — PREDNISONE 20 MG/1
20 TABLET ORAL 2 TIMES DAILY
Status: DISCONTINUED | OUTPATIENT
Start: 2021-01-05 | End: 2021-01-06 | Stop reason: HOSPADM

## 2021-01-05 RX ADMIN — ENOXAPARIN SODIUM 40 MG: 40 INJECTION SUBCUTANEOUS at 08:43

## 2021-01-05 RX ADMIN — ATORVASTATIN CALCIUM 20 MG: 20 TABLET, FILM COATED ORAL at 08:44

## 2021-01-05 RX ADMIN — PREDNISONE 20 MG: 20 TABLET ORAL at 22:34

## 2021-01-05 RX ADMIN — IPRATROPIUM BROMIDE AND ALBUTEROL SULFATE 3 ML: .5; 3 SOLUTION RESPIRATORY (INHALATION) at 07:25

## 2021-01-05 RX ADMIN — METHYLPREDNISOLONE SODIUM SUCCINATE 40 MG: 40 INJECTION, POWDER, FOR SOLUTION INTRAMUSCULAR; INTRAVENOUS at 06:42

## 2021-01-05 RX ADMIN — IPRATROPIUM BROMIDE AND ALBUTEROL SULFATE 3 ML: .5; 3 SOLUTION RESPIRATORY (INHALATION) at 02:18

## 2021-01-05 RX ADMIN — INSULIN LISPRO 4 UNITS: 100 INJECTION, SOLUTION INTRAVENOUS; SUBCUTANEOUS at 22:55

## 2021-01-05 RX ADMIN — INSULIN GLARGINE 12 UNITS: 100 INJECTION, SOLUTION SUBCUTANEOUS at 22:54

## 2021-01-05 RX ADMIN — PRAMIPEXOLE DIHYDROCHLORIDE 1 MG: 1 TABLET ORAL at 22:38

## 2021-01-05 RX ADMIN — Medication 10 ML: at 22:35

## 2021-01-05 RX ADMIN — PREDNISONE 20 MG: 20 TABLET ORAL at 15:09

## 2021-01-05 RX ADMIN — INSULIN LISPRO 5 UNITS: 100 INJECTION, SOLUTION INTRAVENOUS; SUBCUTANEOUS at 12:09

## 2021-01-05 RX ADMIN — MONTELUKAST 10 MG: 10 TABLET, FILM COATED ORAL at 08:44

## 2021-01-05 RX ADMIN — INSULIN LISPRO 7 UNITS: 100 INJECTION, SOLUTION INTRAVENOUS; SUBCUTANEOUS at 08:43

## 2021-01-05 RX ADMIN — BENZONATATE 200 MG: 100 CAPSULE ORAL at 22:34

## 2021-01-05 RX ADMIN — PANTOPRAZOLE SODIUM 40 MG: 40 TABLET, DELAYED RELEASE ORAL at 08:44

## 2021-01-05 RX ADMIN — OXYBUTYNIN CHLORIDE 5 MG: 5 TABLET ORAL at 22:35

## 2021-01-05 RX ADMIN — INSULIN LISPRO 2 UNITS: 100 INJECTION, SOLUTION INTRAVENOUS; SUBCUTANEOUS at 16:30

## 2021-01-05 RX ADMIN — BENZONATATE 200 MG: 100 CAPSULE ORAL at 15:09

## 2021-01-05 RX ADMIN — IPRATROPIUM BROMIDE AND ALBUTEROL SULFATE 3 ML: .5; 3 SOLUTION RESPIRATORY (INHALATION) at 14:22

## 2021-01-05 RX ADMIN — CLONAZEPAM 0.5 MG: 0.5 TABLET ORAL at 08:44

## 2021-01-05 RX ADMIN — DULOXETINE 20 MG: 20 CAPSULE, DELAYED RELEASE ORAL at 08:44

## 2021-01-05 RX ADMIN — OXYBUTYNIN CHLORIDE 5 MG: 5 TABLET ORAL at 08:44

## 2021-01-05 RX ADMIN — CLONAZEPAM 0.5 MG: 0.5 TABLET ORAL at 15:09

## 2021-01-05 RX ADMIN — BENZONATATE 200 MG: 100 CAPSULE ORAL at 08:44

## 2021-01-05 RX ADMIN — FUROSEMIDE 20 MG: 40 TABLET ORAL at 08:45

## 2021-01-05 RX ADMIN — CLONAZEPAM 0.5 MG: 0.5 TABLET ORAL at 22:34

## 2021-01-05 NOTE — PROGRESS NOTES
OCCUPATIONAL THERAPY EVALUATION  Patient: Stan Wylie (21 y.o. female)  Date: 1/5/2021  Primary Diagnosis: COPD exacerbation ([facility] [J44.1]        Precautions: Fall, on 2L O2 via NC    ASSESSMENT  Based on the objective data described below, the patient presents with SOB with activity, decreased activity tolerance, generalized deconditioning, decreased standing balance, increased need for A with self care and functional mobility/transfers. Patient semi supine in bed upon OT arrival for earlier session and agreeable to working with therapy for meal setup. Patient then requesting therapy to come back at a later time to finish evaluation. Patient then found standing in bathroom washing at the sink by herself, PCT was aware, and patient agreeable. Patient A&O x4 and per pt report, pt lives with family in a single story house with 3 KARIN and r handrail. Patient reports using oxygen at baseline at night and as needed furing the day, currently on 2L via nasal cannula. Patient had O2 off while in bathroom, put back on when patient returned to recliner, some minimal SOB noted during activity. Patient mod I scooting, SBA sit <> stand and recliner transfer, CGA bathroom mobility. Patient SBA grooming standing at sink, SBA bathing standing at sink, mod A LE dressing for socks and protective undergarment, setup UE dressing to don gown. Patient would benefit from continued skilled OT services to address above deficits and improve safety and independence with self care and functional mobility/transfers. Recommend discharge to home with [facility] when medically appropriate. Current Level of Function Impacting Discharge (ADLs/self-care): increased A for LE ADLs, patient reports having AD for dressing at home.     Other factors to consider for discharge: time since onset, family support        PLAN :  Recommendations and Planned Interventions: self care training, functional mobility training, therapeutic exercise, balance training, therapeutic activities, endurance activities, patient education, home safety training and family training/education    Frequency/Duration: Patient will be followed by occupational therapy 1 time a week to address goals. Recommendation for discharge: (in order for the patient to meet his/her long term goals)  HHOT    This discharge recommendation:  Has been made in collaboration with the attending provider and/or case management    IF patient discharges home will need the following DME: patient owns DME required for discharge       SUBJECTIVE:   Patient stated I don't usually wear my oxygen at home unless I'm sleeping or walking a lot.     OBJECTIVE DATA SUMMARY:   HISTORY:   Past Medical History:   Diagnosis Date    Arthritis     Bacterial meningitis     Hx of    CAD (coronary artery disease)     Chronic obstructive pulmonary disease (HCC)     Chronic pain     DDD (degenerative disc disease), cervical     Diabetes (HCC)     Environmental allergies     Esophageal disorder     Fibromyalgia     GERD (gastroesophageal reflux disease)     Hypercholesterolemia     Hypertension     Kidney stones     hx of    Macular degeneration     Mixed stress and urge urinary incontinence 7/2/2020    Neurogenic bladder 7/2/2020    Psychiatric disorder     Rheumatoid arthritis ([address]     Scoliosis     Shingles     Hx of    Sleep apnea     pt states uses CPAP    Suicidal thoughts     pt stated during PAT visit , she has hx of suicidal thoughts age 17's, pt stated never attempted and further thoughts of suicide since and none that day    Torn rotator cuff     pt states on right side    Urinary tract infection without hematuria 7/2/2020     Past Surgical History:   Procedure Laterality Date    COLONOSCOPY N/A 10/9/2020    COLONOSCOPY performed by Niyah Hancock MD at [address] [place] [address] HX CARPAL TUNNEL RELEASE      HX DILATION AND CURETTAGE      HX JOINT REPLACEMENT SURGERY      knee both sides    HX LUMBAR FUSION      HX ORTHOPAEDIC      total knee replacements    HX ORTHOPAEDIC      back surgery     HX UROLOGICAL      stent in ureter    HX UROLOGICAL  09/14/2020    Cystoscopy    HX UROLOGICAL Left 09/14/2020    retrograde pyelogram    HX UROLOGICAL Left 09/14/2020    ureteral stent exchange    HX UROLOGICAL  11/09/2020    Cystoscopy    HX UROLOGICAL  11/09/2020    retrograde pyelograms    HX UROLOGICAL Left 11/09/2020    robotic left pyeloplasty    HX UROLOGICAL Left 11/09/2020    ureteral stent exchange    HX UROLOGICAL  11/24/2020    cystoscopy stent removal     NEUROLOGICAL PROCEDURE UNLISTED      back surg    [place] BREAST SURGERY PROCEDURE UNLISTED      [place] CYSTOSCOPY,INSERT URETERAL STENT  5/8/2018, 7/11/2017, 2/9/2019, 10/11/2016, 6/7/2016, 1/14/2016, 7/14/2015       Expanded or extensive additional review of patient history:     Home Situation  Home Environment: Private residence  # Steps to Enter: 3  Rails to Enter: Yes  Hand Rails : Right  One/Two Story Residence: One story  Living Alone: No  Support Systems: Family member(s)  Patient Expects to be Discharged to[de-identified] Private residence  Current DME Used/Available at Home: Jonita José Miguel, straight, Walker, rolling, Adaptive dressing aides, Oxygen, portable, Shower chair    PLOF: Pt mod I for ADLS/IADLS, mod I with mobility prior to admission. EXAMINATION OF PERFORMANCE DEFICITS:  Cognitive/Behavioral Status:  Neurologic State: Alert  Orientation Level: Oriented X4  Cognition: Impulsive  Safety/Judgement: Awareness of environment    Skin: intact where visible    Edema: none noted    Hearing:   Auditory  Auditory Impairment: Hard of hearing, bilateral, Hearing aid(s)  Hearing Aids/Status: With patient(patient with difficulty placing aides)    Vision/Perceptual:    Not formally tested at this time    Range of Motion:  Generally decreased, functional    Strength:  RUE Strength  Observation: grossly observed to be 4-/5     LUE Strength  Observation: grossly observed to be 4-/5    Coordination:  Generally intact    Tone & Sensation:  Tone: normal  Sensation: intact    Balance:  Sitting: Intact  Standing: Impaired; With support  Standing - Static: Good  Standing - Dynamic : Fair;Occasional    Functional Mobility and Transfers for ADLs:  Bed Mobility:  Scooting: Modified independent    Transfers:  Sit to Stand: Stand-by assistance  Stand to Sit: Stand-by assistance  Bed to Chair: Stand-by assistance  Bathroom Mobility: Contact guard assistance  Assistive Device : Cane, straight    ADL Assessment:  Feeding: Setup    Oral Facial Hygiene/Grooming: Stand-by assistance    Bathing: Stand-by assistance    Upper Body Dressing: Setup    Lower Body Dressing: Setup    ADL Intervention and task modifications:  Feeding  Feeding Assistance: Set-up  Container Management: Set-up  Cutting Food: Independent  Food to Mouth: Independent  Drink to Mouth: Independent    Grooming  Grooming Assistance: Stand-by assistance  Position Performed: Standing  Washing Face: Stand-by assistance  Washing Hands: Stand-by assistance    Upper Body Bathing  Bathing Assistance: Stand-by assistance  Position Performed: Standing    Lower Body Bathing  Bathing Assistance: Stand-by assistance  Perineal  : Stand-by assistance  Position Performed: Standing  Lower Body : Stand-by assistance  Position Performed: Standing    Upper Body Dressing Assistance  Dressing Assistance: Set-up  Hospital Gown: Set-up    Lower Body Dressing Assistance  Dressing Assistance: Moderate assistance  Protective Undergarmet: Stand-by assistance  Socks:  Total assistance (dependent)  Leg Crossed Method Used: Yes  Position Performed: Standing;Seated in chair    Cognitive Retraining  Safety/Judgement: Awareness of environment    Therapeutic Exercise:  Patient may benefit from UE HEP, initiated at next session as able     Functional Measure:    [facility] [address] [address] AM-PACTM \"6 Clicks\"                                                       Daily Activity Inpatient Short Form  How much help from another person does the patient currently need. .. Total; A Lot A Little None   1. Putting on and taking off regular lower body clothing? []  1 [x]  2 []  3 []  4   2. Bathing (including washing, rinsing, drying)? []  1 []  2 [x]  3 []  4   3. Toileting, which includes using toilet, bedpan or urinal? [] 1 []  2 [x]  3 []  4   4. Putting on and taking off regular upper body clothing? []  1 []  2 [x]  3 []  4   5. Taking care of personal grooming such as brushing teeth? []  1 []  2 [x]  3 []  4   6. Eating meals? []  1 []  2 [x]  3 []  4   © , Trustees of [address] [place] [address] [address] [address], under license to [employer]. All rights reserved     Score: 17/24     Interpretation of Tool:  Represents clinically-significant functional categories (i.e. Activities of daily living).   Percentage of Impairment CH    0%   CI    1-19% CJ    20-39% CK    40-59% CL    60-79% CM    80-99% CN     100%   [facility]  Score 6-24 24 23 20-22 15-19 10-14 7-9 6        Occupational Therapy Evaluation Charge Determination   History Examination Decision-Making   LOW Complexity : Brief history review  LOW Complexity : 1-3 performance deficits relating to physical, cognitive , or psychosocial skils that result in activity limitations and / or participation restrictions  LOW Complexity : No comorbidities that affect functional and no verbal or physical assistance needed to complete eval tasks       Based on the above components, the patient evaluation is determined to be of the following complexity level: LOW     Pain Ratin/10    Activity Tolerance:   Fair, tolerates ADLs without rest breaks and desaturates with exertion and requires oxygen    After treatment patient left in no apparent distress:    Sitting in chair, Call bell within reach and phlebotomist present in room    COMMUNICATION/EDUCATION:   The patients plan of care was discussed with: Registered nurse and Certified nursing assistant/patient care technician. Home safety education was provided and the patient/caregiver indicated understanding., Patient/family have participated as able in goal setting and plan of care. and Patient/family agree to work toward stated goals and plan of care. This patients plan of care is appropriate for delegation to JALIL. Patient seen 8:09-8:20 am for setup with breakfast, patient requesting complete rest of evaluation later. Patient seen 11:06-11:36 am for mobility/ADL portion of evaluation.     Problem: Self Care Deficits Care Plan (Adult)  Goal: *Acute Goals and Plan of Care (Insert Text)  Description: Pt will be mod I sup <> sit in prep for EOB ADLs  Pt will be mod I grooming standing at sink LRAD  Pt will be mod I LE dressing sitting EOB/long sit  Pt will be mod I sit <>  prep for toileting LRAD  Pt will be mod I toileting/toilet transfer/cloth mgmt LRAD  Pt will be I following UE HEP in prep for self care tasks     Outcome: Not Met     Thank you for this referral.  Leeann Stovall, OTR/L  Time Calculation: 30 mins

## 2021-01-05 NOTE — PROGRESS NOTES
Problem: Mobility Impaired (Adult and Pediatric)  Goal: *Acute Goals and Plan of Care (Insert Text)  Description: Patient will move from supine to sit and sit to supine , scoot up and down, and roll side to side in bed with independence within 7 day(s). Patient will transfer from bed to chair and chair to bed with independence using the least restrictive device within 7 day(s). Patient will improve static/dynamic standing balance to modified independence within 1 week(s). Patient will ambulate 100 feet with modified independence with least restrictive device within 1 weeks. Outcome: Not Met   PHYSICAL THERAPY TREATMENT  Patient: Deborah Britton (89 y.o. female)  Date: 1/5/2021  Diagnosis: COPD exacerbation ([facility] [J44.1] <principal problem not specified>       Precautions:  fall  Chart, physical therapy assessment, plan of care and goals were reviewed. ASSESSMENT  Patient continues with skilled PT services and is progressing towards goals. Patient able to perform bed mob and transfers indep. Required some assist after standing as she had some discomfort in back and initial steps were difficult. Patient declined to use walker which she has walker at home. able to amb in the room and to the bathroom  and able to perform lola care indep. Current Level of Function Impacting Discharge (mobility/balance): decreased endurance to activities  Other factors to consider for discharge: HHPT       PLAN :  Patient continues to benefit from skilled intervention to address the above impairments. Continue treatment per established plan of care. to address goals.     Recommendation for discharge: (in order for the patient to meet his/her long term goals)  Physical therapy at least 2 days/week in the home AND ensure assist and/or supervision for safety with ADLs    This discharge recommendation:  Has been made in collaboration with the attending provider and/or case management    IF patient discharges home will need the following DME: straight cane and rolling walker       SUBJECTIVE:   Patient stated I am ok. want to go to bathroom and wash up. Equilla Mendon    OBJECTIVE DATA SUMMARY:   Critical Behavior:  Neurologic State: Alert  Orientation Level: Oriented X4  Cognition: Impulsive  Safety/Judgement: Awareness of environment  Functional Mobility Training:  Bed Mobility:  Rolling: Independent; Modified independent  Supine to Sit: Independent; Modified independent  Sit to Supine: Independent; Modified independent  Scooting: Modified independent        Transfers:  Sit to Stand: Stand-by assistance  Stand to Sit: Supervision        Bed to Chair: Stand-by assistance            Extended time required for the activities        Balance:  Sitting: Intact  Standing: Impaired; With support  Standing - Static: Good;Fair  Standing - Dynamic : Fair;Occasional  Ambulation/Gait Training:  Distance (ft): 30 Feet (ft)  Assistive Device: Gait belt;Cane, straight  Ambulation - Level of Assistance: Contact guard assistance;Minimal assistance                 Base of Support: Narrowed     Speed/Loren: Slow  Step Length: Left shortened;Right shortened                   Therapeutic Exercises:   AP and laq in sitting  Pain Ratin/10    Activity Tolerance:   Good  Please refer to the flowsheet for vital signs taken during this treatment. After treatment patient left in no apparent distress:   Patient was left in bathroom after assisting her with walking and observed her for few mins. COMMUNICATION/COLLABORATION:   The patients plan of care was discussed with: Certified nursing assistant/patient care technician.      Tegan Campa PT   Time Calculation: 23 mins

## 2021-01-05 NOTE — PROGRESS NOTES
Patient at rest on room air SpO2=97%. Patient during ambulation on room air SpO2=87%.  Patient during ambulation on 2LPM Spo2=93%

## 2021-01-05 NOTE — PROGRESS NOTES
Problem: Falls - Risk of  Goal: *Absence of Falls  Description: Document Vasile Collins Fall Risk and appropriate interventions in the flowsheet.   Outcome: Progressing Towards Goal  Note: Fall Risk Interventions:  Mobility Interventions: PT Consult for assist device competence, PT Consult for mobility concerns, OT consult for ADLs, Bed/chair exit alarm, Utilize gait belt for transfers/ambulation         Medication Interventions: Bed/chair exit alarm         History of Falls Interventions: Bed/chair exit alarm, Vital signs minimum Q4HRs X 24 hrs (comment for end date), Door open when patient unattended         Problem: Patient Education: Go to Patient Education Activity  Goal: Patient/Family Education  Outcome: Progressing Towards Goal

## 2021-01-05 NOTE — PROGRESS NOTES
Hospitalist Progress Note             Daily Progress Note: 1/5/2021      Subjective: The patient is seen for follow  up. Chad Villafuerte is a 65 y.o. female with history of COPD and chronic respiratory failure dependent on oxygen at home who was admitted on 1/02/2020 with complaints of shortness of breath and nonproductive cough. COVID-19 results were negative. CTA of chest negative for PE or infiltrates. She was admitted for acute COPD exacerbation. Patient seen and examined at bedside  Reports she is feeling better this morning  She is on oxygen at 2 L via nasal cannula which is her baseline at home  No acute events overnight.       Problem List:  Patient Active Problem List   Diagnosis Code    Retained ureteral stent Z96.0    COPD (chronic obstructive pulmonary disease) ([facility] [address] J44.9    UPJ (ureteropelvic junction) obstruction N13.5    Acute cystitis N30.00    Fungus present in urine B49    COPD exacerbation (HCC) J44.1         Medications reviewed  Current Facility-Administered Medications   Medication Dose Route Frequency    predniSONE (DELTASONE) tablet 20 mg  20 mg Oral BID    pramipexole (MIRAPEX) tablet 1 mg  1 mg Oral QHS    atorvastatin (LIPITOR) tablet 20 mg  20 mg Oral DAILY    albuterol (PROVENTIL HFA, VENTOLIN HFA, PROAIR HFA) inhaler 2 Puff  2 Puff Inhalation Q6H PRN    clonazePAM (KlonoPIN) tablet 0.5 mg  0.5 mg Oral TID    DULoxetine (CYMBALTA) capsule 20 mg  20 mg Oral DAILY    furosemide (LASIX) tablet 20 mg  20 mg Oral DAILY    montelukast (SINGULAIR) tablet 10 mg  10 mg Oral DAILY    pantoprazole (PROTONIX) tablet 40 mg  40 mg Oral ACB    oxybutynin (DITROPAN) tablet 5 mg  5 mg Oral BID    benzonatate (TESSALON) capsule 200 mg  200 mg Oral TID    albuterol-ipratropium (DUO-NEB) 2.5 MG-0.5 MG/3 ML  3 mL Nebulization Q6H RT    sodium chloride (NS) flush 5-40 mL  5-40 mL IntraVENous PRN    acetaminophen (TYLENOL) tablet 650 mg  650 mg Oral Q6H PRN    polyethylene glycol (MIRALAX) packet 17 g  17 g Oral DAILY PRN    ondansetron (ZOFRAN) injection 4 mg  4 mg IntraVENous Q6H PRN    enoxaparin (LOVENOX) injection 40 mg  40 mg SubCUTAneous DAILY    glucose chewable tablet 16 g  4 Tab Oral PRN    dextrose (D50W) injection syrg 12.5-25 g  25-50 mL IntraVENous PRN    glucagon (GLUCAGEN) injection 1 mg  1 mg IntraMUSCular PRN    insulin glargine (LANTUS) injection 12 Units  0.2 Units/kg SubCUTAneous QHS    insulin lispro (HUMALOG) injection   SubCUTAneous AC&HS       Review of Systems:   Review of Systems   Constitutional: Negative for chills, diaphoresis, fever, malaise/fatigue and weight loss. HENT: Negative for ear discharge, ear pain, hearing loss, nosebleeds, sinus pain and tinnitus. Respiratory: Positive for cough. Negative for hemoptysis, sputum production, shortness of breath and wheezing. Cardiovascular: Negative for chest pain, palpitations, orthopnea, claudication and leg swelling. Gastrointestinal: Negative for abdominal pain, constipation, diarrhea, heartburn, nausea and vomiting. Genitourinary: Negative for dysuria, flank pain, frequency, hematuria and urgency. Musculoskeletal: Negative for back pain, falls, joint pain, myalgias and neck pain. Neurological: Negative for dizziness, tingling, focal weakness, seizures, weakness and headaches. Psychiatric/Behavioral: Negative for depression, hallucinations, memory loss, substance abuse and suicidal ideas. The patient is not nervous/anxious. Objective:   Physical Exam  Constitutional:       General: She is not in acute distress. Appearance: Normal appearance. HENT:      Head: Normocephalic and atraumatic. Mouth/Throat:      Mouth: Mucous membranes are moist.   Eyes:      Pupils: Pupils are equal, round, and reactive to light. Neck:      Musculoskeletal: No neck rigidity. Cardiovascular:      Rate and Rhythm: Normal rate and regular rhythm. Pulses: Normal pulses. Heart sounds: Normal heart sounds. Pulmonary:      Effort: Pulmonary effort is normal. No respiratory distress. Breath sounds: No wheezing. Abdominal:      General: Bowel sounds are normal.      Palpations: Abdomen is soft. There is no mass. Tenderness: There is no abdominal tenderness. Musculoskeletal:         General: No swelling, tenderness, deformity or signs of injury. Skin:     General: Skin is warm and dry. Findings: No bruising or erythema. Neurological:      General: No focal deficit present. Mental Status: She is alert and oriented to person, place, and time. Motor: No weakness. Psychiatric:         Mood and Affect: Mood normal.          Visit Vitals  BP (!) 123/58 (BP 1 Location: Right arm, BP Patient Position: At rest)   Pulse 78   Temp 97.6 °F (36.4 °C)   Resp 18   Ht 5' 4\" (1.626 m)   Wt 65.4 kg (144 lb 3.2 oz)   SpO2 93%   BMI 24.75 kg/m²           Data Review:       Recent Days:  Recent Labs     01/03/21  1258 01/02/21 1915   WBC 6.6 5.5   HGB 13.4 13.8   HCT 41.8 43.6    223     Recent Labs     01/04/21  0615 01/03/21  1258 01/02/21 1915    138 137   K 4.7 4.6 5.3*    98 100   CO2 32 33* 35*   * 301* 290*   BUN 30* 29* 29*   CREA 1.02 1.09* 0.86   CA 8.5 8.5 8.7   MG  --  1.6  --    ALB  --   --  3.5   TBILI  --   --  0.5   ALT  --   --  26         Assessment/Plan       1. Acute on chronic hypoxic respiratory failure   Improving. Secondary to exacerbation of COPD  CTA of chest negative for infiltrate or PE  Continue supplemental oxygen,  DuoNeb's, Tessalon and continue  Transition IV Solu-Medrol to prednisone p.o.      2.  COPD exacerbation  Improving. Secondary to  history of nicotine dependence  She quit smoking 6 years ago  CTA of chest negative for infiltrate or PE  Continue supplemental oxygen, she does use oxygen 2 L at home via NC   Transition IV Solu-Medrol to prednisone p.o.   Continue with duo nebs and Tessalon Perles. 3.  Diabetes type 2  Continue sliding scale insulin. 4.  Weakness  Has been seen by PT and OT  The patient does not want home health PT OT    BMP in a.m. Lovenox for DVT prophylaxis    Comments: Significant  improvement in clinical status today. Will transition IV Solu-Medrol to prednisone p.o. and continue with duo nebs and Tessalon Perles. She is now  at her baseline oxygen supplementation which is 2 L via nasal cannula. Anticipate discharge tomorrow.     Jill Archer, NP

## 2021-01-06 VITALS
TEMPERATURE: 97.5 F | WEIGHT: 144.2 LBS | SYSTOLIC BLOOD PRESSURE: 117 MMHG | HEIGHT: 64 IN | BODY MASS INDEX: 24.62 KG/M2 | RESPIRATION RATE: 20 BRPM | DIASTOLIC BLOOD PRESSURE: 71 MMHG | HEART RATE: 69 BPM | OXYGEN SATURATION: 93 %

## 2021-01-06 LAB
ANION GAP SERPL CALC-SCNC: 2 MMOL/L (ref 5–15)
BUN SERPL-MCNC: 43 MG/DL (ref 6–20)
BUN/CREAT SERPL: 51 (ref 12–20)
CA-I BLD-MCNC: 8.4 MG/DL (ref 8.5–10.1)
CHLORIDE SERPL-SCNC: 102 MMOL/L (ref 97–108)
CO2 SERPL-SCNC: 36 MMOL/L (ref 21–32)
CREAT SERPL-MCNC: 0.84 MG/DL (ref 0.55–1.02)
GLUCOSE BLD STRIP.AUTO-MCNC: 206 MG/DL (ref 65–100)
GLUCOSE SERPL-MCNC: 159 MG/DL (ref 65–100)
PERFORMED BY, TECHID: ABNORMAL
POTASSIUM SERPL-SCNC: 4.6 MMOL/L (ref 3.5–5.1)
SODIUM SERPL-SCNC: 140 MMOL/L (ref 136–145)

## 2021-01-06 PROCEDURE — 82962 GLUCOSE BLOOD TEST: CPT

## 2021-01-06 PROCEDURE — 80048 BASIC METABOLIC PNL TOTAL CA: CPT

## 2021-01-06 PROCEDURE — 74011000250 HC RX REV CODE- 250: Performed by: NURSE PRACTITIONER

## 2021-01-06 PROCEDURE — 36415 COLL VENOUS BLD VENIPUNCTURE: CPT

## 2021-01-06 PROCEDURE — 94640 AIRWAY INHALATION TREATMENT: CPT

## 2021-01-06 PROCEDURE — 74011250636 HC RX REV CODE- 250/636: Performed by: FAMILY MEDICINE

## 2021-01-06 PROCEDURE — 77010033678 HC OXYGEN DAILY

## 2021-01-06 PROCEDURE — 74011636637 HC RX REV CODE- 636/637: Performed by: NURSE PRACTITIONER

## 2021-01-06 PROCEDURE — 94760 N-INVAS EAR/PLS OXIMETRY 1: CPT

## 2021-01-06 PROCEDURE — 74011636637 HC RX REV CODE- 636/637: Performed by: FAMILY MEDICINE

## 2021-01-06 PROCEDURE — 74011250637 HC RX REV CODE- 250/637: Performed by: NURSE PRACTITIONER

## 2021-01-06 RX ORDER — BENZONATATE 200 MG/1
200 CAPSULE ORAL
Qty: 9 CAP | Refills: 0 | Status: SHIPPED | OUTPATIENT
Start: 2021-01-06 | End: 2021-01-09

## 2021-01-06 RX ORDER — PREDNISONE 20 MG/1
TABLET ORAL
Qty: 8 TAB | Refills: 0 | Status: SHIPPED | OUTPATIENT
Start: 2021-01-06 | End: 2021-01-11

## 2021-01-06 RX ORDER — ALBUTEROL SULFATE 90 UG/1
2 AEROSOL, METERED RESPIRATORY (INHALATION)
Qty: 1 INHALER | Refills: 0 | Status: SHIPPED | OUTPATIENT
Start: 2021-01-06 | End: 2021-02-05

## 2021-01-06 RX ADMIN — ENOXAPARIN SODIUM 40 MG: 40 INJECTION SUBCUTANEOUS at 09:53

## 2021-01-06 RX ADMIN — ATORVASTATIN CALCIUM 20 MG: 20 TABLET, FILM COATED ORAL at 09:53

## 2021-01-06 RX ADMIN — IPRATROPIUM BROMIDE AND ALBUTEROL SULFATE 3 ML: .5; 3 SOLUTION RESPIRATORY (INHALATION) at 14:00

## 2021-01-06 RX ADMIN — IPRATROPIUM BROMIDE AND ALBUTEROL SULFATE 3 ML: .5; 3 SOLUTION RESPIRATORY (INHALATION) at 01:23

## 2021-01-06 RX ADMIN — CLONAZEPAM 0.5 MG: 0.5 TABLET ORAL at 09:53

## 2021-01-06 RX ADMIN — OXYBUTYNIN CHLORIDE 5 MG: 5 TABLET ORAL at 09:53

## 2021-01-06 RX ADMIN — PANTOPRAZOLE SODIUM 40 MG: 40 TABLET, DELAYED RELEASE ORAL at 07:30

## 2021-01-06 RX ADMIN — INSULIN LISPRO 3 UNITS: 100 INJECTION, SOLUTION INTRAVENOUS; SUBCUTANEOUS at 07:30

## 2021-01-06 RX ADMIN — MONTELUKAST 10 MG: 10 TABLET, FILM COATED ORAL at 09:53

## 2021-01-06 RX ADMIN — FUROSEMIDE 20 MG: 40 TABLET ORAL at 09:53

## 2021-01-06 RX ADMIN — IPRATROPIUM BROMIDE AND ALBUTEROL SULFATE 3 ML: .5; 3 SOLUTION RESPIRATORY (INHALATION) at 07:57

## 2021-01-06 RX ADMIN — PREDNISONE 20 MG: 20 TABLET ORAL at 09:53

## 2021-01-06 RX ADMIN — DULOXETINE 20 MG: 20 CAPSULE, DELAYED RELEASE ORAL at 09:53

## 2021-01-06 RX ADMIN — BENZONATATE 200 MG: 100 CAPSULE ORAL at 09:53

## 2021-01-06 NOTE — DISCHARGE INSTRUCTIONS
Patient Education        COPD Exacerbation Plan: Care Instructions  Your Care Instructions     If you have chronic obstructive pulmonary disease (COPD), your usual shortness of breath could suddenly get worse. You may start coughing more and have more mucus. This flare-up is called a COPD exacerbation (say \"oi-INJ-dt-BAY-katarina\"). A lung infection or air pollution could set off an exacerbation. Sometimes it can happen after a quick change in temperature or being around chemicals. Work with your doctor to make a plan for dealing with an exacerbation. You can better manage it if you plan ahead. Follow-up care is a key part of your treatment and safety. Be sure to make and go to all appointments, and call your doctor if you are having problems. It's also a good idea to know your test results and keep a list of the medicines you take. How can you care for yourself at home? During an exacerbation  · Do not panic if you start to have one. Quick treatment at home may help you prevent serious breathing problems. If you have a COPD exacerbation plan that you developed with your doctor, follow it. · Take your medicines exactly as your doctor tells you.  ? Use your inhaler as directed by your doctor. If your symptoms do not get better after you use your medicine, have someone take you to the emergency room. Call an ambulance if necessary. ? With inhaled medicines, a spacer or a nebulizer may help you get more medicine to your lungs. Ask your doctor or pharmacist how to use them properly. Practice using the spacer in front of a mirror before you have an exacerbation. This may help you get the medicine into your lungs quickly. ? If your doctor has given you steroid pills, take them as directed. ? Your doctor may have given you a prescription for antibiotics, which you can fill if you need it. ? Talk to your doctor if you have any problems with your medicine.  And call your doctor if you have to use your antibiotic or steroid pills. Preventing an exacerbation  · Do not smoke. This is the most important step you can take to prevent more damage to your lungs and prevent problems. If you already smoke, it is never too late to stop. If you need help quitting, talk to your doctor about stop-smoking programs and medicines. These can increase your chances of quitting for good. · Take your daily medicines as prescribed. · Avoid colds and flu. ? Get a pneumococcal vaccine. ? Get a flu vaccine each year, as soon as it is available. Ask those you live or work with to do the same, so they will not get the flu and infect you. ? Try to stay away from people with colds or the flu. ? Wash your hands often. · Avoid secondhand smoke; air pollution; cold, dry air; hot, humid air; and high altitudes. Stay at home with your windows closed when air pollution is bad. · Learn breathing techniques for COPD, such as breathing through pursed lips. These techniques can help you breathe easier during an exacerbation. When should you call for help? Call 911 anytime you think you may need emergency care. For example, call if:    · You have severe trouble breathing.     · You have severe chest pain. Call your doctor now or seek immediate medical care if:    · You have new or worse shortness of breath.     · You develop new chest pain.     · You are coughing more deeply or more often, especially if you notice more mucus or a change in the color of your mucus.     · You cough up blood.     · You have new or increased swelling in your legs or belly.     · You have a fever. Watch closely for changes in your health, and be sure to contact your doctor if:    · You need to use your antibiotic or steroid pills.     · Your symptoms are getting worse. Where can you learn more? Go to http://www.gray.com/  Enter U536 in the search box to learn more about \"COPD Exacerbation Plan: Care Instructions. \"  Current as of: February 24, 2020               Content Version: 12.6  © 2544-9321 [employer]. Care instructions adapted under license by [employer] (which disclaims liability or warranty for this information). If you have questions about a medical condition or this instruction, always ask your healthcare professional. [address] any warranty or liability for your use of this information. Thank you! Thank you for allowing me to care for you in the emergency department. I sincerely hope that you are satisfied with your visit today. It is my goal to provide you with excellent care. Below you will find a list of your labs and imaging from your visit today. Should you have any questions regarding these results please do not hesitate to call the emergency department. Labs -     Recent Results (from the past 12 hour(s))   D DIMER    Collection Time: 01/02/21  7:11 PM   Result Value Ref Range    D DIMER 1.36 (H) <0.50 ug/ml(FEU)   CBC WITH AUTOMATED DIFF    Collection Time: 01/02/21  7:15 PM   Result Value Ref Range    WBC 5.5 3.6 - 11.0 K/uL    RBC 4.58 3.80 - 5.20 M/uL    HGB 13.8 11.5 - 16.0 g/dL    HCT 43.6 35.0 - 47.0 %    MCV 95.2 80.0 - 99.0 FL    MCH 30.1 26.0 - 34.0 PG    MCHC 31.7 30.0 - 36.5 g/dL    RDW 14.4 11.5 - 14.5 %    PLATELET 672 767 - 833 K/uL    MPV 12.3 8.9 - 12.9 FL    NEUTROPHILS 80 (H) 32 - 75 %    LYMPHOCYTES 17 12 - 49 %    MONOCYTES 3 (L) 5 - 13 %    EOSINOPHILS 0 0 - 7 %    BASOPHILS 0 0 - 1 %    IMMATURE GRANULOCYTES 0 0.0 - 0.5 %    ABS. NEUTROPHILS 4.4 1.8 - 8.0 K/UL    ABS. LYMPHOCYTES 1.0 0.8 - 3.5 K/UL    ABS. MONOCYTES 0.1 0.0 - 1.0 K/UL    ABS. EOSINOPHILS 0.0 0.0 - 0.4 K/UL    ABS. BASOPHILS 0.0 0.0 - 0.1 K/UL    ABS. IMM.  GRANS. 0.0 0.00 - 0.04 K/UL    DF AUTOMATED     METABOLIC PANEL, COMPREHENSIVE    Collection Time: 01/02/21  7:15 PM   Result Value Ref Range    Sodium 137 136 - 145 mmol/L    Potassium 5.3 (H) 3.5 - 5.1 mmol/L    Chloride 100 97 - 108 mmol/L    CO2 35 (H) 21 - 32 mmol/L    Anion gap 2 (L) 5 - 15 mmol/L    Glucose 290 (H) 65 - 100 mg/dL    BUN 29 (H) 6 - 20 mg/dL    Creatinine 0.86 0.55 - 1.02 mg/dL    BUN/Creatinine ratio 34 (H) 12 - 20      GFR est AA >60 >60 ml/min/1.73m2    GFR est non-AA >60 >60 ml/min/1.73m2    Calcium 8.7 8.5 - 10.1 mg/dL    Bilirubin, total 0.5 0.2 - 1.0 mg/dL    AST (SGOT) 21 15 - 37 U/L    ALT (SGPT) 26 12 - 78 U/L    Alk. phosphatase 158 (H) 45 - 117 U/L    Protein, total 7.2 6.4 - 8.2 g/dL    Albumin 3.5 3.5 - 5.0 g/dL    Globulin 3.7 2.0 - 4.0 g/dL    A-G Ratio 0.9 (L) 1.1 - 2.2     CK W/ REFLX CKMB    Collection Time: 01/02/21  7:15 PM   Result Value Ref Range    CK 66.0 26 - 192 ng/mL   TROPONIN I    Collection Time: 01/02/21  7:15 PM   Result Value Ref Range    Troponin-I, Qt. <0.05 <0.05 ng/mL       Radiologic Studies -   XR CHEST PORT   Final Result   IMPRESSION: Essentially clear lungs. No effusion or pneumothorax. Normal heart   and mediastinum      CTA CHEST W OR W WO CONT    (Results Pending)     CT Results  (Last 48 hours)      None          CXR Results  (Last 48 hours)                 01/02/21 1921  XR CHEST PORT Final result    Impression:  IMPRESSION: Essentially clear lungs. No effusion or pneumothorax. Normal heart   and mediastinum       Narrative:  1 view comparison underfilled                      If you feel that you have not received excellent quality care or timely care, please ask to speak to the nurse manager. Please choose us in the future for your continued health care needs. ------------------------------------------------------------------------------------------------------------  The exam and treatment you received in the Emergency Department were for an urgent problem and are not intended as complete care.  It is important that you follow-up with a doctor, nurse practitioner, or physician assistant to:  (1) confirm your diagnosis,  (2) re-evaluation of changes in your illness and treatment, and  (3) for ongoing care. If your symptoms become worse or you do not improve as expected and you are unable to reach your usual health care provider, you should return to the Emergency Department. We are available 24 hours a day. Please take your discharge instructions with you when you go to your follow-up appointment. If you have any problem arranging a follow-up appointment, contact the Emergency Department immediately. If a prescription has been provided, please have it filled as soon as possible to prevent a delay in treatment. Read the entire medication instruction sheet provided to you by the pharmacy. If you have any questions or reservations about taking the medication due to side effects or interactions with other medications, please call your primary care physician or contact the ER to speak with the charge nurse. Make an appointment with your family doctor or the physician you were referred to for follow-up of this visit as instructed on your discharge paperwork, as this is a mandatory follow-up. Return to the ER if you are unable to be seen or if you are unable to be seen in a timely manner. If you have any problem arranging the follow-up visit, contact the Emergency Department immediately.

## 2021-01-06 NOTE — DISCHARGE SUMMARY
Admit date: 1/2/2021   Admitting Provider: Yuni Meier MD    Discharge date: 1/6/2021  Discharging Provider: Mirtha El NP      * Admission Diagnoses: COPD exacerbation [facility]) [J44.1]    * Discharge Diagnoses:    Hospital Problems as of 1/6/2021 Date Reviewed: 1/3/2021          Codes Class Noted - Resolved POA    [facility] ICD-10-CM: J44.1  ICD-9-CM: 491.21  1/2/2021 - Present Yes            HPI: Doug Mitchell is a 65 y.o. female with past medical history of COPD with chronic hypoxic respiratory failure on home oxygen, insulin-dependent diabetes mellitus type 2 and obstructive sleep apnea with use of CPAP machine at night presenting to the ER with complaints of shortness of breath. Patient has been more short of breath with nonproductive cough and subjective fever over the past several days. There has been a COVID-19 exposure. Ms. Jena Wray denies recent chills, nausea, vomiting, diarrhea, sore throat, nasal congestion, rhinorrhea or abdominal pain. Symptoms prompted ER visit this evening.     On arrival to the ER, temperature was 97.7 °F, pulse 66, respirations 18, blood pressure 145/88 and oxygen saturation was 94% on room air. Initial chest x-ray has no evidence of acute infiltrate or cardiopulmonary process. COVID-19 results are negative. Ms. Jena Wray was hydrated with 2 L of normal saline, and administered IV Decadron and given albuterol via inhaler. Symptoms improved however on reexamination, wheezing persisted. Hospital service has been asked to admit Ms. Jena Wray for further treatment and evaluation of COPD exacerbation.     Past medical history, past surgical history, family history and social history was reviewed at the time of admission. Ms. Jena Wray was unable to verify home medication list at the time of admission. Patient is a full code. * Hospital Course: Hilton Hoover is a 65 y. o. female with history of COPD and chronic respiratory failure dependent on oxygen at home who was admitted on 1/02/2020 with complaints of shortness of breath and nonproductive cough.  COVID-19 results were negative. CTA of chest negative for PE or infiltrates. She was admitted for acute COPD exacerbation. Patient managed with supplemental oxygen,  DuoNeb's Tessalon. Transitioned IV Solu-Medrol to prednisone p.o. She is now  at her baseline oxygen supplementation which is 2 L via nasal cannula. Patient offered home health services for disease management, refuses stating \"I don't need them. I'm not coming back here, I've already been here twice\". The purpose of home health was explained to patient. * Procedures:   * No surgery found *      Consults:      Significant Diagnostic Studies:   CTA chest: No evidence of pulmonary embolism or acute aortic abnormalities. Chronic changes in the lungs and skeleton. Discharge Exam:  Physical Exam  Vitals signs and nursing note reviewed. Constitutional:       Appearance: Normal appearance. HENT:      Head: Normocephalic. Nose: Nose normal.      Mouth/Throat:      Mouth: Mucous membranes are moist.   Eyes:      Extraocular Movements: Extraocular movements intact. Neck:      Musculoskeletal: Normal range of motion. Cardiovascular:      Rate and Rhythm: Normal rate and regular rhythm. Pulses: Normal pulses. Heart sounds: Normal heart sounds. Pulmonary:      Effort: Pulmonary effort is normal.      Breath sounds: Wheezing present. Comments: 2L NC at baseline, exp wheezes  Abdominal:      General: Bowel sounds are normal.      Palpations: Abdomen is soft. Musculoskeletal: Normal range of motion. Skin:     General: Skin is warm and dry. Capillary Refill: Capillary refill takes less than 2 seconds. Neurological:      Mental Status: She is alert and oriented to person, place, and time.    Psychiatric:         Behavior: Behavior normal.         * Discharge Condition: stable  * Disposition: Home    Discharge Medications:  Current Discharge Medication List      START taking these medications    Details   benzonatate (TESSALON) 200 mg capsule Take 1 Cap by mouth three (3) times daily as needed for Cough for up to 3 days. Qty: 9 Cap, Refills: 0         CONTINUE these medications which have CHANGED    Details   predniSONE (DELTASONE) 20 mg tablet Take 20 mg by mouth two (2) times a day for 3 days, THEN 20 mg daily for 2 days. Qty: 8 Tab, Refills: 0      albuterol (PROVENTIL HFA, VENTOLIN HFA, PROAIR HFA) 90 mcg/actuation inhaler Take 2 Puffs by inhalation every six (6) hours as needed for Wheezing, Shortness of Breath or Respiratory Distress for up to 30 days. Qty: 1 Inhaler, Refills: 0         CONTINUE these medications which have NOT CHANGED    Details   gabapentin (NEURONTIN) 300 mg capsule Take 300 mg by mouth three (3) times daily. doxycycline (ADOXA) 100 mg tablet Take 1 Tab by mouth two (2) times a day. Qty: 10 Tab, Refills: 0      insulin glargine U-300 conc (TOUJEO) 300 unit/mL (1.5 mL) inpn pen 20 Units by SubCUTAneous route nightly. insulin lispro (HUMALOG) 100 unit/mL injection by SubCUTAneous route. 5 units before lunch, 8 units before supper and 3 units before breakfast      clonazePAM (KlonoPIN) 0.5 mg tablet Take 0.5 mg by mouth three (3) times daily. oxybutynin (DITROPAN) 5 mg tablet Take 5 mg by mouth two (2) times a day. atorvastatin (LIPITOR) 20 mg tablet Take 20 mg by mouth daily. furosemide (LASIX) 20 mg tablet Take 20 mg by mouth daily. As needed      FLUoxetine (PROZAC) 40 mg capsule Take 20 mg by mouth daily. montelukast (SINGULAIR) 10 mg tablet Take 10 mg by mouth daily. pramipexole (MIRAPEX) 0.5 mg tablet Take 1 mg by mouth nightly. ipratropium-albuterol (COMBIVENT)  mcg/actuation inhaler Take 1 Puff by inhalation every six (6) hours as needed for Wheezing or Shortness of Breath.  Indications: CHRONIC OBSTRUCTIVE PULMONARY DISEASE WITH BRONCHOSPASMS cholecalciferol (VITAMIN D3) (50,000 UNITS /1250 MCG) capsule Take  by mouth every seven (7) days. omeprazole magnesium (PRILOSEC OTC PO) Take 1 Tab by mouth two (2) times a day. insulin lispro protamine/insulin lispro (HumaLOG Mix 50-50 Insuln U-100) 100 unit/mL (50-50) injection by SubCUTAneous route. DULoxetine (CYMBALTA) 20 mg capsule Take 20 mg by mouth daily. Biotin 2,500 mcg cap Take 5,000 mcg/day by mouth daily. * Follow-up Care/Patient Instructions: Activity: Activity as tolerated  Diet: Diabetic Diet  Wound Care: None needed      Continue medication as prescribed  Patient educated on importance of maintaining follow-up appointments  Patient educated on limiting her exposure to groups and crowds due to her chronic respiratory failure  Patient medically stable for discharge once bed is available    Follow-up Information     Follow up With Specialties Details Why Contact Info    Teressa Desir MD Family Medicine In 3 days  [address]  [address] [facility]  [phone]      [address] [facility] [address] [facility] Emergency Medicine  As needed, If symptoms worsen [address] [facility] [address]  [phone]    Pulmonologist  In 1 week            CC:  Teressa Desir MD    Time Spent: 35 minutes w/ patient education and    Signed:  Juan Jarquin NP  1/6/2021  12:44 PM

## 2021-01-06 NOTE — PROGRESS NOTES
IV removed; tip intact. Telemetry discontinued. Vital signs stable. Patient verbalized understanding of discharge instructions, to include importance of follow up appointments and medication compliance. Patient discharged home self care. Taken via wheelchair to private vehicle.

## 2021-01-06 NOTE — ROUTINE PROCESS
Primary Nurse Nando Lara RN and Frieda Valentine RN performed a dual skin assessment on this patient No impairment noted

## 2021-02-05 ENCOUNTER — TELEPHONE (OUTPATIENT)
Dept: UROLOGY | Age: 71
End: 2021-02-05

## 2021-02-23 ENCOUNTER — TELEPHONE (OUTPATIENT)
Dept: UROLOGY | Age: 71
End: 2021-02-23

## 2021-03-03 ENCOUNTER — OFFICE VISIT (OUTPATIENT)
Dept: UROLOGY | Age: 71
End: 2021-03-03
Payer: MEDICARE

## 2021-03-03 VITALS
HEART RATE: 111 BPM | SYSTOLIC BLOOD PRESSURE: 98 MMHG | OXYGEN SATURATION: 99 % | RESPIRATION RATE: 12 BRPM | WEIGHT: 135 LBS | DIASTOLIC BLOOD PRESSURE: 56 MMHG | TEMPERATURE: 97.1 F | BODY MASS INDEX: 23.05 KG/M2 | HEIGHT: 64 IN

## 2021-03-03 DIAGNOSIS — N30.00 ACUTE CYSTITIS WITHOUT HEMATURIA: ICD-10-CM

## 2021-03-03 DIAGNOSIS — N13.5 UPJ (URETEROPELVIC JUNCTION) OBSTRUCTION: Primary | ICD-10-CM

## 2021-03-03 DIAGNOSIS — N39.46 MIXED STRESS AND URGE URINARY INCONTINENCE: ICD-10-CM

## 2021-03-03 PROBLEM — Z96.0 RETAINED URETERAL STENT: Status: RESOLVED | Noted: 2020-08-17 | Resolved: 2021-03-03

## 2021-03-03 PROCEDURE — 99214 OFFICE O/P EST MOD 30 MIN: CPT | Performed by: UROLOGY

## 2021-03-03 PROCEDURE — G8536 NO DOC ELDER MAL SCRN: HCPCS | Performed by: UROLOGY

## 2021-03-03 PROCEDURE — G8427 DOCREV CUR MEDS BY ELIG CLIN: HCPCS | Performed by: UROLOGY

## 2021-03-03 PROCEDURE — 1101F PT FALLS ASSESS-DOCD LE1/YR: CPT | Performed by: UROLOGY

## 2021-03-03 PROCEDURE — 3017F COLORECTAL CA SCREEN DOC REV: CPT | Performed by: UROLOGY

## 2021-03-03 PROCEDURE — G8400 PT W/DXA NO RESULTS DOC: HCPCS | Performed by: UROLOGY

## 2021-03-03 PROCEDURE — G8420 CALC BMI NORM PARAMETERS: HCPCS | Performed by: UROLOGY

## 2021-03-03 PROCEDURE — G8432 DEP SCR NOT DOC, RNG: HCPCS | Performed by: UROLOGY

## 2021-03-03 PROCEDURE — 81003 URINALYSIS AUTO W/O SCOPE: CPT | Performed by: UROLOGY

## 2021-03-03 PROCEDURE — 1090F PRES/ABSN URINE INCON ASSESS: CPT | Performed by: UROLOGY

## 2021-03-03 RX ORDER — CIPROFLOXACIN 500 MG/1
500 TABLET ORAL 2 TIMES DAILY
Qty: 10 TAB | Refills: 0 | Status: SHIPPED | OUTPATIENT
Start: 2021-03-03 | End: 2021-05-28 | Stop reason: ALTCHOICE

## 2021-03-03 NOTE — PROGRESS NOTES
Chief Complaint   Patient presents with    Follow-up     1. Have you been to the ER, urgent care clinic since your last visit? Hospitalized since your last visit? Yes When: Jan 2021 Where: [facility] Reason for visit: SOB, cough, fever    2. Have you seen or consulted any other health care providers outside of the [address] [place] [address] since your last visit? Include any pap smears or colon screening.  No  Visit Vitals  BP (!) 98/56 (BP 1 Location: Left upper arm, BP Patient Position: Sitting, BP Cuff Size: Adult)   Pulse (!) 111   Temp 97.1 °F (36.2 °C) (Temporal)   Resp 12   Ht 5' 4\" (1.626 m)   Wt 135 lb (61.2 kg)   LMP  (LMP Unknown)   SpO2 99%   BMI 23.17 kg/m²

## 2021-03-03 NOTE — ASSESSMENT & PLAN NOTE
Left kidney with less function, atrophic. Right kidney appears to be much more dominant and overall kidney function is good. Not symptomatic. I would hold on further evaluation at this time.

## 2021-03-03 NOTE — ASSESSMENT & PLAN NOTE
She is bothered by leakage. She wants further evaluation. We discussed cystoscopy and CMG to evaluate.

## 2021-03-03 NOTE — PROGRESS NOTES
HISTORY OF PRESENT ILLNESS Kwesi Segura is a 65 y.o. female. HPI 
 
ROS Physical Exam 
 
ASSESSMENT and PLAN 
{There are no diagnoses linked to this encounter. (Refresh or delete this SmartLink)} Kirstin Ramirez MD

## 2021-03-03 NOTE — PROGRESS NOTES
HISTORY OF PRESENT ILLNESS  Chicho Brown is a 65 y.o. female. Chief Complaint   Patient presents with    Follow-up    UPJ Obstruction    Bladder Infection       She has chronic leakage. It has been a long time. She has some hesitancy and urgency. She has to strain at times. She wears a depends all the time. She uses 2-3. She leaks with cough or sneeze. She has urgency. She feels pressure not burning. She can sit 30 minutes to empty her bladder. She had many UTIs in her 19's and throughout her life. She has an atrophic left kidney and it is not contributing much function. No evidence of hydronephrosis. Chronic Conditions Addressed Today     1. Mixed stress and urge urinary incontinence     Current Assessment & Plan      She is bothered by leakage. She wants further evaluation. We discussed cystoscopy and CMG to evaluate. 2. UPJ (ureteropelvic junction) obstruction - Primary     Overview        She has a left sided ureteral stent managed previously by Dr. Ra Mcqueen. Stent secondary to chronic left UPJ obstruction with stent changes q 3 months. Last changed 6/2/2020. She has been having stents several years 5-6 per her recollection. She is s/p cystoscopy, left retrograde pyelogram, left ureteral stent exchange on 9/14/2020. Findings: left UPJ obstruction with low-lying left kidney. She is s/p cystoscopy, retrograde pyelograms, robotic left pyeloplasty, left ureteral stent exchange on 11/10/2020. Pathology significant for fibromuscular tissue with chronic inflammation. No evidence of epithelial neoplasm.      12/7/2020: NM study showed: Atrophic left kidney has reduced perfusion and uptake relative to the right. Post Lasix stimulation there is no change in the appearance of shallow curve representing left kidney excretion. No downward slope is identified to allow exclusion of obstructive hydronephrosis. Differential perfusion; left, 37.3%   right, 62.7%.   Differential uptake; left, 19.3%   right, 80.8%. Creatinine on 1/6/2021 was 0.84.             Current Assessment & Plan      Left kidney with less function, atrophic. Right kidney appears to be much more dominant and overall kidney function is good. Not symptomatic. I would hold on further evaluation at this time. Relevant Orders     URINALYSIS W/MICROSCOPIC     CULTURE, URINE     AMB POC URINALYSIS DIP STICK AUTO W/O MICRO    3. Acute cystitis     Overview      She gets UTIs with her ureteral stents. She has been on abx. She had sepsis \"a couple years\" ago. Urine culture 8/18/2020: klebsiella oxytoca. Urine culture 10/22/2020: mixed greg. Urine culture 11/5/2020: no growth. Urine culture 11/9/2020: no growth. Urine culture 1/11/2021: klebsiella pneumonia and klebsiella oxytoca (scanned result). Current Assessment & Plan      She was treated a few months ago. She thinks she has an infection now. I will put her on Cipro          Relevant Medications     ciprofloxacin HCl (CIPRO) 500 mg tablet          Review of Systems   All other systems reviewed and are negative. Physical Exam  Vitals signs reviewed. Constitutional:       General: She is not in acute distress. Appearance: Normal appearance. She is obese. She is not ill-appearing, toxic-appearing or diaphoretic. HENT:      Head: Normocephalic and atraumatic. Mouth/Throat:      Mouth: Mucous membranes are moist.      Pharynx: Oropharynx is clear. Eyes:      Extraocular Movements: Extraocular movements intact. Conjunctiva/sclera: Conjunctivae normal.      Pupils: Pupils are equal, round, and reactive to light. Neck:      Musculoskeletal: Normal range of motion. Cardiovascular:      Rate and Rhythm: Normal rate and regular rhythm. Pulmonary:      Effort: Pulmonary effort is normal. No respiratory distress. Breath sounds: Normal breath sounds.    Abdominal:      General: Bowel sounds are normal. Palpations: Abdomen is soft. Musculoskeletal: Normal range of motion. General: No swelling or deformity. Lymphadenopathy:      Cervical: No cervical adenopathy. Upper Body:      Right upper body: No supraclavicular adenopathy. Left upper body: No supraclavicular adenopathy. Skin:     General: Skin is warm and dry. Neurological:      General: No focal deficit present. Mental Status: She is alert and oriented to person, place, and time. Psychiatric:         Mood and Affect: Mood normal.         Behavior: Behavior normal.                   ASSESSMENT and PLAN  Diagnoses and all orders for this visit:    1. UPJ (ureteropelvic junction) obstruction  Assessment & Plan:  Left kidney with less function, atrophic. Right kidney appears to be much more dominant and overall kidney function is good. Not symptomatic. I would hold on further evaluation at this time. Orders:  -     URINALYSIS W/MICROSCOPIC  -     CULTURE, URINE  -     AMB POC URINALYSIS DIP STICK AUTO W/O MICRO    2. Acute cystitis without hematuria  Assessment & Plan:  She was treated a few months ago. She thinks she has an infection now. I will put her on Cipro      3. Mixed stress and urge urinary incontinence  Assessment & Plan:  She is bothered by leakage. She wants further evaluation. We discussed cystoscopy and CMG to evaluate. Other orders  -     ciprofloxacin HCl (CIPRO) 500 mg tablet; Take 1 Tab by mouth two (2) times a day. Follow-up and Dispositions    · Return for cystoscopy/ CMG.          Kim Cali MD

## 2021-03-05 LAB
APPEARANCE UR: ABNORMAL
BACTERIA #/AREA URNS HPF: ABNORMAL /[HPF]
BACTERIA UR CULT: NORMAL
BILIRUB UR QL STRIP: NEGATIVE
CASTS URNS QL MICRO: ABNORMAL /LPF
COLOR UR: YELLOW
CRYSTALS URNS MICRO: ABNORMAL
EPI CELLS #/AREA URNS HPF: ABNORMAL /HPF (ref 0–10)
GLUCOSE UR QL: ABNORMAL
HGB UR QL STRIP: ABNORMAL
KETONES UR QL STRIP: NEGATIVE
LEUKOCYTE ESTERASE UR QL STRIP: ABNORMAL
MICRO URNS: ABNORMAL
MUCOUS THREADS URNS QL MICRO: PRESENT
NITRITE UR QL STRIP: NEGATIVE
PH UR STRIP: 5.5 [PH] (ref 5–7.5)
PROT UR QL STRIP: ABNORMAL
RBC #/AREA URNS HPF: >30 /HPF (ref 0–2)
SP GR UR: 1.01 (ref 1–1.03)
UNIDENT CRYS URNS QL MICRO: PRESENT
UROBILINOGEN UR STRIP-MCNC: 0.2 MG/DL (ref 0.2–1)
WBC #/AREA URNS HPF: >30 /HPF (ref 0–5)
YEAST #/AREA URNS HPF: PRESENT /[HPF]

## 2021-03-23 ENCOUNTER — TELEPHONE (OUTPATIENT)
Dept: UROLOGY | Age: 71
End: 2021-03-23

## 2021-03-23 NOTE — TELEPHONE ENCOUNTER
pts sister contacted the office stated her sister was seen last week or so for a UTI and was still having symptoms.  I checked her hippa form and unfortunately her sister is not listed- I informed her I was not able to speak with her and that the pt would have to call back herself and I would see what I could do for her

## 2021-04-05 PROBLEM — N39.8 VOIDING DYSFUNCTION: Status: ACTIVE | Noted: 2021-04-05

## 2021-04-05 NOTE — PROGRESS NOTES
Attempted to see the patient, patient eating breakfast.we will try later. Patient intubated with good gas exchange noted. Good chest rise and fall. 8.0 F 23 lip

## 2021-04-08 ENCOUNTER — TELEPHONE (OUTPATIENT)
Dept: UROLOGY | Age: 71
End: 2021-04-08

## 2021-04-16 ENCOUNTER — TRANSCRIBE ORDER (OUTPATIENT)
Dept: SCHEDULING | Age: 71
End: 2021-04-16

## 2021-04-16 DIAGNOSIS — R13.19 ESOPHAGEAL DYSPHAGIA: Primary | ICD-10-CM

## 2021-05-03 ENCOUNTER — HOSPITAL ENCOUNTER (OUTPATIENT)
Dept: GENERAL RADIOLOGY | Age: 71
Discharge: HOME | End: 2021-05-03
Attending: INTERNAL MEDICINE
Payer: MEDICARE

## 2021-05-03 DIAGNOSIS — R13.19 ESOPHAGEAL DYSPHAGIA: ICD-10-CM

## 2021-05-03 PROCEDURE — 74220 X-RAY XM ESOPHAGUS 1CNTRST: CPT

## 2021-05-03 PROCEDURE — 74011000250 HC RX REV CODE- 250: Performed by: INTERNAL MEDICINE

## 2021-05-03 RX ADMIN — BARIUM SULFATE 135 ML: 980 POWDER, FOR SUSPENSION ORAL at 10:00

## 2021-05-03 RX ADMIN — BARIUM SULFATE 145 ML: 0.6 SUSPENSION ORAL at 10:00

## 2021-05-03 RX ADMIN — ANTACID/ANTIFLATULENT 4 G: 380; 550; 10; 10 GRANULE, EFFERVESCENT ORAL at 10:00

## 2021-05-07 ENCOUNTER — TELEPHONE (OUTPATIENT)
Dept: ENT CLINIC | Age: 71
End: 2021-05-07

## 2021-05-28 ENCOUNTER — OFFICE VISIT (OUTPATIENT)
Dept: UROLOGY | Age: 71
End: 2021-05-28
Payer: MEDICARE

## 2021-05-28 VITALS
DIASTOLIC BLOOD PRESSURE: 69 MMHG | RESPIRATION RATE: 20 BRPM | TEMPERATURE: 97.2 F | SYSTOLIC BLOOD PRESSURE: 113 MMHG | OXYGEN SATURATION: 99 % | WEIGHT: 132 LBS | HEART RATE: 90 BPM | BODY MASS INDEX: 22.53 KG/M2 | HEIGHT: 64 IN

## 2021-05-28 DIAGNOSIS — N39.46 MIXED STRESS AND URGE URINARY INCONTINENCE: ICD-10-CM

## 2021-05-28 DIAGNOSIS — B49 FUNGUS PRESENT IN URINE: ICD-10-CM

## 2021-05-28 DIAGNOSIS — N13.5 UPJ (URETEROPELVIC JUNCTION) OBSTRUCTION: ICD-10-CM

## 2021-05-28 DIAGNOSIS — N39.8 VOIDING DYSFUNCTION: Primary | ICD-10-CM

## 2021-05-28 DIAGNOSIS — N30.00 ACUTE CYSTITIS WITHOUT HEMATURIA: ICD-10-CM

## 2021-05-28 LAB
AVG FLOW RATE POC: 9 ML/SECONDS
BILIRUB UR QL STRIP: NEGATIVE
BILIRUB UR QL STRIP: NEGATIVE
FLOW TIME POC: 38 SECONDS
GLUCOSE UR-MCNC: NEGATIVE MG/DL
GLUCOSE UR-MCNC: NEGATIVE MG/DL
KETONES P FAST UR STRIP-MCNC: NEGATIVE MG/DL
KETONES P FAST UR STRIP-MCNC: NEGATIVE MG/DL
MAX FLOW RATE POC: 8 ML/SECONDS
PH UR STRIP: 6 [PH] (ref 4.6–8)
PH UR STRIP: 6.5 [PH] (ref 4.6–8)
PROT UR QL STRIP: NORMAL
PROT UR QL STRIP: NORMAL
PVR POC: >69 CC
SP GR UR STRIP: 1.02 (ref 1–1.03)
SP GR UR STRIP: 1.03 (ref 1–1.03)
TIME TO MAX, POC: 9 SECONDS
UA UROBILINOGEN AMB POC: NORMAL (ref 0.2–1)
UA UROBILINOGEN AMB POC: NORMAL (ref 0.2–1)
URINALYSIS CLARITY POC: NORMAL
URINALYSIS CLARITY POC: NORMAL
URINALYSIS COLOR POC: YELLOW
URINALYSIS COLOR POC: YELLOW
URINE BLOOD POC: NORMAL
URINE BLOOD POC: NORMAL
URINE LEUKOCYTES POC: NORMAL
URINE LEUKOCYTES POC: NORMAL
URINE NITRITES POC: NEGATIVE
URINE NITRITES POC: NEGATIVE
VOIDED VOLUME POC: 129 ML
VOIDING TIME POC: 38

## 2021-05-28 PROCEDURE — G8536 NO DOC ELDER MAL SCRN: HCPCS | Performed by: UROLOGY

## 2021-05-28 PROCEDURE — 81003 URINALYSIS AUTO W/O SCOPE: CPT | Performed by: UROLOGY

## 2021-05-28 PROCEDURE — 52000 CYSTOURETHROSCOPY: CPT | Performed by: UROLOGY

## 2021-05-28 PROCEDURE — 99214 OFFICE O/P EST MOD 30 MIN: CPT | Performed by: UROLOGY

## 2021-05-28 PROCEDURE — G8427 DOCREV CUR MEDS BY ELIG CLIN: HCPCS | Performed by: UROLOGY

## 2021-05-28 PROCEDURE — G8432 DEP SCR NOT DOC, RNG: HCPCS | Performed by: UROLOGY

## 2021-05-28 PROCEDURE — G8400 PT W/DXA NO RESULTS DOC: HCPCS | Performed by: UROLOGY

## 2021-05-28 PROCEDURE — G8420 CALC BMI NORM PARAMETERS: HCPCS | Performed by: UROLOGY

## 2021-05-28 PROCEDURE — 51725 SIMPLE CYSTOMETROGRAM: CPT | Performed by: UROLOGY

## 2021-05-28 PROCEDURE — 51741 ELECTRO-UROFLOWMETRY FIRST: CPT | Performed by: UROLOGY

## 2021-05-28 PROCEDURE — 51798 US URINE CAPACITY MEASURE: CPT | Performed by: UROLOGY

## 2021-05-28 RX ORDER — LORATADINE 10 MG/1
TABLET ORAL
Status: ON HOLD | COMMUNITY
End: 2021-08-31

## 2021-05-28 RX ORDER — SULFAMETHOXAZOLE AND TRIMETHOPRIM 800; 160 MG/1; MG/1
1 TABLET ORAL 2 TIMES DAILY
Qty: 14 TABLET | Refills: 0 | Status: ON HOLD
Start: 2021-05-28 | End: 2021-08-31

## 2021-05-28 NOTE — PROGRESS NOTES
Cystoscopy - Female    Findings:  Initial residual: moderate >180cc  Anterior urethra: normal mucosa  Bladder neck: Normal appearing  Bladder mucosa: Cloudy urine and sediment obscured bladder base. Trabeculation: none  Diverticula: none  Ureteral orifices: difficult to visualize  CMG    Initial urge at (cc): 150  Strong urge at (cc): 200  Findings: No uninhibited contractions noted. No urge related incontinence noted    Uroflow/ PVR    Max Flow: 9 ml/sec    Avg flow: 8 ml/sec    Voided Volume:  129ml    Residual Volume:>69ml    Shape of the curve: Normal bell shaped curve    Impression: Incomplete emptying, concern for cystitis. Cysto urine sent for culture.

## 2021-05-28 NOTE — PROGRESS NOTES
1. Have you been to the ER, urgent care clinic since your last visit? Hospitalized since your last visit? [address]    2. Have you seen or consulted any other health care providers outside of the [address] [place] [address] since your last visit? Include any pap smears or colon screening.  No  Chief Complaint   Patient presents with    Cystoscopy    Stress Incontinence    UPJ Obstruction     Visit Vitals  /69 (BP 1 Location: Right arm, BP Patient Position: Sitting, BP Cuff Size: Adult)   Pulse 90   Temp 97.2 °F (36.2 °C) (Temporal)   Resp 20   Ht 5' 4\" (1.626 m)   Wt 132 lb (59.9 kg)   LMP  (LMP Unknown)   SpO2 99%   BMI 22.66 kg/m²

## 2021-05-28 NOTE — ASSESSMENT & PLAN NOTE
Cystitis concerning for chronic UTI. Will treat empirically with bactrim and adjust based on culture.

## 2021-05-28 NOTE — PROGRESS NOTES
HISTORY OF PRESENT ILLNESS  Cally Jordan is a 65 y.o. female. Chief Complaint   Patient presents with    Cystoscopy    Stress Incontinence    UPJ Obstruction     No changes to urination. Chronic Conditions Addressed Today     1. Mixed stress and urge urinary incontinence     Overview      3/3/21: She has chronic leakage. It has been a long time. She has some hesitancy and urgency. She has to strain at times. She wears a depends all the time. She uses 2-3. She leaks with cough or sneeze. She has urgency.              Current Assessment & Plan      Cystitis concerning for chronic UTI. Will treat empirically with bactrim and adjust based on culture. Relevant Orders     AMB POC UROFLOWMETRY (Completed)     AMB POC PVR, DAYRON,POST-VOID RES,US,NON-IMAGING (Completed)     URINALYSIS W/MICROSCOPIC     CULTURE, URINE     AMB POC URINALYSIS DIP STICK AUTO W/O MICRO (Completed)     AMB POC URINALYSIS DIP STICK AUTO W/O MICRO (Completed)    2. UPJ (ureteropelvic junction) obstruction     Overview        She has a left sided ureteral stent managed previously by Dr. Tereso Laurent. Stent secondary to chronic left UPJ obstruction with stent changes q 3 months. Last changed 6/2/2020. She has been having stents several years 5-6 per her recollection. She is s/p cystoscopy, left retrograde pyelogram, left ureteral stent exchange on 9/14/2020. Findings: left UPJ obstruction with low-lying left kidney. She is s/p cystoscopy, retrograde pyelograms, robotic left pyeloplasty, left ureteral stent exchange on 11/10/2020. Pathology significant for fibromuscular tissue with chronic inflammation. No evidence of epithelial neoplasm.      12/7/2020: NM study showed: Atrophic left kidney has reduced perfusion and uptake relative to the right. Post Lasix stimulation there is no change in the appearance of shallow curve representing left kidney excretion.  No downward slope is identified to allow exclusion of obstructive hydronephrosis. Differential perfusion; left, 37.3%   right, 62.7%. Differential uptake; left, 19.3%   right, 80.8%. Creatinine on 1/6/2021 was 0.84. Creatinine on 3/8/2021 (via Mimi Parsons MD) was 1.39               Relevant Orders     AMB POC UROFLOWMETRY (Completed)     AMB POC PVR, DAYRON,POST-VOID RES,US,NON-IMAGING (Completed)     URINALYSIS W/MICROSCOPIC     CULTURE, URINE     AMB POC URINALYSIS DIP STICK AUTO W/O MICRO (Completed)     AMB POC URINALYSIS DIP STICK AUTO W/O MICRO (Completed)    3. Acute cystitis     Overview      She gets UTIs with her ureteral stents. She has been on abx. She had sepsis \"a couple years\" ago. Urine culture 8/18/2020: klebsiella oxytoca. Urine culture 10/22/2020: mixed greg. Urine culture 11/5/2020: no growth. Urine culture 11/9/2020: no growth. Urine culture 1/11/2021: klebsiella pneumonia and klebsiella oxytoca (scanned result). Urine culture 3/3/21: Mixed urogenital greg, greater than 100,000 colony forming units per mL. Current Assessment & Plan      Catheterized/ cysto sample sent. Apparent UTI today. Bactrim x 7 days sent. Relevant Medications     trimethoprim-sulfamethoxazole (BACTRIM DS, SEPTRA DS) 160-800 mg per tablet    4. Fungus present in urine     Overview      She had moderate growth of candida albicans on 10/13/2020 culture. She was treated with Diflucan. 11/24/2020: no growth on culture, no mold or yeast.          Current Assessment & Plan      Will assess urine culture from today          Relevant Medications     trimethoprim-sulfamethoxazole (BACTRIM DS, SEPTRA DS) 160-800 mg per tablet     Other Relevant Orders     AMB POC UROFLOWMETRY (Completed)     AMB POC PVR, DAYRON,POST-VOID RES,US,NON-IMAGING (Completed)     URINALYSIS W/MICROSCOPIC     CULTURE, URINE     AMB POC URINALYSIS DIP STICK AUTO W/O MICRO (Completed)     AMB POC URINALYSIS DIP STICK AUTO W/O MICRO (Completed)    5.  Voiding dysfunction - Primary     Overview      3/3/21: She has chronic leakage. It has been a long time. She has some hesitancy and urgency. She has to strain at times. She wears a depends all the time. She uses 2-3. She leaks with cough or sneeze. She has urgency. She feels pressure not burning. She can sit 30 minutes to empty her bladder.               Current Assessment & Plan      She appears to have a chronic UTI. Some incomplete emptying noted but not large. Relevant Orders     AMB POC UROFLOWMETRY (Completed)     AMB POC PVR, DAYRON,POST-VOID RES,US,NON-IMAGING (Completed)     URINALYSIS W/MICROSCOPIC     CULTURE, URINE     AMB POC URINALYSIS DIP STICK AUTO W/O MICRO (Completed)     AMB POC URINALYSIS DIP STICK AUTO W/O MICRO (Completed)          Past Medical History:    PMHx (including negatives):  has a past medical history of Arthritis, Bacterial meningitis, CAD (coronary artery disease), Chronic obstructive pulmonary disease (HCC), Chronic pain, DDD (degenerative disc disease), cervical, Diabetes ([address] Environmental allergies, Esophageal disorder, Fibromyalgia, GERD (gastroesophageal reflux disease), Hypercholesterolemia, Hypertension, Kidney stones, Macular degeneration, Mixed stress and urge urinary incontinence (7/2/2020), Neurogenic bladder (7/2/2020), Psychiatric disorder, Rheumatoid arthritis ([address] Scoliosis, Shingles, Sleep apnea, Suicidal thoughts, Torn rotator cuff, and Urinary tract infection without hematuria (7/2/2020).      PSurgHx:  has a past surgical history that includes neurological procedure unlisted; pr breast surgery procedure unlisted; pr cystoscopy,insert ureteral stent (5/8/2018, 7/11/2017, 2/9/2019, 10/11/2016, 6/7/2016, 1/14/2016, 7/14/2015); hx joint replacement surgery; hx orthopaedic; hx orthopaedic; hx carpal tunnel release; hx lumbar fusion; hx dilation and curettage; colonoscopy (N/A, 10/9/2020); hx urological; hx urological (09/14/2020); hx urological (Left, 09/14/2020); hx urological (Left, 09/14/2020); hx urological (11/09/2020); hx urological (11/09/2020); hx urological (Left, 11/09/2020); hx urological (Left, 11/09/2020); hx urological (11/24/2020); and hx urological (05/28/2021). PSocHx:  reports that she quit smoking about 16 years ago. She quit after 55.00 years of use. She has never used smokeless tobacco. She reports current alcohol use. She reports that she does not use drugs. ROS  Physical Exam  Allergies   Allergen Reactions    Codeine Other (comments)     hallucinations      Prior to Admission medications    Medication Sig Start Date End Date Taking? Authorizing Provider   loratadine (CLARITIN) 10 mg tablet loratadine 10 mg tablet   Take 1 tablet every day by oral route. Yes Provider, Historical   trimethoprim-sulfamethoxazole (BACTRIM DS, SEPTRA DS) 160-800 mg per tablet Take 1 Tablet by mouth two (2) times a day. 5/28/21  Yes Emily Parham MD   gabapentin (NEURONTIN) 300 mg capsule Take 300 mg by mouth three (3) times daily. Yes Provider, Historical   doxycycline (ADOXA) 100 mg tablet Take 1 Tab by mouth two (2) times a day. 11/24/20  Yes Emily Parham MD   cholecalciferol (VITAMIN D3) (50,000 UNITS /1250 MCG) capsule Take  by mouth every seven (7) days. Yes Provider, Historical   omeprazole magnesium (PRILOSEC OTC PO) Take 1 Tab by mouth two (2) times a day. Yes Provider, Historical   insulin glargine U-300 conc (TOUJEO) 300 unit/mL (1.5 mL) inpn pen 20 Units by SubCUTAneous route nightly. Yes Provider, Historical   insulin lispro (HUMALOG) 100 unit/mL injection by SubCUTAneous route. 5 units before lunch, 8 units before supper and 3 units before breakfast   Yes Provider, Historical   clonazePAM (KlonoPIN) 0.5 mg tablet Take 0.5 mg by mouth three (3) times daily. Yes Provider, Historical   oxybutynin (DITROPAN) 5 mg tablet Take 5 mg by mouth two (2) times a day.    Yes Provider, Historical   atorvastatin (LIPITOR) 20 mg tablet Take 20 mg by mouth daily. Yes Provider, Historical   insulin lispro protamine/insulin lispro (HumaLOG Mix 50-50 Insuln U-100) 100 unit/mL (50-50) injection by SubCUTAneous route. Yes Provider, Historical   furosemide (LASIX) 20 mg tablet Take 20 mg by mouth daily. As needed   Yes Provider, Historical   FLUoxetine (PROZAC) 40 mg capsule Take 20 mg by mouth daily. Yes Provider, Historical   montelukast (SINGULAIR) 10 mg tablet Take 10 mg by mouth daily. Yes Provider, Historical   pramipexole (MIRAPEX) 0.5 mg tablet Take 1 mg by mouth nightly. Yes Provider, Historical   DULoxetine (CYMBALTA) 20 mg capsule Take 20 mg by mouth daily. Yes Provider, Historical   ipratropium-albuterol (COMBIVENT)  mcg/actuation inhaler Take 1 Puff by inhalation every six (6) hours as needed for Wheezing or Shortness of Breath. Indications: CHRONIC OBSTRUCTIVE PULMONARY DISEASE WITH BRONCHOSPASMS   Yes Provider, Historical   Biotin 2,500 mcg cap Take 5,000 mcg/day by mouth daily. Yes Provider, Historical        ASSESSMENT and PLAN  Diagnoses and all orders for this visit:    1. Voiding dysfunction  Assessment & Plan:  She appears to have a chronic UTI. Some incomplete emptying noted but not large. Orders:  -     AMB POC UROFLOWMETRY  -     AMB POC PVR, DAYRON,POST-VOID RES,US,NON-IMAGING  -     URINALYSIS W/MICROSCOPIC  -     CULTURE, URINE  -     AMB POC URINALYSIS DIP STICK AUTO W/O MICRO  -     AMB POC URINALYSIS DIP STICK AUTO W/O MICRO    2. Mixed stress and urge urinary incontinence  Assessment & Plan:  Cystitis concerning for chronic UTI. Will treat empirically with bactrim and adjust based on culture. Orders:  -     AMB POC UROFLOWMETRY  -     AMB POC PVR, DAYRON,POST-VOID RES,US,NON-IMAGING  -     URINALYSIS W/MICROSCOPIC  -     CULTURE, URINE  -     AMB POC URINALYSIS DIP STICK AUTO W/O MICRO  -     AMB POC URINALYSIS DIP STICK AUTO W/O MICRO    3. Fungus present in urine  Assessment & Plan:   Will assess urine culture from today    Orders:  -     AMB POC UROFLOWMETRY  -     AMB POC PVR, DAYRON,POST-VOID RES,US,NON-IMAGING  -     URINALYSIS W/MICROSCOPIC  -     CULTURE, URINE  -     AMB POC URINALYSIS DIP STICK AUTO W/O MICRO  -     AMB POC URINALYSIS DIP STICK AUTO W/O MICRO    4. UPJ (ureteropelvic junction) obstruction  -     AMB POC UROFLOWMETRY  -     AMB POC PVR, DAYRON,POST-VOID RES,US,NON-IMAGING  -     URINALYSIS W/MICROSCOPIC  -     CULTURE, URINE  -     AMB POC URINALYSIS DIP STICK AUTO W/O MICRO  -     AMB POC URINALYSIS DIP STICK AUTO W/O MICRO    5. Acute cystitis without hematuria  Assessment & Plan:  Catheterized/ cysto sample sent. Apparent UTI today. Bactrim x 7 days sent. Other orders  -     trimethoprim-sulfamethoxazole (BACTRIM DS, SEPTRA DS) 160-800 mg per tablet; Take 1 Tablet by mouth two (2) times a day.          Susi Arredondo MD

## 2021-05-30 LAB
APPEARANCE UR: ABNORMAL
BACTERIA #/AREA URNS HPF: ABNORMAL /[HPF]
BACTERIA UR CULT: NORMAL
BILIRUB UR QL STRIP: NEGATIVE
CASTS URNS QL MICRO: ABNORMAL /LPF
COLOR UR: YELLOW
EPI CELLS #/AREA URNS HPF: ABNORMAL /HPF (ref 0–10)
GLUCOSE UR QL: NEGATIVE
HGB UR QL STRIP: ABNORMAL
KETONES UR QL STRIP: NEGATIVE
LEUKOCYTE ESTERASE UR QL STRIP: ABNORMAL
MICRO URNS: ABNORMAL
NITRITE UR QL STRIP: NEGATIVE
PH UR STRIP: 6 [PH] (ref 5–7.5)
PROT UR QL STRIP: ABNORMAL
RBC #/AREA URNS HPF: ABNORMAL /HPF (ref 0–2)
SP GR UR: 1.01 (ref 1–1.03)
UROBILINOGEN UR STRIP-MCNC: 0.2 MG/DL (ref 0.2–1)
WBC #/AREA URNS HPF: >30 /HPF (ref 0–5)
YEAST #/AREA URNS HPF: PRESENT /[HPF]

## 2021-06-03 DIAGNOSIS — B49 FUNGUS PRESENT IN URINE: Primary | ICD-10-CM

## 2021-06-03 RX ORDER — FLUCONAZOLE 200 MG/1
200 TABLET ORAL DAILY
Qty: 14 TABLET | Refills: 0 | Status: SHIPPED | OUTPATIENT
Start: 2021-06-03 | End: 2021-06-17

## 2021-06-03 NOTE — PROGRESS NOTES
Her UTI appears to be fungal.  I will send her a 14 day course of antifungal.  She needs to submit a new sample after she finishes if you guys can coordinate and schedule also to make sure she has cleared the infection. If not, she may need to see ID.

## 2021-06-23 ENCOUNTER — OFFICE VISIT (OUTPATIENT)
Dept: UROLOGY | Age: 71
End: 2021-06-23
Payer: MEDICARE

## 2021-06-23 DIAGNOSIS — B49 FUNGUS PRESENT IN URINE: Primary | ICD-10-CM

## 2021-06-23 DIAGNOSIS — N30.00 ACUTE CYSTITIS WITHOUT HEMATURIA: ICD-10-CM

## 2021-06-23 LAB
BILIRUB UR QL STRIP: NEGATIVE
GLUCOSE UR-MCNC: NEGATIVE MG/DL
KETONES P FAST UR STRIP-MCNC: NEGATIVE MG/DL
PH UR STRIP: 5.5 [PH] (ref 4.6–8)
PROT UR QL STRIP: NORMAL
SP GR UR STRIP: 1.02 (ref 1–1.03)
UA UROBILINOGEN AMB POC: NORMAL (ref 0.2–1)
URINALYSIS CLARITY POC: NORMAL
URINALYSIS COLOR POC: YELLOW
URINE BLOOD POC: NORMAL
URINE LEUKOCYTES POC: NORMAL
URINE NITRITES POC: NEGATIVE

## 2021-06-23 PROCEDURE — 81003 URINALYSIS AUTO W/O SCOPE: CPT | Performed by: NURSE PRACTITIONER

## 2021-06-23 NOTE — ASSESSMENT & PLAN NOTE
Urine drop off today to ensure adequate treatment. Will send urine for fungal culture. We will call patient with results.

## 2021-06-23 NOTE — PROGRESS NOTES
HISTORY OF PRESENT ILLNESS  Esteban Patricia is a 65 y.o. female. No chief complaint on file. Ms. Bryan Lawson was late for her apt today. She was changed to a urine drop off visit. The purpose of this visit was to ensure adequate treatment of her fungal infection. Urine will be sent for fungal culture and I will notify her of the results. Chronic Conditions Addressed Today     1. Acute cystitis     Overview      She gets UTIs with her ureteral stents. She has been on abx. She had sepsis \"a couple years\" ago. Urine culture 8/18/2020: klebsiella oxytoca. Urine culture 10/22/2020: mixed greg. Urine culture 11/5/2020: no growth. Urine culture 11/9/2020: no growth. Urine culture 1/11/2021: klebsiella pneumonia and klebsiella oxytoca (scanned result). Urine culture 3/3/21: Mixed urogenital greg, greater than 100,000 colony forming units per mL.    5/28/21 sample with yeast. Diflucan x 14 days. 2. Fungus present in urine - Primary     Overview      She had moderate growth of candida albicans on 10/13/2020 culture. She was treated with Diflucan. 11/24/2020: no growth on culture, no mold or yeast.    5/28/21: Yeast in cystoscopic urine sample; no yeast culture completed. RX for Diflucan x 14 days. Current Assessment & Plan      Urine drop off today to ensure adequate treatment. Will send urine for fungal culture. We will call patient with results. Patient denies the symptoms of COVID-19 per routine screening guidelines.   ROS    Past Medical History:  PMHx (including negatives):  has a past medical history of Arthritis, Bacterial meningitis, CAD (coronary artery disease), Chronic obstructive pulmonary disease ([address] Chronic pain, DDD (degenerative disc disease), cervical, Diabetes ([address] Environmental allergies, Esophageal disorder, Fibromyalgia, GERD (gastroesophageal reflux disease), Hypercholesterolemia, Hypertension, Kidney stones, Macular degeneration, Mixed stress and urge urinary incontinence (7/2/2020), Neurogenic bladder (7/2/2020), Psychiatric disorder, Rhe[address]oid arthritis [address] [address] Scoliosis, Shingles, Sleep apnea, Suicidal thoughts, Torn rotator cuff, and Urinary tract infection without hematuria (7/2/2020). PSurgHx:  has a past surgical history that includes neurological procedure unlisted; pr breast surgery procedure unlisted; pr cystoscopy,insert ureteral stent (5/8/2018, 7/11/2017, 2/9/2019, 10/11/2016, 6/7/2016, 1/14/2016, 7/14/2015); hx joint replacement surgery; hx orthopaedic; hx orthopaedic; hx carpal tunnel release; hx lumbar fusion; hx dilation and curettage; colonoscopy (N/A, 10/9/2020); hx urological; hx urological (09/14/2020); hx urological (Left, 09/14/2020); hx urological (Left, 09/14/2020); hx urological (11/09/2020); hx urological (11/09/2020); hx urological (Left, 11/09/2020); hx urological (Left, 11/09/2020); hx urological (11/24/2020); and hx urological (05/28/2021). PSocHx:  reports that she quit smoking about 16 years ago. She quit after 55.00 years of use. She has never used smokeless tobacco. She reports current alcohol use. She reports that she does not use drugs. Physical Exam    ASSESSMENT and PLAN  Diagnoses and all orders for this visit:    1. Fungus present in urine  Assessment & Plan:  Urine drop off today to ensure adequate treatment. Will send urine for fungal culture. We will call patient with results.       2. Acute cystitis without hematuria             Aleja Montemayor NP

## 2021-07-07 LAB — FUNGUS SPEC CULT: NORMAL

## 2021-07-07 NOTE — PROGRESS NOTES
Please let Ms. Colvin Arm know that her fungal culture did not show any growth so her fungal UTI has cleared.

## 2021-07-07 NOTE — PROGRESS NOTES
Spoke to Patient, notified, gave results and recommendations. She wants to do a routine check in 6mos, so I sent fox to scheduling.

## 2021-07-16 ENCOUNTER — OFFICE VISIT (OUTPATIENT)
Dept: UROLOGY | Age: 71
End: 2021-07-16
Payer: MEDICARE

## 2021-07-16 VITALS
TEMPERATURE: 97.4 F | HEART RATE: 85 BPM | BODY MASS INDEX: 22.53 KG/M2 | WEIGHT: 132 LBS | OXYGEN SATURATION: 80 % | HEIGHT: 64 IN | DIASTOLIC BLOOD PRESSURE: 76 MMHG | RESPIRATION RATE: 12 BRPM | SYSTOLIC BLOOD PRESSURE: 165 MMHG

## 2021-07-16 DIAGNOSIS — B49 FUNGUS PRESENT IN URINE: Primary | ICD-10-CM

## 2021-07-16 DIAGNOSIS — N39.8 VOIDING DYSFUNCTION: ICD-10-CM

## 2021-07-16 DIAGNOSIS — N30.00 ACUTE CYSTITIS WITHOUT HEMATURIA: ICD-10-CM

## 2021-07-16 DIAGNOSIS — N13.5 UPJ (URETEROPELVIC JUNCTION) OBSTRUCTION: ICD-10-CM

## 2021-07-16 LAB
BILIRUB UR QL STRIP: NEGATIVE
GLUCOSE UR-MCNC: NEGATIVE MG/DL
KETONES P FAST UR STRIP-MCNC: NEGATIVE MG/DL
PH UR STRIP: 6 [PH] (ref 4.6–8)
PROT UR QL STRIP: NORMAL
SP GR UR STRIP: 1.01 (ref 1–1.03)
UA UROBILINOGEN AMB POC: NORMAL (ref 0.2–1)
URINALYSIS CLARITY POC: CLEAR
URINALYSIS COLOR POC: YELLOW
URINE BLOOD POC: NORMAL
URINE LEUKOCYTES POC: NORMAL
URINE NITRITES POC: NEGATIVE

## 2021-07-16 PROCEDURE — G8432 DEP SCR NOT DOC, RNG: HCPCS | Performed by: NURSE PRACTITIONER

## 2021-07-16 PROCEDURE — G8536 NO DOC ELDER MAL SCRN: HCPCS | Performed by: NURSE PRACTITIONER

## 2021-07-16 PROCEDURE — 81003 URINALYSIS AUTO W/O SCOPE: CPT | Performed by: NURSE PRACTITIONER

## 2021-07-16 PROCEDURE — G8400 PT W/DXA NO RESULTS DOC: HCPCS | Performed by: NURSE PRACTITIONER

## 2021-07-16 PROCEDURE — G8420 CALC BMI NORM PARAMETERS: HCPCS | Performed by: NURSE PRACTITIONER

## 2021-07-16 PROCEDURE — 99213 OFFICE O/P EST LOW 20 MIN: CPT | Performed by: NURSE PRACTITIONER

## 2021-07-16 PROCEDURE — G8427 DOCREV CUR MEDS BY ELIG CLIN: HCPCS | Performed by: NURSE PRACTITIONER

## 2021-07-16 NOTE — ASSESSMENT & PLAN NOTE
Left kidney is atrophic. Right kidney is dominant. She tells me she has further evaluation pending since her creatinine is rising. She sees Nephrology next week.

## 2021-07-16 NOTE — PROGRESS NOTES
Chief Complaint   Patient presents with    New Patient     forgot list of meds    Incontinence    UPJ Obstruction         1. Have you been to the ER, urgent care clinic since your last visit? Hospitalized since your last visit? No    2. Have you seen or consulted any other health care providers outside of the [address] [place] [address] since your last visit? Include any pap smears or colon screening.  No      Visit Vitals  BP (!) 165/76 (BP 1 Location: Left upper arm, BP Patient Position: Sitting, BP Cuff Size: Adult)   Pulse 85   Temp 97.4 °F (36.3 °C) (Temporal)   Resp 12   Ht 5' 4\" (1.626 m)   Wt 132 lb (59.9 kg)   LMP  (LMP Unknown)   SpO2 (!) 80%   BMI 22.66 kg/m²

## 2021-07-16 NOTE — PROGRESS NOTES
HISTORY OF PRESENT ILLNESS  Mary Kay Pierce is a 65 y.o. female. Chief Complaint   Patient presents with    New Patient     forgot list of meds    Incontinence    UPJ Obstruction     She is s/p cystoscopy, CMG, and Uroflow in May with Dr. Bon Bennett. Her incontinence was worsening. He found signs of infection. She was treated for funguria. Her repeat UA showed that she had cleared the fungal infection. Today she thinks she may have another UTI. She notes a foul smell to her urine and more incontinence. UA dip with luekocytes and blood, no nitrites. Chronic Conditions Addressed Today     1. UPJ (ureteropelvic junction) obstruction     Overview        She has a left sided ureteral stent managed previously by Dr. Yoel Larkin. Stent secondary to chronic left UPJ obstruction with stent changes q 3 months. Last changed 6/2/2020. She has been having stents several years 5-6 per her recollection. She is s/p cystoscopy, left retrograde pyelogram, left ureteral stent exchange on 9/14/2020. Findings: left UPJ obstruction with low-lying left kidney. She is s/p cystoscopy, retrograde pyelograms, robotic left pyeloplasty, left ureteral stent exchange on 11/10/2020. Pathology significant for fibromuscular tissue with chronic inflammation. No evidence of epithelial neoplasm.      12/7/2020: NM study showed: Atrophic left kidney has reduced perfusion and uptake relative to the right. Post Lasix stimulation there is no change in the appearance of shallow curve representing left kidney excretion. No downward slope is identified to allow exclusion of obstructive hydronephrosis. Differential perfusion; left, 37.3%   right, 62.7%. Differential uptake; left, 19.3%   right, 80.8%. Creatinine on 1/6/2021 was 0.84. Creatinine on 3/8/2021 (via Bill Meade MD) was 1.39               Current Assessment & Plan      Left kidney is atrophic. Right kidney is dominant.   She tells me she has further evaluation pending since her creatinine is rising. She sees Nephrology next week. 2. Acute cystitis     Overview      History of UTI and fungal UTI. Urine culture 8/18/2020: klebsiella oxytoca. Urine culture 10/22/2020: mixed greg. Urine culture 11/5/2020: no growth. Urine culture 11/9/2020: no growth. Urine culture 1/11/2021: klebsiella pneumonia and klebsiella oxytoca (scanned result). Urine culture 3/3/21: Mixed urogenital greg, greater than 100,000 colony forming units per mL.    5/28/21 sample with yeast. Diflucan x 14 days. 6/28/21: repeat evaluation showed no yeast or mold isolated after 1 week. 3. Fungus present in urine - Primary     Overview      She had moderate growth of candida albicans on 10/13/2020 culture. She was treated with Diflucan. 11/24/2020: no growth on culture, no mold or yeast.    5/28/21: Yeast in cystoscopic urine sample; no yeast culture completed. RX for Diflucan x 14 days. 6/23/21 culture: no fungal growth. Current Assessment & Plan      Funguria resolved. Adequately treated. Relevant Orders     AMB POC URINALYSIS DIP STICK AUTO W/O MICRO (Completed)    4. Voiding dysfunction     Overview      3/3/21: She has chronic leakage. It has been a long time. She has some hesitancy and urgency. She has to strain at times. She wears a depends all the time. She uses 2-3. She leaks with cough or sneeze. She has urgency. She feels pressure not burning. She can sit 30 minutes to empty her bladder.      5/28/21: apparent chronic UTI on cystoscopic evaluation.             Current Assessment & Plan      She has chronic leakage. She is very bothered by it. She is s/p cystoscopy, CMG and Uroflow in May. She does have some incomplete emptying, but is is not large volume. She also has chronic UTI or funguria. Patient denies the symptoms of COVID-19 per routine screening guidelines.   Review of Systems   Genitourinary: Positive for frequency and urgency. Negative for dysuria and hematuria. No gross hematuria       Past Medical History:  PMHx (including negatives):  has a past medical history of Arthritis, Bacterial meningitis, CAD (coronary artery disease), Chronic obstructive pulmonary disease ([facility] [address] Chronic pain, DDD (degenerative disc disease), cervical, Diabetes ([facility] [address] Environmental allergies, Esophageal disorder, Fibromyalgia, GERD (gastroesophageal reflux disease), Hypercholesterolemia, Hypertension, Kidney stones, Macular degeneration, Mixed stress and urge urinary incontinence (7/2/2020), Neurogenic bladder (7/2/2020), Psychiatric disorder, Rheumatoid arthritis ([facility] [address] Scoliosis, Shingles, Sleep apnea, Suicidal thoughts, Torn rotator cuff, and Urinary tract infection without hematuria (7/2/2020). PSurgHx:  has a past surgical history that includes neurological procedure unlisted; pr breast surgery procedure unlisted; pr cystoscopy,insert ureteral stent (5/8/2018, 7/11/2017, 2/9/2019, 10/11/2016, 6/7/2016, 1/14/2016, 7/14/2015); hx joint replacement surgery; hx orthopaedic; hx orthopaedic; hx carpal tunnel release; hx lumbar fusion; hx dilation and curettage; colonoscopy (N/A, 10/9/2020); hx urological; hx urological (09/14/2020); hx urological (Left, 09/14/2020); hx urological (Left, 09/14/2020); hx urological (11/09/2020); hx urological (11/09/2020); hx urological (Left, 11/09/2020); hx urological (Left, 11/09/2020); hx urological (11/24/2020); and hx urological (05/28/2021). PSocHx:  reports that she quit smoking about 16 years ago. She quit after 55.00 years of use. She has never used smokeless tobacco. She reports current alcohol use. She reports that she does not use drugs. ASSESSMENT and PLAN  Diagnoses and all orders for this visit:    1. Fungus present in urine  Assessment & Plan:  Funguria resolved. Adequately treated. Orders:  -     AMB POC URINALYSIS DIP STICK AUTO W/O MICRO    2.  UPJ (ureteropelvic junction) obstruction  Assessment & Plan:  Left kidney is atrophic. Right kidney is dominant. She tells me she has further evaluation pending since her creatinine is rising. She sees Nephrology next week. 3. Acute cystitis without hematuria    4. Voiding dysfunction  Assessment & Plan:  She has chronic leakage. She is very bothered by it. She is s/p cystoscopy, CMG and Uroflow in May. She does have some incomplete emptying, but is is not large volume. She also has chronic UTI or funguria. Other orders  -     CULTURE, URINE  -     URINALYSIS W/MICROSCOPIC     Layla Dewey NP     I will send her urine today for culture and treat appropriately based on that culture.

## 2021-07-16 NOTE — ASSESSMENT & PLAN NOTE
She has chronic leakage. She is very bothered by it. She is s/p cystoscopy, CMG and Uroflow in May. She does have some incomplete emptying, but is is not large volume. She also has chronic UTI or funguria.

## 2021-07-18 LAB
APPEARANCE UR: ABNORMAL
BACTERIA #/AREA URNS HPF: ABNORMAL /[HPF]
BACTERIA UR CULT: NORMAL
BILIRUB UR QL STRIP: NEGATIVE
CASTS URNS QL MICRO: ABNORMAL /LPF
COLOR UR: YELLOW
EPI CELLS #/AREA URNS HPF: >10 /HPF (ref 0–10)
GLUCOSE UR QL: NEGATIVE
HGB UR QL STRIP: ABNORMAL
KETONES UR QL STRIP: NEGATIVE
LEUKOCYTE ESTERASE UR QL STRIP: ABNORMAL
MICRO URNS: ABNORMAL
NITRITE UR QL STRIP: NEGATIVE
PH UR STRIP: 6 [PH] (ref 5–7.5)
PROT UR QL STRIP: ABNORMAL
RBC #/AREA URNS HPF: ABNORMAL /HPF (ref 0–2)
RENAL EPI CELLS #/AREA URNS HPF: PRESENT /HPF
SP GR UR: 1.01 (ref 1–1.03)
UROBILINOGEN UR STRIP-MCNC: 0.2 MG/DL (ref 0.2–1)
WBC #/AREA URNS HPF: >30 /HPF (ref 0–5)

## 2021-07-19 NOTE — PROGRESS NOTES
This appears to be a contaminated specimen. I recommend that if she is bothered by feelings of another UTI she come in for a catheterized specimen.

## 2021-07-20 NOTE — PROGRESS NOTES
Spoke to Patients sister, explained everything, sister is going to explain to patient that if she has continued feelings of UTI she will need an apt to provide cath specimen.

## 2021-07-20 NOTE — PROGRESS NOTES
I spoke to patients sister. I explained everything and she is going to explain to patient that is she has feelings of UTI she will need to make an apt for cath specimen.

## 2021-08-13 ENCOUNTER — OFFICE VISIT (OUTPATIENT)
Dept: UROLOGY | Age: 71
End: 2021-08-13
Payer: MEDICARE

## 2021-08-13 VITALS
HEIGHT: 64 IN | DIASTOLIC BLOOD PRESSURE: 70 MMHG | TEMPERATURE: 97.5 F | SYSTOLIC BLOOD PRESSURE: 138 MMHG | WEIGHT: 127 LBS | HEART RATE: 90 BPM | RESPIRATION RATE: 22 BRPM | OXYGEN SATURATION: 93 % | BODY MASS INDEX: 21.68 KG/M2

## 2021-08-13 DIAGNOSIS — N39.8 VOIDING DYSFUNCTION: ICD-10-CM

## 2021-08-13 DIAGNOSIS — N30.00 ACUTE CYSTITIS WITHOUT HEMATURIA: ICD-10-CM

## 2021-08-13 DIAGNOSIS — B49 FUNGUS PRESENT IN URINE: ICD-10-CM

## 2021-08-13 DIAGNOSIS — N13.5 UPJ (URETEROPELVIC JUNCTION) OBSTRUCTION: Primary | ICD-10-CM

## 2021-08-13 LAB
BILIRUB UR QL STRIP: NEGATIVE
GLUCOSE UR-MCNC: NEGATIVE MG/DL
KETONES P FAST UR STRIP-MCNC: NEGATIVE MG/DL
PH UR STRIP: 7 [PH] (ref 4.6–8)
PROT UR QL STRIP: NEGATIVE
SP GR UR STRIP: 1.01 (ref 1–1.03)
UA UROBILINOGEN AMB POC: NORMAL (ref 0.2–1)
URINALYSIS CLARITY POC: CLEAR
URINALYSIS COLOR POC: YELLOW
URINE BLOOD POC: NORMAL
URINE LEUKOCYTES POC: NORMAL
URINE NITRITES POC: NEGATIVE

## 2021-08-13 PROCEDURE — G8427 DOCREV CUR MEDS BY ELIG CLIN: HCPCS | Performed by: NURSE PRACTITIONER

## 2021-08-13 PROCEDURE — G8432 DEP SCR NOT DOC, RNG: HCPCS | Performed by: NURSE PRACTITIONER

## 2021-08-13 PROCEDURE — 81003 URINALYSIS AUTO W/O SCOPE: CPT | Performed by: NURSE PRACTITIONER

## 2021-08-13 PROCEDURE — G8536 NO DOC ELDER MAL SCRN: HCPCS | Performed by: NURSE PRACTITIONER

## 2021-08-13 PROCEDURE — G8420 CALC BMI NORM PARAMETERS: HCPCS | Performed by: NURSE PRACTITIONER

## 2021-08-13 PROCEDURE — 99213 OFFICE O/P EST LOW 20 MIN: CPT | Performed by: NURSE PRACTITIONER

## 2021-08-13 PROCEDURE — G8400 PT W/DXA NO RESULTS DOC: HCPCS | Performed by: NURSE PRACTITIONER

## 2021-08-13 RX ORDER — FLUCONAZOLE 200 MG/1
200 TABLET ORAL DAILY
Qty: 14 TABLET | Refills: 0 | Status: SHIPPED | OUTPATIENT
Start: 2021-08-13 | End: 2021-08-27

## 2021-08-13 NOTE — PROGRESS NOTES
HISTORY OF PRESENT ILLNESS  Jarrell Dewey is a 65 y.o. female. Chief Complaint   Patient presents with    Follow-up    Enuresis     Is  is has ongoing issues with her voiding patterns. She feels that she is completely incontinent at times at night with no sensory awareness. During the day she voids sometimes twice a day sometimes up to 8 times a day. Intermittently she has some dysuria. She is in general, uncomfortable. Her PCP is not comfortable treating her for infection. She has a history of fungal UTI. In the last year she has had 2 bacterial UTIs. Her other cultures reflects mixed urogenital greg. She is status post cystoscopy with Dr. Jarocho Alaniz in late May 2021. She was found at that time to have evidence of chronic and acute infection. At that time she was found to have fungal infection. She was treated with a 14-day course of Diflucan. Repeat culture showed no ongoing infection. On 7/16/2021,the culture showed less than 10,000 colony forming units of bacteria per milliliter of urine. This colony count is not generally considered to be clinically significant. UA today with blood and leukocytes. We will send to [facility] for culture. Chronic Conditions Addressed Today     1. UPJ (ureteropelvic junction) obstruction - Primary     Overview        She has a left sided ureteral stent managed previously by Dr. Kristopher Arias. Stent secondary to chronic left UPJ obstruction with stent changes q 3 months. Last changed 6/2/2020. She has been having stents several years 5-6 per her recollection. She is s/p cystoscopy, left retrograde pyelogram, left ureteral stent exchange on 9/14/2020. Findings: left UPJ obstruction with low-lying left kidney. She is s/p cystoscopy, retrograde pyelograms, robotic left pyeloplasty, left ureteral stent exchange on 11/10/2020. Pathology significant for fibromuscular tissue with chronic inflammation. No evidence of epithelial neoplasm.      12/7/2020: NM study showed: Atrophic left kidney has reduced perfusion and uptake relative to the right. Post Lasix stimulation there is no change in the appearance of shallow curve representing left kidney excretion. No downward slope is identified to allow exclusion of obstructive hydronephrosis. Differential perfusion; left, 37.3%   right, 62.7%. Differential uptake; left, 19.3%   right, 80.8%. Creatinine on 1/6/2021 was 0.84. Creatinine on 3/8/2021 (via Sharmin Brandon MD) was 1.39               Relevant Orders     METABOLIC PANEL, BASIC    2. Acute cystitis     Overview      History of UTI and fungal UTI. Urine culture 8/18/2020: klebsiella oxytoca. Urine culture 10/22/2020: mixed greg. Urine culture 11/5/2020: no growth. Urine culture 11/9/2020: no growth. Urine culture 1/11/2021: klebsiella pneumonia and klebsiella oxytoca (scanned result). Urine culture 3/3/21: Mixed urogenital greg, greater than 100,000 colony forming units per mL.    5/28/21 sample with yeast. Diflucan x 14 days. 6/28/21: repeat evaluation showed no yeast or mold isolated after 1 week. Current Assessment & Plan      UA dip today with blood and leukocytes. We will send to [facility] for culture. It is important to rule out a fungal infection given her history. Relevant Medications     fluconazole (DIFLUCAN) 200 mg tablet    3. Fungus present in urine     Overview      She had moderate growth of candida albicans on 10/13/2020 culture. She was treated with Diflucan. 11/24/2020: no growth on culture, no mold or yeast.    5/28/21: Yeast in cystoscopic urine sample; no yeast culture completed. RX for Diflucan x 14 days. 6/23/21 culture: no fungal growth. Current Assessment & Plan      She has a history of fungal UTI. We will send to [facility] for both bacterial and fungal culture. I have sent a prescription for 14 days of Diflucan to the pharmacy.   I will call her with culture results. If she continues to have a fungal infection I recommend she see infectious disease. Relevant Medications     fluconazole (DIFLUCAN) 200 mg tablet     Other Relevant Orders     AMB POC URINALYSIS DIP STICK AUTO W/O MICRO    4. Voiding dysfunction     Overview      3/3/21: She has chronic leakage. It has been a long time. She has some hesitancy and urgency. She has to strain at times. She wears a depends all the time. She uses 2-3. She leaks with cough or sneeze. She has urgency. She feels pressure not burning. She can sit 30 minutes to empty her bladder.      5/28/21: apparent chronic UTI on cystoscopic evaluation.             Current Assessment & Plan      She has chronic urinary leakage and is very bothered by this. She is status post cystoscopy, CMG, uroflow in May. She had evidence of chronic inflammation and infection. Patient denies the symptoms of COVID-19 per routine screening guidelines. Review of Systems   Constitutional: Negative for chills and fever. Gastrointestinal: Negative for abdominal pain, nausea and vomiting. Genitourinary: Positive for dysuria and urgency. Negative for flank pain and hematuria. Past Medical History:  PMHx (including negatives):  has a past medical history of Arthritis, Bacterial meningitis, CAD (coronary artery disease), Chronic obstructive pulmonary disease ([address] Chronic pain, DDD (degenerative disc disease), cervical, Diabetes ([address] Environmental allergies, Esophageal disorder, Fibromyalgia, GERD (gastroesophageal reflux disease), Hypercholesterolemia, Hypertension, Kidney stones, Macular degeneration, Mixed stress and urge urinary incontinence (7/2/2020), Neurogenic bladder (7/2/2020), Psychiatric disorder, Rheumatoid arthritis ([address] Scoliosis, Shingles, Sleep apnea, Suicidal thoughts, Torn rotator cuff, and Urinary tract infection without hematuria (7/2/2020).    PSurgHx:  has a past surgical history that includes neurological procedure unlisted; pr breast surgery procedure unlisted; pr cystoscopy,insert ureteral stent (5/8/2018, 7/11/2017, 2/9/2019, 10/11/2016, 6/7/2016, 1/14/2016, 7/14/2015); hx joint replacement surgery; hx orthopaedic; hx orthopaedic; hx carpal tunnel release; hx lumbar fusion; hx dilation and curettage; colonoscopy (N/A, 10/9/2020); hx urological; hx urological (09/14/2020); hx urological (Left, 09/14/2020); hx urological (Left, 09/14/2020); hx urological (11/09/2020); hx urological (11/09/2020); hx urological (Left, 11/09/2020); hx urological (Left, 11/09/2020); hx urological (11/24/2020); and hx urological (05/28/2021). PSocHx:  reports that she quit smoking about 16 years ago. She quit after 55.00 years of use. She has never used smokeless tobacco. She reports current alcohol use. She reports that she does not use drugs. ASSESSMENT and PLAN  Diagnoses and all orders for this visit:    1. UPJ (ureteropelvic junction) obstruction  -     METABOLIC PANEL, BASIC    2. Fungus present in urine  Assessment & Plan:  She has a history of fungal UTI. We will send to [facility] for both bacterial and fungal culture. I have sent a prescription for 14 days of Diflucan to the pharmacy. I will call her with culture results. If she continues to have a fungal infection I recommend she see infectious disease. Orders:  -     AMB POC URINALYSIS DIP STICK AUTO W/O MICRO  -     fluconazole (DIFLUCAN) 200 mg tablet; Take 1 Tablet by mouth daily for 14 days. FDA advises cautious prescribing of oral fluconazole in pregnancy. 3. Acute cystitis without hematuria  Assessment & Plan:  UA dip today with blood and leukocytes. We will send to [facility] for culture. It is important to rule out a fungal infection given her history. 4. Voiding dysfunction  Assessment & Plan:  She has chronic urinary leakage and is very bothered by this. She is status post cystoscopy, CMG, uroflow in May. She had evidence of chronic inflammation and infection. Follow-up and Dispositions    · Return in about 3 months (around 11/13/2021) for with Dr. Peace Chacon; follow up per Nephrology. She is very concerned about her renal function and her voiding patterns. She feels like she is not getting any answers. She is followed by PCP and nephrology. I will check a BMP today to reassess kidney function since we do not have any new labs. I will also schedule her a follow-up with Dr. Peace Chacon in 3 months time.     If her culture shows fungal infection, I recommend that she see infectious disease, Dr. Urban Frausto, NP

## 2021-08-13 NOTE — ASSESSMENT & PLAN NOTE
She has chronic urinary leakage and is very bothered by this. She is status post cystoscopy, CMG, uroflow in May. She had evidence of chronic inflammation and infection.

## 2021-08-13 NOTE — ASSESSMENT & PLAN NOTE
UA dip today with blood and leukocytes. We will send to [facility] for culture. It is important to rule out a fungal infection given her history.

## 2021-08-13 NOTE — ASSESSMENT & PLAN NOTE
She has a history of fungal UTI. We will send to [facility] for both bacterial and fungal culture. I have sent a prescription for 14 days of Diflucan to the pharmacy. I will call her with culture results. If she continues to have a fungal infection I recommend she see infectious disease.

## 2021-08-13 NOTE — PROGRESS NOTES
1. Have you been to the ER, urgent care clinic since your last visit? Hospitalized since your last visit? No    2. Have you seen or consulted any other health care providers outside of the [address] [place] [address] since your last visit? Include any pap smears or colon screening.  No   Chief Complaint   Patient presents with    Follow-up    Enuresis     Visit Vitals  /70 (BP 1 Location: Right arm, BP Patient Position: Sitting, BP Cuff Size: Adult)   Pulse 90   Temp 97.5 °F (36.4 °C) (Temporal)   Resp 22   Ht 5' 4\" (1.626 m)   Wt 127 lb (57.6 kg)   LMP  (LMP Unknown)   SpO2 93%   BMI 21.80 kg/m²

## 2021-08-14 LAB
BUN SERPL-MCNC: 17 MG/DL (ref 8–27)
BUN/CREAT SERPL: 21 (ref 12–28)
CALCIUM SERPL-MCNC: 9.9 MG/DL (ref 8.7–10.3)
CHLORIDE SERPL-SCNC: 94 MMOL/L (ref 96–106)
CO2 SERPL-SCNC: 29 MMOL/L (ref 20–29)
CREAT SERPL-MCNC: 0.82 MG/DL (ref 0.57–1)
GLUCOSE SERPL-MCNC: 209 MG/DL (ref 65–99)
POTASSIUM SERPL-SCNC: 4.4 MMOL/L (ref 3.5–5.2)
SODIUM SERPL-SCNC: 138 MMOL/L (ref 134–144)

## 2021-08-15 NOTE — PROGRESS NOTES
Can you please call the relative listed in her chart and let her know that renal function looks very good, very normal.    Her blood sugar is a bit on the high side so she should see her PCP about that. She asked me to text her results, but she doesn't have MyChart. Relative should encourage her to set that up since she has such a hard time hearing over the phone. Still waiting on her urine results.

## 2021-08-16 ENCOUNTER — TELEPHONE (OUTPATIENT)
Dept: UROLOGY | Age: 71
End: 2021-08-16

## 2021-08-16 DIAGNOSIS — B49 FUNGUS PRESENT IN URINE: Primary | ICD-10-CM

## 2021-08-16 NOTE — TELEPHONE ENCOUNTER
Please call patient's contact (patient is very hard of hearing). Let her know that she does have yeast in the urine again. She is on appropriate therapy, but I do want her to see ID. Dr. Nohemi Simmons or Dr. Mickie Bryant. We discussed that at our visit date. Thank you! I will place referral and have cc'd Zoila Patel too.

## 2021-08-29 ENCOUNTER — APPOINTMENT (OUTPATIENT)
Dept: CT IMAGING | Age: 71
DRG: 535 | End: 2021-08-29
Attending: FAMILY MEDICINE
Payer: MEDICARE

## 2021-08-29 ENCOUNTER — APPOINTMENT (OUTPATIENT)
Dept: GENERAL RADIOLOGY | Age: 71
DRG: 535 | End: 2021-08-29
Attending: EMERGENCY MEDICINE
Payer: MEDICARE

## 2021-08-29 ENCOUNTER — APPOINTMENT (OUTPATIENT)
Dept: CT IMAGING | Age: 71
DRG: 535 | End: 2021-08-29
Attending: EMERGENCY MEDICINE
Payer: MEDICARE

## 2021-08-29 ENCOUNTER — HOSPITAL ENCOUNTER (INPATIENT)
Age: 71
LOS: 4 days | Discharge: NURSING, REHAB OR LONG-TERM CARE | DRG: 535 | End: 2021-09-04
Attending: EMERGENCY MEDICINE | Admitting: INTERNAL MEDICINE
Payer: MEDICARE

## 2021-08-29 DIAGNOSIS — F41.9 ANXIETY: ICD-10-CM

## 2021-08-29 DIAGNOSIS — S32.592A INFERIOR PUBIC RAMUS FRACTURE, LEFT, CLOSED, INITIAL ENCOUNTER (HCC): Primary | ICD-10-CM

## 2021-08-29 DIAGNOSIS — S32.599A CLOSED FRACTURE OF INFERIOR PUBIC RAMUS, UNSPECIFIED LATERALITY, INITIAL ENCOUNTER (HCC): ICD-10-CM

## 2021-08-29 PROCEDURE — 72192 CT PELVIS W/O DYE: CPT

## 2021-08-29 PROCEDURE — 99285 EMERGENCY DEPT VISIT HI MDM: CPT

## 2021-08-29 PROCEDURE — 74011250637 HC RX REV CODE- 250/637: Performed by: EMERGENCY MEDICINE

## 2021-08-29 PROCEDURE — 72131 CT LUMBAR SPINE W/O DYE: CPT

## 2021-08-29 PROCEDURE — 73502 X-RAY EXAM HIP UNI 2-3 VIEWS: CPT

## 2021-08-29 PROCEDURE — 72100 X-RAY EXAM L-S SPINE 2/3 VWS: CPT

## 2021-08-29 RX ORDER — ACETAMINOPHEN 500 MG
500 TABLET ORAL
Status: COMPLETED | OUTPATIENT
Start: 2021-08-29 | End: 2021-08-29

## 2021-08-29 RX ORDER — ASPIRIN 81 MG/1
81 TABLET ORAL DAILY
Status: ON HOLD | COMMUNITY
End: 2021-09-02

## 2021-08-29 RX ORDER — CANDESARTAN 8 MG/1
8 TABLET ORAL DAILY
Status: ON HOLD | COMMUNITY
End: 2021-08-31

## 2021-08-29 RX ORDER — ALBUTEROL SULFATE 0.83 MG/ML
SOLUTION RESPIRATORY (INHALATION)
COMMUNITY

## 2021-08-29 RX ADMIN — ACETAMINOPHEN 500 MG: 500 TABLET ORAL at 18:18

## 2021-08-29 NOTE — ED PROVIDER NOTES
EMERGENCY DEPARTMENT HISTORY AND PHYSICAL EXAM      Date: 8/29/2021  Patient Name: Kamlesh Guerra    History of Presenting Illness     Chief Complaint   Patient presents with    Fall       History Provided By: Patient    HPI: Kamlesh Guerra, 66 y.o. female with a past medical history significant diabetes, hypertension, osteoarthritis, COPD and other multiple medical problems presents to the ED with cc of ground-level fall at home not complaining of persistent pain left hip unable to ambulate due to pain. Patient has had a prior lumbar fusion multiple levels also. No other injuries reported. She rates the pain is moderate in intensity. Difficulty flexing left thigh and hip. No other injuries reported. There are no other complaints, changes, or physical findings at this time. PCP: Tavares Contreras MD    No current facility-administered medications on file prior to encounter. Current Outpatient Medications on File Prior to Encounter   Medication Sig Dispense Refill    aspirin delayed-release 81 mg tablet Take 81 mg by mouth daily.  albuterol (PROVENTIL VENTOLIN) 2.5 mg /3 mL (0.083 %) nebu by Nebulization route.  cholecalciferol (VITAMIN D3) (50,000 UNITS /1250 MCG) capsule Take  by mouth every seven (7) days.  insulin glargine U-300 conc (TOUJEO) 300 unit/mL (1.5 mL) inpn pen 20 Units by SubCUTAneous route nightly.  clonazePAM (KlonoPIN) 0.5 mg tablet Take 0.5 mg by mouth three (3) times daily.  oxybutynin (DITROPAN) 5 mg tablet Take 5 mg by mouth two (2) times a day.  atorvastatin (LIPITOR) 20 mg tablet Take 20 mg by mouth daily.  FLUoxetine (PROZAC) 40 mg capsule Take 20 mg by mouth daily.  montelukast (SINGULAIR) 10 mg tablet Take 10 mg by mouth daily.  pramipexole (MIRAPEX) 0.5 mg tablet Take 1 mg by mouth nightly.  candesartan (ATACAND) 8 mg tablet Take 8 mg by mouth daily.  (Patient not taking: Reported on 8/29/2021)      loratadine (Maurita Girard) 10 mg tablet loratadine 10 mg tablet   Take 1 tablet every day by oral route. (Patient not taking: Reported on 8/29/2021)      trimethoprim-sulfamethoxazole (BACTRIM DS, SEPTRA DS) 160-800 mg per tablet Take 1 Tablet by mouth two (2) times a day. (Patient not taking: Reported on 8/29/2021) 14 Tablet 0    gabapentin (NEURONTIN) 300 mg capsule Take 300 mg by mouth three (3) times daily. (Patient not taking: Reported on 8/29/2021)      doxycycline (ADOXA) 100 mg tablet Take 1 Tab by mouth two (2) times a day. (Patient not taking: Reported on 8/29/2021) 10 Tab 0    omeprazole magnesium (PRILOSEC OTC PO) Take 1 Tab by mouth two (2) times a day. (Patient not taking: Reported on 8/29/2021)      insulin lispro (HUMALOG) 100 unit/mL injection by SubCUTAneous route. 5 units before lunch, 8 units before supper and 3 units before breakfast (Patient not taking: Reported on 8/29/2021)      insulin lispro protamine/insulin lispro (HumaLOG Mix 50-50 Insuln U-100) 100 unit/mL (50-50) injection by SubCUTAneous route. (Patient not taking: Reported on 8/29/2021)      furosemide (LASIX) 20 mg tablet Take 20 mg by mouth daily. As needed (Patient not taking: Reported on 8/29/2021)      DULoxetine (CYMBALTA) 20 mg capsule Take 20 mg by mouth daily. (Patient not taking: Reported on 8/29/2021)      ipratropium-albuterol (COMBIVENT)  mcg/actuation inhaler Take 1 Puff by inhalation every six (6) hours as needed for Wheezing or Shortness of Breath. Indications: CHRONIC OBSTRUCTIVE PULMONARY DISEASE WITH BRONCHOSPASMS (Patient not taking: Reported on 8/29/2021)      Biotin 2,500 mcg cap Take 5,000 mcg/day by mouth daily.  (Patient not taking: Reported on 8/29/2021)         Past History     Past Medical History:  Past Medical History:   Diagnosis Date    Arthritis     Bacterial meningitis     Hx of    CAD (coronary artery disease)     Chronic obstructive pulmonary disease (HCC)     Chronic pain     DDD (degenerative disc disease), cervical     Diabetes ([address]     Environmental allergies     Esophageal disorder     Fibromyalgia     GERD (gastroesophageal reflux disease)     Hypercholesterolemia     Hypertension     Kidney stones     hx of    Macular degeneration     Mixed stress and urge urinary incontinence 7/2/2020    Neurogenic bladder 7/2/2020    Psychiatric disorder     Rheumatoid arthritis ([address]     Scoliosis     Shingles     Hx of    Sleep apnea     pt states uses CPAP    Suicidal thoughts     pt stated during PAT visit , she has hx of suicidal thoughts age 17's, pt stated never attempted and further thoughts of suicide since and none that day    Torn rotator cuff     pt states on right side    Urinary tract infection without hematuria 7/2/2020       Past Surgical History:  Past Surgical History:   Procedure Laterality Date    COLONOSCOPY N/A 10/9/2020    COLONOSCOPY performed by Pablo Kumari MD at [facility] HX CARPAL TUNNEL RELEASE      HX DILATION AND CURETTAGE      HX JOINT REPLACEMENT SURGERY      knee both sides    HX LUMBAR FUSION      HX ORTHOPAEDIC      total knee replacements    HX ORTHOPAEDIC      back surgery     HX UROLOGICAL      stent in ureter    HX UROLOGICAL  09/14/2020    Cystoscopy    HX UROLOGICAL Left 09/14/2020    retrograde pyelogram    HX UROLOGICAL Left 09/14/2020    ureteral stent exchange    HX UROLOGICAL  11/09/2020    Cystoscopy    HX UROLOGICAL  11/09/2020    retrograde pyelograms    HX UROLOGICAL Left 11/09/2020    robotic left pyeloplasty    HX UROLOGICAL Left 11/09/2020    ureteral stent exchange    HX UROLOGICAL  11/24/2020    cystoscopy stent removal     HX UROLOGICAL  05/28/2021    CYSTO    NEUROLOGICAL PROCEDURE UNLISTED      back surg    SD BREAST SURGERY PROCEDURE UNLISTED      SD CYSTOSCOPY,INSERT URETERAL STENT  5/8/2018, 7/11/2017, 2/9/2019, 10/11/2016, 6/7/2016, 1/14/2016, 7/14/2015       Family History:  Family History   Problem Relation Age of Onset    Hypertension Mother     Heart Disease Mother     Diabetes Mother     Liver Disease Mother     Diabetes Father     Heart Disease Father     Heart Disease Brother     Diabetes Brother     COPD Brother     Liver Disease Brother     Hypertension Sister     Elevated Lipids Sister        Social History:  Social History     Tobacco Use    Smoking status: Former Smoker     Years: 55.00     Quit date: 2005     Years since quittin.3    Smokeless tobacco: Never Used    Tobacco comment: pt sates quit 7 years ago   Vaping Use    Vaping Use: Never used   Substance Use Topics    Alcohol use: Yes     Comment: special occasions    Drug use: Never       Allergies: Allergies   Allergen Reactions    Codeine Other (comments)     hallucinations         Review of Systems     Review of Systems   Constitutional: Negative for diaphoresis and fatigue. HENT: Negative for congestion, dental problem, ear discharge and ear pain. Eyes: Negative for discharge and redness. Respiratory: Negative for cough, chest tightness and shortness of breath. Cardiovascular: Negative for chest pain and palpitations. Gastrointestinal: Negative for abdominal pain, constipation, diarrhea, nausea and vomiting. Endocrine: Negative. Genitourinary: Negative. Negative for dysuria and frequency. Musculoskeletal: Positive for arthralgias. Negative for myalgias. Left hip tenderness   Skin: Negative. Neurological: Negative for dizziness, syncope and light-headedness. Hematological: Negative. Psychiatric/Behavioral: Negative for agitation and behavioral problems. All other systems reviewed and are negative. Physical Exam     Physical Exam  Vitals and nursing note reviewed. Constitutional:       Appearance: Normal appearance. She is normal weight. HENT:      Head: Normocephalic and atraumatic.       Nose: Nose normal.      Mouth/Throat:      Mouth: Mucous membranes are moist. Pharynx: Oropharynx is clear. Eyes:      Extraocular Movements: Extraocular movements intact. Conjunctiva/sclera: Conjunctivae normal.      Pupils: Pupils are equal, round, and reactive to light. Cardiovascular:      Rate and Rhythm: Normal rate and regular rhythm. Pulses: Normal pulses. Heart sounds: Normal heart sounds. Pulmonary:      Effort: Pulmonary effort is normal.      Breath sounds: Normal breath sounds. Abdominal:      General: Abdomen is flat. Palpations: Abdomen is soft. Musculoskeletal:         General: Tenderness and signs of injury present. No deformity. Normal range of motion. Cervical back: Normal range of motion and neck supple. Skin:     General: Skin is warm and dry. Capillary Refill: Capillary refill takes less than 2 seconds. Neurological:      General: No focal deficit present. Mental Status: She is alert and oriented to person, place, and time. Psychiatric:         Mood and Affect: Mood normal.         Behavior: Behavior normal.         Lab and Diagnostic Study Results     Labs -   No results found for this or any previous visit (from the past 12 hour(s)). Radiologic Studies -     CT Results  (Last 48 hours)               08/29/21 2140  CT SPINE LUMB WO CONT Final result    Impression:  Normal noncontrast CT of the pelvis. HISTORY:  lower back pain, multiple spinal surgeries   Dose reduction technique: All CT scans at this facility are performed using dose reduction optimization   technique as appropriate on the exam including the following: Automated exposure   control, adjustment of the MA and/or KV according to patient size of use of   iterative reconstructive technique. .       TECHNIQUE: CT of the pelvis without contrast   COMPARISON: None   LIMITATIONS: None   BLADDER: Normal   RETROPERITONEUM/AORTA: Dense aortic calcifications circumferentially but without   adi caliber abnormality.    BOWEL/MESENTERY: Normal   APPENDIX: Identified and normal   PERITONEAL CAVITY: Normal   REPRODUCTIVE ORGANS: Normal   BONE/TISSUES: There is a nondisplaced fracture through the left inferior pubic   ramus. Slight adjacent bruising/swelling noted along the medial surface of the   fracture. No measurable hematoma. Advanced degenerative change of both hips   characterized by decreased joint space, osteophytosis about the femoral head and   necks, and subchondral sclerosis and cystic change involving the articular   surfaces. OTHER: Incidental note of a dependent gallstone. IMPRESSION: Nondisplaced fracture of the left inferior pubic ramus. advanced   degenerative changes of the hips. Please see lumbar spine CT for details   regarding the lumbar spine. Incidental visualization of cholelithiasis. HISTORY:  lower back pain, multiple spinal surgeries   Dose reduction technique: All CT scans at this facility are performed using dose reduction optimization   technique as appropriate on the exam including the following: Automated exposure   control, adjustment of the MA and/or KV according to patient size of use of   iterative reconstructive technique. .       TECHNIQUE: Noncontrast CT of the lumbar spine with multiplanar reconstructions. COMPARISON: 1/22/2015   LIMITATIONS: None       FRACTURES: None   ALIGNMENT: Extensive thoracic-lumbar fixation with transpedicular screws at   T10-L1, a left unilateral transpedicular screw at L2, bilateral transpedicular   screws at L3, a right unilateral transpedicular screw at L4, and bilateral   transpedicular screws at L5-S1. Intervening spinal rods. Alignment is grossly   anatomic. No obvious hardware complication       VERTEBRAL BODIES: Diffuse degenerative changes of the endplates and with areas   of osteophyte formation as on prior. Posterior osteophytes most prominent at   T12-L1. DISC SPACES: Baseline degenerative disc disease. Disc spacers at L1-2, L2-3,   L3-4, L4-5.    POSTERIOR ELEMENTS: Normal   SPINAL CANAL: Normal   SACROILIAC JOINT: Visualized portions unremarkable   PARASPINAL SOFT TISSUES: Normal       OTHER: Ectatic and diffusely calcified aorta without adi aneurysm       IMPRESSION: Postoperative changes as described without obvious hardware   complication or acute abnormality. Baseline degenerative disc disease and   arthritic change throughout the spine. Narrative:  HISTORY:  lower back pain, multiple spinal surgeries   Dose reduction technique: All CT scans at this facility are performed using dose reduction optimization   technique as appropriate on the exam including the following: Automated exposure   control, adjustment of the MA and/or KV according to patient size of use of   iterative reconstructive technique. .       TECHNIQUE: CT of the pelvis without contrast   COMPARISON: None   LIMITATIONS: None   BLADDER: Normal   RETROPERITONEUM/AORTA: Normal   BOWEL/MESENTERY: Normal   APPENDIX: Identified and normal   PERITONEAL CAVITY: Normal   REPRODUCTIVE ORGANS: Normal   BONE/TISSUES: No acute abnormality. OTHER: None           08/29/21 2140  CT PELV WO CONT Final result    Impression:  Normal noncontrast CT of the pelvis. HISTORY:  lower back pain, multiple spinal surgeries   Dose reduction technique: All CT scans at this facility are performed using dose reduction optimization   technique as appropriate on the exam including the following: Automated exposure   control, adjustment of the MA and/or KV according to patient size of use of   iterative reconstructive technique. .       TECHNIQUE: CT of the pelvis without contrast   COMPARISON: None   LIMITATIONS: None   BLADDER: Normal   RETROPERITONEUM/AORTA: Dense aortic calcifications circumferentially but without   adi caliber abnormality.    BOWEL/MESENTERY: Normal   APPENDIX: Identified and normal   PERITONEAL CAVITY: Normal   REPRODUCTIVE ORGANS: Normal   BONE/TISSUES: There is a nondisplaced fracture through the left inferior pubic   ramus. Slight adjacent bruising/swelling noted along the medial surface of the   fracture. No measurable hematoma. Advanced degenerative change of both hips   characterized by decreased joint space, osteophytosis about the femoral head and   necks, and subchondral sclerosis and cystic change involving the articular   surfaces. OTHER: Incidental note of a dependent gallstone. IMPRESSION: Nondisplaced fracture of the left inferior pubic ramus. advanced   degenerative changes of the hips. Please see lumbar spine CT for details   regarding the lumbar spine. Incidental visualization of cholelithiasis. HISTORY:  lower back pain, multiple spinal surgeries   Dose reduction technique: All CT scans at this facility are performed using dose reduction optimization   technique as appropriate on the exam including the following: Automated exposure   control, adjustment of the MA and/or KV according to patient size of use of   iterative reconstructive technique. .       TECHNIQUE: Noncontrast CT of the lumbar spine with multiplanar reconstructions. COMPARISON: 1/22/2015   LIMITATIONS: None       FRACTURES: None   ALIGNMENT: Extensive thoracic-lumbar fixation with transpedicular screws at   T10-L1, a left unilateral transpedicular screw at L2, bilateral transpedicular   screws at L3, a right unilateral transpedicular screw at L4, and bilateral   transpedicular screws at L5-S1. Intervening spinal rods. Alignment is grossly   anatomic. No obvious hardware complication       VERTEBRAL BODIES: Diffuse degenerative changes of the endplates and with areas   of osteophyte formation as on prior. Posterior osteophytes most prominent at   T12-L1. DISC SPACES: Baseline degenerative disc disease. Disc spacers at L1-2, L2-3,   L3-4, L4-5.    POSTERIOR ELEMENTS: Normal   SPINAL CANAL: Normal   SACROILIAC JOINT: Visualized portions unremarkable   PARASPINAL SOFT TISSUES: Normal OTHER: Ectatic and diffusely calcified aorta without adi aneurysm       IMPRESSION: Postoperative changes as described without obvious hardware   complication or acute abnormality. Baseline degenerative disc disease and   arthritic change throughout the spine. Narrative:  HISTORY:  lower back pain, multiple spinal surgeries   Dose reduction technique: All CT scans at this facility are performed using dose reduction optimization   technique as appropriate on the exam including the following: Automated exposure   control, adjustment of the MA and/or KV according to patient size of use of   iterative reconstructive technique. .       TECHNIQUE: CT of the pelvis without contrast   COMPARISON: None   LIMITATIONS: None   BLADDER: Normal   RETROPERITONEUM/AORTA: Normal   BOWEL/MESENTERY: Normal   APPENDIX: Identified and normal   PERITONEAL CAVITY: Normal   REPRODUCTIVE ORGANS: Normal   BONE/TISSUES: No acute abnormality. OTHER: None               CXR Results  (Last 48 hours)    None            Medical Decision Making   - I am the first provider for this patient. - I reviewed the vital signs, available nursing notes, past medical history, past surgical history, family history and social history. - Initial assessment performed. The patients presenting problems have been discussed, and they are in agreement with the care plan formulated and outlined with them. I have encouraged them to ask questions as they arise throughout their visit. Vital Signs-Reviewed the patient's vital signs. Patient Vitals for the past 12 hrs:   Temp Pulse Resp BP SpO2   08/29/21 2344 -- -- -- (!) 110/57 --   08/29/21 2340 -- (!) 49 20 -- --   08/29/21 2144 -- (!) 53 23 116/60 97 %   08/29/21 1821 -- (!) 49 20 121/66 98 %   08/29/21 1707 -- (!) 53 -- -- --   08/29/21 1650 97.5 °F (36.4 °C) (!) 57 16 (!) 96/58 97 %       Records Reviewed: Nursing Notes    ED Course/Provider Notes (Medical Decision Making):    Admitted by Dr. Bailee Pillai pending results of x-rays. Admit Note:  11:53 PM  CT hip shows fracture of the right inferior pubic ramus. Patient is having difficulty walking. Discussed with on-call orthopedic surgeon, Dr. Valdez Noel. Per H&P, the patient will be admitted to obs for further evaluation and care. Accepting physician is . The patient verbalizes agreement with the diagnosis and plan of care. The results of their tests and reason(s) for their admission have been discussed with pt and/or available family. All questions were answered and there are no apparent barriers to comprehension or communication. Disposition   Disposition: Condition unchanged and stable      Diagnosis     Clinical Impression:   1. Inferior pubic ramus fracture, left, closed, initial encounter [facility])        Attestations:    Sharla Denney, DO    Please note that this dictation was completed with [employer], the computer voice recognition software. Quite often unanticipated grammatical, syntax, homophones, and other interpretive errors are inadvertently transcribed by the computer software. Please disregard these errors. Please excuse any errors that have escaped final proofreading. Thank you.

## 2021-08-30 PROBLEM — S32.599A INFERIOR PUBIC RAMUS FRACTURE (HCC): Status: ACTIVE | Noted: 2021-08-30

## 2021-08-30 LAB
ALBUMIN SERPL-MCNC: 3.5 G/DL (ref 3.5–5)
ALBUMIN/GLOB SERPL: 1 {RATIO} (ref 1.1–2.2)
ALP SERPL-CCNC: 104 U/L (ref 45–117)
ALT SERPL-CCNC: 16 U/L (ref 12–78)
ANION GAP SERPL CALC-SCNC: 4 MMOL/L (ref 5–15)
APPEARANCE UR: CLEAR
AST SERPL W P-5'-P-CCNC: 13 U/L (ref 15–37)
BACTERIA URNS QL MICRO: NEGATIVE /HPF
BASOPHILS # BLD: 0.1 K/UL (ref 0–0.1)
BASOPHILS NFR BLD: 1 % (ref 0–1)
BILIRUB SERPL-MCNC: 0.7 MG/DL (ref 0.2–1)
BILIRUB UR QL: NEGATIVE
BUN SERPL-MCNC: 17 MG/DL (ref 6–20)
BUN/CREAT SERPL: 19 (ref 12–20)
CA-I BLD-MCNC: 9.1 MG/DL (ref 8.5–10.1)
CHLORIDE SERPL-SCNC: 103 MMOL/L (ref 97–108)
CO2 SERPL-SCNC: 34 MMOL/L (ref 21–32)
COLOR UR: ABNORMAL
CREAT SERPL-MCNC: 0.89 MG/DL (ref 0.55–1.02)
DIFFERENTIAL METHOD BLD: ABNORMAL
EOSINOPHIL # BLD: 0 K/UL (ref 0–0.4)
EOSINOPHIL NFR BLD: 0 % (ref 0–7)
ERYTHROCYTE [DISTWIDTH] IN BLOOD BY AUTOMATED COUNT: 13.5 % (ref 11.5–14.5)
GLOBULIN SER CALC-MCNC: 3.4 G/DL (ref 2–4)
GLUCOSE BLD STRIP.AUTO-MCNC: 104 MG/DL (ref 65–117)
GLUCOSE BLD STRIP.AUTO-MCNC: 202 MG/DL (ref 65–117)
GLUCOSE SERPL-MCNC: 144 MG/DL (ref 65–100)
GLUCOSE UR STRIP.AUTO-MCNC: NEGATIVE MG/DL
HCT VFR BLD AUTO: 42.6 % (ref 35–47)
HGB BLD-MCNC: 13.9 G/DL (ref 11.5–16)
HGB UR QL STRIP: ABNORMAL
IMM GRANULOCYTES # BLD AUTO: 0 K/UL (ref 0–0.04)
IMM GRANULOCYTES NFR BLD AUTO: 0 % (ref 0–0.5)
KETONES UR QL STRIP.AUTO: NEGATIVE MG/DL
LEUKOCYTE ESTERASE UR QL STRIP.AUTO: ABNORMAL
LYMPHOCYTES # BLD: 1.3 K/UL (ref 0.8–3.5)
LYMPHOCYTES NFR BLD: 15 % (ref 12–49)
MCH RBC QN AUTO: 31 PG (ref 26–34)
MCHC RBC AUTO-ENTMCNC: 32.6 G/DL (ref 30–36.5)
MCV RBC AUTO: 95.1 FL (ref 80–99)
MONOCYTES # BLD: 0.7 K/UL (ref 0–1)
MONOCYTES NFR BLD: 8 % (ref 5–13)
NEUTS SEG # BLD: 6.5 K/UL (ref 1.8–8)
NEUTS SEG NFR BLD: 76 % (ref 32–75)
NITRITE UR QL STRIP.AUTO: NEGATIVE
PERFORMED BY, TECHID: ABNORMAL
PERFORMED BY, TECHID: NORMAL
PH UR STRIP: 5 [PH] (ref 5–8)
PLATELET # BLD AUTO: 143 K/UL (ref 150–400)
PMV BLD AUTO: 12.3 FL (ref 8.9–12.9)
POTASSIUM SERPL-SCNC: 5.3 MMOL/L (ref 3.5–5.1)
PROT SERPL-MCNC: 6.9 G/DL (ref 6.4–8.2)
PROT UR STRIP-MCNC: NEGATIVE MG/DL
RBC # BLD AUTO: 4.48 M/UL (ref 3.8–5.2)
RBC #/AREA URNS HPF: ABNORMAL /HPF (ref 0–5)
SODIUM SERPL-SCNC: 141 MMOL/L (ref 136–145)
SP GR UR REFRACTOMETRY: 1.01 (ref 1–1.03)
UA: UC IF INDICATED,UAUC: ABNORMAL
UROBILINOGEN UR QL STRIP.AUTO: 0.1 EU/DL (ref 0.1–1)
WBC # BLD AUTO: 8.6 K/UL (ref 3.6–11)
WBC URNS QL MICRO: ABNORMAL /HPF (ref 0–4)

## 2021-08-30 PROCEDURE — 74011250636 HC RX REV CODE- 250/636: Performed by: EMERGENCY MEDICINE

## 2021-08-30 PROCEDURE — 99218 HC RM OBSERVATION: CPT

## 2021-08-30 PROCEDURE — 85025 COMPLETE CBC W/AUTO DIFF WBC: CPT

## 2021-08-30 PROCEDURE — 80053 COMPREHEN METABOLIC PANEL: CPT

## 2021-08-30 PROCEDURE — 87086 URINE CULTURE/COLONY COUNT: CPT

## 2021-08-30 PROCEDURE — 74011250637 HC RX REV CODE- 250/637: Performed by: INTERNAL MEDICINE

## 2021-08-30 PROCEDURE — 81001 URINALYSIS AUTO W/SCOPE: CPT

## 2021-08-30 PROCEDURE — 74011636637 HC RX REV CODE- 636/637: Performed by: INTERNAL MEDICINE

## 2021-08-30 PROCEDURE — 74011250636 HC RX REV CODE- 250/636: Performed by: INTERNAL MEDICINE

## 2021-08-30 PROCEDURE — 36415 COLL VENOUS BLD VENIPUNCTURE: CPT

## 2021-08-30 PROCEDURE — 82962 GLUCOSE BLOOD TEST: CPT

## 2021-08-30 RX ORDER — PANTOPRAZOLE SODIUM 40 MG/1
40 TABLET, DELAYED RELEASE ORAL
Status: DISCONTINUED | OUTPATIENT
Start: 2021-08-31 | End: 2021-09-03

## 2021-08-30 RX ORDER — SODIUM CHLORIDE 0.9 % (FLUSH) 0.9 %
5-40 SYRINGE (ML) INJECTION EVERY 8 HOURS
Status: DISCONTINUED | OUTPATIENT
Start: 2021-08-30 | End: 2021-09-05 | Stop reason: HOSPADM

## 2021-08-30 RX ORDER — INSULIN LISPRO 100 [IU]/ML
5 INJECTION, SOLUTION INTRAVENOUS; SUBCUTANEOUS
Status: DISCONTINUED | OUTPATIENT
Start: 2021-08-30 | End: 2021-08-31

## 2021-08-30 RX ORDER — ONDANSETRON 2 MG/ML
4 INJECTION INTRAMUSCULAR; INTRAVENOUS
Status: DISCONTINUED | OUTPATIENT
Start: 2021-08-30 | End: 2021-09-05 | Stop reason: HOSPADM

## 2021-08-30 RX ORDER — PRAMIPEXOLE DIHYDROCHLORIDE 1 MG/1
1 TABLET ORAL
Status: DISCONTINUED | OUTPATIENT
Start: 2021-08-30 | End: 2021-09-03

## 2021-08-30 RX ORDER — ASPIRIN 81 MG/1
81 TABLET ORAL DAILY
Status: DISCONTINUED | OUTPATIENT
Start: 2021-08-31 | End: 2021-09-05 | Stop reason: HOSPADM

## 2021-08-30 RX ORDER — ACETAMINOPHEN 650 MG/1
650 SUPPOSITORY RECTAL
Status: DISCONTINUED | OUTPATIENT
Start: 2021-08-30 | End: 2021-09-05 | Stop reason: HOSPADM

## 2021-08-30 RX ORDER — ATORVASTATIN CALCIUM 20 MG/1
20 TABLET, FILM COATED ORAL DAILY
Status: DISCONTINUED | OUTPATIENT
Start: 2021-08-31 | End: 2021-09-03

## 2021-08-30 RX ORDER — DEXTROSE 50 % IN WATER (D50W) INTRAVENOUS SYRINGE
25-50 AS NEEDED
Status: DISCONTINUED | OUTPATIENT
Start: 2021-08-30 | End: 2021-09-05 | Stop reason: HOSPADM

## 2021-08-30 RX ORDER — CLONAZEPAM 0.5 MG/1
0.5 TABLET ORAL 3 TIMES DAILY
Status: DISCONTINUED | OUTPATIENT
Start: 2021-08-30 | End: 2021-09-05 | Stop reason: HOSPADM

## 2021-08-30 RX ORDER — GABAPENTIN 300 MG/1
300 CAPSULE ORAL 3 TIMES DAILY
Status: DISCONTINUED | OUTPATIENT
Start: 2021-08-30 | End: 2021-08-31

## 2021-08-30 RX ORDER — OXYCODONE AND ACETAMINOPHEN 5; 325 MG/1; MG/1
1 TABLET ORAL
Status: DISCONTINUED | OUTPATIENT
Start: 2021-08-30 | End: 2021-09-05 | Stop reason: HOSPADM

## 2021-08-30 RX ORDER — SODIUM CHLORIDE 9 MG/ML
75 INJECTION, SOLUTION INTRAVENOUS CONTINUOUS
Status: DISCONTINUED | OUTPATIENT
Start: 2021-08-30 | End: 2021-09-01

## 2021-08-30 RX ORDER — ACETAMINOPHEN 325 MG/1
650 TABLET ORAL
Status: DISCONTINUED | OUTPATIENT
Start: 2021-08-30 | End: 2021-09-05 | Stop reason: HOSPADM

## 2021-08-30 RX ORDER — MORPHINE SULFATE 2 MG/ML
2 INJECTION, SOLUTION INTRAMUSCULAR; INTRAVENOUS
Status: DISCONTINUED | OUTPATIENT
Start: 2021-08-30 | End: 2021-08-30

## 2021-08-30 RX ORDER — SODIUM CHLORIDE 0.9 % (FLUSH) 0.9 %
5-40 SYRINGE (ML) INJECTION AS NEEDED
Status: DISCONTINUED | OUTPATIENT
Start: 2021-08-30 | End: 2021-09-05 | Stop reason: HOSPADM

## 2021-08-30 RX ORDER — MONTELUKAST SODIUM 10 MG/1
10 TABLET ORAL DAILY
Status: DISCONTINUED | OUTPATIENT
Start: 2021-08-31 | End: 2021-09-03

## 2021-08-30 RX ORDER — DULOXETIN HYDROCHLORIDE 20 MG/1
20 CAPSULE, DELAYED RELEASE ORAL DAILY
Status: DISCONTINUED | OUTPATIENT
Start: 2021-08-31 | End: 2021-08-30

## 2021-08-30 RX ORDER — POLYETHYLENE GLYCOL 3350 17 G/17G
17 POWDER, FOR SOLUTION ORAL DAILY PRN
Status: DISCONTINUED | OUTPATIENT
Start: 2021-08-30 | End: 2021-09-05 | Stop reason: HOSPADM

## 2021-08-30 RX ORDER — FLUOXETINE HYDROCHLORIDE 20 MG/1
20 CAPSULE ORAL DAILY
Status: DISCONTINUED | OUTPATIENT
Start: 2021-08-31 | End: 2021-09-03

## 2021-08-30 RX ORDER — OXYBUTYNIN CHLORIDE 5 MG/1
5 TABLET ORAL 2 TIMES DAILY
Status: DISCONTINUED | OUTPATIENT
Start: 2021-08-30 | End: 2021-09-03

## 2021-08-30 RX ORDER — ALBUTEROL SULFATE 0.83 MG/ML
2.5 SOLUTION RESPIRATORY (INHALATION)
Status: DISCONTINUED | OUTPATIENT
Start: 2021-08-30 | End: 2021-09-05 | Stop reason: HOSPADM

## 2021-08-30 RX ORDER — ENOXAPARIN SODIUM 100 MG/ML
40 INJECTION SUBCUTANEOUS DAILY
Status: DISCONTINUED | OUTPATIENT
Start: 2021-08-31 | End: 2021-09-05 | Stop reason: HOSPADM

## 2021-08-30 RX ORDER — MAGNESIUM SULFATE 100 %
4 CRYSTALS MISCELLANEOUS AS NEEDED
Status: DISCONTINUED | OUTPATIENT
Start: 2021-08-30 | End: 2021-09-05 | Stop reason: HOSPADM

## 2021-08-30 RX ORDER — LOSARTAN POTASSIUM 50 MG/1
50 TABLET ORAL DAILY
Status: DISCONTINUED | OUTPATIENT
Start: 2021-08-31 | End: 2021-08-31

## 2021-08-30 RX ORDER — FUROSEMIDE 40 MG/1
20 TABLET ORAL DAILY
Status: DISCONTINUED | OUTPATIENT
Start: 2021-08-31 | End: 2021-08-31

## 2021-08-30 RX ORDER — INSULIN GLARGINE 100 [IU]/ML
20 INJECTION, SOLUTION SUBCUTANEOUS
Status: DISCONTINUED | OUTPATIENT
Start: 2021-08-31 | End: 2021-09-05 | Stop reason: HOSPADM

## 2021-08-30 RX ORDER — ERGOCALCIFEROL 1.25 MG/1
50000 CAPSULE ORAL
Status: DISCONTINUED | OUTPATIENT
Start: 2021-09-05 | End: 2021-09-03

## 2021-08-30 RX ORDER — ONDANSETRON 4 MG/1
4 TABLET, ORALLY DISINTEGRATING ORAL
Status: DISCONTINUED | OUTPATIENT
Start: 2021-08-30 | End: 2021-09-05 | Stop reason: HOSPADM

## 2021-08-30 RX ORDER — INSULIN LISPRO 100 [IU]/ML
INJECTION, SOLUTION INTRAVENOUS; SUBCUTANEOUS
Status: DISCONTINUED | OUTPATIENT
Start: 2021-08-30 | End: 2021-09-05 | Stop reason: HOSPADM

## 2021-08-30 RX ADMIN — Medication 10 ML: at 18:54

## 2021-08-30 RX ADMIN — OXYBUTYNIN CHLORIDE 5 MG: 5 TABLET ORAL at 21:20

## 2021-08-30 RX ADMIN — ACETAMINOPHEN 650 MG: 325 TABLET ORAL at 21:19

## 2021-08-30 RX ADMIN — SODIUM CHLORIDE 75 ML/HR: 9 INJECTION, SOLUTION INTRAVENOUS at 17:03

## 2021-08-30 RX ADMIN — Medication 10 ML: at 21:25

## 2021-08-30 RX ADMIN — INSULIN LISPRO 3 UNITS: 100 INJECTION, SOLUTION INTRAVENOUS; SUBCUTANEOUS at 21:20

## 2021-08-30 RX ADMIN — CLONAZEPAM 0.5 MG: 0.5 TABLET ORAL at 18:54

## 2021-08-30 RX ADMIN — CLONAZEPAM 0.5 MG: 0.5 TABLET ORAL at 21:19

## 2021-08-30 RX ADMIN — SODIUM CHLORIDE 75 ML/HR: 9 INJECTION, SOLUTION INTRAVENOUS at 14:10

## 2021-08-30 NOTE — PROGRESS NOTES
Dr. Delbert Blanchard at bedside 1702 assessing patient. States orthopedic surgery was already called by freestanding ER and MD will come see patient.

## 2021-08-30 NOTE — H&P
GENERAL GENERIC H&P/CONSULT  Presenting complaint: Fall/hip pain    Subjective: 78-year-old female with past medical history multiple comorbidities presents [facility] with complaints of left hip pain. Patient states she was in her usual state of health until yesterday afternoon when she tripped and fell following which she had sudden onset intermittent left hip pain moderate severe in intensity, aggravated with movement as well as weightbearing following which patient presented to the ED. Patient denies any fevers chills nausea vomiting lightheadedness dizziness dyspnea orthopnea paroxysmal nocturnal dyspnea chest pain palpitations headache focal weakness loss of sensation auditory or visual symptoms abdominal stool or urinary complaints or any other associated symptoms.   Patient endorses no recent sick contacts or travel activity    Past Medical History:   Diagnosis Date    Arthritis     Bacterial meningitis     Hx of    CAD (coronary artery disease)     Chronic obstructive pulmonary disease (HCC)     Chronic pain     DDD (degenerative disc disease), cervical     Diabetes ([address]     Environmental allergies     Esophageal disorder     Fibromyalgia     GERD (gastroesophageal reflux disease)     Hypercholesterolemia     Hypertension     Kidney stones     hx of    Macular degeneration     Mixed stress and urge urinary incontinence 7/2/2020    Neurogenic bladder 7/2/2020    Psychiatric disorder     Rheumatoid arthritis ([address]     Scoliosis     Shingles     Hx of    Sleep apnea     pt states uses CPAP    Suicidal thoughts     pt stated during PAT visit , she has hx of suicidal thoughts age 17's, pt stated never attempted and further thoughts of suicide since and none that day    Torn rotator cuff     pt states on right side    Urinary tract infection without hematuria 7/2/2020      Past Surgical History:   Procedure Laterality Date    COLONOSCOPY N/A 10/9/2020 COLONOSCOPY performed by Mane Pierre MD at [address] [facility] HX Deanna Begin HX DILATION AND CURETTAGE      HX JOINT REPLACEMENT SURGERY      knee both sides    HX LUMBAR FUSION      HX ORTHOPAEDIC      total knee replacements    HX ORTHOPAEDIC      back surgery     HX UROLOGICAL      stent in ureter    HX UROLOGICAL  09/14/2020    Cystoscopy    HX UROLOGICAL Left 09/14/2020    retrograde pyelogram    HX UROLOGICAL Left 09/14/2020    ureteral stent exchange    HX UROLOGICAL  11/09/2020    Cystoscopy    HX UROLOGICAL  11/09/2020    retrograde pyelograms    HX UROLOGICAL Left 11/09/2020    robotic left pyeloplasty    HX UROLOGICAL Left 11/09/2020    ureteral stent exchange    HX UROLOGICAL  11/24/2020    cystoscopy stent removal     HX UROLOGICAL  05/28/2021    CYSTO    NEUROLOGICAL PROCEDURE UNLISTED      back surg    ME BREAST SURGERY PROCEDURE UNLISTED      ME CYSTOSCOPY,INSERT URETERAL STENT  5/8/2018, 7/11/2017, 2/9/2019, 10/11/2016, 6/7/2016, 1/14/2016, 7/14/2015      Prior to Admission medications    Medication Sig Start Date End Date Taking? Authorizing Provider   aspirin delayed-release 81 mg tablet Take 81 mg by mouth daily. Yes Other, MD Katiana   albuterol (PROVENTIL VENTOLIN) 2.5 mg /3 mL (0.083 %) nebu by Nebulization route. Yes Other, MD Katiana   cholecalciferol (VITAMIN D3) (50,000 UNITS /1250 MCG) capsule Take  by mouth every seven (7) days. Yes Provider, Historical   insulin glargine U-300 conc (TOUJEO) 300 unit/mL (1.5 mL) inpn pen 20 Units by SubCUTAneous route nightly. Yes Provider, Historical   clonazePAM (KlonoPIN) 0.5 mg tablet Take 0.5 mg by mouth three (3) times daily. Yes Provider, Historical   oxybutynin (DITROPAN) 5 mg tablet Take 5 mg by mouth two (2) times a day. Yes Provider, Historical   atorvastatin (LIPITOR) 20 mg tablet Take 20 mg by mouth daily. Yes Provider, Historical   FLUoxetine (PROZAC) 40 mg capsule Take 20 mg by mouth daily. Yes Provider, Historical   montelukast (SINGULAIR) 10 mg tablet Take 10 mg by mouth daily. Yes Provider, Historical   pramipexole (MIRAPEX) 0.5 mg tablet Take 1 mg by mouth nightly. Yes Provider, Historical   candesartan (ATACAND) 8 mg tablet Take 8 mg by mouth daily. Patient not taking: Reported on 8/29/2021    Other, MD Katiana   loratadine (CLARITIN) 10 mg tablet loratadine 10 mg tablet   Take 1 tablet every day by oral route. Patient not taking: Reported on 8/29/2021    Provider, Historical   trimethoprim-sulfamethoxazole (BACTRIM DS, SEPTRA DS) 160-800 mg per tablet Take 1 Tablet by mouth two (2) times a day. Patient not taking: Reported on 8/29/2021 5/28/21   William Stock MD   gabapentin (NEURONTIN) 300 mg capsule Take 300 mg by mouth three (3) times daily. Patient not taking: Reported on 8/29/2021    Provider, Historical   doxycycline (ADOXA) 100 mg tablet Take 1 Tab by mouth two (2) times a day. Patient not taking: Reported on 8/29/2021 11/24/20   William Stock MD   omeprazole magnesium (PRILOSEC OTC PO) Take 1 Tab by mouth two (2) times a day. Patient not taking: Reported on 8/29/2021    Provider, Historical   insulin lispro (HUMALOG) 100 unit/mL injection by SubCUTAneous route. 5 units before lunch, 8 units before supper and 3 units before breakfast  Patient not taking: Reported on 8/29/2021    Provider, Historical   insulin lispro protamine/insulin lispro (HumaLOG Mix 50-50 Insuln U-100) 100 unit/mL (50-50) injection by SubCUTAneous route. Patient not taking: Reported on 8/29/2021    Provider, Historical   furosemide (LASIX) 20 mg tablet Take 20 mg by mouth daily. As needed  Patient not taking: Reported on 8/29/2021    Provider, Historical   DULoxetine (CYMBALTA) 20 mg capsule Take 20 mg by mouth daily.   Patient not taking: Reported on 8/29/2021    Provider, Historical   ipratropium-albuterol (COMBIVENT)  mcg/actuation inhaler Take 1 Puff by inhalation every six (6) hours as needed for Wheezing or Shortness of Breath. Indications: CHRONIC OBSTRUCTIVE PULMONARY DISEASE WITH BRONCHOSPASMS  Patient not taking: Reported on 2021    Provider, Historical   Biotin 2,500 mcg cap Take 5,000 mcg/day by mouth daily. Patient not taking: Reported on 2021    Provider, Historical     Allergies   Allergen Reactions    Codeine Other (comments)     hallucinations      Social History     Tobacco Use    Smoking status: Former Smoker     Years: 55.00     Quit date: 2005     Years since quittin.3    Smokeless tobacco: Never Used    Tobacco comment: pt sates quit 7 years ago   Substance Use Topics    Alcohol use: Yes     Comment: special occasions      Family History   Problem Relation Age of Onset    Hypertension Mother     Heart Disease Mother     Diabetes Mother     Liver Disease Mother     Diabetes Father     Heart Disease Father     Heart Disease Brother     Diabetes Brother     COPD Brother     Liver Disease Brother     Hypertension Sister     Elevated Lipids Sister       Review of Systems   Constitutional: Positive for activity change, appetite change and fatigue. Negative for chills, diaphoresis, fever and unexpected weight change. HENT: Negative for congestion, dental problem, drooling, ear discharge, ear pain, facial swelling, hearing loss, mouth sores, nosebleeds, postnasal drip, rhinorrhea, sinus pressure, sinus pain, sneezing, sore throat, tinnitus, trouble swallowing and voice change. Eyes: Negative for photophobia, pain, discharge, redness, itching and visual disturbance. Respiratory: Negative for apnea, cough, choking, chest tightness, shortness of breath, wheezing and stridor. Cardiovascular: Negative for chest pain, palpitations and leg swelling. Gastrointestinal: Negative for abdominal distention, abdominal pain, anal bleeding, blood in stool, constipation, diarrhea, nausea, rectal pain and vomiting.    Endocrine: Negative for cold intolerance, heat intolerance, polydipsia, polyphagia and polyuria. Genitourinary: Negative for decreased urine volume, difficulty urinating, dyspareunia, dysuria, enuresis, flank pain, frequency, genital sores, hematuria, menstrual problem, pelvic pain, urgency, vaginal bleeding, vaginal discharge and vaginal pain. Musculoskeletal: Positive for arthralgias, gait problem and joint swelling. Negative for back pain, myalgias, neck pain and neck stiffness. Skin: Negative for color change, pallor, rash and wound. Allergic/Immunologic: Negative for environmental allergies, food allergies and immunocompromised state. Neurological: Positive for weakness. Negative for dizziness, tremors, seizures, syncope, facial asymmetry, speech difficulty, light-headedness, numbness and headaches. Hematological: Negative for adenopathy. Does not bruise/bleed easily. Psychiatric/Behavioral: Negative for agitation, behavioral problems, confusion, decreased concentration, dysphoric mood, hallucinations, self-injury, sleep disturbance and suicidal ideas. The patient is not nervous/anxious and is not hyperactive. Objective:    No intake/output data recorded. No intake/output data recorded. Patient Vitals for the past 8 hrs:   BP Temp Pulse Resp SpO2   08/30/21 1609 100/61 98.2 °F (36.8 °C) 62 18 95 %   08/30/21 1418 102/60 -- 72 -- --   08/30/21 1302 (!) 111/53 -- (!) 58 -- 99 %     Physical Exam  Vitals reviewed. Constitutional:       General: She is not in acute distress. Appearance: She is normal weight. She is ill-appearing. She is not toxic-appearing or diaphoretic. HENT:      Head: Normocephalic and atraumatic. Nose: Nose normal. No congestion or rhinorrhea. Mouth/Throat:      Mouth: Mucous membranes are moist.      Pharynx: Oropharynx is clear. No oropharyngeal exudate or posterior oropharyngeal erythema. Eyes:      General: No scleral icterus. Right eye: No discharge. Left eye: No discharge. Extraocular Movements: Extraocular movements intact. Conjunctiva/sclera: Conjunctivae normal.      Pupils: Pupils are equal, round, and reactive to light. Neck:      Vascular: No carotid bruit. Cardiovascular:      Rate and Rhythm: Normal rate and regular rhythm. Pulses: Normal pulses. Heart sounds: Normal heart sounds. No murmur heard. No friction rub. No gallop. Pulmonary:      Effort: Pulmonary effort is normal. No respiratory distress. Breath sounds: Normal breath sounds. No stridor. No wheezing, rhonchi or rales. Chest:      Chest wall: No tenderness. Abdominal:      General: Abdomen is flat. Bowel sounds are normal. There is no distension. Palpations: Abdomen is soft. There is no mass. Tenderness: There is no abdominal tenderness. There is no right CVA tenderness, left CVA tenderness, guarding or rebound. Hernia: No hernia is present. Musculoskeletal:         General: Swelling, tenderness, deformity and signs of injury present. Cervical back: Normal range of motion and neck supple. No rigidity or tenderness. Right lower leg: No edema. Left lower leg: No edema. Lymphadenopathy:      Cervical: No cervical adenopathy. Skin:     General: Skin is warm. Capillary Refill: Capillary refill takes less than 2 seconds. Coloration: Skin is not jaundiced or pale. Findings: No bruising, erythema, lesion or rash. Neurological:      General: No focal deficit present. Mental Status: She is alert and oriented to person, place, and time. Mental status is at baseline. Cranial Nerves: No cranial nerve deficit. Sensory: No sensory deficit. Motor: No weakness. Coordination: Coordination normal.      Gait: Gait normal.      Deep Tendon Reflexes: Reflexes normal.   Psychiatric:         Mood and Affect: Mood normal.         Behavior: Behavior normal.         Thought Content:  Thought content normal.         Judgment: Judgment normal.          Labs:    Recent Results (from the past 24 hour(s))   CBC WITH AUTOMATED DIFF    Collection Time: 08/30/21 11:15 AM   Result Value Ref Range    WBC 8.6 3.6 - 11.0 K/uL    RBC 4.48 3.80 - 5.20 M/uL    HGB 13.9 11.5 - 16.0 g/dL    HCT 42.6 35.0 - 47.0 %    MCV 95.1 80.0 - 99.0 FL    MCH 31.0 26.0 - 34.0 PG    MCHC 32.6 30.0 - 36.5 g/dL    RDW 13.5 11.5 - 14.5 %    PLATELET 205 (L) 204 - 400 K/uL    MPV 12.3 8.9 - 12.9 FL    NEUTROPHILS 76 (H) 32 - 75 %    LYMPHOCYTES 15 12 - 49 %    MONOCYTES 8 5 - 13 %    EOSINOPHILS 0 0 - 7 %    BASOPHILS 1 0 - 1 %    IMMATURE GRANULOCYTES 0 0.0 - 0.5 %    ABS. NEUTROPHILS 6.5 1.8 - 8.0 K/UL    ABS. LYMPHOCYTES 1.3 0.8 - 3.5 K/UL    ABS. MONOCYTES 0.7 0.0 - 1.0 K/UL    ABS. EOSINOPHILS 0.0 0.0 - 0.4 K/UL    ABS. BASOPHILS 0.1 0.0 - 0.1 K/UL    ABS. IMM. GRANS. 0.0 0.00 - 0.04 K/UL    DF AUTOMATED     METABOLIC PANEL, COMPREHENSIVE    Collection Time: 08/30/21 11:15 AM   Result Value Ref Range    Sodium 141 136 - 145 mmol/L    Potassium 5.3 (H) 3.5 - 5.1 mmol/L    Chloride 103 97 - 108 mmol/L    CO2 34 (H) 21 - 32 mmol/L    Anion gap 4 (L) 5 - 15 mmol/L    Glucose 144 (H) 65 - 100 mg/dL    BUN 17 6 - 20 mg/dL    Creatinine 0.89 0.55 - 1.02 mg/dL    BUN/Creatinine ratio 19 12 - 20      GFR est AA >60 >60 ml/min/1.73m2    GFR est non-AA >60 >60 ml/min/1.73m2    Calcium 9.1 8.5 - 10.1 mg/dL    Bilirubin, total 0.7 0.2 - 1.0 mg/dL    AST (SGOT) 13 (L) 15 - 37 U/L    ALT (SGPT) 16 12 - 78 U/L    Alk. phosphatase 104 45 - 117 U/L    Protein, total 6.9 6.4 - 8.2 g/dL    Albumin 3.5 3.5 - 5.0 g/dL    Globulin 3.4 2.0 - 4.0 g/dL    A-G Ratio 1.0 (L) 1.1 - 2.2         CT pelvis:IMPRESSION: Nondisplaced fracture of the left inferior pubic ramus. advanced  degenerative changes of the hips. Please see lumbar spine CT for details  regarding the lumbar spine. Incidental visualization of cholelithiasis.     CT lumbar spine:IMPRESSION: Postoperative changes as described without obvious hardware  complication or acute abnormality. Baseline degenerative disc disease and  arthritic change throughout the spine. Assessment:  Active Problems:    Inferior pubic ramus fracture ([address] (8/30/2021)        Plan:    Fall/inferior pubic ramus fracture-patient presents with above-mentioned symptomatology based on patient's clinical presentation as well as physical examination conjunction radiographic findings consistent with inferior pubic ramus fracture, remains hemodynamically stable at this time  Percocet as needed for pain relief  Orthopedic consult further evaluation  Physical therapy Occupational Therapy consult for evaluation    Diabetes-continue current insulin regimen with additional sliding scale further coverage    Peripheral neuropathy-continue current medications    Hyperlipidemia-continue statin    Prophylaxis-Lovenox  FEN-cardiac/diabetic diet, maintenance IV fluids, replete potassium and magnesium  Full code, will clarify about surrogate decision-maker, admitted for further management  Disposition-rehab placement    Signed:   Joel Bai MD 8/30/2021

## 2021-08-30 NOTE — PROGRESS NOTES
Patient arrived to unit alert and oriented x 4 on room air. No attending is assigned to patient at this time. Called free standing ED in [address] [place] [address] to find out who the provider/hospitalist will be taking over patient here at [address] [place] [address]. Patient arrived and states she is having 8 of 10 pelvic pain. Vitals stable at this time. Instructed patient to remain on bedrest until told otherwise. Ns to infuse at 75 ml/hr as ordered. Patient has urine sample ordered and pending.

## 2021-08-30 NOTE — PROGRESS NOTES
Pt resting quietly all morning without complaint. Updated on admit status and still no room available, voices understanding.
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
## 2021-08-31 LAB
ANION GAP SERPL CALC-SCNC: 4 MMOL/L (ref 5–15)
BASOPHILS # BLD: 0 K/UL (ref 0–0.1)
BASOPHILS NFR BLD: 1 % (ref 0–1)
BUN SERPL-MCNC: 18 MG/DL (ref 6–20)
BUN/CREAT SERPL: 20 (ref 12–20)
CA-I BLD-MCNC: 8.6 MG/DL (ref 8.5–10.1)
CHLORIDE SERPL-SCNC: 106 MMOL/L (ref 97–108)
CO2 SERPL-SCNC: 30 MMOL/L (ref 21–32)
CREAT SERPL-MCNC: 0.9 MG/DL (ref 0.55–1.02)
DIFFERENTIAL METHOD BLD: ABNORMAL
EOSINOPHIL # BLD: 0.1 K/UL (ref 0–0.4)
EOSINOPHIL NFR BLD: 1 % (ref 0–7)
ERYTHROCYTE [DISTWIDTH] IN BLOOD BY AUTOMATED COUNT: 13.6 % (ref 11.5–14.5)
GLUCOSE BLD STRIP.AUTO-MCNC: 125 MG/DL (ref 65–117)
GLUCOSE BLD STRIP.AUTO-MCNC: 75 MG/DL (ref 65–117)
GLUCOSE BLD STRIP.AUTO-MCNC: 82 MG/DL (ref 65–117)
GLUCOSE SERPL-MCNC: 113 MG/DL (ref 65–100)
HCT VFR BLD AUTO: 39.8 % (ref 35–47)
HGB BLD-MCNC: 12.9 G/DL (ref 11.5–16)
IMM GRANULOCYTES # BLD AUTO: 0 K/UL (ref 0–0.04)
IMM GRANULOCYTES NFR BLD AUTO: 0 % (ref 0–0.5)
LYMPHOCYTES # BLD: 1.9 K/UL (ref 0.8–3.5)
LYMPHOCYTES NFR BLD: 25 % (ref 12–49)
MAGNESIUM SERPL-MCNC: 1.4 MG/DL (ref 1.6–2.4)
MCH RBC QN AUTO: 30.6 PG (ref 26–34)
MCHC RBC AUTO-ENTMCNC: 32.4 G/DL (ref 30–36.5)
MCV RBC AUTO: 94.5 FL (ref 80–99)
MONOCYTES # BLD: 0.7 K/UL (ref 0–1)
MONOCYTES NFR BLD: 10 % (ref 5–13)
NEUTS SEG # BLD: 4.8 K/UL (ref 1.8–8)
NEUTS SEG NFR BLD: 63 % (ref 32–75)
NRBC # BLD: 0 K/UL (ref 0–0.01)
NRBC BLD-RTO: 0 PER 100 WBC
PERFORMED BY, TECHID: ABNORMAL
PERFORMED BY, TECHID: NORMAL
PERFORMED BY, TECHID: NORMAL
PLATELET # BLD AUTO: 126 K/UL (ref 150–400)
PMV BLD AUTO: 12.3 FL (ref 8.9–12.9)
POTASSIUM SERPL-SCNC: 4.9 MMOL/L (ref 3.5–5.1)
RBC # BLD AUTO: 4.21 M/UL (ref 3.8–5.2)
SODIUM SERPL-SCNC: 140 MMOL/L (ref 136–145)
WBC # BLD AUTO: 7.6 K/UL (ref 3.6–11)

## 2021-08-31 PROCEDURE — 97530 THERAPEUTIC ACTIVITIES: CPT

## 2021-08-31 PROCEDURE — 74011636637 HC RX REV CODE- 636/637: Performed by: INTERNAL MEDICINE

## 2021-08-31 PROCEDURE — 65270000029 HC RM PRIVATE

## 2021-08-31 PROCEDURE — 82962 GLUCOSE BLOOD TEST: CPT

## 2021-08-31 PROCEDURE — 74011250637 HC RX REV CODE- 250/637: Performed by: PHYSICIAN ASSISTANT

## 2021-08-31 PROCEDURE — 97165 OT EVAL LOW COMPLEX 30 MIN: CPT

## 2021-08-31 PROCEDURE — 74011250636 HC RX REV CODE- 250/636: Performed by: INTERNAL MEDICINE

## 2021-08-31 PROCEDURE — 85025 COMPLETE CBC W/AUTO DIFF WBC: CPT

## 2021-08-31 PROCEDURE — 36415 COLL VENOUS BLD VENIPUNCTURE: CPT

## 2021-08-31 PROCEDURE — 96374 THER/PROPH/DIAG INJ IV PUSH: CPT

## 2021-08-31 PROCEDURE — 96372 THER/PROPH/DIAG INJ SC/IM: CPT

## 2021-08-31 PROCEDURE — 97161 PT EVAL LOW COMPLEX 20 MIN: CPT

## 2021-08-31 PROCEDURE — 74011000250 HC RX REV CODE- 250: Performed by: PHYSICIAN ASSISTANT

## 2021-08-31 PROCEDURE — 74011250637 HC RX REV CODE- 250/637: Performed by: INTERNAL MEDICINE

## 2021-08-31 PROCEDURE — 99218 HC RM OBSERVATION: CPT

## 2021-08-31 PROCEDURE — 87086 URINE CULTURE/COLONY COUNT: CPT

## 2021-08-31 PROCEDURE — 80048 BASIC METABOLIC PNL TOTAL CA: CPT

## 2021-08-31 PROCEDURE — 83735 ASSAY OF MAGNESIUM: CPT

## 2021-08-31 PROCEDURE — 74011250636 HC RX REV CODE- 250/636: Performed by: PHYSICIAN ASSISTANT

## 2021-08-31 RX ORDER — MIDODRINE HYDROCHLORIDE 5 MG/1
5 TABLET ORAL
Status: DISCONTINUED | OUTPATIENT
Start: 2021-08-31 | End: 2021-09-03

## 2021-08-31 RX ORDER — TRAMADOL HYDROCHLORIDE 50 MG/1
50 TABLET ORAL
Status: DISCONTINUED | OUTPATIENT
Start: 2021-08-31 | End: 2021-09-05 | Stop reason: HOSPADM

## 2021-08-31 RX ADMIN — OXYCODONE HYDROCHLORIDE AND ACETAMINOPHEN 1 TABLET: 5; 325 TABLET ORAL at 08:45

## 2021-08-31 RX ADMIN — SODIUM CHLORIDE 500 ML: 9 INJECTION, SOLUTION INTRAVENOUS at 16:49

## 2021-08-31 RX ADMIN — ACETAMINOPHEN 650 MG: 325 TABLET ORAL at 21:54

## 2021-08-31 RX ADMIN — PANTOPRAZOLE SODIUM 40 MG: 40 TABLET, DELAYED RELEASE ORAL at 15:20

## 2021-08-31 RX ADMIN — ASPIRIN 81 MG: 81 TABLET, COATED ORAL at 08:45

## 2021-08-31 RX ADMIN — ACETAMINOPHEN 650 MG: 325 TABLET ORAL at 04:25

## 2021-08-31 RX ADMIN — CLONAZEPAM 0.5 MG: 0.5 TABLET ORAL at 08:45

## 2021-08-31 RX ADMIN — CEFTRIAXONE 1 G: 1 INJECTION, POWDER, FOR SOLUTION INTRAMUSCULAR; INTRAVENOUS at 11:47

## 2021-08-31 RX ADMIN — INSULIN GLARGINE 20 UNITS: 100 INJECTION, SOLUTION SUBCUTANEOUS at 21:55

## 2021-08-31 RX ADMIN — OXYBUTYNIN CHLORIDE 5 MG: 5 TABLET ORAL at 21:54

## 2021-08-31 RX ADMIN — GABAPENTIN 300 MG: 300 CAPSULE ORAL at 08:45

## 2021-08-31 RX ADMIN — Medication 10 ML: at 05:17

## 2021-08-31 RX ADMIN — CLONAZEPAM 0.5 MG: 0.5 TABLET ORAL at 15:20

## 2021-08-31 RX ADMIN — PRAMIPEXOLE DIHYDROCHLORIDE 1 MG: 1 TABLET ORAL at 21:54

## 2021-08-31 RX ADMIN — FLUOXETINE 20 MG: 20 CAPSULE ORAL at 08:46

## 2021-08-31 RX ADMIN — INSULIN LISPRO 5 UNITS: 100 INJECTION, SOLUTION INTRAVENOUS; SUBCUTANEOUS at 08:46

## 2021-08-31 RX ADMIN — MIDODRINE HYDROCHLORIDE 5 MG: 5 TABLET ORAL at 16:56

## 2021-08-31 RX ADMIN — ACETAMINOPHEN 650 MG: 325 TABLET ORAL at 11:47

## 2021-08-31 RX ADMIN — SODIUM CHLORIDE 75 ML/HR: 9 INJECTION, SOLUTION INTRAVENOUS at 21:56

## 2021-08-31 RX ADMIN — OXYBUTYNIN CHLORIDE 5 MG: 5 TABLET ORAL at 08:45

## 2021-08-31 RX ADMIN — MONTELUKAST 10 MG: 10 TABLET, FILM COATED ORAL at 08:45

## 2021-08-31 RX ADMIN — SODIUM CHLORIDE 75 ML/HR: 9 INJECTION, SOLUTION INTRAVENOUS at 04:21

## 2021-08-31 RX ADMIN — ENOXAPARIN SODIUM 40 MG: 60 INJECTION SUBCUTANEOUS at 11:52

## 2021-08-31 RX ADMIN — OXYCODONE HYDROCHLORIDE AND ACETAMINOPHEN 1 TABLET: 5; 325 TABLET ORAL at 15:19

## 2021-08-31 RX ADMIN — PANTOPRAZOLE SODIUM 40 MG: 40 TABLET, DELAYED RELEASE ORAL at 08:45

## 2021-08-31 RX ADMIN — ATORVASTATIN CALCIUM 20 MG: 20 TABLET, FILM COATED ORAL at 08:45

## 2021-08-31 RX ADMIN — Medication 10 ML: at 21:56

## 2021-08-31 RX ADMIN — Medication 10 ML: at 13:17

## 2021-08-31 NOTE — PROGRESS NOTES
Hospitalist Progress Note    Subjective:   Daily Progress Note: 8/31/2021 11:25 AM    Left hip pain.     Current Facility-Administered Medications   Medication Dose Route Frequency    0.9% sodium chloride infusion  75 mL/hr IntraVENous CONTINUOUS    albuterol (PROVENTIL VENTOLIN) nebulizer solution 2.5 mg  2.5 mg Nebulization Q4H PRN    aspirin delayed-release tablet 81 mg  81 mg Oral DAILY    atorvastatin (LIPITOR) tablet 20 mg  20 mg Oral DAILY    losartan (COZAAR) tablet 50 mg  50 mg Oral DAILY    [START ON 9/5/2021] ergocalciferol capsule 50,000 Units  50,000 Units Oral every Sunday    clonazePAM (KlonoPIN) tablet 0.5 mg  0.5 mg Oral TID    FLUoxetine (PROzac) capsule 20 mg  20 mg Oral DAILY    furosemide (LASIX) tablet 20 mg  20 mg Oral DAILY    gabapentin (NEURONTIN) capsule 300 mg  300 mg Oral TID    insulin glargine (LANTUS) injection 20 Units  20 Units SubCUTAneous QHS    insulin lispro (HUMALOG) injection 5 Units  5 Units SubCUTAneous TIDAC    montelukast (SINGULAIR) tablet 10 mg  10 mg Oral DAILY    pantoprazole (PROTONIX) tablet 40 mg  40 mg Oral ACB&D    oxybutynin (DITROPAN) tablet 5 mg  5 mg Oral BID    pramipexole (MIRAPEX) tablet 1 mg  1 mg Oral QHS    sodium chloride (NS) flush 5-40 mL  5-40 mL IntraVENous Q8H    sodium chloride (NS) flush 5-40 mL  5-40 mL IntraVENous PRN    acetaminophen (TYLENOL) tablet 650 mg  650 mg Oral Q6H PRN    Or    acetaminophen (TYLENOL) suppository 650 mg  650 mg Rectal Q6H PRN    polyethylene glycol (MIRALAX) packet 17 g  17 g Oral DAILY PRN    ondansetron (ZOFRAN ODT) tablet 4 mg  4 mg Oral Q8H PRN    Or    ondansetron (ZOFRAN) injection 4 mg  4 mg IntraVENous Q6H PRN    enoxaparin (LOVENOX) injection 40 mg  40 mg SubCUTAneous DAILY    oxyCODONE-acetaminophen (PERCOCET) 5-325 mg per tablet 1 Tablet  1 Tablet Oral Q4H PRN    glucose chewable tablet 16 g  4 Tablet Oral PRN    dextrose (D50W) injection syrg 12.5-25 g  25-50 mL IntraVENous PRN  glucagon (GLUCAGEN) injection 1 mg  1 mg IntraMUSCular PRN    insulin lispro (HUMALOG) injection   SubCUTAneous AC&HS        Review of Systems  Review of Systems   Constitutional: Positive for malaise/fatigue. Negative for fever. HENT: Negative. Eyes: Negative. Respiratory: Negative for cough and shortness of breath. Cardiovascular: Negative for chest pain and leg swelling. Gastrointestinal: Negative for abdominal pain, nausea and vomiting. Genitourinary: Positive for dysuria. Musculoskeletal:        Left hip pain and pubic pain   Skin: Negative. Neurological: Negative. Psychiatric/Behavioral: Negative. Objective:     Visit Vitals  BP (!) 98/50   Pulse (!) 51   Temp 98 °F (36.7 °C)   Resp 16   Ht 5' 6\" (1.676 m)   Wt 55.4 kg (122 lb 2.2 oz)   LMP  (LMP Unknown)   SpO2 98%   BMI 19.71 kg/m²    O2 Flow Rate (L/min): 2 l/min (wears 2 l nc at homeper patient ) O2 Device: Nasal cannula    Temp (24hrs), Av.4 °F (36.9 °C), Min:98 °F (36.7 °C), Max:98.9 °F (37.2 °C)       0701 -  1900  In: 300 [P.O.:300]  Out: 350 [Urine:350]  No intake/output data recorded.     Recent Results (from the past 24 hour(s))   GLUCOSE, POC    Collection Time: 21  5:27 PM   Result Value Ref Range    Glucose (POC) 104 65 - 117 mg/dL    Performed by Barrera Marin W/ REFLEX CULTURE    Collection Time: 21  5:30 PM    Specimen: Urine   Result Value Ref Range    Color Yellow/Straw      Appearance Clear Clear      Specific gravity 1.012 1.003 - 1.030      pH (UA) 5.0 5.0 - 8.0      Protein Negative Negative mg/dL    Glucose Negative Negative mg/dL    Ketone Negative Negative mg/dL    Bilirubin Negative Negative      Blood Moderate (A) Negative      Urobilinogen 0.1 0.1 - 1.0 EU/dL    Nitrites Negative Negative      Leukocyte Esterase Small (A) Negative      UA:UC IF INDICATED Urine Culture Ordered (A) Culture not indicated by UA result      WBC 20-50 0 - 4 /hpf    RBC 10-20 0 - 5 /hpf    Bacteria Negative Negative /hpf   GLUCOSE, POC    Collection Time: 08/30/21  9:01 PM   Result Value Ref Range    Glucose (POC) 202 (H) 65 - 117 mg/dL    Performed by [facility]    METABOLIC PANEL, BASIC    Collection Time: 08/31/21  6:29 AM   Result Value Ref Range    Sodium 140 136 - 145 mmol/L    Potassium 4.9 3.5 - 5.1 mmol/L    Chloride 106 97 - 108 mmol/L    CO2 30 21 - 32 mmol/L    Anion gap 4 (L) 5 - 15 mmol/L    Glucose 113 (H) 65 - 100 mg/dL    BUN 18 6 - 20 mg/dL    Creatinine 0.90 0.55 - 1.02 mg/dL    BUN/Creatinine ratio 20 12 - 20      GFR est AA >60 >60 ml/min/1.73m2    GFR est non-AA >60 >60 ml/min/1.73m2    Calcium 8.6 8.5 - 10.1 mg/dL   MAGNESIUM    Collection Time: 08/31/21  6:29 AM   Result Value Ref Range    Magnesium 1.4 (L) 1.6 - 2.4 mg/dL   CBC WITH AUTOMATED DIFF    Collection Time: 08/31/21  6:29 AM   Result Value Ref Range    WBC 7.6 3.6 - 11.0 K/uL    RBC 4.21 3.80 - 5.20 M/uL    HGB 12.9 11.5 - 16.0 g/dL    HCT 39.8 35.0 - 47.0 %    MCV 94.5 80.0 - 99.0 FL    MCH 30.6 26.0 - 34.0 PG    MCHC 32.4 30.0 - 36.5 g/dL    RDW 13.6 11.5 - 14.5 %    PLATELET 653 (L) 747 - 400 K/uL    MPV 12.3 8.9 - 12.9 FL    NRBC 0.0 0.0  WBC    ABSOLUTE NRBC 0.00 0.00 - 0.01 K/uL    NEUTROPHILS 63 32 - 75 %    LYMPHOCYTES 25 12 - 49 %    MONOCYTES 10 5 - 13 %    EOSINOPHILS 1 0 - 7 %    BASOPHILS 1 0 - 1 %    IMMATURE GRANULOCYTES 0 0 - 0.5 %    ABS. NEUTROPHILS 4.8 1.8 - 8.0 K/UL    ABS. LYMPHOCYTES 1.9 0.8 - 3.5 K/UL    ABS. MONOCYTES 0.7 0.0 - 1.0 K/UL    ABS. EOSINOPHILS 0.1 0.0 - 0.4 K/UL    ABS. BASOPHILS 0.0 0.0 - 0.1 K/UL    ABS. IMM. GRANS. 0.0 0.00 - 0.04 K/UL    DF AUTOMATED     GLUCOSE, POC    Collection Time: 08/31/21  8:37 AM   Result Value Ref Range    Glucose (POC) 125 (H) 65 - 117 mg/dL    Performed by Jacob Anton         CT SPINE LUMB WO CONT   Final Result   Normal noncontrast CT of the pelvis.      HISTORY:  lower back pain, multiple spinal surgeries   Dose reduction technique: All CT scans at this facility are performed using dose reduction optimization   technique as appropriate on the exam including the following: Automated exposure   control, adjustment of the MA and/or KV according to patient size of use of   iterative reconstructive technique. .      TECHNIQUE: CT of the pelvis without contrast   COMPARISON: None   LIMITATIONS: None   BLADDER: Normal   RETROPERITONEUM/AORTA: Dense aortic calcifications circumferentially but without   adi caliber abnormality. BOWEL/MESENTERY: Normal   APPENDIX: Identified and normal   PERITONEAL CAVITY: Normal   REPRODUCTIVE ORGANS: Normal   BONE/TISSUES: There is a nondisplaced fracture through the left inferior pubic   ramus. Slight adjacent bruising/swelling noted along the medial surface of the   fracture. No measurable hematoma. Advanced degenerative change of both hips   characterized by decreased joint space, osteophytosis about the femoral head and   necks, and subchondral sclerosis and cystic change involving the articular   surfaces. OTHER: Incidental note of a dependent gallstone. IMPRESSION: Nondisplaced fracture of the left inferior pubic ramus. advanced   degenerative changes of the hips. Please see lumbar spine CT for details   regarding the lumbar spine. Incidental visualization of cholelithiasis. HISTORY:  lower back pain, multiple spinal surgeries   Dose reduction technique: All CT scans at this facility are performed using dose reduction optimization   technique as appropriate on the exam including the following: Automated exposure   control, adjustment of the MA and/or KV according to patient size of use of   iterative reconstructive technique. .      TECHNIQUE: Noncontrast CT of the lumbar spine with multiplanar reconstructions.        COMPARISON: 1/22/2015   LIMITATIONS: None      FRACTURES: None   ALIGNMENT: Extensive thoracic-lumbar fixation with transpedicular screws at   T10-L1, a left unilateral transpedicular screw at L2, bilateral transpedicular   screws at L3, a right unilateral transpedicular screw at L4, and bilateral   transpedicular screws at L5-S1. Intervening spinal rods. Alignment is grossly   anatomic. No obvious hardware complication      VERTEBRAL BODIES: Diffuse degenerative changes of the endplates and with areas   of osteophyte formation as on prior. Posterior osteophytes most prominent at   T12-L1. DISC SPACES: Baseline degenerative disc disease. Disc spacers at L1-2, L2-3,   L3-4, L4-5. POSTERIOR ELEMENTS: Normal   SPINAL CANAL: Normal   SACROILIAC JOINT: Visualized portions unremarkable   PARASPINAL SOFT TISSUES: Normal      OTHER: Ectatic and diffusely calcified aorta without adi aneurysm      IMPRESSION: Postoperative changes as described without obvious hardware   complication or acute abnormality. Baseline degenerative disc disease and   arthritic change throughout the spine. CT PELV WO CONT   Final Result   Normal noncontrast CT of the pelvis. HISTORY:  lower back pain, multiple spinal surgeries   Dose reduction technique: All CT scans at this facility are performed using dose reduction optimization   technique as appropriate on the exam including the following: Automated exposure   control, adjustment of the MA and/or KV according to patient size of use of   iterative reconstructive technique. .      TECHNIQUE: CT of the pelvis without contrast   COMPARISON: None   LIMITATIONS: None   BLADDER: Normal   RETROPERITONEUM/AORTA: Dense aortic calcifications circumferentially but without   adi caliber abnormality. BOWEL/MESENTERY: Normal   APPENDIX: Identified and normal   PERITONEAL CAVITY: Normal   REPRODUCTIVE ORGANS: Normal   BONE/TISSUES: There is a nondisplaced fracture through the left inferior pubic   ramus. Slight adjacent bruising/swelling noted along the medial surface of the   fracture. No measurable hematoma.   Advanced degenerative change of both hips   characterized by decreased joint space, osteophytosis about the femoral head and   necks, and subchondral sclerosis and cystic change involving the articular   surfaces. OTHER: Incidental note of a dependent gallstone. IMPRESSION: Nondisplaced fracture of the left inferior pubic ramus. advanced   degenerative changes of the hips. Please see lumbar spine CT for details   regarding the lumbar spine. Incidental visualization of cholelithiasis. HISTORY:  lower back pain, multiple spinal surgeries   Dose reduction technique: All CT scans at this facility are performed using dose reduction optimization   technique as appropriate on the exam including the following: Automated exposure   control, adjustment of the MA and/or KV according to patient size of use of   iterative reconstructive technique. .      TECHNIQUE: Noncontrast CT of the lumbar spine with multiplanar reconstructions. COMPARISON: 1/22/2015   LIMITATIONS: None      FRACTURES: None   ALIGNMENT: Extensive thoracic-lumbar fixation with transpedicular screws at   T10-L1, a left unilateral transpedicular screw at L2, bilateral transpedicular   screws at L3, a right unilateral transpedicular screw at L4, and bilateral   transpedicular screws at L5-S1. Intervening spinal rods. Alignment is grossly   anatomic. No obvious hardware complication      VERTEBRAL BODIES: Diffuse degenerative changes of the endplates and with areas   of osteophyte formation as on prior. Posterior osteophytes most prominent at   T12-L1. DISC SPACES: Baseline degenerative disc disease. Disc spacers at L1-2, L2-3,   L3-4, L4-5. POSTERIOR ELEMENTS: Normal   SPINAL CANAL: Normal   SACROILIAC JOINT: Visualized portions unremarkable   PARASPINAL SOFT TISSUES: Normal      OTHER: Ectatic and diffusely calcified aorta without adi aneurysm      IMPRESSION: Postoperative changes as described without obvious hardware   complication or acute abnormality. Baseline degenerative disc disease and   arthritic change throughout the spine. XR SPINE LUMB 2 OR 3 V   Final Result   Osteoporosis and extensive postoperative changes. Negative for   acute process. XR HIP LT W OR WO PELV 2-3 VWS   Final Result   Negative for acute process. PHYSICAL EXAM:    Physical Exam  Vitals reviewed. Constitutional:       General: She is not in acute distress. Appearance: She is not ill-appearing. HENT:      Head: Normocephalic and atraumatic. Mouth/Throat:      Mouth: Mucous membranes are moist.      Pharynx: Oropharynx is clear. Eyes:      Conjunctiva/sclera: Conjunctivae normal.   Cardiovascular:      Rate and Rhythm: Normal rate and regular rhythm. Heart sounds: Normal heart sounds. Pulmonary:      Effort: Pulmonary effort is normal.      Breath sounds: Normal breath sounds. Abdominal:      General: Abdomen is flat. Bowel sounds are normal.      Palpations: Abdomen is soft. Musculoskeletal:      Cervical back: Normal range of motion and neck supple. Comments: Left leg hip flexion with 20 degrees due to pain all other extremities normal   Skin:     General: Skin is warm and dry. Neurological:      General: No focal deficit present. Mental Status: She is alert and oriented to person, place, and time. Mental status is at baseline.    Psychiatric:         Mood and Affect: Mood normal.          Data Review    Recent Results (from the past 24 hour(s))   GLUCOSE, POC    Collection Time: 08/30/21  5:27 PM   Result Value Ref Range    Glucose (POC) 104 65 - 117 mg/dL    Performed by Edmond De Jesus W/ REFLEX CULTURE    Collection Time: 08/30/21  5:30 PM    Specimen: Urine   Result Value Ref Range    Color Yellow/Straw      Appearance Clear Clear      Specific gravity 1.012 1.003 - 1.030      pH (UA) 5.0 5.0 - 8.0      Protein Negative Negative mg/dL    Glucose Negative Negative mg/dL    Ketone Negative Negative mg/dL    Bilirubin Negative Negative      Blood Moderate (A) Negative      Urobilinogen 0.1 0.1 - 1.0 EU/dL    Nitrites Negative Negative      Leukocyte Esterase Small (A) Negative      UA:UC IF INDICATED Urine Culture Ordered (A) Culture not indicated by UA result      WBC 20-50 0 - 4 /hpf    RBC 10-20 0 - 5 /hpf    Bacteria Negative Negative /hpf   GLUCOSE, POC    Collection Time: 08/30/21  9:01 PM   Result Value Ref Range    Glucose (POC) 202 (H) 65 - 117 mg/dL    Performed by Della SINCLAIR    METABOLIC PANEL, BASIC    Collection Time: 08/31/21  6:29 AM   Result Value Ref Range    Sodium 140 136 - 145 mmol/L    Potassium 4.9 3.5 - 5.1 mmol/L    Chloride 106 97 - 108 mmol/L    CO2 30 21 - 32 mmol/L    Anion gap 4 (L) 5 - 15 mmol/L    Glucose 113 (H) 65 - 100 mg/dL    BUN 18 6 - 20 mg/dL    Creatinine 0.90 0.55 - 1.02 mg/dL    BUN/Creatinine ratio 20 12 - 20      GFR est AA >60 >60 ml/min/1.73m2    GFR est non-AA >60 >60 ml/min/1.73m2    Calcium 8.6 8.5 - 10.1 mg/dL   MAGNESIUM    Collection Time: 08/31/21  6:29 AM   Result Value Ref Range    Magnesium 1.4 (L) 1.6 - 2.4 mg/dL   CBC WITH AUTOMATED DIFF    Collection Time: 08/31/21  6:29 AM   Result Value Ref Range    WBC 7.6 3.6 - 11.0 K/uL    RBC 4.21 3.80 - 5.20 M/uL    HGB 12.9 11.5 - 16.0 g/dL    HCT 39.8 35.0 - 47.0 %    MCV 94.5 80.0 - 99.0 FL    MCH 30.6 26.0 - 34.0 PG    MCHC 32.4 30.0 - 36.5 g/dL    RDW 13.6 11.5 - 14.5 %    PLATELET 739 (L) 145 - 400 K/uL    MPV 12.3 8.9 - 12.9 FL    NRBC 0.0 0.0  WBC    ABSOLUTE NRBC 0.00 0.00 - 0.01 K/uL    NEUTROPHILS 63 32 - 75 %    LYMPHOCYTES 25 12 - 49 %    MONOCYTES 10 5 - 13 %    EOSINOPHILS 1 0 - 7 %    BASOPHILS 1 0 - 1 %    IMMATURE GRANULOCYTES 0 0 - 0.5 %    ABS. NEUTROPHILS 4.8 1.8 - 8.0 K/UL    ABS. LYMPHOCYTES 1.9 0.8 - 3.5 K/UL    ABS. MONOCYTES 0.7 0.0 - 1.0 K/UL    ABS. EOSINOPHILS 0.1 0.0 - 0.4 K/UL    ABS. BASOPHILS 0.0 0.0 - 0.1 K/UL    ABS. IMM.  GRANS. 0.0 0.00 - 0.04 K/UL    DF AUTOMATED     GLUCOSE, POC Collection Time: 08/31/21  8:37 AM   Result Value Ref Range    Glucose (POC) 125 (H) 65 - 117 mg/dL    Performed by Javid Stewart         Assessment/Plan:     Active Problems:    Inferior pubic ramus fracture ([address] (8/30/2021)      This is a 64-year-old female admitted on 8/29/2021 after a ground-level fall at home. This resulted in a nondisplaced fracture of the left inferior pubic ramus with advanced degenerative changes of the hips. UA is consistent with a urinary tract infection. Urine culture pending. Started patient on Rocephin. Patient will be evaluated by PT\OT and set up for rehab services as an outpatient and most likely require placement    Impression:    1. Ground-level fall  2. Inferior pubic ramus fracture, left  3. Diabetes mellitus, type II  4. Peripheral neuropathy  5. Hyperlipidemia  6. Urinary tract infection    Plan:    1. Ground-level fall  Will benefit from rehab and ongoing ambulation with roller walker    2. Inferior pubic ramus fracture, left  Nondisplaced on CT  Orthopedic consultation  PT OT for rehab    3. Diabetes mellitus, type II  Continue Lantus 20 units nightly  Sliding scale insulin    4. Peripheral neuropathy  Stable    5. Hyperlipidemia  Continue Lipitor    6. Urinary tract infection  Urine culture pending  UA consistent with uncomplicated urinary tract infection  Rocephin x3 days    CODE STATUS: Full code    DVT prophylaxis: Lovenox  Ulcer prophylaxis: Protonix    Discharge barriers: PT OT consult and rehabilitation acceptance    Discussed case with patient's niece, [facility] discussed with: Patient/Family    Total time spent with patient: >35 minutes.

## 2021-08-31 NOTE — ROUTINE PROCESS
Bedside and Verbal shift change report given to Mary Thomas RN (oncoming nurse) by Durga Camara  (offgoing nurse). Report included the following information SBAR, Kardex, MAR, Accordion, Recent Results, Med Rec Status and Quality Measures.

## 2021-08-31 NOTE — PROGRESS NOTES
Kayden MAR notified of patient's BP of 76/53. She is lethargic, but otherwise stable. Provided will place an order for a 500 cc Normal Saline bolus in addition to Midrodine 5 mg PO scheduled.

## 2021-08-31 NOTE — PROGRESS NOTES
Reason for Admission:  Left hip pain                     RUR Score:                     Plan for utilizing home health:  Patient politely declined. PCP: First and Last name:  Black Osborne MD     Name of Practice:    Are you a current patient: Yes/No: Yes   Approximate date of last visit: \"a couple of weeks ago\"    Can you participate in a virtual visit with your PCP:                     Current Advanced Directive/Advance Care Plan: Full Code      Healthcare Decision Maker:   Click here to complete [address] including selection of the Healthcare Decision Maker Relationship (ie \"Primary\")           Vonda Naranjo (niece)  ()                   Transition of Care Plan:                      CM met with patient at bedside to complete discharge planning assessment. Patient is alert, oriented, and able to make her needs known. Prior to admission, she was living with her sister and niece. She drives herself to medical appointments. She gave choice for IRF: [facility], and has no preference for SNF. She politely declined home health at this time. Discharge disposition: TBD. CM team will continue to follow case.

## 2021-08-31 NOTE — PROGRESS NOTES
PHYSICAL THERAPY EVALUATION  Patient: Chandana Cervantes (40 y.o. female)  Date: 8/31/2021  Primary Diagnosis: Inferior pubic ramus fracture ([facility] [address] [S32.605J]        Precautions: falls , WBAT     ASSESSMENT  This is a 67 y/o female came to  [facility] with c/o L hip pain  , s/p  fall with sudden onset intermittent left hip pain moderate to severe in intensity, aggravated with movement as well as weight bearing ,  admitted on  8/29/2021 for fall/inferior pubic ramus fracture . Orders for WBAT for b/l LE . Pls see PMH below . Pt received semi-supine in bed , agreeable for PT/OT eval/Tx . Pt is A& O x 4 , as per pt report , she lives with niece and sister in one story home with 4 steps to enter and wide solange rails , was Mod I for ADL's  and functional transfers/mobility with  cane prior to admission . Other DME owned : shower chair , RW      Based on the objective data described below, the patient presents with c/o pain in L hip - 8/10 , [place] , decreased strength , 3/5 grossly in  b/l LE ,  balance deficits , generalized weakness ,  decreased activity tolerance and increased  need for assist with functional mobility ( needs  Tory x 2  for supine <>sit transfers , intact static sitting balance , minAx 2  for sit <>stand and bed <>chair transfers , fair static  standing balance with support  , is able to ambulate - 15' with RW, Tory with slow trung and  decreased step length  b/l Le ) . Pt would benefit from skilled PT services while at [facility] in order to increase safety and independence with functional transfers/mobility. Recommend d/c to IRF vs HHPT with prior level of care pending progress when medically appropriate . Current Level of Function Impacting Discharge (mobility/balance): Requires minAx 1-2  for transfers/mobility    Functional Outcome Measure: The patient scored 17 on the AM PAC basic mobility outcome measure which is indicative of medium complexity.       Other factors to consider for discharge: severity of deficits , time since onset         PLAN :  Recommendations and Planned Interventions: bed mobility training, transfer training, gait training, therapeutic exercises, neuromuscular re-education, patient and family training/education, and therapeutic activities      Frequency/Duration: Patient will be followed by physical therapy:  5 times a week to address goals. Recommendation for discharge: (in order for the patient to meet his/her long term goals)  IRF vs HHPT with prior level of care pending progress     This discharge recommendation:  Has been made in collaboration with the attending provider and/or case management    IF patient discharges home will need the following DME: rolling walker         SUBJECTIVE:   Patient stated it is hurting on my L hip .     OBJECTIVE DATA SUMMARY:   HISTORY:    Past Medical History:   Diagnosis Date    Arthritis     Bacterial meningitis     Hx of    CAD (coronary artery disease)     Chronic obstructive pulmonary disease (HCC)     Chronic pain     DDD (degenerative disc disease), cervical     Diabetes (HCC)     Environmental allergies     Esophageal disorder     Fibromyalgia     GERD (gastroesophageal reflux disease)     Hypercholesterolemia     Hypertension     Kidney stones     hx of    Macular degeneration     Mixed stress and urge urinary incontinence 7/2/2020    Neurogenic bladder 7/2/2020    Psychiatric disorder     Rheumatoid arthritis ([address]     Scoliosis     Shingles     Hx of    Sleep apnea     pt states uses CPAP    Suicidal thoughts     pt stated during PAT visit , she has hx of suicidal thoughts age 17's, pt stated never attempted and further thoughts of suicide since and none that day    Torn rotator cuff     pt states on right side    Urinary tract infection without hematuria 7/2/2020     Past Surgical History:   Procedure Laterality Date    COLONOSCOPY N/A 10/9/2020    COLONOSCOPY performed by Gabby Butler MD at [facility] HX CARPAL TUNNEL RELEASE      HX DILATION AND CURETTAGE      HX JOINT REPLACEMENT SURGERY      knee both sides    HX LUMBAR FUSION      HX ORTHOPAEDIC      total knee replacements    HX ORTHOPAEDIC      back surgery     HX UROLOGICAL      stent in ureter    HX UROLOGICAL  09/14/2020    Cystoscopy    HX UROLOGICAL Left 09/14/2020    retrograde pyelogram    HX UROLOGICAL Left 09/14/2020    ureteral stent exchange    HX UROLOGICAL  11/09/2020    Cystoscopy    HX UROLOGICAL  11/09/2020    retrograde pyelograms    HX UROLOGICAL Left 11/09/2020    robotic left pyeloplasty    HX UROLOGICAL Left 11/09/2020    ureteral stent exchange    HX UROLOGICAL  11/24/2020    cystoscopy stent removal     HX UROLOGICAL  05/28/2021    CYSTO    NEUROLOGICAL PROCEDURE UNLISTED      back surg    [place] BREAST SURGERY PROCEDURE UNLISTED      [place] CYSTOSCOPY,INSERT URETERAL STENT  5/8/2018, 7/11/2017, 2/9/2019, 10/11/2016, 6/7/2016, 1/14/2016, 7/14/2015       Personal factors and/or comorbidities impacting plan of care:     Home Situation  Home Environment: Private residence  # Steps to Enter: 7  Rails to Enter: Yes  Hand Rails : Bilateral  One/Two Story Residence: One story  Living Alone: No  Support Systems: Family member(s)  Patient Expects to be Discharged to[de-identified] Apartment  Current DME Used/Available at Home: Cane, straight, Walker, rolling, Shower chair    PLOF: Pt IND for ADLS/IADLS, mod I for mobility with SPC prior to admission. EXAMINATION/PRESENTATION/DECISION MAKING:   Critical Behavior:  Neurologic State: Alert  Orientation Level: Oriented X4     Hearing:   Auditory  Auditory Impairment: Hard of hearing, bilateral  Skin:  intact where exposed    Range Of Motion:  AROM: Generally decreased, functional    PROM: Generally decreased, functional    Strength:    Strength: Generally decreased, functional (3/5 grossly for b/l LE)    Tone & Sensation:   Tone: Normal    Sensation: Intact    Functional Mobility:  Bed Mobility: Supine to Sit: Minimum assistance;Assist x2     Scooting: Minimum assistance  Transfers:  Sit to Stand: Minimum assistance;Assist x2  Stand to Sit: Minimum assistance;Assist x2        Bed to Chair: Minimum assistance       Balance:   Sitting: Impaired; Without support  Sitting - Static: Good (unsupported)  Sitting - Dynamic: Fair (occasional)  Standing: Impaired;Pull to stand; With support  Standing - Static: Constant support; Fair  Standing - Dynamic : Constant support; Fair  Ambulation/Gait Training:  Distance (ft): 12 Feet (ft)  Assistive Device: Walker, rolling  Ambulation - Level of Assistance: Minimal assistance    Right Side Weight Bearing: As tolerated  Left Side Weight Bearing: As tolerated    Speed/Loren: Slow  Step Length: Right shortened;Left shortened    Functional Measure:    [facility] [address] AM-PAC 6 Clicks         Basic Mobility Inpatient Short Form  How much difficulty does the patient currently have. .. Unable A Lot A Little None   1. Turning over in bed (including adjusting bedclothes, sheets and blankets)? [] 1   [] 2   [x] 3   [] 4   2. Sitting down on and standing up from a chair with arms ( e.g., wheelchair, bedside commode, etc.)   [] 1   [] 2   [x] 3   [] 4   3. Moving from lying on back to sitting on the side of the bed? [] 1   [] 2   [x] 3   [] 4          How much help from another person does the patient currently need. .. Total A Lot A Little None   4. Moving to and from a bed to a chair (including a wheelchair)? [] 1   [] 2   [x] 3   [] 4   5. Need to walk in hospital room? [] 1   [] 2   [x] 3   [] 4   6. Climbing 3-5 steps with a railing? [] 1   [x] 2   [] 3   [] 4   © 2007, Trustees of [address] [place] [address] [address] [address], under license to [employer]. All rights reserved     Score:  Initial: 17 Most Recent: X (Date: -8/31/21- )   Interpretation of Tool:  Represents activities that are increasingly more difficult (i.e. Bed mobility, Transfers, Gait).   Score 24 23 22-20 19-15 14-10 9-7 6   Modifier CH CI CJ CK CL CM CN          Physical Therapy Evaluation Charge Determination   History Examination Presentation Decision-Making   MEDIUM  Complexity : 1-2 comorbidities / personal factors will impact the outcome/ POC  MEDIUM Complexity : 3 Standardized tests and measures addressing body structure, function, activity limitation and / or participation in recreation  LOW Complexity : Stable, uncomplicated  Other Functional Measure [facility] 6 medium complexity      Based on the above components, the patient evaluation is determined to be of the following complexity level: LOW     Pain Rating:  Pain at L hip - 8/10    Activity Tolerance:   Fair  Please refer to the flowsheet for vital signs taken during this treatment. After treatment patient left in no apparent distress:   Supine in bed, Call bell within reach, and Bed / chair alarm activated    COMMUNICATION/EDUCATION:   The patients plan of care was discussed with: Occupational therapist, Registered nurse, and Case management. Fall prevention education was provided and the patient/caregiver indicated understanding. and Patient/family agree to work toward stated goals and plan of care. Problem: Mobility Impaired (Adult and Pediatric)  Goal: *Acute Goals and Plan of Care (Insert Text)  Description: Pt will be I with LE HEP in 7 days. Pt will perform bed mobility with mod I in 7 days. Pt will perform transfers with mod I in 7 days. Pt will amb -100 feet with LRAD safely with mod I in 7 days. Pt will ascend/descend 4 steps with B handrails and CGA in 7 days to safely enter home.         Outcome: Not Met       Thank you for this referral.  Maxine Lanier   Time Calculation: 33 mins

## 2021-08-31 NOTE — PROGRESS NOTES
Problem: Self Care Deficits Care Plan (Adult)  Goal: *Acute Goals and Plan of Care (Insert Text)  Description: Pt will be MI sup<->sit in prep for EOB ADL's  Pt will be MI  LB dressing EOB level  Pt will be MI  sit<-> prep for toilet transfer  Pt will be MI  toilet transfer with LRAD  Pt will be MI  toileting/cloth mgmt LRAD  Pt will be MI  grooming standing sink  Pt will be MI bathing sitting/standing sink LRAD  Pt will be MI solange UE HEP in prep for self care tasks      Outcome: Not Met     OCCUPATIONAL THERAPY EVALUATION  Patient: Johnathan Hudson (10 y.o. female)  Date: 8/31/2021  Primary Diagnosis: Inferior pubic ramus fracture ([facility] [address] [S32.599A]        Precautions: falls     ASSESSMENT  Patient is 65 y/o female came to [facility]/p fall with sudden onset intermittent left hip pain moderate to severe in intensity, aggrivated with movement as well as WB adm 8/29/2021 for fall/inferior pubic ramus fracture (WBAT per chart per [facility] [place], awaiting ortho consult). Pt has hx of arhtirits, bacterial meningitis, CAD, COPD, chronic pain, DDD< DM, esophageal disorder, fibromyalgia, GERD, hyperchoelsteroemia, HTN, kidney stones, macular degeneration, neurogenic bladder, RA, shingles, sleep apnea with use of CPAP, right torn rotator cuff, lumbar fusion, total knee replacements, back surgery. Pt received semi supine in bed A&Ox4 and agreeable for OT/PT eval/tx. Per pt report, pt lives with niece and sister in one story home with 4 steps wide solange rails to [place] and is MI for self care (using shower chair) and functional transfers/mobility using cane (also has RW). Pt currently presents with decreased balance, decreased activity tolerance, generalized weakness and increased need for assist with self care (total A LB dressing bed level, SBA grooming bed level) and functional transfers/mobility (min Ax2 sup->sit, min A scooting EOb, min Ax2 sit<->stand, min A bed to chair with Rw and gait belt).  Pt would benefit from continued skilled OT services while at [facility] in order to increase safety and independence with self care and functional transfers/mobility. Recommend discharge to IRF when medically appropriate however pending progress with therapy pt may progress to [address]. Other factors to consider for discharge: time since onset, severity of deficits, PLOF        PLAN :  Recommendations and Planned Interventions: self care training, functional mobility training, therapeutic exercise, balance training, therapeutic activities, endurance activities, patient education, and home safety training    Frequency/Duration: Patient will be followed by occupational therapy 5 times a week to address goals. Recommendation for discharge: (in order for the patient to meet his/her long term goals)  IRF however pending progress with therapy may progress to [address]    This discharge recommendation:  Has been made in collaboration with the attending provider and/or case management    IF patient discharges home will need the following DME: TBD       SUBJECTIVE:   Patient stated I would like to try to sit up.     OBJECTIVE DATA SUMMARY:   HISTORY:   Past Medical History:   Diagnosis Date    Arthritis     Bacterial meningitis     Hx of    CAD (coronary artery disease)     Chronic obstructive pulmonary disease (HCC)     Chronic pain     DDD (degenerative disc disease), cervical     Diabetes (HCC)     Environmental allergies     Esophageal disorder     Fibromyalgia     GERD (gastroesophageal reflux disease)     Hypercholesterolemia     Hypertension     Kidney stones     hx of    Macular degeneration     Mixed stress and urge urinary incontinence 7/2/2020    Neurogenic bladder 7/2/2020    Psychiatric disorder     Rheumatoid arthritis ([address]     Scoliosis     Shingles     Hx of    Sleep apnea     pt states uses CPAP    Suicidal thoughts     pt stated during PAT visit , she has hx of suicidal thoughts age 17's, pt stated never attempted and further thoughts of suicide since and none that day    Torn rotator cuff     pt states on right side    Urinary tract infection without hematuria 7/2/2020     Past Surgical History:   Procedure Laterality Date    COLONOSCOPY N/A 10/9/2020    COLONOS[facility] performed by Sloan Bean MD at [facility]      HX DILATION AND CURETTAGE      HX JOINT REPLACEMENT SURGERY      knee both sides    HX LUMBAR FUSION      HX ORTHOPAEDIC      total knee replacements    HX ORTHOPAEDIC      back surgery     HX UROLOGICAL      stent in ureter    HX UROLOGICAL  09/14/2020    Cystoscopy    HX UROLOGICAL Left 09/14/2020    retrograde pyelogram    HX UROLOGICAL Left 09/14/2020    ureteral stent exchange    HX UROLOGICAL  11/09/2020    Cystoscopy    HX UROLOGICAL  11/09/2020    retrograde pyelograms    HX UROLOGICAL Left 11/09/2020    robotic left pyeloplasty    HX UROLOGICAL Left 11/09/2020    ureteral stent exchange    HX UROLOGICAL  11/24/2020    cystoscopy stent removal     HX UROLOGICAL  05/28/2021    CYSTO    NEUROLOGICAL PROCEDURE UNLISTED      back surg    [place] BREAST SURGERY PROCEDURE UNLISTED      [place] CYSTOSCOPY,INSERT URETERAL STENT  5/8/2018, 7/11/2017, 2/9/2019, 10/11/2016, 6/7/2016, 1/14/2016, 7/14/2015       Expanded or extensive additional review of patient history:     Home Situation  Home Environment: Private residence  # Steps to Enter: 7  Rails to Enter: Yes  Hand Rails : Bilateral  One/Two Story Residence: One story  Living Alone: No  Support Systems: Family member(s)  Patient Expects to be Discharged to[de-identified] Apartment  Current DME Used/Available at Home: [place], straight, Walker, rolling, Shower chair    PLOF: Pt MI for ADLS/IADLS, MI with mobility prior to admission. EXAMINATION OF PERFORMANCE DEFICITS:  Cognitive/Behavioral Status:  Neurologic State: Alert  Orientation Level: Oriented X4     Hearing:   Auditory  Auditory Impairment: Hard of hearing, bilateral    Range of Motion:  AROM: Generally decreased, functional  PROM: Generally decreased, functional     Strength:  Strength: Generally decreased, functional (solange UE grossly observed to be 3+/5)     Balance:  Sitting: Impaired; Without support  Sitting - Static: Good (unsupported)  Sitting - Dynamic: Fair (occasional)  Standing: Impaired;Pull to stand; With support  Standing - Static: Constant support; Fair  Standing - Dynamic : Constant support; Fair    Functional Mobility and Transfers for ADLs:  Bed Mobility:  Supine to Sit: Minimum assistance;Assist x2  Scooting: Minimum assistance    Transfers:  Sit to Stand: Minimum assistance;Assist x2  Stand to Sit: Minimum assistance;Assist x2  Bed to Chair: Minimum assistance  Assistive Device : Gait Belt;Walker, rolling    ADL Assessment:     Oral Facial Hygiene/Grooming: Stand-by assistance (seated in chair)    Lower Body Dressing: Total assistance (bed level )    ADL Intervention and task modifications:     Grooming  Grooming Assistance: Stand-by assistance (simulated)  Position Performed: Seated in chair  Washing Face: Stand-by assistance     Lower Body Dressing Assistance  Socks: Total assistance (dependent)  Position Performed: Supine    Toileting  Clothing Management: Minimum assistance (simulatd)    [address] [place] [address] [address] [address] AM-[facility] \"6 Clicks\"                                                       Daily Activity Inpatient Short Form  How much help from another person does the patient currently need. .. Total; A Lot A Little None   1. Putting on and taking off regular lower body clothing? []  1 [x]  2 []  3 []  4   2. Bathing (including washing, rinsing, drying)? []  1 [x]  2 []  3 []  4   3. Toileting, which includes using toilet, bedpan or urinal? [] 1 []  2 [x]  3 []  4   4. Putting on and taking off regular upper body clothing? []  1 []  2 [x]  3 []  4   5. Taking care of personal grooming such as brushing teeth? []  1 []  2 [x]  3 []  4   6. Eating meals?  []  1 []  2 []  3 [x]  4   © 2007, Trustees of [address] [place] [address] [address] [address], under license to [employer]. All rights reserved     Score: 1724     Interpretation of Tool:  Represents clinically-significant functional categories (i.e. Activities of daily living). Percentage of Impairment CH    0%   CI    1-19% CJ    20-39% CK    40-59% CL    60-79% CM    80-99% CN     100%   AMPA  Score 6-24 24 23 20-22 15-19 10-14 7-9 6        Occupational Therapy Evaluation Charge Determination   History Examination Decision-Making   LOW Complexity : Brief history review  MEDIUM Complexity : 3-5 performance deficits relating to physical, cognitive , or psychosocial skils that result in activity limitations and / or participation restrictions MEDIUM Complexity : Patient may present with comorbidities that affect occupational performnce. Miniml to moderate modification of tasks or assistance (eg, physical or verbal ) with assesment(s) is necessary to enable patient to complete evaluation       Based on the above components, the patient evaluation is determined to be of the following complexity level: LOW   Pain Ratin/10 left pelvic region    Activity Tolerance:   Good and requires rest breaks  Please refer to the flowsheet for vital signs taken during this treatment. After treatment patient left in no apparent distress:    Sitting in chair and Call bell within reach    COMMUNICATION/EDUCATION:   The patients plan of care was discussed with: Physical therapist and Registered nurse. PT/OT sessions occurred together for increased safety of pt and clinician. Home safety education was provided and the patient/caregiver indicated understanding. and Patient/family have participated as able in goal setting and plan of care. This patients plan of care is appropriate for delegation to \[facility] [place] [facility]\"".     Thank you for this referral.  Jacque Overcast  Time Calculation: 33 mins

## 2021-08-31 NOTE — PROGRESS NOTES
PT consult received and acknowledged . PT eval attempted, however ,pt with nondisplaced fracture of the left inferior pubic ramus  . PT will hold PT eval at this time for possible ortho visit/intervention . PT will continue to follow and reattempt for PT eval at a later time  .  Thanks

## 2021-09-01 ENCOUNTER — APPOINTMENT (OUTPATIENT)
Dept: GENERAL RADIOLOGY | Age: 71
DRG: 535 | End: 2021-09-01
Attending: PHYSICIAN ASSISTANT
Payer: MEDICARE

## 2021-09-01 LAB
ALBUMIN SERPL-MCNC: 2.9 G/DL (ref 3.5–5)
ALBUMIN/GLOB SERPL: 0.9 {RATIO} (ref 1.1–2.2)
ALP SERPL-CCNC: 83 U/L (ref 45–117)
ALT SERPL-CCNC: 12 U/L (ref 12–78)
ANION GAP SERPL CALC-SCNC: 4 MMOL/L (ref 5–15)
AST SERPL W P-5'-P-CCNC: 9 U/L (ref 15–37)
BACTERIA SPEC CULT: NORMAL
BASOPHILS # BLD: 0 K/UL (ref 0–0.1)
BASOPHILS NFR BLD: 0 % (ref 0–1)
BILIRUB SERPL-MCNC: 0.4 MG/DL (ref 0.2–1)
BUN SERPL-MCNC: 18 MG/DL (ref 6–20)
BUN/CREAT SERPL: 22 (ref 12–20)
CA-I BLD-MCNC: 8.3 MG/DL (ref 8.5–10.1)
CHLORIDE SERPL-SCNC: 107 MMOL/L (ref 97–108)
CO2 SERPL-SCNC: 28 MMOL/L (ref 21–32)
COLONY COUNT,CNT: NORMAL
COLONY COUNT,CNT: NORMAL
CREAT SERPL-MCNC: 0.81 MG/DL (ref 0.55–1.02)
DIFFERENTIAL METHOD BLD: ABNORMAL
EOSINOPHIL # BLD: 0 K/UL (ref 0–0.4)
EOSINOPHIL NFR BLD: 0 % (ref 0–7)
ERYTHROCYTE [DISTWIDTH] IN BLOOD BY AUTOMATED COUNT: 13.5 % (ref 11.5–14.5)
GLOBULIN SER CALC-MCNC: 3.1 G/DL (ref 2–4)
GLUCOSE BLD STRIP.AUTO-MCNC: 108 MG/DL (ref 65–117)
GLUCOSE BLD STRIP.AUTO-MCNC: 56 MG/DL (ref 65–117)
GLUCOSE BLD STRIP.AUTO-MCNC: 76 MG/DL (ref 65–117)
GLUCOSE BLD STRIP.AUTO-MCNC: 88 MG/DL (ref 65–117)
GLUCOSE SERPL-MCNC: 99 MG/DL (ref 65–100)
HCT VFR BLD AUTO: 41.2 % (ref 35–47)
HGB BLD-MCNC: 13.1 G/DL (ref 11.5–16)
IMM GRANULOCYTES # BLD AUTO: 0.1 K/UL (ref 0–0.04)
IMM GRANULOCYTES NFR BLD AUTO: 1 % (ref 0–0.5)
LYMPHOCYTES # BLD: 0.6 K/UL (ref 0.8–3.5)
LYMPHOCYTES NFR BLD: 3 % (ref 12–49)
MCH RBC QN AUTO: 30.5 PG (ref 26–34)
MCHC RBC AUTO-ENTMCNC: 31.8 G/DL (ref 30–36.5)
MCV RBC AUTO: 96 FL (ref 80–99)
MONOCYTES # BLD: 0.6 K/UL (ref 0–1)
MONOCYTES NFR BLD: 3 % (ref 5–13)
NEUTS SEG # BLD: 15.5 K/UL (ref 1.8–8)
NEUTS SEG NFR BLD: 93 % (ref 32–75)
NRBC # BLD: 0 K/UL (ref 0–0.01)
NRBC BLD-RTO: 0 PER 100 WBC
PERFORMED BY, TECHID: ABNORMAL
PERFORMED BY, TECHID: NORMAL
PLATELET # BLD AUTO: 133 K/UL (ref 150–400)
PMV BLD AUTO: 12.8 FL (ref 8.9–12.9)
POTASSIUM SERPL-SCNC: 4.4 MMOL/L (ref 3.5–5.1)
PROT SERPL-MCNC: 6 G/DL (ref 6.4–8.2)
RBC # BLD AUTO: 4.29 M/UL (ref 3.8–5.2)
SODIUM SERPL-SCNC: 139 MMOL/L (ref 136–145)
SPECIAL REQUESTS,SREQ: NORMAL
WBC # BLD AUTO: 16.8 K/UL (ref 3.6–11)

## 2021-09-01 PROCEDURE — 92610 EVALUATE SWALLOWING FUNCTION: CPT

## 2021-09-01 PROCEDURE — 85025 COMPLETE CBC W/AUTO DIFF WBC: CPT

## 2021-09-01 PROCEDURE — 82962 GLUCOSE BLOOD TEST: CPT

## 2021-09-01 PROCEDURE — 71045 X-RAY EXAM CHEST 1 VIEW: CPT

## 2021-09-01 PROCEDURE — 74011250636 HC RX REV CODE- 250/636: Performed by: INTERNAL MEDICINE

## 2021-09-01 PROCEDURE — 74011000250 HC RX REV CODE- 250: Performed by: INTERNAL MEDICINE

## 2021-09-01 PROCEDURE — 74011000258 HC RX REV CODE- 258: Performed by: PHYSICIAN ASSISTANT

## 2021-09-01 PROCEDURE — 74011250636 HC RX REV CODE- 250/636: Performed by: PHYSICIAN ASSISTANT

## 2021-09-01 PROCEDURE — 74011250637 HC RX REV CODE- 250/637: Performed by: INTERNAL MEDICINE

## 2021-09-01 PROCEDURE — 80053 COMPREHEN METABOLIC PANEL: CPT

## 2021-09-01 PROCEDURE — 65270000029 HC RM PRIVATE

## 2021-09-01 PROCEDURE — 97535 SELF CARE MNGMENT TRAINING: CPT

## 2021-09-01 RX ORDER — SODIUM CHLORIDE 0.9 % (FLUSH) 0.9 %
5-40 SYRINGE (ML) INJECTION EVERY 8 HOURS
Status: CANCELLED | OUTPATIENT
Start: 2021-09-01

## 2021-09-01 RX ORDER — DEXTROSE MONOHYDRATE AND SODIUM CHLORIDE 5; .9 G/100ML; G/100ML
75 INJECTION, SOLUTION INTRAVENOUS CONTINUOUS
Status: DISCONTINUED | OUTPATIENT
Start: 2021-09-01 | End: 2021-09-05 | Stop reason: HOSPADM

## 2021-09-01 RX ORDER — SODIUM CHLORIDE 9 MG/ML
75 INJECTION, SOLUTION INTRAVENOUS CONTINUOUS
Status: CANCELLED | OUTPATIENT
Start: 2021-09-01

## 2021-09-01 RX ORDER — SODIUM CHLORIDE 0.9 % (FLUSH) 0.9 %
5-40 SYRINGE (ML) INJECTION AS NEEDED
Status: CANCELLED | OUTPATIENT
Start: 2021-09-01

## 2021-09-01 RX ADMIN — PIPERACILLIN AND TAZOBACTAM 3.38 G: 3; .375 INJECTION, POWDER, LYOPHILIZED, FOR SOLUTION INTRAVENOUS at 21:25

## 2021-09-01 RX ADMIN — OXYBUTYNIN CHLORIDE 5 MG: 5 TABLET ORAL at 21:26

## 2021-09-01 RX ADMIN — Medication 10 ML: at 05:14

## 2021-09-01 RX ADMIN — PRAMIPEXOLE DIHYDROCHLORIDE 1 MG: 1 TABLET ORAL at 21:26

## 2021-09-01 RX ADMIN — Medication 10 ML: at 14:00

## 2021-09-01 RX ADMIN — DEXTROSE AND SODIUM CHLORIDE 75 ML/HR: 5; 900 INJECTION, SOLUTION INTRAVENOUS at 17:00

## 2021-09-01 RX ADMIN — SODIUM CHLORIDE 75 ML/HR: 9 INJECTION, SOLUTION INTRAVENOUS at 11:54

## 2021-09-01 RX ADMIN — DEXTROSE MONOHYDRATE 25 G: 25 INJECTION, SOLUTION INTRAVENOUS at 15:59

## 2021-09-01 RX ADMIN — Medication 10 ML: at 21:26

## 2021-09-01 RX ADMIN — PIPERACILLIN AND TAZOBACTAM 3.38 G: 3; .375 INJECTION, POWDER, LYOPHILIZED, FOR SOLUTION INTRAVENOUS at 11:54

## 2021-09-01 NOTE — PROGRESS NOTES
Hospitalist Progress Note    Subjective:   Daily Progress Note: 9/1/2021 11:25 AM    Left hip pain.   Difficulty swallowing    Current Facility-Administered Medications   Medication Dose Route Frequency    cefTRIAXone (ROCEPHIN) 1 g in sterile water (preservative free) 10 mL IV syringe  1 g IntraVENous Q24H    midodrine (PROAMATINE) tablet 5 mg  5 mg Oral TID WITH MEALS    traMADoL (ULTRAM) tablet 50 mg  50 mg Oral Q6H PRN    0.9% sodium chloride infusion  75 mL/hr IntraVENous CONTINUOUS    albuterol (PROVENTIL VENTOLIN) nebulizer solution 2.5 mg  2.5 mg Nebulization Q4H PRN    aspirin delayed-release tablet 81 mg  81 mg Oral DAILY    atorvastatin (LIPITOR) tablet 20 mg  20 mg Oral DAILY    [START ON 9/5/2021] ergocalciferol capsule 50,000 Units  50,000 Units Oral every Sunday    [Held by provider] clonazePAM (KlonoPIN) tablet 0.5 mg  0.5 mg Oral TID    FLUoxetine (PROzac) capsule 20 mg  20 mg Oral DAILY    insulin glargine (LANTUS) injection 20 Units  20 Units SubCUTAneous QHS    montelukast (SINGULAIR) tablet 10 mg  10 mg Oral DAILY    pantoprazole (PROTONIX) tablet 40 mg  40 mg Oral ACB&D    oxybutynin (DITROPAN) tablet 5 mg  5 mg Oral BID    pramipexole (MIRAPEX) tablet 1 mg  1 mg Oral QHS    sodium chloride (NS) flush 5-40 mL  5-40 mL IntraVENous Q8H    sodium chloride (NS) flush 5-40 mL  5-40 mL IntraVENous PRN    acetaminophen (TYLENOL) tablet 650 mg  650 mg Oral Q6H PRN    Or    acetaminophen (TYLENOL) suppository 650 mg  650 mg Rectal Q6H PRN    polyethylene glycol (MIRALAX) packet 17 g  17 g Oral DAILY PRN    ondansetron (ZOFRAN ODT) tablet 4 mg  4 mg Oral Q8H PRN    Or    ondansetron (ZOFRAN) injection 4 mg  4 mg IntraVENous Q6H PRN    enoxaparin (LOVENOX) injection 40 mg  40 mg SubCUTAneous DAILY    [Held by provider] oxyCODONE-acetaminophen (PERCOCET) 5-325 mg per tablet 1 Tablet  1 Tablet Oral Q4H PRN    glucose chewable tablet 16 g  4 Tablet Oral PRN    dextrose (D50W) injection syrg 12.5-25 g  25-50 mL IntraVENous PRN    glucagon (GLUCAGEN) injection 1 mg  1 mg IntraMUSCular PRN    insulin lispro (HUMALOG) injection   SubCUTAneous AC&HS        Review of Systems  Review of Systems   Constitutional: Positive for malaise/fatigue. Negative for fever. HENT: Negative. Eyes: Negative. Respiratory: Negative for cough and shortness of breath. Cardiovascular: Negative for chest pain and leg swelling. Gastrointestinal: Negative for abdominal pain, nausea and vomiting. Genitourinary: Positive for dysuria. Musculoskeletal:        Left hip pain and pubic pain   Skin: Negative. Neurological: Negative. Psychiatric/Behavioral: Negative. Objective:     Visit Vitals  BP (!) 98/47   Pulse 76   Temp 98.4 °F (36.9 °C)   Resp 18   Ht 5' 6\" (1.676 m)   Wt 58.3 kg (128 lb 8.5 oz)   LMP  (LMP Unknown)   SpO2 91%   BMI 20.74 kg/m²    O2 Flow Rate (L/min): 2 l/min O2 Device: Nasal cannula    Temp (24hrs), Av.2 °F (36.8 °C), Min:97.4 °F (36.3 °C), Max:98.9 °F (37.2 °C)      No intake/output data recorded.    1901 -  0700  In: 650 [P.O.:650]  Out: 350 [Urine:350]    Recent Results (from the past 24 hour(s))   GLUCOSE, POC    Collection Time: 21 11:47 AM   Result Value Ref Range    Glucose (POC) 75 65 - 117 mg/dL    Performed by Obdulio Yen    GLUCOSE, POC    Collection Time: 21  9:04 PM   Result Value Ref Range    Glucose (POC) 82 65 - 117 mg/dL    Performed by Lennox Curia    CBC WITH AUTOMATED DIFF    Collection Time: 21  7:35 AM   Result Value Ref Range    WBC 16.8 (H) 3.6 - 11.0 K/uL    RBC 4.29 3.80 - 5.20 M/uL    HGB 13.1 11.5 - 16.0 g/dL    HCT 41.2 35.0 - 47.0 %    MCV 96.0 80.0 - 99.0 FL    MCH 30.5 26.0 - 34.0 PG    MCHC 31.8 30.0 - 36.5 g/dL    RDW 13.5 11.5 - 14.5 %    PLATELET 166 (L) 373 - 400 K/uL    MPV 12.8 8.9 - 12.9 FL    NRBC 0.0 0.0  WBC    ABSOLUTE NRBC 0.00 0.00 - 0.01 K/uL    NEUTROPHILS 93 (H) 32 - 75 % LYMPHOCYTES 3 (L) 12 - 49 %    MONOCYTES 3 (L) 5 - 13 %    EOSINOPHILS 0 0 - 7 %    BASOPHILS 0 0 - 1 %    IMMATURE GRANULOCYTES 1 (H) 0 - 0.5 %    ABS. NEUTROPHILS 15.5 (H) 1.8 - 8.0 K/UL    ABS. LYMPHOCYTES 0.6 (L) 0.8 - 3.5 K/UL    ABS. MONOCYTES 0.6 0.0 - 1.0 K/UL    ABS. EOSINOPHILS 0.0 0.0 - 0.4 K/UL    ABS. BASOPHILS 0.0 0.0 - 0.1 K/UL    ABS. IMM. GRANS. 0.1 (H) 0.00 - 0.04 K/UL    DF AUTOMATED     METABOLIC PANEL, COMPREHENSIVE    Collection Time: 09/01/21  7:35 AM   Result Value Ref Range    Sodium 139 136 - 145 mmol/L    Potassium 4.4 3.5 - 5.1 mmol/L    Chloride 107 97 - 108 mmol/L    CO2 28 21 - 32 mmol/L    Anion gap 4 (L) 5 - 15 mmol/L    Glucose 99 65 - 100 mg/dL    BUN 18 6 - 20 mg/dL    Creatinine 0.81 0.55 - 1.02 mg/dL    BUN/Creatinine ratio 22 (H) 12 - 20      GFR est AA >60 >60 ml/min/1.73m2    GFR est non-AA >60 >60 ml/min/1.73m2    Calcium 8.3 (L) 8.5 - 10.1 mg/dL    Bilirubin, total 0.4 0.2 - 1.0 mg/dL    AST (SGOT) 9 (L) 15 - 37 U/L    ALT (SGPT) 12 12 - 78 U/L    Alk. phosphatase 83 45 - 117 U/L    Protein, total 6.0 (L) 6.4 - 8.2 g/dL    Albumin 2.9 (L) 3.5 - 5.0 g/dL    Globulin 3.1 2.0 - 4.0 g/dL    A-G Ratio 0.9 (L) 1.1 - 2.2     GLUCOSE, POC    Collection Time: 09/01/21  9:10 AM   Result Value Ref Range    Glucose (POC) 88 65 - 117 mg/dL    Performed by Meenu Newsome         CT SPINE LUMB WO CONT   Final Result   Normal noncontrast CT of the pelvis. HISTORY:  lower back pain, multiple spinal surgeries   Dose reduction technique: All CT scans at this facility are performed using dose reduction optimization   technique as appropriate on the exam including the following: Automated exposure   control, adjustment of the MA and/or KV according to patient size of use of   iterative reconstructive technique. .      TECHNIQUE: CT of the pelvis without contrast   COMPARISON: None   LIMITATIONS: None   BLADDER: Normal   RETROPERITONEUM/AORTA: Dense aortic calcifications circumferentially but without   adi caliber abnormality. BOWEL/MESENTERY: Normal   APPENDIX: Identified and normal   PERITONEAL CAVITY: Normal   REPRODUCTIVE ORGANS: Normal   BONE/TISSUES: There is a nondisplaced fracture through the left inferior pubic   ramus. Slight adjacent bruising/swelling noted along the medial surface of the   fracture. No measurable hematoma. Advanced degenerative change of both hips   characterized by decreased joint space, osteophytosis about the femoral head and   necks, and subchondral sclerosis and cystic change involving the articular   surfaces. OTHER: Incidental note of a dependent gallstone. IMPRESSION: Nondisplaced fracture of the left inferior pubic ramus. advanced   degenerative changes of the hips. Please see lumbar spine CT for details   regarding the lumbar spine. Incidental visualization of cholelithiasis. HISTORY:  lower back pain, multiple spinal surgeries   Dose reduction technique: All CT scans at this facility are performed using dose reduction optimization   technique as appropriate on the exam including the following: Automated exposure   control, adjustment of the MA and/or KV according to patient size of use of   iterative reconstructive technique. .      TECHNIQUE: Noncontrast CT of the lumbar spine with multiplanar reconstructions. COMPARISON: 1/22/2015   LIMITATIONS: None      FRACTURES: None   ALIGNMENT: Extensive thoracic-lumbar fixation with transpedicular screws at   T10-L1, a left unilateral transpedicular screw at L2, bilateral transpedicular   screws at L3, a right unilateral transpedicular screw at L4, and bilateral   transpedicular screws at L5-S1. Intervening spinal rods. Alignment is grossly   anatomic. No obvious hardware complication      VERTEBRAL BODIES: Diffuse degenerative changes of the endplates and with areas   of osteophyte formation as on prior. Posterior osteophytes most prominent at   T12-L1.    DISC SPACES: Baseline degenerative disc disease. Disc spacers at L1-2, L2-3,   L3-4, L4-5. POSTERIOR ELEMENTS: Normal   SPINAL CANAL: Normal   SACROILIAC JOINT: Visualized portions unremarkable   PARASPINAL SOFT TISSUES: Normal      OTHER: Ectatic and diffusely calcified aorta without adi aneurysm      IMPRESSION: Postoperative changes as described without obvious hardware   complication or acute abnormality. Baseline degenerative disc disease and   arthritic change throughout the spine. CT PELV WO CONT   Final Result   Normal noncontrast CT of the pelvis. HISTORY:  lower back pain, multiple spinal surgeries   Dose reduction technique: All CT scans at this facility are performed using dose reduction optimization   technique as appropriate on the exam including the following: Automated exposure   control, adjustment of the MA and/or KV according to patient size of use of   iterative reconstructive technique. .      TECHNIQUE: CT of the pelvis without contrast   COMPARISON: None   LIMITATIONS: None   BLADDER: Normal   RETROPERITONEUM/AORTA: Dense aortic calcifications circumferentially but without   adi caliber abnormality. BOWEL/MESENTERY: Normal   APPENDIX: Identified and normal   PERITONEAL CAVITY: Normal   REPRODUCTIVE ORGANS: Normal   BONE/TISSUES: There is a nondisplaced fracture through the left inferior pubic   ramus. Slight adjacent bruising/swelling noted along the medial surface of the   fracture. No measurable hematoma. Advanced degenerative change of both hips   characterized by decreased joint space, osteophytosis about the femoral head and   necks, and subchondral sclerosis and cystic change involving the articular   surfaces. OTHER: Incidental note of a dependent gallstone. IMPRESSION: Nondisplaced fracture of the left inferior pubic ramus. advanced   degenerative changes of the hips. Please see lumbar spine CT for details   regarding the lumbar spine.  Incidental visualization of cholelithiasis. HISTORY:  lower back pain, multiple spinal surgeries   Dose reduction technique: All CT scans at this facility are performed using dose reduction optimization   technique as appropriate on the exam including the following: Automated exposure   control, adjustment of the MA and/or KV according to patient size of use of   iterative reconstructive technique. .      TECHNIQUE: Noncontrast CT of the lumbar spine with multiplanar reconstructions. COMPARISON: 1/22/2015   LIMITATIONS: None      FRACTURES: None   ALIGNMENT: Extensive thoracic-lumbar fixation with transpedicular screws at   T10-L1, a left unilateral transpedicular screw at L2, bilateral transpedicular   screws at L3, a right unilateral transpedicular screw at L4, and bilateral   transpedicular screws at L5-S1. Intervening spinal rods. Alignment is grossly   anatomic. No obvious hardware complication      VERTEBRAL BODIES: Diffuse degenerative changes of the endplates and with areas   of osteophyte formation as on prior. Posterior osteophytes most prominent at   T12-L1. DISC SPACES: Baseline degenerative disc disease. Disc spacers at L1-2, L2-3,   L3-4, L4-5. POSTERIOR ELEMENTS: Normal   SPINAL CANAL: Normal   SACROILIAC JOINT: Visualized portions unremarkable   PARASPINAL SOFT TISSUES: Normal      OTHER: Ectatic and diffusely calcified aorta without adi aneurysm      IMPRESSION: Postoperative changes as described without obvious hardware   complication or acute abnormality. Baseline degenerative disc disease and   arthritic change throughout the spine. XR SPINE LUMB 2 OR 3 V   Final Result   Osteoporosis and extensive postoperative changes. Negative for   acute process. XR HIP LT W OR WO PELV 2-3 VWS   Final Result   Negative for acute process. PHYSICAL EXAM:    Physical Exam  Vitals reviewed. Constitutional:       General: She is not in acute distress. Appearance: She is not ill-appearing. HENT:      Head: Normocephalic and atraumatic. Mouth/Throat:      Mouth: Mucous membranes are moist.      Pharynx: Oropharynx is clear. Eyes:      Conjunctiva/sclera: Conjunctivae normal.   Cardiovascular:      Rate and Rhythm: Normal rate and regular rhythm. Heart sounds: Normal heart sounds. Pulmonary:      Effort: Pulmonary effort is normal.      Breath sounds: Normal breath sounds. Abdominal:      General: Abdomen is flat. Bowel sounds are normal.      Palpations: Abdomen is soft. Musculoskeletal:      Cervical back: Normal range of motion and neck supple. Comments: Left leg hip flexion with 20 degrees due to pain all other extremities normal   Skin:     General: Skin is warm and dry. Neurological:      General: No focal deficit present. Mental Status: She is alert and oriented to person, place, and time. Mental status is at baseline. Psychiatric:         Mood and Affect: Mood normal.          Data Review    Recent Results (from the past 24 hour(s))   GLUCOSE, POC    Collection Time: 08/31/21 11:47 AM   Result Value Ref Range    Glucose (POC) 75 65 - 117 mg/dL    Performed by Mary Luna    GLUCOSE, POC    Collection Time: 08/31/21  9:04 PM   Result Value Ref Range    Glucose (POC) 82 65 - 117 mg/dL    Performed by Max Zimmerman    CBC WITH AUTOMATED DIFF    Collection Time: 09/01/21  7:35 AM   Result Value Ref Range    WBC 16.8 (H) 3.6 - 11.0 K/uL    RBC 4.29 3.80 - 5.20 M/uL    HGB 13.1 11.5 - 16.0 g/dL    HCT 41.2 35.0 - 47.0 %    MCV 96.0 80.0 - 99.0 FL    MCH 30.5 26.0 - 34.0 PG    MCHC 31.8 30.0 - 36.5 g/dL    RDW 13.5 11.5 - 14.5 %    PLATELET 754 (L) 862 - 400 K/uL    MPV 12.8 8.9 - 12.9 FL    NRBC 0.0 0.0  WBC    ABSOLUTE NRBC 0.00 0.00 - 0.01 K/uL    NEUTROPHILS 93 (H) 32 - 75 %    LYMPHOCYTES 3 (L) 12 - 49 %    MONOCYTES 3 (L) 5 - 13 %    EOSINOPHILS 0 0 - 7 %    BASOPHILS 0 0 - 1 %    IMMATURE GRANULOCYTES 1 (H) 0 - 0.5 %    ABS.  NEUTROPHILS 15.5 (H) 1.8 - 8.0 K/UL    ABS. LYMPHOCYTES 0.6 (L) 0.8 - 3.5 K/UL    ABS. MONOCYTES 0.6 0.0 - 1.0 K/UL    ABS. EOSINOPHILS 0.0 0.0 - 0.4 K/UL    ABS. BASOPHILS 0.0 0.0 - 0.1 K/UL    ABS. IMM. GRANS. 0.1 (H) 0.00 - 0.04 K/UL    DF AUTOMATED     METABOLIC PANEL, COMPREHENSIVE    Collection Time: 09/01/21  7:35 AM   Result Value Ref Range    Sodium 139 136 - 145 mmol/L    Potassium 4.4 3.5 - 5.1 mmol/L    Chloride 107 97 - 108 mmol/L    CO2 28 21 - 32 mmol/L    Anion gap 4 (L) 5 - 15 mmol/L    Glucose 99 65 - 100 mg/dL    BUN 18 6 - 20 mg/dL    Creatinine 0.81 0.55 - 1.02 mg/dL    BUN/Creatinine ratio 22 (H) 12 - 20      GFR est AA >60 >60 ml/min/1.73m2    GFR est non-AA >60 >60 ml/min/1.73m2    Calcium 8.3 (L) 8.5 - 10.1 mg/dL    Bilirubin, total 0.4 0.2 - 1.0 mg/dL    AST (SGOT) 9 (L) 15 - 37 U/L    ALT (SGPT) 12 12 - 78 U/L    Alk. phosphatase 83 45 - 117 U/L    Protein, total 6.0 (L) 6.4 - 8.2 g/dL    Albumin 2.9 (L) 3.5 - 5.0 g/dL    Globulin 3.1 2.0 - 4.0 g/dL    A-G Ratio 0.9 (L) 1.1 - 2.2     GLUCOSE, POC    Collection Time: 09/01/21  9:10 AM   Result Value Ref Range    Glucose (POC) 88 65 - 117 mg/dL    Performed by Shasta Bolanos         Assessment/Plan:     Active Problems:    Inferior pubic ramus fracture ([address] (8/30/2021)      Fall (8/31/2021)      This is a 33-year-old female admitted on 8/29/2021 after a ground-level fall at home. This resulted in a nondisplaced fracture of the left inferior pubic ramus with advanced degenerative changes of the hips. UA is consistent with a urinary tract infection. Urine culture pending. Started patient on Rocephin. Patient will be evaluated by PT\OT and set up for rehab services as an outpatient and most likely require placement. History of esophageal dysmotility and unable to swallow at this point. GI consultation, Dr. Maria Esther Butler primary gastroenterologist.  Speech evaluation at this point has failed, n.p.o. Impression:    1. Ground-level fall  2.   Inferior pubic ramus fracture, left  3. Diabetes mellitus, type II  4. Peripheral neuropathy  5. Hyperlipidemia  6. Urinary tract infection    Plan:    1. Ground-level fall  Will benefit from rehab and ongoing ambulation with roller walker    2. Inferior pubic ramus fracture, left  Nondisplaced on CT  Orthopedic consultation  PT OT for rehab    3. Diabetes mellitus, type II  Continue Lantus 20 units nightly  Sliding scale insulin    4. Peripheral neuropathy  Stable    5. Hyperlipidemia  Continue Lipitor    6. Urinary tract infection  Urine culture pending  UA consistent with uncomplicated urinary tract infection  Rocephin changed to Zosyn    7. Esophageal dysmotility  Unable to swallow at this point  Speech evaluation, n.p.o. failed speech assessment  May need require EGD  Consult GI, Dr. Brielle Mcgowan    8. Concern for aspiration pneumonia  Esophageal dysmotility and possible aspiration risk  Change antibiotics to Zosyn as white count is increasing  Chest x-ray pending    CODE STATUS: Full code    DVT prophylaxis: Lovenox  Ulcer prophylaxis: Protonix    Discharge barriers: PT OT consult and rehabilitation acceptance, EGD and speech evaluation, GI consultation    D[facility] with patient's [facility] discussed with: Patient/Family    Total time spent with patient: >35 minutes.

## 2021-09-01 NOTE — ROUTINE PROCESS
Bedside and Verbal shift change report given to Nino Carrel, RN (oncoming nurse) by Rhonda Dunbar (offgoing nurse). Report included the following information SBAR, Kardex, MAR, Accordion, Recent Results, Med Rec Status and Quality Measures.

## 2021-09-01 NOTE — PROGRESS NOTES
SPEECH LANGUAGE PATHOLOGY BEDSIDE SWALLOW EVALUATION  Patient: Tali Hernández (61 y.o. female)  Date: 9/1/2021  Primary Diagnosis: Inferior pubic ramus fracture ([address] Hulan Fonder  Fall [W19. XXXA]        Precautions: aspiration      ASSESSMENT :  Based on the objective data described below, the patient presents with esophageal dysfunction. Patient w/ hx of chronic dysphagia, esophageal.   Per records review, patient had Ba esophagram completed 4/16/21 w/ results indicative of esophageal dysmotility, regurgitation and spasm. Patient reports she has been modifying her diet at home to softer textures, however it has worsened. Patient reports epigastric pain, nausea and regurgitation/vomiting of both solids and liquids when she swallows. She points to thoracic esophagus where her symptoms occur. She also reports globus sensation at cervical esophagus. Patient reports some weight loss. She does say that a feeding tube has been mentioned to her before. Patient w/ xerostomia. This can negatively impact bolus manipulation, transfer and deglutition. Oral motor largely WFL. Administered thin liquids and puree (applesauce). Swallow initiation timely, HLE and protraction reduced to digital palpation. No overt s/sx of aspiration/penetration noted. Approximately one minute after swallowing, patient regurgitated water into emesis bag. No further trials administered. Patient will benefit from skilled intervention to address the above impairments. Patients rehabilitation potential is considered to be Fair     PLAN :  Recommendations and Planned Interventions:  NPO, except sips of water. GI consult. Will advance diet as tolerated and if cleared by GI.   RD consult for supplemental nutrition and calorie count. Frequency/Duration: Patient will be followed by speech-language pathology 3 times a week to address goals. Discharge Recommendations: To Be Determined     SUBJECTIVE:   Patient alert.  Seen at chairside. She is [place]. OBJECTIVE:     CXR Results  (Last 48 hours)                 09/01/21 1125  XR CHEST PORT Final result    Narrative:  Chest single view. Comparison single view chest January 2, 2021. Similar coarse reticular markings through lungs. Increased lung volumes. Suspect   degree COPD/emphysema. No gross interstitial or alveolar pulmonary edema. Cardiac and mediastinal   structures unchanged again noted pulmonary arterial prominence, evidence for   pulmonary arterial hypertension. Atherosclerotic change thoracic aorta. No   pneumothorax or sizable pleural effusion. Partially imaged hardware thoracolumbar spine.               CT Results  (Last 48 hours)      None             Past Medical History:   Diagnosis Date    Arthritis     Bacterial meningitis     Hx of    CAD (coronary artery disease)     Chronic obstructive pulmonary disease (HCC)     Chronic pain     DDD (degenerative disc disease), cervical     Diabetes (HCC)     Environmental allergies     Esophageal disorder     Fibromyalgia     GERD (gastroesophageal reflux disease)     Hypercholesterolemia     Hypertension     Kidney stones     hx of    Macular degeneration     Mixed stress and urge urinary incontinence 7/2/2020    Neurogenic bladder 7/2/2020    Psychiatric disorder     Rheumatoid arthritis ([address]     Scoliosis     Shingles     Hx of    Sleep apnea     pt states uses CPAP    Suicidal thoughts     pt stated during PAT visit , she has hx of suicidal thoughts age 17's, pt stated never attempted and further thoughts of suicide since and none that day    Torn rotator cuff     pt states on right side    Urinary tract infection without hematuria 7/2/2020     Past Surgical History:   Procedure Laterality Date    COLONOSCOPY N/A 10/9/2020    COLONOSCOPY performed by Alexey Martin MD at [facility] HX CARPAL TUNNEL RELEASE      HX DILATION AND CURETTAGE      HX JOINT REPLACEMENT SURGERY knee both sides    HX LUMBAR FUSION      HX ORTHOPAEDIC      total knee replacements    HX ORTHOPAEDIC      back surgery     HX UROLOGICAL      stent in ureter    HX UROLOGICAL  09/14/2020    Cystoscopy    HX UROLOGICAL Left 09/14/2020    retrograde pyelogram    HX UROLOGICAL Left 09/14/2020    ureteral stent exchange    HX UROLOGICAL  11/09/2020    Cystoscopy    HX UROLOGICAL  11/09/2020    retrograde pyelograms    HX UROLOGICAL Left 11/09/2020    robotic left pyeloplasty    HX UROLOGICAL Left 11/09/2020    ureteral stent exchange    HX UROLOGICAL  11/24/2020    cystoscopy stent removal     HX UROLOGICAL  05/28/2021    CYSTO    NEUROLOGICAL PROCEDURE UNLISTED      back surg    MN BREAST SURGERY PROCEDURE UNLISTED      MN CYSTOSCOPY,INSERT URETERAL STENT  5/8/2018, 7/11/2017, 2/9/2019, 10/11/2016, 6/7/2016, 1/14/2016, 7/14/2015     Prior Level of Function/Home Situation: unknown  Home Situation  Home Environment: Private residence  # Steps to Enter: 7  Rails to Enter: Yes  Hand Rails : Bilateral  One/Two Story Residence: One story  Living Alone: No  Support Systems: Family member(s)  Patient Expects to be Discharged to[de-identified] Apartment  Current DME Used/Available at Home: Caty Partida, straight, Walker, rolling, Shower chair  Diet prior to admission: regular, thin  Current Diet:  Regular, thin   Cognitive and Communication Status:  Neurologic State: Alert  Orientation Level: Oriented X4  Cognition: Appropriate safety awareness, Appropriate for age attention/concentration  Perception: Appears intact        Swallowing Evaluation:   Oral Assessment:  Oral Assessment  Labial: No impairment  Dentition: Limited  Oral Hygiene: xerostomia  Lingual: No impairment  Velum: No impairment  Mandible: No impairment  P.O. Trials:  Patient Position: upright in chair  Vocal quality prior to P.O.: No impairment  Consistency Presented:  Thin liquid;Puree  How Presented: Cup/sip;Spoon;Self-fed/presented     Bolus Acceptance: No impairment  Bolus Formation/Control: No impairment     Propulsion: No impairment  Oral Residue: None  Initiation of Swallow: No impairment  Laryngeal Elevation: Functional  Aspiration Signs/Symptoms: None  Pharyngeal Phase Characteristics: Other (comment) (esophageal dysfunction)             Oral Phase Severity: Minimal  Pharyngeal Phase Severity : Minimal  Voice:           Vocal Quality: No impairment          Pain: 0       After treatment:   Patient left in no apparent distress sitting up in chair and Call bell within reach    COMMUNICATION/EDUCATION:   Patient was educated regarding purpose of SLP assessment, POC, diet recs and sw safety precautions. Patient demonstrated Good understanding as evidenced by verbal understanding. The patient's plan of care including recommendations, planned interventions, and recommended diet changes were discussed with: Physician. Patient/family have participated as able in goal setting and plan of care. Thank you for this referral.  Julia Ruvalcaba SLP M.S. CCC-SLP  Time Calculation: 16 mins           Problem: Dysphagia (Adult)  Goal: *Acute Goals and Plan of Care (Insert Text)  Description: Speech Therapy Swallow Goals  Initiated 9/1/2021  -Patient will tolerate PO trials without clinical indicators of aspiration given no cues within 5-7 day(s). [ ] Not met  [ ]  MET   [ ] Progressing  [ ] Jesse Mcgowan  -Patient will participate in modified barium swallow study within 5-7 day(s). [ ] Not met  [ ]  MET   [ ] Progressing  [ ] Jesse Mcgowan  -Patient will demonstrate understanding of swallow safety precautions and aspiration precautions, diet recs with no cues within 5-7 day(s).         [ ] Not met  [ ]  MET   [ ] Progressing  [ ] Discontinue      Outcome: Not Progressing Towards Goal

## 2021-09-01 NOTE — PROGRESS NOTES
Problem: Self Care Deficits Care Plan (Adult)  Goal: *Acute Goals and Plan of Care (Insert Text)  Description: Pt will be MI sup<->sit in prep for EOB ADL's  Pt will be MI  LB dressing EOB level  Pt will be MI  sit<-> prep for toilet transfer  Pt will be MI  toilet transfer with LRAD  Pt will be MI  toileting/cloth mgmt LRAD  Pt will be MI  grooming standing sink  Pt will be MI bathing sitting/standing sink LRAD  Pt will be MI solange UE HEP in prep for self care tasks      Outcome: Progressing Towards Goal   OCCUPATIONAL THERAPY TREATMENT  Patient: Clarissa Lowery (79 y.o. female)  Date: 9/1/2021  Diagnosis: Inferior pubic ramus fracture ([address] Yvonnie Corpus  Fall [W19. XXXA] <principal problem not specified>       Precautions: FALL, WBAT L Pelvic fx  Chart, occupational therapy assessment, plan of care, and goals were reviewed. ASSESSMENT  Patient continues with skilled OT services and is progressing towards goals. Patient received semi supine in bed upon SKINNER'S arrival, on  2L of oxygen via NC and agreeable to work with therapist.  Patient required SBA x1 for bed mobility, SBA supine to sit EOB using bed rail, and scooting with increased time 2/2 decrease activity tolerance and pain. STS with CGA using Rw/gait belt for bed to toilet transfer  with min A and vc's for correct technique for safety. SBA for toileting and cloth mgmt. Min A toilet to chair tf with vc's for navigating RW. Seated in chair, pt required SBA/set-up for bathing upper body( chest and arms and lower back) and lower body (perineal area and thighs). Patient required SBA/set-up for doffing/donning gown. No SOB noted. Patient reported stomach upset and nursing was notified. Patient left resting comfortably drinking coffee. Patient would benefit from continued skilled Occupational services while at [facility] in order to increase safety and independence with self care and functional tranfers/mobility.  Recommend at discharge to  IRF vs HHOT when medically appropriate. Current Level of Function Impacting Discharge (ADLs): SBA/set-up for toileting,UB dressing, bathing    Other factors to consider for discharge: Time of onset, medical prognosis/diagnosis, severity of deficits, PLOF, home environment, and family support          PLAN :  Patient continues to benefit from skilled intervention to address the above impairments. Continue treatment per established plan of care. to address goals. Recommend next OT session: Standing tolerance    Recommendation for discharge: (in order for the patient to meet his/her long term goals)  IRF vs HHOT pending therapy progress    This discharge recommendation:  Has been made in collaboration with the attending provider and/or case management    IF patient discharges home will need the following DME: TBD       SUBJECTIVE:   Patient stated my stomach is upset.     OBJECTIVE DATA SUMMARY:   Cognitive/Behavioral Status:  Neurologic State: Alert  Orientation Level: Oriented X4  Cognition: Appropriate for age attention/concentration (HOE)             Functional Mobility and Transfers for ADLs:  Bed Mobility:  Rolling: Stand-by assistance  Supine to Sit: Stand-by assistance  Sit to Supine: Stand-by assistance  Scooting: Stand-by assistance    Transfers:  Sit to Stand: Contact guard assistance  Functional Transfers  Toilet Transfer : Minimum assistance       Balance:  Sitting: Intact; With support  Sitting - Static: Good (unsupported)  Sitting - Dynamic: Fair (occasional)  Standing: Impaired; With support  Standing - Static: Fair;Constant support  Standing - Dynamic : Fair;Constant support    ADL Intervention:            Upper Body Bathing  Bathing Assistance: Stand-by assistance;Set-up  Position Performed: Seated in chair    Lower Body Bathing  Bathing Assistance: Stand-by assistance;Set-up  Perineal  : Stand-by assistance;Set-up  Position Performed: Seated in chair  Lower Body : Stand-by assistance;Set-up  Position Performed: Seated in chair    Upper Body Dressing Assistance  Dressing Assistance: Stand-by assistance;Set-up  Hospital Gown: Stand-by assistance;Set-up         Toileting  Toileting Assistance: Stand-by assistance  Bladder Hygiene: Stand-by assistance  Bowel Hygiene: Stand-by assistance  Clothing Management: Stand-by assistance  Adaptive Equipment: Grab bars; Walker         Pain:  8/10 R pelvic area    Activity Tolerance:   Fair and requires rest breaks  Please refer to the flowsheet for vital signs taken during this treatment. After treatment patient left in no apparent distress:   Sitting in chair and Call bell within reach    COMMUNICATION/COLLABORATION:   The patients plan of care was discussed with: Registered nurse.      SUSANNE Hoyos  Time Calculation: 40 mins

## 2021-09-01 NOTE — PROGRESS NOTES
Consult received. Patient presenting w/ esophageal dysfunction and unable to keep liquids down. Recommend NPO, small sips of water only until GI sees. Patient had Ba esophagram 4/16/21 confirming esophageal dysmotility.

## 2021-09-01 NOTE — CONSULTS
Gastroenterology Consult     Referring Physician: Altha Habermann, MD     Consult Date: 9/1/2021     Subjective:     Chief Complaint: Fall    History of Present Illness: Yumiko Singleton is a 66 y.o. female who is seen in consultation for esophageal dysmotility. Patient was admitted to the hospital with report of ground-level fall at home. She reported moderate pain from her left hip. No other injury reported. History of chronic dysphagia. She had and EGD on 10/9/2020 which showed poor motility of esophagus. 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 Barium esophagram on 5/3/21 esophageal dysmotility, no obstructions, no reflux. Speech pathology evaluated patient,  they noted \"Swallow initiation timely, HLE and protraction reduced to digital palpation. No overt s/sx of aspiration/penetration noted. Approximately one minute after swallowing, patient regurgitated water into emesis bag\". Dry mouth. X-ray of left hip negative,  XR of spine - Osteoporosis and extensive postoperative changes. Negative for acute process. CT spine - Postoperative changes as described without obvious hardware  complication or acute abnormality. Baseline degenerative disc disease and  arthritic change throughout the spine. XR chest - Similar coarse reticular markings through lungs. Increased lung volumes. Suspectdegree COPD/emphysema. No gross interstitial or alveolar pulmonary edema. Cardiac and mediastinalstructures unchanged again noted pulmonary arterial prominence, evidence forpulmonary arterial hypertension. Atherosclerotic change thoracic aorta. No pneumothorax or sizable pleural effusion. Partially imaged hardware thoracolumbar spine.       Past Medical History:   Diagnosis Date    Arthritis     Bacterial meningitis     Hx of    CAD (coronary artery disease)     Chronic obstructive pulmonary disease (HCC)     Chronic pain     DDD (degenerative disc disease), cervical     Diabetes ([facility] [address]     Environmental allergies     Esophageal disorder     Fibromyalgia     GERD (gastroesophageal reflux disease)     Hypercholesterolemia     Hypertension     Kidney stones     hx of    Macular degeneration     Mixed stress and urge urinary incontinence 7/2/2020    Neurogenic bladder 7/2/2020    Psychiatric disorder     Rheumatoid arthritis ([address]     Scoliosis     Shingles     Hx of    Sleep apnea     pt states uses CPAP    Suicidal thoughts     pt stated during PAT visit , she has hx of suicidal thoughts age 17's, pt stated never attempted and further thoughts of suicide since and none that day    Torn rotator cuff     pt states on right side    Urinary tract infection without hematuria 7/2/2020     Past Surgical History:   Procedure Laterality Date    COLONOSCOPY N/A 10/9/2020    COLONOSCOPY performed by Ponce Vivar MD at [place] [address] [id number]      HX DILATION AND CURETTAGE      HX JOINT REPLACEMENT SURGERY      knee both sides    HX LUMBAR FUSION      HX ORTHOPAEDIC      total knee replacements    HX ORTHOPAEDIC      back surgery     HX UROLOGICAL      stent in ureter    HX UROLOGICAL  09/14/2020    Cystoscopy    HX UROLOGICAL Left 09/14/2020    retrograde pyelogram    HX UROLOGICAL Left 09/14/2020    ureteral stent exchange    HX UROLOGICAL  11/09/2020    Cystoscopy    HX UROLOGICAL  11/09/2020    retrograde pyelograms    HX UROLOGICAL Left 11/09/2020    robotic left pyeloplasty    HX UROLOGICAL Left 11/09/2020    ureteral stent exchange    HX UROLOGICAL  11/24/2020    cystoscopy stent removal     HX UROLOGICAL  05/28/2021    CYSTO    NEUROLOGICAL PROCEDURE UNLISTED      back surg    OK BREAST SURGERY PROCEDURE UNLISTED      OK CYSTOSCOPY,INSERT URETERAL STENT  5/8/2018, 7/11/2017, 2/9/2019, 10/11/2016, 6/7/2016, 1/14/2016, 7/14/2015      Family History   Problem Relation Age of Onset    Hypertension Mother     Heart Disease Mother     Diabetes Mother     Liver Disease Mother     Diabetes Father     Heart Disease Father     Heart Disease Brother     Diabetes Brother     COPD Brother     Liver Disease Brother     Hypertension Sister     Elevated Lipids Sister      Social History     Tobacco Use    Smoking status: Former Smoker     Years: 55.00     Quit date: 2005     Years since quittin.3    Smokeless tobacco: Never Used    Tobacco comment: pt satclarissa quit 7 years ago   Substance Use Topics    Alcohol use: Yes     Comment: special occasions      Allergies   Allergen Reactions    Codeine Other (comments)     hallucinations     Current Facility-Administered Medications   Medication Dose Route Frequency    piperacillin-tazobactam (ZOSYN) 3.375 g in 0.9% sodium chloride (MBP/ADV) 100 mL MBP  3.375 g IntraVENous Q8H    midodrine (PROAMATINE) tablet 5 mg  5 mg Oral TID WITH MEALS    traMADoL (ULTRAM) tablet 50 mg  50 mg Oral Q6H PRN    0.9% sodium chloride infusion  75 mL/hr IntraVENous CONTINUOUS    albuterol (PROVENTIL VENTOLIN) nebulizer solution 2.5 mg  2.5 mg Nebulization Q4H PRN    aspirin delayed-release tablet 81 mg  81 mg Oral DAILY    atorvastatin (LIPITOR) tablet 20 mg  20 mg Oral DAILY    [START ON 2021] ergocalciferol capsule 50,000 Units  50,000 Units Oral every     [Held by provider] clonazePAM (KlonoPIN) tablet 0.5 mg  0.5 mg Oral TID    FLUoxetine (PROzac) capsule 20 mg  20 mg Oral DAILY    insulin glargine (LANTUS) injection 20 Units  20 Units SubCUTAneous QHS    montelukast (SINGULAIR) tablet 10 mg  10 mg Oral DAILY    pantoprazole (PROTONIX) tablet 40 mg  40 mg Oral ACB&D    oxybutynin (DITROPAN) tablet 5 mg  5 mg Oral BID    pramipexole (MIRAPEX) tablet 1 mg  1 mg Oral QHS    sodium chloride (NS) flush 5-40 mL  5-40 mL IntraVENous Q8H    sodium chloride (NS) flush 5-40 mL  5-40 mL IntraVENous PRN    acetaminophen (TYLENOL) tablet 650 mg  650 mg Oral Q6H PRN    Or    acetaminophen (TYLENOL) suppository 650 mg 650 mg Rectal Q6H PRN    polyethylene glycol (MIRALAX) packet 17 g  17 g Oral DAILY PRN    ondansetron (ZOFRAN ODT) tablet 4 mg  4 mg Oral Q8H PRN    Or    ondansetron (ZOFRAN) injection 4 mg  4 mg IntraVENous Q6H PRN    enoxaparin (LOVENOX) injection 40 mg  40 mg SubCUTAneous DAILY    [Held by provider] oxyCODONE-acetaminophen (PERCOCET) 5-325 mg per tablet 1 Tablet  1 Tablet Oral Q4H PRN    glucose chewable tablet 16 g  4 Tablet Oral PRN    dextrose (D50W) injection syrg 12.5-25 g  25-50 mL IntraVENous PRN    glucagon (GLUCAGEN) injection 1 mg  1 mg IntraMUSCular PRN    insulin lispro (HUMALOG) injection   SubCUTAneous AC&HS        Review of Systems:  A detailed 10 organ review of systems is obtained with pertinent positives as listed in the History of Present Illness and Past Medical History. All others are negative. Objective:     Physical Exam:  Visit Vitals  BP (!) 98/47   Pulse 76   Temp 98.4 °F (36.9 °C)   Resp 18   Ht 5' 6\" (1.676 m)   Wt 58.3 kg (128 lb 8.5 oz)   LMP  (LMP Unknown)   SpO2 91%   BMI 20.74 kg/m²        Skin:  Extremities and face reveal no rashes. No galicia erythema. No telangiectasias on the chest wall. HEENT: Sclerae anicteric. Extra-occular muscles are intact. No oral ulcers. No abnormal pigmentation of the lips. The neck is supple. Cardiovascular: Regular rate and rhythm. No murmurs, gallops, or rubs. PMI nondisplaced. Carotids without bruits. Respiratory:  Comfortable breathing with no accessory muscle use. Clear breath sounds with no wheezes, rales, or rhonchi. GI:  Abdomen nondistended, soft, and nontender. Normal active bowel sounds. No enlargement of the liver or spleen. No masses palpable. Rectal:  Deferred  Musculoskeletal:  No pitting edema of the lower legs. Extremities have good range of motion. No costovertebral tenderness. Neurological:  Gross memory appears intact. Patient is alert and oriented.   Psychiatric:  Mood appears appropriate with judgement intact. Lymphatic:  No cervical or supraclavicular adenopathy. Lab/Data Review:  Recent Results (from the past 24 hour(s))   GLUCOSE, POC    Collection Time: 08/31/21  9:04 PM   Result Value Ref Range    Glucose (POC) 82 65 - 117 mg/dL    Performed by Beth Rao    CBC WITH AUTOMATED DIFF    Collection Time: 09/01/21  7:35 AM   Result Value Ref Range    WBC 16.8 (H) 3.6 - 11.0 K/uL    RBC 4.29 3.80 - 5.20 M/uL    HGB 13.1 11.5 - 16.0 g/dL    HCT 41.2 35.0 - 47.0 %    MCV 96.0 80.0 - 99.0 FL    MCH 30.5 26.0 - 34.0 PG    MCHC 31.8 30.0 - 36.5 g/dL    RDW 13.5 11.5 - 14.5 %    PLATELET 410 (L) 235 - 400 K/uL    MPV 12.8 8.9 - 12.9 FL    NRBC 0.0 0.0  WBC    ABSOLUTE NRBC 0.00 0.00 - 0.01 K/uL    NEUTROPHILS 93 (H) 32 - 75 %    LYMPHOCYTES 3 (L) 12 - 49 %    MONOCYTES 3 (L) 5 - 13 %    EOSINOPHILS 0 0 - 7 %    BASOPHILS 0 0 - 1 %    IMMATURE GRANULOCYTES 1 (H) 0 - 0.5 %    ABS. NEUTROPHILS 15.5 (H) 1.8 - 8.0 K/UL    ABS. LYMPHOCYTES 0.6 (L) 0.8 - 3.5 K/UL    ABS. MONOCYTES 0.6 0.0 - 1.0 K/UL    ABS. EOSINOPHILS 0.0 0.0 - 0.4 K/UL    ABS. BASOPHILS 0.0 0.0 - 0.1 K/UL    ABS. IMM. GRANS. 0.1 (H) 0.00 - 0.04 K/UL    DF AUTOMATED     METABOLIC PANEL, COMPREHENSIVE    Collection Time: 09/01/21  7:35 AM   Result Value Ref Range    Sodium 139 136 - 145 mmol/L    Potassium 4.4 3.5 - 5.1 mmol/L    Chloride 107 97 - 108 mmol/L    CO2 28 21 - 32 mmol/L    Anion gap 4 (L) 5 - 15 mmol/L    Glucose 99 65 - 100 mg/dL    BUN 18 6 - 20 mg/dL    Creatinine 0.81 0.55 - 1.02 mg/dL    BUN/Creatinine ratio 22 (H) 12 - 20      GFR est AA >60 >60 ml/min/1.73m2    GFR est non-AA >60 >60 ml/min/1.73m2    Calcium 8.3 (L) 8.5 - 10.1 mg/dL    Bilirubin, total 0.4 0.2 - 1.0 mg/dL    AST (SGOT) 9 (L) 15 - 37 U/L    ALT (SGPT) 12 12 - 78 U/L    Alk.  phosphatase 83 45 - 117 U/L    Protein, total 6.0 (L) 6.4 - 8.2 g/dL    Albumin 2.9 (L) 3.5 - 5.0 g/dL    Globulin 3.1 2.0 - 4.0 g/dL    A-G Ratio 0.9 (L) 1.1 - 2.2     GLUCOSE, POC Collection Time: 09/01/21  9:10 AM   Result Value Ref Range    Glucose (POC) 88 65 - 117 mg/dL    Performed by [address], POC    Collection Time: 09/01/21 11:19 AM   Result Value Ref Range    Glucose (POC) 76 65 - 117 mg/dL    Performed by Jorge Morales         XR CHEST PORT   Final Result      CT SPINE LUMB WO CONT   Final Result   Normal noncontrast CT of the pelvis. HISTORY:  lower back pain, multiple spinal surgeries   Dose reduction technique: All CT scans at this facility are performed using dose reduction optimization   technique as appropriate on the exam including the following: Automated exposure   control, adjustment of the MA and/or KV according to patient size of use of   iterative reconstructive technique. .      TECHNIQUE: CT of the pelvis without contrast   COMPARISON: None   LIMITATIONS: None   BLADDER: Normal   RETROPERITONEUM/AORTA: Dense aortic calcifications circumferentially but without   adi caliber abnormality. BOWEL/MESENTERY: Normal   APPENDIX: Identified and normal   PERITONEAL CAVITY: Normal   REPRODUCTIVE ORGANS: Normal   BONE/TISSUES: There is a nondisplaced fracture through the left inferior pubic   ramus. Slight adjacent bruising/swelling noted along the medial surface of the   fracture. No measurable hematoma. Advanced degenerative change of both hips   characterized by decreased joint space, osteophytosis about the femoral head and   necks, and subchondral sclerosis and cystic change involving the articular   surfaces. OTHER: Incidental note of a dependent gallstone. IMPRESSION: Nondisplaced fracture of the left inferior pubic ramus. advanced   degenerative changes of the hips. Please see lumbar spine CT for details   regarding the lumbar spine. Incidental visualization of cholelithiasis. HISTORY:  lower back pain, multiple spinal surgeries   Dose reduction technique:    All CT scans at this facility are performed using dose reduction optimization   technique as appropriate on the exam including the following: Automated exposure   control, adjustment of the MA and/or KV according to patient size of use of   iterative reconstructive technique. .      TECHNIQUE: Noncontrast CT of the lumbar spine with multiplanar reconstructions. COMPARISON: 1/22/2015   LIMITATIONS: None      FRACTURES: None   ALIGNMENT: Extensive thoracic-lumbar fixation with transpedicular screws at   T10-L1, a left unilateral transpedicular screw at L2, bilateral transpedicular   screws at L3, a right unilateral transpedicular screw at L4, and bilateral   transpedicular screws at L5-S1. Intervening spinal rods. Alignment is grossly   anatomic. No obvious hardware complication      VERTEBRAL BODIES: Diffuse degenerative changes of the endplates and with areas   of osteophyte formation as on prior. Posterior osteophytes most prominent at   T12-L1. DISC SPACES: Baseline degenerative disc disease. Disc spacers at L1-2, L2-3,   L3-4, L4-5. POSTERIOR ELEMENTS: Normal   SPINAL CANAL: Normal   SACROILIAC JOINT: Visualized portions unremarkable   PARASPINAL SOFT TISSUES: Normal      OTHER: Ectatic and diffusely calcified aorta without adi aneurysm      IMPRESSION: Postoperative changes as described without obvious hardware   complication or acute abnormality. Baseline degenerative disc disease and   arthritic change throughout the spine. CT PELV WO CONT   Final Result   Normal noncontrast CT of the pelvis. HISTORY:  lower back pain, multiple spinal surgeries   Dose reduction technique: All CT scans at this facility are performed using dose reduction optimization   technique as appropriate on the exam including the following: Automated exposure   control, adjustment of the MA and/or KV according to patient size of use of   iterative reconstructive technique. .      TECHNIQUE: CT of the pelvis without contrast   COMPARISON: None   LIMITATIONS: None   BLADDER: Normal RETROPERITONEUM/AORTA: Dense aortic calcifications circumferentially but without   adi caliber abnormality. BOWEL/MESENTERY: Normal   APPENDIX: Identified and normal   PERITONEAL CAVITY: Normal   REPRODUCTIVE ORGANS: Normal   BONE/TISSUES: There is a nondisplaced fracture through the left inferior pubic   ramus. Slight adjacent bruising/swelling noted along the medial surface of the   fracture. No measurable hematoma. Advanced degenerative change of both hips   characterized by decreased joint space, osteophytosis about the femoral head and   necks, and subchondral sclerosis and cystic change involving the articular   surfaces. OTHER: Incidental note of a dependent gallstone. IMPRESSION: Nondisplaced fracture of the left inferior pubic ramus. advanced   degenerative changes of the hips. Please see lumbar spine CT for details   regarding the lumbar spine. Incidental visualization of cholelithiasis. HISTORY:  lower back pain, multiple spinal surgeries   Dose reduction technique: All CT scans at this facility are performed using dose reduction optimization   technique as appropriate on the exam including the following: Automated exposure   control, adjustment of the MA and/or KV according to patient size of use of   iterative reconstructive technique. .      TECHNIQUE: Noncontrast CT of the lumbar spine with multiplanar reconstructions. COMPARISON: 1/22/2015   LIMITATIONS: None      FRACTURES: None   ALIGNMENT: Extensive thoracic-lumbar fixation with transpedicular screws at   T10-L1, a left unilateral transpedicular screw at L2, bilateral transpedicular   screws at L3, a right unilateral transpedicular screw at L4, and bilateral   transpedicular screws at L5-S1. Intervening spinal rods. Alignment is grossly   anatomic. No obvious hardware complication      VERTEBRAL BODIES: Diffuse degenerative changes of the endplates and with areas   of osteophyte formation as on prior.  Posterior osteophytes most prominent at   T12-L1. DISC SPACES: Baseline degenerative disc disease. Disc spacers at L1-2, L2-3,   L3-4, L4-5. POSTERIOR ELEMENTS: Normal   SPINAL CANAL: Normal   SACROILIAC JOINT: Visualized portions unremarkable   PARASPINAL SOFT TISSUES: Normal      OTHER: Ectatic and diffusely calcified aorta without adi aneurysm      IMPRESSION: Postoperative changes as described without obvious hardware   complication or acute abnormality. Baseline degenerative disc disease and   arthritic change throughout the spine. XR SPINE LUMB 2 OR 3 V   Final Result   Osteoporosis and extensive postoperative changes. Negative for   acute process. XR HIP LT W OR WO PELV 2-3 VWS   Final Result   Negative for acute process. Assessment/Plan:   1. Esophageal dysmotility       -EGD on 10/9/2020 which showed poor motility of esophagus. Pathology came back with scant Efrguson's metaplasia. - Barium esophagram on 5/3/21 esophageal dysmotility, no obstructions, no reflux.         -continue PPI       -Schedule for EGD in AM. Pls get consent. NPO post midnight. 2. Left hip Fracture      -Ortho has been consulted  3. Diabetes mellitus, type II       -Continue Lantus 20 units nightly  Sliding scale insulin. Managed by primary. 4. UTI      -on Zosyn      -pending urine culture    Active Problems:    Inferior pubic ramus fracture ([address] (8/30/2021)      Fall (8/31/2021)         IP CONSULT TO ORTHOPEDIC SURGERY  IP CONSULT TO GASTROENTEROLOGY      Patient discussed with Dr Cheyanne Judd and agrees to above impression and plan.   Thank you for allowing me to participate in this patients care    [place] Claudia FINNEY-C  Cc Referring Physician   Tyrone Echeverria MD

## 2021-09-01 NOTE — CONSULTS
Consult Date: 8/31/2021    IP CONSULT TO ORTHOPEDIC SURGERY  Consult performed by: Tiffany Stovall MD  Consult ordered by: Red Hendricks PA-C  Assessment/Recommendations:     ASSESSMENT:  1) Stable pelvic ring injury  2) Fracture of left inferior pubic ramus  3) Moderately advanced DJD both hips  4) History of bilateral knee arthroplasty  5) Difficulty in walking    RECOMMENDATIONS/PLAN:    I had a detailed discussion with the patient about my findings, and the diagnostic impressions. Conservative management is appropriate for the patient's situation and the injury to her pelvic ring, with physical therapy for gait training and transfers, and supportive and symptomatic treatment. The patient may be weightbearing as tolerated, on both lower extremities. No specific orthopedic surgical intervention is indicated at this time. The patient appeared to understand the issues discussed, and was agreeable with the plan. Mrs. Liliana Magaña will follow up as an outpatient with me in my office in 2 weeks, at [address]. Please call [phone] for an appointment. Subjective   77-year-old female with past medical history multiple comorbidities presents [facility] with complaints of left hip pain. Patient states she was in her usual state of health until yesterday afternoon when she tripped and fell following which she had sudden onset intermittent left hip pain moderate severe in intensity, aggravated with movement as well as weightbearing following which patient presented to the ED. Patient was admitted for medical work-up, pain control and discharge planning. Orthopedics was consulted for suspected pubic ramus fracture.     Past Medical History:   Diagnosis Date    Arthritis     Bacterial meningitis     Hx of    CAD (coronary artery disease)     Chronic obstructive pulmonary disease (HCC)     Chronic pain     DDD (degenerative disc disease), cervical     Diabetes ([facility] [address]     Environmental allergies     Esophageal disorder     Fibromyalgia     GERD (gastroesophageal reflux disease)     Hypercholesterolemia     Hypertension     Kidney stones     hx of    Macular degeneration     Mixed stress and urge urinary incontinence 7/2/2020    Neurogenic bladder 7/2/2020    Psychiatric disorder     Rheumatoid arthritis ([address]     Scoliosis     Shingles     Hx of    Sleep apnea     pt states uses CPAP    Suicidal thoughts     pt stated during PAT visit , she has hx of suicidal thoughts age 17's, pt stated never attempted and further thoughts of suicide since and none that day    Torn rotator cuff     pt states on right side    Urinary tract infection without hematuria 7/2/2020      Past Surgical History:   Procedure Laterality Date    COLONOSCOPY N/A 10/9/2020    COLONOSCOPY performed by Devang Barrientos MD at [address] [facility] [address] [facility]      HX DILATION AND CURETTAGE      HX JOINT REPLACEMENT SURGERY      knee both sides    HX LUMBAR FUSION      HX ORTHOPAEDIC      total knee replacements    HX ORTHOPAEDIC      back surgery     HX UROLOGICAL      stent in ureter    HX UROLOGICAL  09/14/2020    Cystoscopy    HX UROLOGICAL Left 09/14/2020    retrograde pyelogram    HX UROLOGICAL Left 09/14/2020    ureteral stent exchange    HX UROLOGICAL  11/09/2020    Cystoscopy    HX UROLOGICAL  11/09/2020    retrograde pyelograms    HX UROLOGICAL Left 11/09/2020    robotic left pyeloplasty    HX UROLOGICAL Left 11/09/2020    ureteral stent exchange    HX UROLOGICAL  11/24/2020    cystoscopy stent removal     HX UROLOGICAL  05/28/2021    CYSTO    NEUROLOGICAL PROCEDURE UNLISTED      back surg    SD BREAST SURGERY PROCEDURE UNLISTED      SD CYSTOSCOPY,INSERT URETERAL STENT  5/8/2018, 7/11/2017, 2/9/2019, 10/11/2016, 6/7/2016, 1/14/2016, 7/14/2015     Family History   Problem Relation Age of Onset    Hypertension Mother     Heart Disease Mother    [address] [place] [address] Diabetes Mother     Liver Disease Mother     Diabetes Father     Heart Disease Father     Heart Disease Brother     Diabetes Brother     COPD Brother     Liver Disease Brother     Hypertension Sister     Elevated Lipids Sister       Social History     Tobacco Use    Smoking status: Former Smoker     Years: 55.00     Quit date: 2005     Years since quittin.3    Smokeless tobacco: Never Used    Tobacco comment: pt sates quit 7 years ago   Substance Use Topics    Alcohol use: Yes     Comment: special occasions       Current Facility-Administered Medications   Medication Dose Route Frequency Provider Last Rate Last Admin    cefTRIAXone (ROCEPHIN) 1 g in sterile water (preservative free) 10 mL IV syringe  1 g IntraVENous Q24H Roel Valladares PA-C   1 g at 21 1147    midodrine (PROAMATINE) tablet 5 mg  5 mg Oral TID WITH MEALS Roel Valladares PA-C   5 mg at 21 1656    traMADoL (ULTRAM) tablet 50 mg  50 mg Oral Q6H PRN Charlee Valladares PA-C        0.9% sodium chloride infusion  75 mL/hr IntraVENous CONTINUOUS Zack Christina MD 75 mL/hr at 21 0421 75 mL/hr at 21 0421    albuterol (PROVENTIL VENTOLIN) nebulizer solution 2.5 mg  2.5 mg Nebulization Q4H PRN Almas Mckeon MD        aspirin delayed-release tablet 81 mg  81 mg Oral DAILY Zack Christina MD   81 mg at 21 0845    atorvastatin (LIPITOR) tablet 20 mg  20 mg Oral DAILY Narayan Christina MD   20 mg at 21 0845    [START ON 2021] ergocalciferol capsule 50,000 Units  50,000 Units Oral every  Narayan Christina MD        [Held by provider] clonazePAM (KlonoPIN) tablet 0.5 mg  0.5 mg Oral TID Almas Mckeon MD   0.5 mg at 21 1520    FLUoxetine (PROzac) capsule 20 mg  20 mg Oral DAILY Almas Mckeon MD   20 mg at 21 0846    insulin glargine (LANTUS) injection 20 Units  20 Units SubCUTAneous [facility] Zack Christina MD        montelukast (SINGULAIR) tablet 10 mg  10 mg Oral DAILY Camryn Christina MD   10 mg at 08/31/21 0845    pantoprazole (PROTONIX) tablet 40 mg  40 mg Oral ACB&D Vy Cuellar MD   40 mg at 08/31/21 1520    oxybutynin (DITROPAN) tablet 5 mg  5 mg Oral BID Vy Cuellar MD   5 mg at 08/31/21 0845    pramipexole (MIRAPEX) tablet 1 mg  1 mg Oral QHS Camryn Christina MD        sodium chloride (NS) flush 5-40 mL  5-40 mL IntraVENous Camryn Burgos MD   10 mL at 08/31/21 1317    sodium chloride (NS) flush 5-40 mL  5-40 mL IntraVENous PRN Vy Cuellar MD        acetaminophen (TYLENOL) tablet 650 mg  650 mg Oral Q6H PRN Vy Cuellar MD   650 mg at 08/31/21 1147    Or    acetaminophen (TYLENOL) suppository 650 mg  650 mg Rectal Q6H PRN Vy Cuellar MD        polyethylene glycol [facility]) packet 17 g  17 g Oral DAILY PRN Vy Cuellar MD        ondansetron (ZOFRAN ODT) tablet 4 mg  4 mg Oral Q8H PRN Vy Cuellar MD        Or    ondansetron [facility]) injection 4 mg  4 mg IntraVENous Q6H PRN Vy Cuellar MD        enoxaparin (LOVENOX) injection 40 mg  40 mg SubCUTAneous DAILY Camryn Christina MD   40 mg at 08/31/21 1152    [Held by provider] oxyCODONE-acetaminophen (PERCOCET) 5-325 mg per tablet 1 Tablet  1 Tablet Oral Q4H PRN Vy Cuellar MD   1 Tablet at 08/31/21 1519    glucose chewable tablet 16 g  4 Tablet Oral PRN Vy Cuellar MD        dextrose (D50W) injection syrg 12.5-25 g  25-50 mL IntraVENous PRN Vy Cuellar MD        glucagon [facility]) injection 1 mg  1 mg IntraMUSCular PRN Vy Cuellar MD        insulin lispro (HUMALOG) injection   SubCUTAneous AC&HS Vy Cuellar MD   3 Units at 08/30/21 2120        Review of Systems  Constitutional: Negative for diaphoresis and fatigue. HENT: Negative for congestion, dental problem, ear discharge and ear pain. Eyes: Negative for discharge and redness. Respiratory: Negative for cough, chest tightness and shortness of breath. Cardiovascular: Negative for chest pain and palpitations. Gastrointestinal: Negative for abdominal pain, constipation, diarrhea, nausea and vomiting. Endocrine: Negative. Genitourinary: Negative. Negative for dysuria and frequency. Musculoskeletal: Positive for arthralgias. Negative for myalgias. Left hip tenderness   Skin: Negative. Neurological: Negative for dizziness, syncope and light-headedness. Hematological: Negative. Psychiatric/Behavioral: Negative for agitation and behavioral problems. All other systems reviewed and are negative. Objective     Vital signs for last 24 hours:  Visit Vitals  /70 (BP 1 Location: Right leg, BP Patient Position: Sitting)   Pulse 70   Temp 97.4 °F (36.3 °C)   Resp 20   Ht 5' 6\" (1.676 m)   Wt 55.4 kg (122 lb 2.2 oz)   LMP  (LMP Unknown)   SpO2 97%   BMI 19.71 kg/m²       Intake/Output this shift:  Current Shift: No intake/output data recorded.   Last 3 Shifts: 08/30 0701 - 08/31 1900  In: 0 [P.O.:650]  Out: 350 [Urine:350]    Data Review:   Recent Results (from the past 24 hour(s))   METABOLIC PANEL, BASIC    Collection Time: 08/31/21  6:29 AM   Result Value Ref Range    Sodium 140 136 - 145 mmol/L    Potassium 4.9 3.5 - 5.1 mmol/L    Chloride 106 97 - 108 mmol/L    CO2 30 21 - 32 mmol/L    Anion gap 4 (L) 5 - 15 mmol/L    Glucose 113 (H) 65 - 100 mg/dL    BUN 18 6 - 20 mg/dL    Creatinine 0.90 0.55 - 1.02 mg/dL    BUN/Creatinine ratio 20 12 - 20      GFR est AA >60 >60 ml/min/1.73m2    GFR est non-AA >60 >60 ml/min/1.73m2    Calcium 8.6 8.5 - 10.1 mg/dL   MAGNESIUM    Collection Time: 08/31/21  6:29 AM   Result Value Ref Range    Magnesium 1.4 (L) 1.6 - 2.4 mg/dL   CBC WITH AUTOMATED DIFF    Collection Time: 08/31/21  6:29 AM   Result Value Ref Range    WBC 7.6 3.6 - 11.0 K/uL    RBC 4.21 3.80 - 5.20 M/uL    HGB 12.9 11.5 - 16.0 g/dL    HCT 39.8 35.0 - 47.0 %    MCV 94.5 80.0 - 99.0 FL    MCH 30.6 26.0 - 34.0 PG    MCHC 32.4 30.0 - 36.5 g/dL    RDW 13.6 11.5 - 14.5 %    PLATELET 569 (L) 799 - 400 K/uL    MPV 12.3 8.9 - 12.9 FL    NRBC 0.0 0.0  WBC    ABSOLUTE NRBC 0.00 0.00 - 0.01 K/uL    NEUTROPHILS 63 32 - 75 %    LYMPHOCYTES 25 12 - 49 %    MONOCYTES 10 5 - 13 %    EOSINOPHILS 1 0 - 7 %    BASOPHILS 1 0 - 1 %    IMMATURE GRANULOCYTES 0 0 - 0.5 %    ABS. NEUTROPHILS 4.8 1.8 - 8.0 K/UL    ABS. LYMPHOCYTES 1.9 0.8 - 3.5 K/UL    ABS. MONOCYTES 0.7 0.0 - 1.0 K/UL    ABS. EOSINOPHILS 0.1 0.0 - 0.4 K/UL    ABS. BASOPHILS 0.0 0.0 - 0.1 K/UL    ABS. IMM. GRANS. 0.0 0.00 - 0.04 K/UL    DF AUTOMATED     GLUCOSE, POC    Collection Time: 08/31/21  8:37 AM   Result Value Ref Range    Glucose (POC) 125 (H) 65 - 117 mg/dL    Performed by Rodney Pena, POC    Collection Time: 08/31/21 11:47 AM   Result Value Ref Range    Glucose (POC) 75 65 - 117 mg/dL    Performed by Jaleesa Media    GLUCOSE, POC    Collection Time: 08/31/21  9:04 PM   Result Value Ref Range    Glucose (POC) 82 65 - 117 mg/dL    Performed by Beth Rao      CT Scan of Abd/Pelvis:  Imaging studies showed a minimally displaced fracture of the left inferior pubic ramus that appeared acute. Also noted were moderately advanced degenerative changes in both hip joints. Incidentally noted was posterior instrumentation and fusion of T11-S1. Physical Exam    Constitutional:       Appearance: Normal appearance. She is normal weight. HENT:      Head: Normocephalic and atraumatic. Nose: Nose normal.   Eyes:      Extraocular Movements: Extraocular movements intact. Conjunctiva/sclera: Conjunctivae normal.      Pupils: Pupils are equal, round, and reactive to light.    Cardiovascular:      Rate and Rhythm: Normal rate and regular rhythm. Pulses: Normal pulses. Pulmonary:      Effort: Pulmonary effort is normal.      Breath sounds: Normal breath sounds. Abdominal:      General: Abdomen is flat. Palpations: Abdomen is soft. Musculoskeletal:         Pelvis was square. Legs were equal in length. Alignment was neutral.  Pelvic compression was uncomfortable. Local tenderness was noted over the symphysis and the left groin. Mild tenderness was also present over the sacrum and lower lumbar spine. Both knee joints showed scars of remote arthroplasty surgery. Skin:     General: Skin is warm and dry. Capillary Refill: Capillary refill takes less than 2 seconds. Neurological:      General: No focal deficit present. Mental Status: She is alert and oriented to person, place, and time.    Psychiatric:         Mood and Affect: Mood normal.         Behavior: Behavior normal.

## 2021-09-02 ENCOUNTER — APPOINTMENT (OUTPATIENT)
Dept: ENDOSCOPY | Age: 71
DRG: 535 | End: 2021-09-02
Attending: INTERNAL MEDICINE
Payer: MEDICARE

## 2021-09-02 LAB
ALBUMIN SERPL-MCNC: 2.6 G/DL (ref 3.5–5)
ALBUMIN/GLOB SERPL: 0.9 {RATIO} (ref 1.1–2.2)
ALP SERPL-CCNC: 70 U/L (ref 45–117)
ALT SERPL-CCNC: 7 U/L (ref 12–78)
ANION GAP SERPL CALC-SCNC: 4 MMOL/L (ref 5–15)
AST SERPL W P-5'-P-CCNC: 7 U/L (ref 15–37)
BACTERIA SPEC CULT: NORMAL
BASOPHILS # BLD: 0 K/UL (ref 0–0.1)
BASOPHILS NFR BLD: 0 % (ref 0–1)
BILIRUB SERPL-MCNC: 0.5 MG/DL (ref 0.2–1)
BUN SERPL-MCNC: 9 MG/DL (ref 6–20)
BUN/CREAT SERPL: 16 (ref 12–20)
CA-I BLD-MCNC: 8.5 MG/DL (ref 8.5–10.1)
CHLORIDE SERPL-SCNC: 104 MMOL/L (ref 97–108)
CO2 SERPL-SCNC: 29 MMOL/L (ref 21–32)
CREAT SERPL-MCNC: 0.57 MG/DL (ref 0.55–1.02)
DIFFERENTIAL METHOD BLD: ABNORMAL
EOSINOPHIL # BLD: 0.1 K/UL (ref 0–0.4)
EOSINOPHIL NFR BLD: 1 % (ref 0–7)
ERYTHROCYTE [DISTWIDTH] IN BLOOD BY AUTOMATED COUNT: 13.4 % (ref 11.5–14.5)
GLOBULIN SER CALC-MCNC: 3 G/DL (ref 2–4)
GLUCOSE BLD STRIP.AUTO-MCNC: 108 MG/DL (ref 65–117)
GLUCOSE BLD STRIP.AUTO-MCNC: 138 MG/DL (ref 65–117)
GLUCOSE BLD STRIP.AUTO-MCNC: 142 MG/DL (ref 65–117)
GLUCOSE BLD STRIP.AUTO-MCNC: 74 MG/DL (ref 65–117)
GLUCOSE SERPL-MCNC: 100 MG/DL (ref 65–100)
HCT VFR BLD AUTO: 36.9 % (ref 35–47)
HGB BLD-MCNC: 11.9 G/DL (ref 11.5–16)
IMM GRANULOCYTES # BLD AUTO: 0 K/UL (ref 0–0.04)
IMM GRANULOCYTES NFR BLD AUTO: 0 % (ref 0–0.5)
LYMPHOCYTES # BLD: 1.2 K/UL (ref 0.8–3.5)
LYMPHOCYTES NFR BLD: 13 % (ref 12–49)
MCH RBC QN AUTO: 30.8 PG (ref 26–34)
MCHC RBC AUTO-ENTMCNC: 32.2 G/DL (ref 30–36.5)
MCV RBC AUTO: 95.6 FL (ref 80–99)
MONOCYTES # BLD: 0.5 K/UL (ref 0–1)
MONOCYTES NFR BLD: 5 % (ref 5–13)
NEUTS SEG # BLD: 7.6 K/UL (ref 1.8–8)
NEUTS SEG NFR BLD: 81 % (ref 32–75)
NRBC # BLD: 0 K/UL (ref 0–0.01)
NRBC BLD-RTO: 0 PER 100 WBC
PERFORMED BY, TECHID: ABNORMAL
PERFORMED BY, TECHID: ABNORMAL
PERFORMED BY, TECHID: NORMAL
PERFORMED BY, TECHID: NORMAL
PLATELET # BLD AUTO: 115 K/UL (ref 150–400)
PMV BLD AUTO: 11.9 FL (ref 8.9–12.9)
POTASSIUM SERPL-SCNC: 3.8 MMOL/L (ref 3.5–5.1)
PROT SERPL-MCNC: 5.6 G/DL (ref 6.4–8.2)
RBC # BLD AUTO: 3.86 M/UL (ref 3.8–5.2)
SODIUM SERPL-SCNC: 137 MMOL/L (ref 136–145)
SPECIAL REQUESTS,SREQ: NORMAL
WBC # BLD AUTO: 9.4 K/UL (ref 3.6–11)

## 2021-09-02 PROCEDURE — 0DB58ZX EXCISION OF ESOPHAGUS, VIA NATURAL OR ARTIFICIAL OPENING ENDOSCOPIC, DIAGNOSTIC: ICD-10-PCS | Performed by: INTERNAL MEDICINE

## 2021-09-02 PROCEDURE — 97535 SELF CARE MNGMENT TRAINING: CPT

## 2021-09-02 PROCEDURE — 77010033678 HC OXYGEN DAILY

## 2021-09-02 PROCEDURE — 2709999900 HC NON-CHARGEABLE SUPPLY: Performed by: INTERNAL MEDICINE

## 2021-09-02 PROCEDURE — 74011250636 HC RX REV CODE- 250/636: Performed by: INTERNAL MEDICINE

## 2021-09-02 PROCEDURE — 74011250637 HC RX REV CODE- 250/637: Performed by: INTERNAL MEDICINE

## 2021-09-02 PROCEDURE — 36415 COLL VENOUS BLD VENIPUNCTURE: CPT

## 2021-09-02 PROCEDURE — 80053 COMPREHEN METABOLIC PANEL: CPT

## 2021-09-02 PROCEDURE — 65270000029 HC RM PRIVATE

## 2021-09-02 PROCEDURE — 74011000258 HC RX REV CODE- 258: Performed by: PHYSICIAN ASSISTANT

## 2021-09-02 PROCEDURE — 94760 N-INVAS EAR/PLS OXIMETRY 1: CPT

## 2021-09-02 PROCEDURE — 82962 GLUCOSE BLOOD TEST: CPT

## 2021-09-02 PROCEDURE — 76040000019: Performed by: INTERNAL MEDICINE

## 2021-09-02 PROCEDURE — 74011636637 HC RX REV CODE- 636/637: Performed by: INTERNAL MEDICINE

## 2021-09-02 PROCEDURE — 92526 ORAL FUNCTION THERAPY: CPT

## 2021-09-02 PROCEDURE — 74011250637 HC RX REV CODE- 250/637: Performed by: PHYSICIAN ASSISTANT

## 2021-09-02 PROCEDURE — 88305 TISSUE EXAM BY PATHOLOGIST: CPT

## 2021-09-02 PROCEDURE — 77030021593 HC FCPS BIOP ENDOSC BSC -A: Performed by: INTERNAL MEDICINE

## 2021-09-02 PROCEDURE — 74011250636 HC RX REV CODE- 250/636: Performed by: PHYSICIAN ASSISTANT

## 2021-09-02 PROCEDURE — 85025 COMPLETE CBC W/AUTO DIFF WBC: CPT

## 2021-09-02 RX ORDER — SODIUM CHLORIDE 9 MG/ML
75 INJECTION, SOLUTION INTRAVENOUS CONTINUOUS
Status: CANCELLED | OUTPATIENT
Start: 2021-09-02

## 2021-09-02 RX ORDER — SODIUM CHLORIDE 0.9 % (FLUSH) 0.9 %
5-40 SYRINGE (ML) INJECTION AS NEEDED
Status: CANCELLED | OUTPATIENT
Start: 2021-09-02

## 2021-09-02 RX ORDER — FENTANYL CITRATE 50 UG/ML
INJECTION, SOLUTION INTRAMUSCULAR; INTRAVENOUS AS NEEDED
Status: DISCONTINUED | OUTPATIENT
Start: 2021-09-02 | End: 2021-09-05 | Stop reason: HOSPADM

## 2021-09-02 RX ORDER — SODIUM CHLORIDE 0.9 % (FLUSH) 0.9 %
5-40 SYRINGE (ML) INJECTION EVERY 8 HOURS
Status: CANCELLED | OUTPATIENT
Start: 2021-09-02

## 2021-09-02 RX ORDER — MIDAZOLAM HYDROCHLORIDE 1 MG/ML
INJECTION INTRAMUSCULAR; INTRAVENOUS AS NEEDED
Status: DISCONTINUED | OUTPATIENT
Start: 2021-09-02 | End: 2021-09-05 | Stop reason: HOSPADM

## 2021-09-02 RX ADMIN — Medication 10 ML: at 18:57

## 2021-09-02 RX ADMIN — Medication 10 ML: at 05:31

## 2021-09-02 RX ADMIN — OXYBUTYNIN CHLORIDE 5 MG: 5 TABLET ORAL at 10:29

## 2021-09-02 RX ADMIN — PIPERACILLIN AND TAZOBACTAM 3.38 G: 3; .375 INJECTION, POWDER, LYOPHILIZED, FOR SOLUTION INTRAVENOUS at 03:01

## 2021-09-02 RX ADMIN — ACETAMINOPHEN 650 MG: 325 TABLET ORAL at 20:30

## 2021-09-02 RX ADMIN — Medication 10 ML: at 23:49

## 2021-09-02 RX ADMIN — ASPIRIN 81 MG: 81 TABLET, COATED ORAL at 10:29

## 2021-09-02 RX ADMIN — ATORVASTATIN CALCIUM 20 MG: 20 TABLET, FILM COATED ORAL at 10:31

## 2021-09-02 RX ADMIN — MIDODRINE HYDROCHLORIDE 5 MG: 5 TABLET ORAL at 10:29

## 2021-09-02 RX ADMIN — PIPERACILLIN AND TAZOBACTAM 3.38 G: 3; .375 INJECTION, POWDER, LYOPHILIZED, FOR SOLUTION INTRAVENOUS at 20:30

## 2021-09-02 RX ADMIN — PRAMIPEXOLE DIHYDROCHLORIDE 1 MG: 1 TABLET ORAL at 23:52

## 2021-09-02 RX ADMIN — TRAMADOL HYDROCHLORIDE 50 MG: 50 TABLET, FILM COATED ORAL at 10:49

## 2021-09-02 RX ADMIN — INSULIN GLARGINE 20 UNITS: 100 INJECTION, SOLUTION SUBCUTANEOUS at 22:01

## 2021-09-02 RX ADMIN — FLUOXETINE 20 MG: 20 CAPSULE ORAL at 10:29

## 2021-09-02 RX ADMIN — MONTELUKAST 10 MG: 10 TABLET, FILM COATED ORAL at 10:29

## 2021-09-02 RX ADMIN — PANTOPRAZOLE SODIUM 40 MG: 40 TABLET, DELAYED RELEASE ORAL at 10:35

## 2021-09-02 RX ADMIN — PIPERACILLIN AND TAZOBACTAM 3.38 G: 3; .375 INJECTION, POWDER, LYOPHILIZED, FOR SOLUTION INTRAVENOUS at 10:29

## 2021-09-02 RX ADMIN — OXYBUTYNIN CHLORIDE 5 MG: 5 TABLET ORAL at 20:30

## 2021-09-02 NOTE — PROGRESS NOTES
Hospitalist Progress Note    Subjective:   Daily Progress Note: 9/2/2021 11:25 AM    Left hip pain. Difficulty swallowing.  EGD    Current Facility-Administered Medications   Medication Dose Route Frequency    midazolam (PF) (VERSED) 1 mg/mL injection    PRN    fentaNYL citrate (PF) injection    PRN    piperacillin-tazobactam (ZOSYN) 3.375 g in 0.9% sodium chloride (MBP/ADV) 100 mL MBP  3.375 g IntraVENous Q8H    dextrose 5% and 0.9% NaCl infusion  75 mL/hr IntraVENous CONTINUOUS    midodrine (PROAMATINE) tablet 5 mg  5 mg Oral TID WITH MEALS    traMADoL (ULTRAM) tablet 50 mg  50 mg Oral Q6H PRN    albuterol (PROVENTIL VENTOLIN) nebulizer solution 2.5 mg  2.5 mg Nebulization Q4H PRN    aspirin delayed-release tablet 81 mg  81 mg Oral DAILY    atorvastatin (LIPITOR) tablet 20 mg  20 mg Oral DAILY    [START ON 9/5/2021] ergocalciferol capsule 50,000 Units  50,000 Units Oral every Sunday    [Held by provider] clonazePAM (KlonoPIN) tablet 0.5 mg  0.5 mg Oral TID    FLUoxetine (PROzac) capsule 20 mg  20 mg Oral DAILY    insulin glargine (LANTUS) injection 20 Units  20 Units SubCUTAneous QHS    montelukast (SINGULAIR) tablet 10 mg  10 mg Oral DAILY    pantoprazole (PROTONIX) tablet 40 mg  40 mg Oral ACB&D    oxybutynin (DITROPAN) tablet 5 mg  5 mg Oral BID    pramipexole (MIRAPEX) tablet 1 mg  1 mg Oral QHS    sodium chloride (NS) flush 5-40 mL  5-40 mL IntraVENous Q8H    sodium chloride (NS) flush 5-40 mL  5-40 mL IntraVENous PRN    acetaminophen (TYLENOL) tablet 650 mg  650 mg Oral Q6H PRN    Or    acetaminophen (TYLENOL) suppository 650 mg  650 mg Rectal Q6H PRN    polyethylene glycol (MIRALAX) packet 17 g  17 g Oral DAILY PRN    ondansetron (ZOFRAN ODT) tablet 4 mg  4 mg Oral Q8H PRN    Or    ondansetron (ZOFRAN) injection 4 mg  4 mg IntraVENous Q6H PRN    [Held by provider] enoxaparin (LOVENOX) injection 40 mg  40 mg SubCUTAneous DAILY    [Held by provider] oxyCODONE-acetaminophen (PERCOCET) 5-325 mg per tablet 1 Tablet  1 Tablet Oral Q4H PRN    glucose chewable tablet 16 g  4 Tablet Oral PRN    dextrose (D50W) injection syrg 12.5-25 g  25-50 mL IntraVENous PRN    glucagon (GLUCAGEN) injection 1 mg  1 mg IntraMUSCular PRN    insulin lispro (HUMALOG) injection   SubCUTAneous AC&HS        Review of Systems  Review of Systems   Constitutional: Positive for malaise/fatigue. Negative for fever. HENT: Negative. Eyes: Negative. Respiratory: Negative for cough and shortness of breath. Cardiovascular: Negative for chest pain and leg swelling. Gastrointestinal: Negative for abdominal pain, nausea and vomiting. Genitourinary: Positive for dysuria. Musculoskeletal:        Left hip pain and pubic pain   Skin: Negative. Neurological: Negative. Psychiatric/Behavioral: Negative. Objective:     Visit Vitals  /60 (BP 1 Location: Left upper arm, BP Patient Position: Sitting)   Pulse 70   Temp 97.6 °F (36.4 °C)   Resp 20   Ht 5' 6\" (1.676 m)   Wt 58.3 kg (128 lb 8.5 oz)   LMP  (LMP Unknown)   SpO2 98%   BMI 20.74 kg/m²    O2 Flow Rate (L/min): 3 l/min O2 Device: Nasal cannula    Temp (24hrs), Av.6 °F (36.4 °C), Min:97 °F (36.1 °C), Max:98.1 °F (36.7 °C)      No intake/output data recorded. No intake/output data recorded.     Recent Results (from the past 24 hour(s))   GLUCOSE, POC    Collection Time: 21  3:56 PM   Result Value Ref Range    Glucose (POC) 56 (L) 65 - 117 mg/dL    Performed by Sherry Apple    GLUCOSE, POC    Collection Time: 21  7:33 PM   Result Value Ref Range    Glucose (POC) 108 65 - 117 mg/dL    Performed by Ina Colvin    GLUCOSE, POC    Collection Time: 21  8:02 AM   Result Value Ref Range    Glucose (POC) 108 65 - 117 mg/dL    Performed by Dena Naik    CBC WITH AUTOMATED DIFF    Collection Time: 21 10:44 AM   Result Value Ref Range    WBC 9.4 3.6 - 11.0 K/uL    RBC 3.86 3.80 - 5.20 M/uL    HGB 11.9 11.5 - 16.0 g/dL    HCT 36.9 35.0 - 47.0 %    MCV 95.6 80.0 - 99.0 FL    MCH 30.8 26.0 - 34.0 PG    MCHC 32.2 30.0 - 36.5 g/dL    RDW 13.4 11.5 - 14.5 %    PLATELET 522 (L) 095 - 400 K/uL    MPV 11.9 8.9 - 12.9 FL    NRBC 0.0 0.0  WBC    ABSOLUTE NRBC 0.00 0.00 - 0.01 K/uL    NEUTROPHILS 81 (H) 32 - 75 %    LYMPHOCYTES 13 12 - 49 %    MONOCYTES 5 5 - 13 %    EOSINOPHILS 1 0 - 7 %    BASOPHILS 0 0 - 1 %    IMMATURE GRANULOCYTES 0 0 - 0.5 %    ABS. NEUTROPHILS 7.6 1.8 - 8.0 K/UL    ABS. LYMPHOCYTES 1.2 0.8 - 3.5 K/UL    ABS. MONOCYTES 0.5 0.0 - 1.0 K/UL    ABS. EOSINOPHILS 0.1 0.0 - 0.4 K/UL    ABS. BASOPHILS 0.0 0.0 - 0.1 K/UL    ABS. IMM. GRANS. 0.0 0.00 - 0.04 K/UL    DF AUTOMATED     METABOLIC PANEL, COMPREHENSIVE    Collection Time: 09/02/21 10:44 AM   Result Value Ref Range    Sodium 137 136 - 145 mmol/L    Potassium 3.8 3.5 - 5.1 mmol/L    Chloride 104 97 - 108 mmol/L    CO2 29 21 - 32 mmol/L    Anion gap 4 (L) 5 - 15 mmol/L    Glucose 100 65 - 100 mg/dL    BUN 9 6 - 20 mg/dL    Creatinine 0.57 0.55 - 1.02 mg/dL    BUN/Creatinine ratio 16 12 - 20      GFR est AA >60 >60 ml/min/1.73m2    GFR est non-AA >60 >60 ml/min/1.73m2    Calcium 8.5 8.5 - 10.1 mg/dL    Bilirubin, total 0.5 0.2 - 1.0 mg/dL    AST (SGOT) 7 (L) 15 - 37 U/L    ALT (SGPT) 7 (L) 12 - 78 U/L    Alk. phosphatase 70 45 - 117 U/L    Protein, total 5.6 (L) 6.4 - 8.2 g/dL    Albumin 2.6 (L) 3.5 - 5.0 g/dL    Globulin 3.0 2.0 - 4.0 g/dL    A-G Ratio 0.9 (L) 1.1 - 2.2     GLUCOSE, POC    Collection Time: 09/02/21 12:17 PM   Result Value Ref Range    Glucose (POC) 74 65 - 117 mg/dL    Performed by [facility]         XR CHEST PORT   Final Result      CT SPINE LUMB WO CONT   Final Result   Normal noncontrast CT of the pelvis. HISTORY:  lower back pain, multiple spinal surgeries   Dose reduction technique:    All CT scans at this facility are performed using dose reduction optimization   technique as appropriate on the exam including the following: Automated exposure   control, adjustment of the MA and/or KV according to patient size of use of   iterative reconstructive technique. .      TECHNIQUE: CT of the pelvis without contrast   COMPARISON: None   LIMITATIONS: None   BLADDER: Normal   RETROPERITONEUM/AORTA: Dense aortic calcifications circumferentially but without   adi caliber abnormality. BOWEL/MESENTERY: Normal   APPENDIX: Identified and normal   PERITONEAL CAVITY: Normal   REPRODUCTIVE ORGANS: Normal   BONE/TISSUES: There is a nondisplaced fracture through the left inferior pubic   ramus. Slight adjacent bruising/swelling noted along the medial surface of the   fracture. No measurable hematoma. Advanced degenerative change of both hips   characterized by decreased joint space, osteophytosis about the femoral head and   necks, and subchondral sclerosis and cystic change involving the articular   surfaces. OTHER: Incidental note of a dependent gallstone. IMPRESSION: Nondisplaced fracture of the left inferior pubic ramus. advanced   degenerative changes of the hips. Please see lumbar spine CT for details   regarding the lumbar spine. Incidental visualization of cholelithiasis. HISTORY:  lower back pain, multiple spinal surgeries   Dose reduction technique: All CT scans at this facility are performed using dose reduction optimization   technique as appropriate on the exam including the following: Automated exposure   control, adjustment of the MA and/or KV according to patient size of use of   iterative reconstructive technique. .      TECHNIQUE: Noncontrast CT of the lumbar spine with multiplanar reconstructions.        COMPARISON: 1/22/2015   LIMITATIONS: None      FRACTURES: None   ALIGNMENT: Extensive thoracic-lumbar fixation with transpedicular screws at   T10-L1, a left unilateral transpedicular screw at L2, bilateral transpedicular   screws at L3, a right unilateral transpedicular screw at L4, and bilateral   transpedicular screws at L5-S1. Intervening spinal rods. Alignment is grossly   anatomic. No obvious hardware complication      VERTEBRAL BODIES: Diffuse degenerative changes of the endplates and with areas   of osteophyte formation as on prior. Posterior osteophytes most prominent at   T12-L1. DISC SPACES: Baseline degenerative disc disease. Disc spacers at L1-2, L2-3,   L3-4, L4-5. POSTERIOR ELEMENTS: Normal   SPINAL CANAL: Normal   SACROILIAC JOINT: Visualized portions unremarkable   PARASPINAL SOFT TISSUES: Normal      OTHER: Ectatic and diffusely calcified aorta without adi aneurysm      IMPRESSION: Postoperative changes as described without obvious hardware   complication or acute abnormality. Baseline degenerative disc disease and   arthritic change throughout the spine. CT PELV WO CONT   Final Result   Normal noncontrast CT of the pelvis. HISTORY:  lower back pain, multiple spinal surgeries   Dose reduction technique: All CT scans at this facility are performed using dose reduction optimization   technique as appropriate on the exam including the following: Automated exposure   control, adjustment of the MA and/or KV according to patient size of use of   iterative reconstructive technique. .      TECHNIQUE: CT of the pelvis without contrast   COMPARISON: None   LIMITATIONS: None   BLADDER: Normal   RETROPERITONEUM/AORTA: Dense aortic calcifications circumferentially but without   adi caliber abnormality. BOWEL/MESENTERY: Normal   APPENDIX: Identified and normal   PERITONEAL CAVITY: Normal   REPRODUCTIVE ORGANS: Normal   BONE/TISSUES: There is a nondisplaced fracture through the left inferior pubic   ramus. Slight adjacent bruising/swelling noted along the medial surface of the   fracture. No measurable hematoma.   Advanced degenerative change of both hips   characterized by decreased joint space, osteophytosis about the femoral head and   necks, and subchondral sclerosis and cystic change involving the articular   surfaces. OTHER: Incidental note of a dependent gallstone. IMPRESSION: Nondisplaced fracture of the left inferior pubic ramus. advanced   degenerative changes of the hips. Please see lumbar spine CT for details   regarding the lumbar spine. Incidental visualization of cholelithiasis. HISTORY:  lower back pain, multiple spinal surgeries   Dose reduction technique: All CT scans at this facility are performed using dose reduction optimization   technique as appropriate on the exam including the following: Automated exposure   control, adjustment of the MA and/or KV according to patient size of use of   iterative reconstructive technique. .      TECHNIQUE: Noncontrast CT of the lumbar spine with multiplanar reconstructions. COMPARISON: 1/22/2015   LIMITATIONS: None      FRACTURES: None   ALIGNMENT: Extensive thoracic-lumbar fixation with transpedicular screws at   T10-L1, a left unilateral transpedicular screw at L2, bilateral transpedicular   screws at L3, a right unilateral transpedicular screw at L4, and bilateral   transpedicular screws at L5-S1. Intervening spinal rods. Alignment is grossly   anatomic. No obvious hardware complication      VERTEBRAL BODIES: Diffuse degenerative changes of the endplates and with areas   of osteophyte formation as on prior. Posterior osteophytes most prominent at   T12-L1. DISC SPACES: Baseline degenerative disc disease. Disc spacers at L1-2, L2-3,   L3-4, L4-5. POSTERIOR ELEMENTS: Normal   SPINAL CANAL: Normal   SACROILIAC JOINT: Visualized portions unremarkable   PARASPINAL SOFT TISSUES: Normal      OTHER: Ectatic and diffusely calcified aorta without adi aneurysm      IMPRESSION: Postoperative changes as described without obvious hardware   complication or acute abnormality. Baseline degenerative disc disease and   arthritic change throughout the spine.       XR SPINE LUMB 2 OR 3 V   Final Result   Osteoporosis and extensive postoperative changes. Negative for   acute process. XR HIP LT W OR WO PELV 2-3 VWS   Final Result   Negative for acute process. CT CHEST W WO CONT F/U    (Results Pending)        PHYSICAL EXAM:    Physical Exam  Vitals reviewed. Constitutional:       General: She is not in acute distress. Appearance: She is not ill-appearing. HENT:      Head: Normocephalic and atraumatic. Mouth/Throat:      Mouth: Mucous membranes are moist.      Pharynx: Oropharynx is clear. Eyes:      Conjunctiva/sclera: Conjunctivae normal.   Cardiovascular:      Rate and Rhythm: Normal rate and regular rhythm. Heart sounds: Normal heart sounds. Pulmonary:      Effort: Pulmonary effort is normal.      Breath sounds: Normal breath sounds. Abdominal:      General: Abdomen is flat. Bowel sounds are normal.      Palpations: Abdomen is soft. Musculoskeletal:      Cervical back: Normal range of motion and neck supple. Comments: Left leg hip flexion with 20 degrees due to pain all other extremities normal   Skin:     General: Skin is warm and dry. Neurological:      General: No focal deficit present. Mental Status: She is alert and oriented to person, place, and time. Mental status is at baseline.    Psychiatric:         Mood and Affect: Mood normal.          Data Review    Recent Results (from the past 24 hour(s))   GLUCOSE, POC    Collection Time: 09/01/21  3:56 PM   Result Value Ref Range    Glucose (POC) 56 (L) 65 - 117 mg/dL    Performed by Mandy De Jesus    GLUCOSE, POC    Collection Time: 09/01/21  7:33 PM   Result Value Ref Range    Glucose (POC) 108 65 - 117 mg/dL    Performed by Abiodun Campbell    GLUCOSE, POC    Collection Time: 09/02/21  8:02 AM   Result Value Ref Range    Glucose (POC) 108 65 - 117 mg/dL    Performed by Blaine Mckeon    CBC WITH AUTOMATED DIFF    Collection Time: 09/02/21 10:44 AM   Result Value Ref Range    WBC 9.4 3.6 - 11.0 K/uL    RBC 3.86 3.80 - 5.20 M/uL    HGB 11.9 11.5 - 16.0 g/dL HCT 36.9 35.0 - 47.0 %    MCV 95.6 80.0 - 99.0 FL    MCH 30.8 26.0 - 34.0 PG    MCHC 32.2 30.0 - 36.5 g/dL    RDW 13.4 11.5 - 14.5 %    PLATELET 403 (L) 010 - 400 K/uL    MPV 11.9 8.9 - 12.9 FL    NRBC 0.0 0.0  WBC    ABSOLUTE NRBC 0.00 0.00 - 0.01 K/uL    NEUTROPHILS 81 (H) 32 - 75 %    LYMPHOCYTES 13 12 - 49 %    MONOCYTES 5 5 - 13 %    EOSINOPHILS 1 0 - 7 %    BASOPHILS 0 0 - 1 %    IMMATURE GRANULOCYTES 0 0 - 0.5 %    ABS. NEUTROPHILS 7.6 1.8 - 8.0 K/UL    ABS. LYMPHOCYTES 1.2 0.8 - 3.5 K/UL    ABS. MONOCYTES 0.5 0.0 - 1.0 K/UL    ABS. EOSINOPHILS 0.1 0.0 - 0.4 K/UL    ABS. BASOPHILS 0.0 0.0 - 0.1 K/UL    ABS. IMM. GRANS. 0.0 0.00 - 0.04 K/UL    DF AUTOMATED     METABOLIC PANEL, COMPREHENSIVE    Collection Time: 09/02/21 10:44 AM   Result Value Ref Range    Sodium 137 136 - 145 mmol/L    Potassium 3.8 3.5 - 5.1 mmol/L    Chloride 104 97 - 108 mmol/L    CO2 29 21 - 32 mmol/L    Anion gap 4 (L) 5 - 15 mmol/L    Glucose 100 65 - 100 mg/dL    BUN 9 6 - 20 mg/dL    Creatinine 0.57 0.55 - 1.02 mg/dL    BUN/Creatinine ratio 16 12 - 20      GFR est AA >60 >60 ml/min/1.73m2    GFR est non-AA >60 >60 ml/min/1.73m2    Calcium 8.5 8.5 - 10.1 mg/dL    Bilirubin, total 0.5 0.2 - 1.0 mg/dL    AST (SGOT) 7 (L) 15 - 37 U/L    ALT (SGPT) 7 (L) 12 - 78 U/L    Alk. phosphatase 70 45 - 117 U/L    Protein, total 5.6 (L) 6.4 - 8.2 g/dL    Albumin 2.6 (L) 3.5 - 5.0 g/dL    Globulin 3.0 2.0 - 4.0 g/dL    A-G Ratio 0.9 (L) 1.1 - 2.2     GLUCOSE, POC    Collection Time: 09/02/21 12:17 PM   Result Value Ref Range    Glucose (POC) 74 65 - 117 mg/dL    Performed by [facility]         Assessment/Plan:     Active Problems:    Inferior pubic ramus fracture ([address] (8/30/2021)      Fall (8/31/2021)      This is a 75-year-old female admitted on 8/29/2021 after a ground-level fall at home. This resulted in a nondisplaced fracture of the left inferior pubic ramus with advanced degenerative changes of the hips.   UA is consistent with a urinary tract infection. Urine culture pending. Started patient on Rocephin. Patient will be evaluated by PT\OT and set up for rehab services as an outpatient and most likely require placement. History of esophageal dysmotility and unable to swallow at this point. GI consultation, Dr. Lorin Curtis primary gastroenterologist.  Speech evaluation at this point has failed, n.p.o. EGD with chronic gastritis, biopsy taken. Esophageal dysmotility noted. Recommendation is for PEG. CT scan of the chest with and without contrast for further evaluation. Impression:    1. Ground-level fall  2. Inferior pubic ramus fracture, left  3. Diabetes mellitus, type II  4. Peripheral neuropathy  5. Hyperlipidemia  6. Urinary tract infection    Plan:    1. Ground-level fall  Will benefit from rehab and ongoing ambulation with roller walker    2. Inferior pubic ramus fracture, left  Nondisplaced on CT  Orthopedic consultation  PT OT for rehab    3. Diabetes mellitus, type II  Continue Lantus 20 units nightly  Sliding scale insulin    4. Peripheral neuropathy  Stable    5. Hyperlipidemia  Continue Lipitor    6. Urinary tract infection  Urine culture pending  UA consistent with uncomplicated urinary tract infection  Rocephin changed to Zosyn    7. Esophageal dysmotility  Unable to swallow at this point  Speech evaluation, n.p.o. failed speech assessment  EGD with esophageal dysmotility, gastric antritis, chronic biopsy taken  Recommendation for PEG  Consult GI, Dr. Milind Lilly    8. Concern for aspiration pneumonia  Esophageal dysmotility and possible aspiration risk  Change antibiotics to Zosyn as white count is increasing  Chest x-ray pending    9.   Moderate protein malnutrition  Nutritional consultation  Speech consultation for supplementation    CODE STATUS: Full code    DVT prophylaxis: Lovenox  Ulcer prophylaxis: Protonix    Discharge barriers: PT OT consult and rehabilitation acceptance, EGD and speech evaluation, GI consultation    Discussed case with patient's emi [facility] discussed with: Patient/Family    Total time spent with patient: >35 minutes.

## 2021-09-02 NOTE — CONSULTS
Nutrition Note    RD received consult for ONS. However, SLP rec'd NPO at this time. RD will f/u on 9/3.     Electronically signed by Arun Maki RD on 9/2/2021 at 1:12 PM    Contact: 1992

## 2021-09-02 NOTE — PROGRESS NOTES
SPEECH LANGUAGE PATHOLOGY DYSPHAGIA TREATMENT  Patient: Lian Blue (1 y.o. female)  Date: 9/2/2021  Diagnosis: Inferior pubic ramus fracture ([address] Brenda Jacquie  Fall [W19. XXXA] <principal problem not specified>  Procedure(s) (LRB):  ESOPHAGOGASTRODUODENOSCOPY (EGD) (N/A)  ESOPHAGOGASTRODUODENAL (EGD) BIOPSY (N/A) Day of Surgery  Precautions:      ASSESSMENT :  EGD completed w/ evidence of esophageal motility and recs for PEG placement. Patient requesting information regarding PEG placement. Extensive education provided to patient and niece. Patient unable to tolerate thin liquids, puree or smooth solids w/o mid sternal/esophageal globus sensation and retrograde movement to oral cavity. Patient expressing wishes to receive PEG. Notified PA. Patient at high risk for re-admission, dehydration, malnutrition and aspiration s/t esophageal dysphagia w/ PO. She would benefit from PEG placement. PLAN :  Recommendations and Planned Interventions:  Rec primary source of nutrition via PEG once placed w/ PO for pleasure as tolerated. Frequency/Duration: Patient will be followed by speech-language pathology 1 time a week to address goals. Discharge Recommendations: No skilled ST s/t severity of esophageal dysphagia. Will follow up x1 for education. SUBJECTIVE:   Patient reports concerns and questions regarding PEG.      OBJECTIVE:     Past Medical History:   Diagnosis Date    Arthritis     Bacterial meningitis     Hx of    CAD (coronary artery disease)     Chronic obstructive pulmonary disease (HCC)     Chronic pain     DDD (degenerative disc disease), cervical     Diabetes (HCC)     Environmental allergies     Esophageal disorder     Fibromyalgia     GERD (gastroesophageal reflux disease)     Hypercholesterolemia     Hypertension     Kidney stones     hx of    Macular degeneration     Mixed stress and urge urinary incontinence 7/2/2020    Neurogenic bladder 7/2/2020    Psychiatric disorder     Rheumatoid arthritis ([address]     Scoliosis     Shingles     Hx of    Sleep apnea     pt states uses CPAP    Suicidal thoughts     pt stated during PAT visit , she has hx of suicidal thoughts age 17's, pt stated never attempted and further thoughts of suicide since and none that day    Torn rotator cuff     pt states on right side    Urinary tract infection without hematuria 7/2/2020       CXR Results  (Last 48 hours)                 09/01/21 1125  XR CHEST PORT Final result    Narrative:  Chest single view. Comparison single view chest January 2, 2021. Similar coarse reticular markings through lungs. Increased lung volumes. Suspect   degree COPD/emphysema. No gross interstitial or alveolar pulmonary edema. Cardiac and mediastinal   structures unchanged again noted pulmonary arterial prominence, evidence for   pulmonary arterial hypertension. Atherosclerotic change thoracic aorta. No   pneumothorax or sizable pleural effusion. Partially imaged hardware thoracolumbar spine. Current Diet:  DIET NPO  DIET NPO     Cognitive and Communication Status:  Neurologic State: Alert  Orientation Level: Oriented X4  Cognition: Appropriate decision making, Appropriate for age attention/concentration, Appropriate safety awareness  Perception: Appears intact         Pain:  Pain Scale 1: Numeric (0 - 10)  Pain Intensity 1: 0       After treatment:   Patient left in no apparent distress in bed, Call bell within reach, Nursing notified, and Caregiver / family present    COMMUNICATION/EDUCATION:   Patient and niece educated regarding her deficit(s) of esophageal dysphagia. She demonstrated Good understanding as evidenced by engagement and appropriate questions. The patient's plan of care including recommendations, planned interventions, and recommended diet changes were discussed with: Registered nurse. Patient/family have participated as able in goal setting and plan of care.   Patient/family agree to work toward stated goals and plan of care. Problem: Dysphagia (Adult)  Goal: *Acute Goals and Plan of Care (Insert Text)  Description: Speech Therapy Swallow Goals  Initiated 9/1/2021  -Patient will tolerate PO trials without clinical indicators of aspiration given no cues within 5-7 day(s). [ ] Not met  [ ]  MET   [ ] Progressing  [ ] Clifton Hudson  -Patient will participate in modified barium swallow study within 5-7 day(s). [ ] Not met  [ ]  MET   [ ] Progressing  [ ] Watt Hudson  -Patient will demonstrate understanding of swallow safety precautions and aspiration precautions, diet recs with no cues within 5-7 day(s).         [ ] Not met  [ ]  MET   [ ] Progressing  [ ] Discontinue      Outcome: Progressing Towards Goal     Thank you for this referral.  Nanette Lei M.S., M.Ed., CCC-SLP  Time Calculation: 47 mins

## 2021-09-02 NOTE — PROGRESS NOTES
Problem: Self Care Deficits Care Plan (Adult)  Goal: *Acute Goals and Plan of Care (Insert Text)  Description: Pt will be MI sup<->sit in prep for EOB ADL's  Pt will be MI  LB dressing EOB level  Pt will be MI  sit<-> prep for toilet transfer  Pt will be MI  toilet transfer with LRAD  Pt will be MI  toileting/cloth mgmt LRAD  Pt will be MI  grooming standing sink  Pt will be MI bathing sitting/standing sink LRAD  Pt will be MI solange UE HEP in prep for self care tasks      Outcome: Progressing Towards Goal   OCCUPATIONAL THERAPY TREATMENT  Patient: Bebo Bullion (4 y.o. female)  Date: 9/2/2021  Diagnosis: Inferior pubic ramus fracture ([address] Vandana Huston  Fall [W19. XXXA] <principal problem not specified>  Procedure(s) (LRB):  ESOPHAGOGASTRODUODENOSCOPY (EGD) (N/A)  ESOPHAGOGASTRODUODENAL (EGD) BIOPSY (N/A) Day of Surgery  Precautions: FALL pelvic fx WBAT  Chart, occupational therapy assessment, plan of care, and goals were reviewed. ASSESSMENT  Patient continues with skilled OT services and is progressing towards goals. Patient received semi supine in bed upon SKINNER'S arrival, on 3L of oxygen via NC and agreeable to work with therapist. Patient just from testing. Patient required SBA x1 for bed mobility, SBA supine to sit EOB using bed rail, and scooting with increased time 2/2 decrease activity tolerance and pain. Seated EOB , patient required mod A for donning L sock but able to don R sock. STS with SBA, using Rw/gait belt for bed to toilet transfer with CGA and vc's for correct technique for safety. SBA for toileting and cloth mgmt. Increased time for toileting issues. CGA toilet to chair tf with vc's for navigating RW and decreased activity tolerance. O2 line unable to reach toilet, patient went without with no SOB noted but  noted decreased endurance. Unable to check O2 2/2 cold fingers. Patient left resting comfortably in chair. Missy Martinez will depened if patient will have assistance at home. Patient would benefit from continued skilled Occupational services while at [facility] in order to increase safety and independence with self care and functional tranfers/mobility. Recommend at discharge to  IRF vs HHOT when medically appropriate.      Current Level of Function Impacting Discharge (ADLs): SBA/set-up for toileting,UB dressing, bathing     Other factors to consider for discharge: Time of onset, medical prognosis/diagnosis, severity of deficits, PLOF, home environment, and family support              PLAN :  Patient continues to benefit from skilled intervention to address the above impairments. Continue treatment per established plan of care. to address goals. Recommend next OT session: Activity tolerance    Recommendation for discharge: (in order for the patient to meet his/her long term goals)    IRF vs HHOT     This discharge recommendation:  Has been made in collaboration with the attending provider and/or case management    IF patient discharges home will need the following DME: TBD       SUBJECTIVE:   Patient stated I get tired when I do too much. Danuta Mondragon    OBJECTIVE DATA SUMMARY:   Cognitive/Behavioral Status:  Neurologic State: Alert  Orientation Level: Oriented X4  Cognition: Appropriate decision making; Appropriate for age attention/concentration; Appropriate safety awareness             Functional Mobility and Transfers for ADLs:  Bed Mobility:  Rolling: Stand-by assistance  Supine to Sit: Stand-by assistance  Scooting: Stand-by assistance    Transfers:  Sit to Stand: Stand-by assistance  Functional Transfers  Bathroom Mobility: Contact guard assistance  Toilet Transfer : Contact guard assistance       Balance:  Sitting: Intact; With support  Sitting - Static: Good (unsupported)  Sitting - Dynamic: Good (unsupported)  Standing: Impaired; With support  Standing - Static: Fair;Constant support  Standing - Dynamic : Fair;Constant support    ADL Intervention:       Lower Body Dressing Assistance  Dressing Assistance: Moderate assistance  Socks: Moderate assistance  Leg Crossed Method Used: Yes  Position Performed: Seated edge of bed (R LE only)  Cues: Verbal cues provided    Toileting  Toileting Assistance: Stand-by assistance  Bladder Hygiene: Stand-by assistance  Bowel Hygiene: Stand-by assistance  Clothing Management: Stand-by assistance  Adaptive Equipment: Walker;Grab bars             Pain:  8/10 L pelvic area    Activity Tolerance:   Good, Fair and requires rest breaks  Please refer to the flowsheet for vital signs taken during this treatment. After treatment patient left in no apparent distress:   Sitting in chair and Call bell within reach    COMMUNICATION/COLLABORATION:   The patients plan of care was discussed with: Registered nurse.      SUSANNE Davalos  Time Calculation: 41 mins

## 2021-09-02 NOTE — PROGRESS NOTES
Progress Note    Patient: Anuj Curry MRN: [id number]  SSN: [id number]    YOB: 1950  Age: 66 y.o. Sex: female      Admit Date: 8/29/2021    LOS: 2 days     Subjective:   GI following in consultation for esophageal dysmotility. The patient was admitted to the hospital following a ground level fall at home. CT scan shows non displaced inferior pubic ramus fracture on the left. Orthopedic surgery consulted. Patient under went EGD today showing Dyskinesia of esophagus/ Poor clearance of  food from the esophagus,  Unspecified chronic gastritis without bleeding. Recommend diet per speech therapist. Depending upon speech pathology evaluation of swallowing   function and patients symptoms consider peg tube placement. CT Chest pending. She had and EGD on 10/9/2020 which showed poor motility of esophagus. 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 Barium esophagram on 5/3/21 esophageal dysmotility, no obstructions, no reflux. Speech pathology evaluated patient,  they noted \"Swallow initiation timely, HLE and protraction reduced to digital palpation. No overt s/sx of aspiration/penetration noted. Per primary team patient has made the decision for peg tube placement. Will schedule for tomorrow. EGD 9/2/21:  EGD today showing Dyskinesia of esophagus/ Poor clearance of th food from the esophagus,  Unspecified chronic gastritis without bleeding. X-ray of left hip negative,  XR of spine - Osteoporosis and extensive postoperative changes. Negative for acute process. CT spine - Postoperative changes as described without obvious hardware  complication or acute abnormality. Baseline degenerative disc disease and  arthritic change throughout the spine. XR chest - Similar coarse reticular markings through lungs. Increased lung volumes. Suspectdegree COPD/emphysema. No gross interstitial or alveolar pulmonary edema.  Cardiac and mediastinalstructures unchanged again noted pulmonary arterial prominence, evidence forpulmonary arterial hypertension. Atherosclerotic change thoracic aorta. No pneumothorax or sizable pleural effusion. Partially imaged hardware thoracolumbar spine. Objective:     Vitals:    09/02/21 0850 09/02/21 0859 09/02/21 1011 09/02/21 1221   BP: 129/60 126/69 126/60    Pulse: (!) 51 (!) 53 70    Resp: 21 22 20    Temp:   97.6 °F (36.4 °C)    SpO2: 99% 99% 99% 98%   Weight:       Height:            Intake and Output:  Current Shift: No intake/output data recorded. Last three shifts: No intake/output data recorded. Physical Exam:   Skin:  Extremities and face reveal no rashes. No galicia erythema. HEENT: Sclerae anicteric. Extra-occular muscles are intact. No abnormal pigmentation of the lips. The neck is supple. Cardiovascular: Regular rate and rhythm. Respiratory:  Comfortable breathing with no accessory muscle use. GI:  Abdomen nondistended, soft, and nontender. No enlargement of the liver or spleen. No masses palpable. Rectal:  Deferred  Musculoskeletal: Extremities have good range of motion. Neurological:  Gross memory appears intact. Patient is alert and oriented. Psychiatric:  Mood appears appropriate with judgement intact.   Lymphatic:  No visible adenopathy      Lab/Data Review:  Recent Results (from the past 24 hour(s))   GLUCOSE, POC    Collection Time: 09/01/21  3:56 PM   Result Value Ref Range    Glucose (POC) 56 (L) 65 - 117 mg/dL    Performed by Heaven Mendez    GLUCOSE, POC    Collection Time: 09/01/21  7:33 PM   Result Value Ref Range    Glucose (POC) 108 65 - 117 mg/dL    Performed by Pasty Scheuermann    GLUCOSE, POC    Collection Time: 09/02/21  8:02 AM   Result Value Ref Range    Glucose (POC) 108 65 - 117 mg/dL    Performed by Nile Card    CBC WITH AUTOMATED DIFF    Collection Time: 09/02/21 10:44 AM   Result Value Ref Range    WBC 9.4 3.6 - 11.0 K/uL    RBC 3.86 3.80 - 5.20 M/uL    HGB 11.9 11.5 - 16.0 g/dL    HCT 36.9 35.0 - 47.0 % MCV 95.6 80.0 - 99.0 FL    MCH 30.8 26.0 - 34.0 PG    MCHC 32.2 30.0 - 36.5 g/dL    RDW 13.4 11.5 - 14.5 %    PLATELET 320 (L) 235 - 400 K/uL    MPV 11.9 8.9 - 12.9 FL    NRBC 0.0 0.0  WBC    ABSOLUTE NRBC 0.00 0.00 - 0.01 K/uL    NEUTROPHILS 81 (H) 32 - 75 %    LYMPHOCYTES 13 12 - 49 %    MONOCYTES 5 5 - 13 %    EOSINOPHILS 1 0 - 7 %    BASOPHILS 0 0 - 1 %    IMMATURE GRANULOCYTES 0 0 - 0.5 %    ABS. NEUTROPHILS 7.6 1.8 - 8.0 K/UL    ABS. LYMPHOCYTES 1.2 0.8 - 3.5 K/UL    ABS. MONOCYTES 0.5 0.0 - 1.0 K/UL    ABS. EOSINOPHILS 0.1 0.0 - 0.4 K/UL    ABS. BASOPHILS 0.0 0.0 - 0.1 K/UL    ABS. IMM. GRANS. 0.0 0.00 - 0.04 K/UL    DF AUTOMATED     METABOLIC PANEL, COMPREHENSIVE    Collection Time: 09/02/21 10:44 AM   Result Value Ref Range    Sodium 137 136 - 145 mmol/L    Potassium 3.8 3.5 - 5.1 mmol/L    Chloride 104 97 - 108 mmol/L    CO2 29 21 - 32 mmol/L    Anion gap 4 (L) 5 - 15 mmol/L    Glucose 100 65 - 100 mg/dL    BUN 9 6 - 20 mg/dL    Creatinine 0.57 0.55 - 1.02 mg/dL    BUN/Creatinine ratio 16 12 - 20      GFR est AA >60 >60 ml/min/1.73m2    GFR est non-AA >60 >60 ml/min/1.73m2    Calcium 8.5 8.5 - 10.1 mg/dL    Bilirubin, total 0.5 0.2 - 1.0 mg/dL    AST (SGOT) 7 (L) 15 - 37 U/L    ALT (SGPT) 7 (L) 12 - 78 U/L    Alk. phosphatase 70 45 - 117 U/L    Protein, total 5.6 (L) 6.4 - 8.2 g/dL    Albumin 2.6 (L) 3.5 - 5.0 g/dL    Globulin 3.0 2.0 - 4.0 g/dL    A-G Ratio 0.9 (L) 1.1 - 2.2     GLUCOSE, POC    Collection Time: 09/02/21 12:17 PM   Result Value Ref Range    Glucose (POC) 74 65 - 117 mg/dL    Performed by [facility]               XR CHEST PORT   Final Result      CT SPINE LUMB WO CONT   Final Result   Normal noncontrast CT of the pelvis. HISTORY:  lower back pain, multiple spinal surgeries   Dose reduction technique:    All CT scans at this facility are performed using dose reduction optimization   technique as appropriate on the exam including the following: Automated exposure   control, adjustment of the MA and/or KV according to patient size of use of   iterative reconstructive technique. .      TECHNIQUE: CT of the pelvis without contrast   COMPARISON: None   LIMITATIONS: None   BLADDER: Normal   RETROPERITONEUM/AORTA: Dense aortic calcifications circumferentially but without   adi caliber abnormality. BOWEL/MESENTERY: Normal   APPENDIX: Identified and normal   PERITONEAL CAVITY: Normal   REPRODUCTIVE ORGANS: Normal   BONE/TISSUES: There is a nondisplaced fracture through the left inferior pubic   ramus. Slight adjacent bruising/swelling noted along the medial surface of the   fracture. No measurable hematoma. Advanced degenerative change of both hips   characterized by decreased joint space, osteophytosis about the femoral head and   necks, and subchondral sclerosis and cystic change involving the articular   surfaces. OTHER: Incidental note of a dependent gallstone. IMPRESSION: Nondisplaced fracture of the left inferior pubic ramus. advanced   degenerative changes of the hips. Please see lumbar spine CT for details   regarding the lumbar spine. Incidental visualization of cholelithiasis. HISTORY:  lower back pain, multiple spinal surgeries   Dose reduction technique: All CT scans at this facility are performed using dose reduction optimization   technique as appropriate on the exam including the following: Automated exposure   control, adjustment of the MA and/or KV according to patient size of use of   iterative reconstructive technique. .      TECHNIQUE: Noncontrast CT of the lumbar spine with multiplanar reconstructions. COMPARISON: 1/22/2015   LIMITATIONS: None      FRACTURES: None   ALIGNMENT: Extensive thoracic-lumbar fixation with transpedicular screws at   T10-L1, a left unilateral transpedicular screw at L2, bilateral transpedicular   screws at L3, a right unilateral transpedicular screw at L4, and bilateral   transpedicular screws at L5-S1.  Intervening spinal rods. Alignment is grossly   anatomic. No obvious hardware complication      VERTEBRAL BODIES: Diffuse degenerative changes of the endplates and with areas   of osteophyte formation as on prior. Posterior osteophytes most prominent at   T12-L1. DISC SPACES: Baseline degenerative disc disease. Disc spacers at L1-2, L2-3,   L3-4, L4-5. POSTERIOR ELEMENTS: Normal   SPINAL CANAL: Normal   SACROILIAC JOINT: Visualized portions unremarkable   PARASPINAL SOFT TISSUES: Normal      OTHER: Ectatic and diffusely calcified aorta without adi aneurysm      IMPRESSION: Postoperative changes as described without obvious hardware   complication or acute abnormality. Baseline degenerative disc disease and   arthritic change throughout the spine. CT PELV WO CONT   Final Result   Normal noncontrast CT of the pelvis. HISTORY:  lower back pain, multiple spinal surgeries   Dose reduction technique: All CT scans at this facility are performed using dose reduction optimization   technique as appropriate on the exam including the following: Automated exposure   control, adjustment of the MA and/or KV according to patient size of use of   iterative reconstructive technique. .      TECHNIQUE: CT of the pelvis without contrast   COMPARISON: None   LIMITATIONS: None   BLADDER: Normal   RETROPERITONEUM/AORTA: Dense aortic calcifications circumferentially but without   adi caliber abnormality. BOWEL/MESENTERY: Normal   APPENDIX: Identified and normal   PERITONEAL CAVITY: Normal   REPRODUCTIVE ORGANS: Normal   BONE/TISSUES: There is a nondisplaced fracture through the left inferior pubic   ramus. Slight adjacent bruising/swelling noted along the medial surface of the   fracture. No measurable hematoma. Advanced degenerative change of both hips   characterized by decreased joint space, osteophytosis about the femoral head and   necks, and subchondral sclerosis and cystic change involving the articular   surfaces. OTHER: Incidental note of a dependent gallstone. IMPRESSION: Nondisplaced fracture of the left inferior pubic ramus. advanced   degenerative changes of the hips. Please see lumbar spine CT for details   regarding the lumbar spine. Incidental visualization of cholelithiasis. HISTORY:  lower back pain, multiple spinal surgeries   Dose reduction technique: All CT scans at this facility are performed using dose reduction optimization   technique as appropriate on the exam including the following: Automated exposure   control, adjustment of the MA and/or KV according to patient size of use of   iterative reconstructive technique. .      TECHNIQUE: Noncontrast CT of the lumbar spine with multiplanar reconstructions. COMPARISON: 1/22/2015   LIMITATIONS: None      FRACTURES: None   ALIGNMENT: Extensive thoracic-lumbar fixation with transpedicular screws at   T10-L1, a left unilateral transpedicular screw at L2, bilateral transpedicular   screws at L3, a right unilateral transpedicular screw at L4, and bilateral   transpedicular screws at L5-S1. Intervening spinal rods. Alignment is grossly   anatomic. No obvious hardware complication      VERTEBRAL BODIES: Diffuse degenerative changes of the endplates and with areas   of osteophyte formation as on prior. Posterior osteophytes most prominent at   T12-L1. DISC SPACES: Baseline degenerative disc disease. Disc spacers at L1-2, L2-3,   L3-4, L4-5. POSTERIOR ELEMENTS: Normal   SPINAL CANAL: Normal   SACROILIAC JOINT: Visualized portions unremarkable   PARASPINAL SOFT TISSUES: Normal      OTHER: Ectatic and diffusely calcified aorta without adi aneurysm      IMPRESSION: Postoperative changes as described without obvious hardware   complication or acute abnormality. Baseline degenerative disc disease and   arthritic change throughout the spine. XR SPINE LUMB 2 OR 3 V   Final Result   Osteoporosis and extensive postoperative changes.  Negative for acute process. XR HIP LT W OR WO PELV 2-3 VWS   Final Result   Negative for acute process. CT CHEST W WO CONT F/U    (Results Pending)       Assessment:     Active Problems:    Inferior pubic ramus fracture ([address] (8/30/2021)      Fall (8/31/2021)        Plan:   1. Esophageal dysmotility      -EGD on 10/9/2020 which showed poor motility of esophagus. 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 - Barium esophagram on 5/3/21 esophageal dysmotility, no obstructions, no reflux.         -continue PPI    S/P EGD 9/2/21: Dyskinesia of esophagus/ Poor clearance of th food from the esophagus, Unspecified chronic gastritis without bleeding. Diet per speech Pathology  Follow up in the office in 2 weeks  Depending upon speech pathology evaluation of  swallowing function and patients symptoms consider  PEG placement  CT Chest to evaluate for possible aspiration   EGD in the am for peg tube placement  2. Left hip Fracture      -Ortho has been consulted  3. Diabetes mellitus, type II       -Continue Lantus 20 units nightly  Sliding scale insulin. Managed by primary.   4. UTI      -on Zosyn      -pending urine culture    Thank you for allowing me to participate in this patients care   Plan discussed with Dr. Inocencio Giron and he approves    Signed By: Thomas Valentino, ZENIA     September 2, 2021

## 2021-09-02 NOTE — PROGRESS NOTES
PT attempted to see pt for PT tx today. At time of attempt, pt was off the floor at endo. Will attempt tx again at a later time.

## 2021-09-03 ENCOUNTER — APPOINTMENT (OUTPATIENT)
Dept: CT IMAGING | Age: 71
DRG: 535 | End: 2021-09-03
Attending: NURSE PRACTITIONER
Payer: MEDICARE

## 2021-09-03 ENCOUNTER — ANESTHESIA (OUTPATIENT)
Dept: ENDOSCOPY | Age: 71
DRG: 535 | End: 2021-09-03
Payer: MEDICARE

## 2021-09-03 ENCOUNTER — ANESTHESIA EVENT (OUTPATIENT)
Dept: ENDOSCOPY | Age: 71
DRG: 535 | End: 2021-09-03
Payer: MEDICARE

## 2021-09-03 ENCOUNTER — APPOINTMENT (OUTPATIENT)
Dept: ENDOSCOPY | Age: 71
DRG: 535 | End: 2021-09-03
Attending: INTERNAL MEDICINE
Payer: MEDICARE

## 2021-09-03 PROBLEM — E46 PROTEIN MALNUTRITION (HCC): Status: ACTIVE | Noted: 2021-09-03

## 2021-09-03 PROBLEM — E44.0 MODERATE MALNUTRITION (HCC): Status: ACTIVE | Noted: 2021-09-03

## 2021-09-03 PROBLEM — J69.0 ASPIRATION PNEUMONIA (HCC): Status: ACTIVE | Noted: 2021-09-03

## 2021-09-03 PROBLEM — K22.4 ESOPHAGEAL DYSMOTILITY: Status: ACTIVE | Noted: 2021-09-03

## 2021-09-03 LAB
GLUCOSE BLD STRIP.AUTO-MCNC: 146 MG/DL (ref 65–117)
GLUCOSE BLD STRIP.AUTO-MCNC: 184 MG/DL (ref 65–117)
GLUCOSE BLD STRIP.AUTO-MCNC: 60 MG/DL (ref 65–117)
GLUCOSE BLD STRIP.AUTO-MCNC: 70 MG/DL (ref 65–117)
GLUCOSE BLD STRIP.AUTO-MCNC: 72 MG/DL (ref 65–117)
PERFORMED BY, TECHID: ABNORMAL
PERFORMED BY, TECHID: NORMAL
PERFORMED BY, TECHID: NORMAL

## 2021-09-03 PROCEDURE — 74011000250 HC RX REV CODE- 250: Performed by: REGISTERED NURSE

## 2021-09-03 PROCEDURE — 74011250637 HC RX REV CODE- 250/637: Performed by: PHYSICIAN ASSISTANT

## 2021-09-03 PROCEDURE — 74011250636 HC RX REV CODE- 250/636: Performed by: PHYSICIAN ASSISTANT

## 2021-09-03 PROCEDURE — 65270000029 HC RM PRIVATE

## 2021-09-03 PROCEDURE — 74011000258 HC RX REV CODE- 258: Performed by: PHYSICIAN ASSISTANT

## 2021-09-03 PROCEDURE — 76040000019: Performed by: INTERNAL MEDICINE

## 2021-09-03 PROCEDURE — 77030005122 HC CATH GASTMY PEG BSC -B: Performed by: INTERNAL MEDICINE

## 2021-09-03 PROCEDURE — 76060000031 HC ANESTHESIA FIRST 0.5 HR: Performed by: INTERNAL MEDICINE

## 2021-09-03 PROCEDURE — 2709999900 HC NON-CHARGEABLE SUPPLY: Performed by: INTERNAL MEDICINE

## 2021-09-03 PROCEDURE — 0DH63UZ INSERTION OF FEEDING DEVICE INTO STOMACH, PERCUTANEOUS APPROACH: ICD-10-PCS | Performed by: INTERNAL MEDICINE

## 2021-09-03 PROCEDURE — 82962 GLUCOSE BLOOD TEST: CPT

## 2021-09-03 PROCEDURE — 74011636637 HC RX REV CODE- 636/637: Performed by: INTERNAL MEDICINE

## 2021-09-03 PROCEDURE — 74011250636 HC RX REV CODE- 250/636: Performed by: REGISTERED NURSE

## 2021-09-03 RX ORDER — FLUOXETINE HYDROCHLORIDE 20 MG/1
20 CAPSULE ORAL DAILY
Status: DISCONTINUED | OUTPATIENT
Start: 2021-09-04 | End: 2021-09-05 | Stop reason: HOSPADM

## 2021-09-03 RX ORDER — PANTOPRAZOLE SODIUM 40 MG/1
40 GRANULE, DELAYED RELEASE ORAL
Status: DISCONTINUED | OUTPATIENT
Start: 2021-09-04 | End: 2021-09-05 | Stop reason: HOSPADM

## 2021-09-03 RX ORDER — AMOXICILLIN AND CLAVULANATE POTASSIUM 500; 125 MG/1; MG/1
1 TABLET, FILM COATED ORAL DAILY
Qty: 10 TABLET | Refills: 0 | Status: SHIPPED | OUTPATIENT
Start: 2021-09-03 | End: 2022-07-29 | Stop reason: ALTCHOICE

## 2021-09-03 RX ORDER — LIDOCAINE HYDROCHLORIDE 20 MG/ML
INJECTION, SOLUTION EPIDURAL; INFILTRATION; INTRACAUDAL; PERINEURAL AS NEEDED
Status: DISCONTINUED | OUTPATIENT
Start: 2021-09-03 | End: 2021-09-03 | Stop reason: HOSPADM

## 2021-09-03 RX ORDER — ASPIRIN 81 MG/1
81 TABLET ORAL DAILY
Qty: 30 TABLET | Refills: 1 | Status: SHIPPED | OUTPATIENT
Start: 2021-09-03

## 2021-09-03 RX ORDER — MIDODRINE HYDROCHLORIDE 5 MG/1
5 TABLET ORAL
Status: DISCONTINUED | OUTPATIENT
Start: 2021-09-03 | End: 2021-09-05 | Stop reason: HOSPADM

## 2021-09-03 RX ORDER — PROPOFOL 10 MG/ML
INJECTION, EMULSION INTRAVENOUS
Status: COMPLETED
Start: 2021-09-03 | End: 2021-09-03

## 2021-09-03 RX ORDER — OXYBUTYNIN CHLORIDE 5 MG/1
5 TABLET ORAL 2 TIMES DAILY
Status: DISCONTINUED | OUTPATIENT
Start: 2021-09-03 | End: 2021-09-05 | Stop reason: HOSPADM

## 2021-09-03 RX ORDER — ATORVASTATIN CALCIUM 20 MG/1
20 TABLET, FILM COATED ORAL DAILY
Status: DISCONTINUED | OUTPATIENT
Start: 2021-09-04 | End: 2021-09-05 | Stop reason: HOSPADM

## 2021-09-03 RX ORDER — SODIUM CHLORIDE 9 MG/ML
INJECTION, SOLUTION INTRAVENOUS
Status: DISCONTINUED | OUTPATIENT
Start: 2021-09-03 | End: 2021-09-03 | Stop reason: HOSPADM

## 2021-09-03 RX ORDER — ERGOCALCIFEROL 1.25 MG/1
50000 CAPSULE ORAL
Status: DISCONTINUED | OUTPATIENT
Start: 2021-09-05 | End: 2021-09-05 | Stop reason: HOSPADM

## 2021-09-03 RX ORDER — MIDODRINE HYDROCHLORIDE 5 MG/1
5 TABLET ORAL
Qty: 90 TABLET | Refills: 0 | Status: SHIPPED | OUTPATIENT
Start: 2021-09-03 | End: 2021-10-03

## 2021-09-03 RX ORDER — PROPOFOL 10 MG/ML
INJECTION, EMULSION INTRAVENOUS AS NEEDED
Status: DISCONTINUED | OUTPATIENT
Start: 2021-09-03 | End: 2021-09-03 | Stop reason: HOSPADM

## 2021-09-03 RX ORDER — PRAMIPEXOLE DIHYDROCHLORIDE 1 MG/1
1 TABLET ORAL
Status: DISCONTINUED | OUTPATIENT
Start: 2021-09-03 | End: 2021-09-05 | Stop reason: HOSPADM

## 2021-09-03 RX ORDER — MONTELUKAST SODIUM 10 MG/1
10 TABLET ORAL DAILY
Status: DISCONTINUED | OUTPATIENT
Start: 2021-09-04 | End: 2021-09-05 | Stop reason: HOSPADM

## 2021-09-03 RX ORDER — TRAMADOL HYDROCHLORIDE 50 MG/1
50 TABLET ORAL
Qty: 20 TABLET | Refills: 0 | Status: SHIPPED | OUTPATIENT
Start: 2021-09-03 | End: 2021-09-06

## 2021-09-03 RX ADMIN — INSULIN GLARGINE 20 UNITS: 100 INJECTION, SOLUTION SUBCUTANEOUS at 22:19

## 2021-09-03 RX ADMIN — PROPOFOL 20 MG: 10 INJECTION, EMULSION INTRAVENOUS at 09:40

## 2021-09-03 RX ADMIN — MIDODRINE HYDROCHLORIDE 5 MG: 5 TABLET ORAL at 17:20

## 2021-09-03 RX ADMIN — DEXTROSE AND SODIUM CHLORIDE 75 ML/HR: 5; 900 INJECTION, SOLUTION INTRAVENOUS at 08:28

## 2021-09-03 RX ADMIN — Medication 10 ML: at 22:24

## 2021-09-03 RX ADMIN — Medication 10 ML: at 14:50

## 2021-09-03 RX ADMIN — Medication 10 ML: at 05:41

## 2021-09-03 RX ADMIN — PIPERACILLIN AND TAZOBACTAM 3.38 G: 3; .375 INJECTION, POWDER, LYOPHILIZED, FOR SOLUTION INTRAVENOUS at 21:01

## 2021-09-03 RX ADMIN — SODIUM CHLORIDE: 9 INJECTION, SOLUTION INTRAVENOUS at 09:35

## 2021-09-03 RX ADMIN — PRAMIPEXOLE DIHYDROCHLORIDE 1 MG: 1 TABLET ORAL at 22:19

## 2021-09-03 RX ADMIN — TRAMADOL HYDROCHLORIDE 50 MG: 50 TABLET, FILM COATED ORAL at 17:20

## 2021-09-03 RX ADMIN — OXYBUTYNIN CHLORIDE 5 MG: 5 TABLET ORAL at 22:19

## 2021-09-03 RX ADMIN — PIPERACILLIN AND TAZOBACTAM 3.38 G: 3; .375 INJECTION, POWDER, LYOPHILIZED, FOR SOLUTION INTRAVENOUS at 04:33

## 2021-09-03 RX ADMIN — PIPERACILLIN AND TAZOBACTAM 3.38 G: 3; .375 INJECTION, POWDER, LYOPHILIZED, FOR SOLUTION INTRAVENOUS at 12:38

## 2021-09-03 RX ADMIN — LIDOCAINE HYDROCHLORIDE 60 MG: 20 INJECTION, SOLUTION EPIDURAL; INFILTRATION; INTRACAUDAL; PERINEURAL at 09:36

## 2021-09-03 RX ADMIN — PROPOFOL 80 MG: 10 INJECTION, EMULSION INTRAVENOUS at 09:37

## 2021-09-03 NOTE — ANESTHESIA POSTPROCEDURE EVALUATION
Procedure(s):  PERCUTANEOUS ENDOSCOPIC GASTROSTOMY TUBE INSERTION  ESOPHAGOGASTRODUODENOSCOPY (EGD). total IV anesthesia    Anesthesia Post Evaluation      Multimodal analgesia: multimodal analgesia not used between 6 hours prior to anesthesia start to PACU discharge  Patient location during evaluation: bedside  Patient participation: complete - patient cannot participate  Level of consciousness: awake  Pain score: 0  Pain management: adequate  Airway patency: patent  Anesthetic complications: no  Cardiovascular status: acceptable  Respiratory status: acceptable  Hydration status: acceptable  Post anesthesia nausea and vomiting:  none  Final Post Anesthesia Temperature Assessment:  Normothermia (36.0-37.5 degrees C)      INITIAL Post-op Vital signs: No vitals data found for the desired time range.

## 2021-09-03 NOTE — PROGRESS NOTES
OT treatment attempted at 8:20 am however patient taken off floor for peg tub procedure. Will continue to follow patient and attempt tx at a later time. Thank you!

## 2021-09-03 NOTE — PROGRESS NOTES
Comprehensive Nutrition Assessment    Type and Reason for Visit: Initial (TF recs)    Nutrition Recommendations/Plan:   Initiate Jevity 1.5 via PEG at 215ml, bolus, 5x daily  TIMED 6am, 10am, 2pm, 4pm, and 6pm  Flush with 165ml after each feed, or per MD      Order provides 1630kcal (100%), 69g pro (107%), and 1650ml (101%)    Allow PO for pleasure per SLP       RD to add easy to chew- order per RN    Document TF formula, TF rate, flushes, and BM in I/O's    Nutrition Assessment:  Admitted for ground level fall. Found to have hip fracture- ortho following. Pt with esophageal dysmotility. SLP rec'd alternative means of nutrition, allowing PO for pleasure. PEG placement scheduled for 9/3. RD to provide TF recs. Labs: Glu (POC) 70, ALT 7, Mg1.4, TP 5.6. Meds: statin, D5 at 75ml/hr, insulin, midodrine, Singulair, PPI, zosyn, IVF. Malnutrition Assessment:  Malnutrition Status: Moderate malnutrition    Context:  Acute illness     Findings of the 6 clinical characteristics of malnutrition:   Energy Intake:  7 - 50% or less of est energy requirements for 5 or more days  Weight Loss:  No significant weight loss     Body Fat Loss:  1 - Mild body fat loss, Orbital   Muscle Mass Loss:  1 - Mild muscle mass loss, Temples (temporalis), Clavicles (pectoralis & deltoids)  Fluid Accumulation:  No significant fluid accumulation,      Estimated Daily Nutrient Needs:  Energy (kcal): 1630kcal (28kcal/kg); Weight Used for Energy Requirements: Current  Protein (g): 64g (1.1g/kg); Weight Used for Protein Requirements: Current  Fluid (ml/day): 1630ml; Method Used for Fluid Requirements: 1 ml/kcal    Nutrition Related Findings:  NFPE showing muscle and fat wasting. Poor skin turgor. PEG to abdomin. SLP allowing PO for pleasure, difficulty c/s. Soft BM on 9/2. No N/V/D/C.  No edema     Wounds:    None       Current Nutrition Therapies:  DIET NPO    Anthropometric Measures:  · Height:  5' 6\" (167.6 cm)  · Current Body Wt:  58.3 kg (128 lb 8.5 oz)   · Usual Body Wt:  59.9 kg (132 lb 0.9 oz) (7/16)     · Ideal Body Wt:  130 lbs:  98.9 %   · BMI Category:  Underweight (BMI less than 22) age over 72     Wt Readings from Last 3 Encounters:   09/03/21 58.3 kg (128 lb 8.5 oz)   08/13/21 57.6 kg (127 lb)   07/16/21 59.9 kg (132 lb)     Nutrition Diagnosis:   · Inadequate oral intake related to swallowing difficulty as evidenced by NPO or clear liquid status due to medical condition, nutrition support-enteral nutrition    Nutrition Interventions:   Food and/or Nutrient Delivery: Start tube feeding, Start oral diet  Nutrition Education and Counseling: Education not indicated  Coordination of Nutrition Care: Continue to monitor while inpatient    Goals:  Pt meet >75% of EEN via TF. Wt stability +/- 0.5kg.        Nutrition Monitoring and Evaluation:   Behavioral-Environmental Outcomes: None identified  Food/Nutrient Intake Outcomes: Food and nutrient intake, Enteral nutrition intake/tolerance  Physical Signs/Symptoms Outcomes: Chewing or swallowing, Weight, Skin    Discharge Planning:    Enteral nutrition     Electronically signed by Luan Nevarez RD on 9/3/2021 at 9:48 AM    Contact: [phone]

## 2021-09-03 NOTE — ROUTINE PROCESS
Bedside and Verbal shift change report given to Penny Diaz RN (oncoming nurse) by Judith Soto (offgoing nurse). Report included the following information SBAR, Kardex, MAR, Accordion, Recent Results, Med Rec Status and Quality Measures.

## 2021-09-03 NOTE — PROGRESS NOTES
PT treatment attempted at 9:40 however pt off the floor for procedure for PEG placement. Will continue to follow patient and attempt PT treatment at a later time. Thank you.

## 2021-09-03 NOTE — ROUTINE PROCESS
TRANSFER - OUT REPORT:    Verbal report given to Chloe RN(name) on Sophia Contreras  being transferred to [facility](unit) for routine post - op       Report consisted of patients Situation, Background, Assessment and   Recommendations(SBAR). Information from the following report(s) SBAR was reviewed with the receiving nurse. Opportunity for questions and clarification was provided.       Patient transported with:   O2 @ 2 liters  Tech

## 2021-09-03 NOTE — DISCHARGE SUMMARY
Admit date: 8/29/2021   Admitting Provider: Kenia Sanford MD    Discharge date: 9/3/2021  Discharging Provider: Zack Alicea PA-C      * Admission Diagnoses: Inferior pubic ramus fracture [facility]) Hulan Fonder  Fall [W19. XXXA]    * Discharge Diagnoses:    Hospital Problems as of 9/3/2021 Date Reviewed: 8/13/2021        Codes Class Noted - Resolved POA    Protein malnutrition ([facility] ICD-10-CM: E46  ICD-9-CM: 664  9/3/2021 - Present Unknown        Esophageal dysmotility ICD-10-CM: K22.4  ICD-9-CM: 530.5  9/3/2021 - Present Unknown        Aspiration pneumonia ([facility] ICD-10-CM: J69.0  ICD-9-CM: 507.0  9/3/2021 - Present Unknown        Fall ICD-10-CM: W19. Wilder Alvarez  ICD-9-CM: E888.9  8/31/2021 - Present Unknown        Inferior pubic ramus fracture [facility]) ICD-10-CM: Z84.651P  ICD-9-CM: 808.2  8/30/2021 - Present Unknown              * Hospital Course: This is a 75-year-old female admitted on 8/29/2021 after a ground-level fall at home. This resulted in a nondisplaced fracture of the left inferior pubic ramus with advanced degenerative changes of the hips. UA is consistent with a urinary tract infection. Urine culture no growth. Started patient on Rocephin and changed to Zosyn due to aspiration pneumonia concern. Patient will be evaluated by PT\OT and set up for rehab services as an outpatient and most likely require placement. [facility]. History of esophageal dysmotility and unable to swallow at this point. GI consultation, Dr. Queenie Husain primary gastroenterologist.  Speech evaluation at this point has failed, n.p.o. EGD with chronic gastritis, biopsy taken. Esophageal dysmotility noted. Recommendation is for PEG. PEG placed on 9/3/2021. Dietitian consult for tube feedings. Patient be discharged on Augmentin twice daily for 5 additional days. Patient's blood pressure was noted to be low and improved on midodrine and will continue that medication.   When patient is tolerating PEG feedings will be released to [facility]. Discharge instructions provided to the patient and Ms. Villa Barger. 1.  Ground-level fall  Will benefit from rehab and ongoing ambulation with roller walker     2. Inferior pubic ramus fracture, left  Nondisplaced on CT  Orthopedic consultation no surgical intervention recommended  PT OT for rehab     3. Diabetes mellitus, type II  Continue Lantus 20 units nightly  Sliding scale insulin     4.  Peripheral neuropathy  Stable     5. Hyperlipidemia  Continue Lipitor     6. Urinary tract infection  Urine culture with no growth  UA consistent with uncomplicated urinary tract infection  Rocephin changed to Zosyn     7.  Esophageal dysmotility  Unable to swallow at this point  Speech evaluation, n.p.o. failed speech assessment  EGD with esophageal dysmotility, gastric antritis, chronic biopsy taken  Recommendation for PEG, PEG inserted on 9/3/2021  Consult GI, Dr. Hayden Sánchez     8. Concern for aspiration pneumonia  Esophageal dysmotility and possible aspiration risk  Change antibiotics to Zosyn as white count is increasing  Chest x-ray with no obvious consolidation consistent with aspiration pneumonia     9. Moderate protein malnutrition  Nutritional consultation  Speech consultation for supplementation  PEG tube placement    10. Aspiration pneumonia  Continue Augmentin as an outpatient    * Procedures: PEG and EGD  Procedure(s):  PERCUTANEOUS ENDOSCOPIC GASTROSTOMY TUBE INSERTION  ESOPHAGOGASTRODUODENOSCOPY (EGD)      Consults:   Gastroenterology and orthopedic consultation    Significant Diagnostic Studies: As discussed in hospital course    Discharge Exam:  Visit Vitals  /76 (BP Patient Position: At rest)   Pulse (!) 55   Temp 98.6 °F (37 °C)   Resp 18   Ht 5' 6\" (1.676 m)   Wt 58.3 kg (128 lb 8.5 oz)   LMP  (LMP Unknown)   SpO2 98%   BMI 20.74 kg/m²     PHYSICAL EXAM:  Constitutional: Alert in no acute distress   HEENT: Sclerae anicteric, The neck is supple. Cardiovascular: Regular rate and rhythm. No murmurs, gallops, or rubs. [facility] Records Respiratory: Clear breath sounds with no wheezes, rales, or rhonchi. GI: Abdomen nondistended, soft, and nontender. Normal active bowel sounds. PEG in place  Rectal: Deferred   Musculoskeletal: No pitting edema of the lower legs. Extremities have good range of motion. Tenderness in the left pelvis. Neurological:  Patient is alert and oriented. Cranial nerves II-XII intact  Psychiatric: Mood appears appropriate with judgement intact. Lymphatic: No cervical or supraclavicular adenopathy. Skin: No rashes or breakdown of the skin      * Discharge Condition: stable  * Disposition: Rehab facility    Discharge Medications:  Current Discharge Medication List      START taking these medications    Details   midodrine (PROAMATINE) 5 mg tablet Take 1 Tablet by mouth three (3) times daily (with meals) for 30 days. Qty: 90 Tablet, Refills: 0  Start date: 9/3/2021, End date: 10/3/2021      traMADoL (ULTRAM) 50 mg tablet Take 1 Tablet by mouth every six (6) hours as needed for Pain for up to 3 days. Max Daily Amount: 200 mg. Qty: 20 Tablet, Refills: 0  Start date: 9/3/2021, End date: 9/6/2021    Associated Diagnoses: Closed fracture of inferior pubic ramus, unspecified laterality, initial encounter ([facility])      amoxicillin-clavulanate (Augmentin) 500-125 mg per tablet Take 1 Tablet by mouth daily. Qty: 10 Tablet, Refills: 0  Start date: 9/3/2021         CONTINUE these medications which have CHANGED    Details   aspirin delayed-release 81 mg tablet Take 1 Tablet by mouth daily. Qty: 30 Tablet, Refills: 1  Start date: 9/3/2021         CONTINUE these medications which have NOT CHANGED    Details   albuterol (PROVENTIL VENTOLIN) 2.5 mg /3 mL (0.083 %) nebu by Nebulization route. cholecalciferol (VITAMIN D3) (50,000 UNITS /1250 MCG) capsule Take  by mouth every seven (7) days.       insulin glargine U-300 conc (TOUJEO) 300 unit/mL (1.5 mL) inpn pen 20 Units by SubCUTAneous route nightly. clonazePAM (KlonoPIN) 0.5 mg tablet Take 0.5 mg by mouth three (3) times daily. oxybutynin (DITROPAN) 5 mg tablet Take 5 mg by mouth two (2) times a day. atorvastatin (LIPITOR) 20 mg tablet Take 20 mg by mouth daily. FLUoxetine (PROZAC) 40 mg capsule Take 20 mg by mouth daily. montelukast (SINGULAIR) 10 mg tablet Take 10 mg by mouth daily. pramipexole (MIRAPEX) 0.5 mg tablet Take 1 mg by mouth nightly. ipratropium-albuterol (COMBIVENT)  mcg/actuation inhaler Take 1 Puff by inhalation every six (6) hours as needed for Wheezing or Shortness of Breath. Indications: CHRONIC OBSTRUCTIVE PULMONARY DISEASE WITH BRONCHOSPASMS         STOP taking these medications       omeprazole magnesium (PRILOSEC OTC PO) Comments:   Reason for Stopping:               * Follow-up Care/Patient Instructions:   Activity: Activity as tolerated  Diet: Cardiac Diet  Wound Care: None needed    Follow-up Information     Follow up With Specialties Details Why Contact Info    Galina Nunez MD Family Medicine In 1 week  [address] [place] [phone]      Mane Pierre MD Gastroenterology In 1 week  [address] [place] [address] [address]       Nguyen Braden MD Orthopedic Surgery In 2 weeks  [place] [address]  [phone]            Discharge summary greater than 35 minutes spent with the patient performing discharge instructions, medication review and physical exam    Signed:  Karen Camarillo PA-C  9/3/2021  11:07 AM

## 2021-09-03 NOTE — PROGRESS NOTES
Progress Note    Patient: Les Grey MRN: [id number]  SSN: [id number]    YOB: 1950  Age: 66 y.o. Sex: female      Admit Date: 8/29/2021    LOS: 3 days     Subjective:   GI in consultation for esophageal dysmotility. Patient seen s/p EGD today with peg tube placement. Site is w/o redness, or drainage. Patient  Admits to slight tenderness around the site. Peg tube may be used in 6 hours post insertion. Dietitian consult for feed, formula,and  Volume. She was admitted to the hospital following a ground level fall at home. CT scan shows displaced inferior pubic ramus fracture on the left. Orthopedic surgery consulted. Ms. Denise Goldsmith under went EGD yesterday which showed dyskinesia of esophagus/ Poor clearance of food from the esophagus and un-specified chronic gastritis without bleeding. CT Chest pending. She had and EGD on 10/9/2020 which showed poor motility of esophagus. 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 Barium esophagram on 5/3/21 esophageal dysmotility, no obstructions, no reflux. EGD 9/2/21:  EGD today showing Dyskinesia of esophagus/ Poor clearance of th food from the esophagus,  Unspecified chronic gastritis without bleeding. X-ray of left hip negative,  XR of spine - Osteoporosis and extensive postoperative changes. Negative for acute process. CT spine - Postoperative changes as described without obvious hardware  complication or acute abnormality. Baseline degenerative disc disease and  arthritic change throughout the spine. XR chest - Similar coarse reticular markings through lungs. Increased lung volumes. Suspectdegree COPD/emphysema. No gross interstitial or alveolar pulmonary edema. Cardiac and mediastinalstructures unchanged again noted pulmonary arterial prominence, evidence forpulmonary arterial hypertension. Atherosclerotic change thoracic aorta. No pneumothorax or sizable pleural effusion. Partially imaged hardware thoracolumbar spine. Objective:     Vitals:    09/03/21 1000 09/03/21 1005 09/03/21 1045 09/03/21 1227   BP: (!) 117/56 108/76 104/68    Pulse: (!) 51 (!) 55 60    Resp: 19 18 18    Temp:   98.1 °F (36.7 °C)    SpO2: 99% 98% 97%    Weight:       Height:    5' 6\" (1.676 m)        Intake and Output:  Current Shift: 09/03 0701 - 09/03 1900  In: 200 [I.V.:200]  Out: -   Last three shifts: No intake/output data recorded. Physical Exam:   Skin:  Extremities and face reveal no rashes. No galicia erythema. HEENT: Sclerae anicteric. Extra-occular muscles are intact. No abnormal pigmentation of the lips. The neck is supple. [place]. Cardiovascular: Regular rate and rhythm. Respiratory:  Comfortable breathing with no accessory muscle use. GI:  Abdomen nondistended, soft, and nontender. No enlargement of the liver or spleen. No masses palpable. Peg tube site w/o redness or drainage. Rectal:  Deferred  Musculoskeletal: Extremities have good range of motion. Neurological:  Gross memory appears intact. Patient is alert and oriented. Psychiatric:  Mood appears appropriate with judgement intact. Lymphatic:  No visible adenopathy      Lab/Data Review:  Recent Results (from the past 24 hour(s))   GLUCOSE, POC    Collection Time: 09/02/21  5:34 PM   Result Value Ref Range    Glucose (POC) 138 (H) 65 - 117 mg/dL    Performed by Opal Moose    GLUCOSE, POC    Collection Time: 09/02/21  8:36 PM   Result Value Ref Range    Glucose (POC) 142 (H) 65 - 117 mg/dL    Performed by Opal Moose    GLUCOSE, POC    Collection Time: 09/03/21  7:51 AM   Result Value Ref Range    Glucose (POC) 70 65 - 117 mg/dL    Performed by Sandeep Eldridge    GLUCOSE, POC    Collection Time: 09/03/21 12:16 PM   Result Value Ref Range    Glucose (POC) 72 65 - 117 mg/dL    Performed by Laura SINCLAIR               XR CHEST PORT   Final Result      CT SPINE LUMB WO CONT   Final Result   Normal noncontrast CT of the pelvis.      HISTORY:  lower back pain, multiple spinal surgeries   Dose reduction technique: All CT scans at this facility are performed using dose reduction optimization   technique as appropriate on the exam including the following: Automated exposure   control, adjustment of the MA and/or KV according to patient size of use of   iterative reconstructive technique. .      TECHNIQUE: CT of the pelvis without contrast   COMPARISON: None   LIMITATIONS: None   BLADDER: Normal   RETROPERITONEUM/AORTA: Dense aortic calcifications circumferentially but without   adi caliber abnormality. BOWEL/MESENTERY: Normal   APPENDIX: Identified and normal   PERITONEAL CAVITY: Normal   REPRODUCTIVE ORGANS: Normal   BONE/TISSUES: There is a nondisplaced fracture through the left inferior pubic   ramus. Slight adjacent bruising/swelling noted along the medial surface of the   fracture. No measurable hematoma. Advanced degenerative change of both hips   characterized by decreased joint space, osteophytosis about the femoral head and   necks, and subchondral sclerosis and cystic change involving the articular   surfaces. OTHER: Incidental note of a dependent gallstone. IMPRESSION: Nondisplaced fracture of the left inferior pubic ramus. advanced   degenerative changes of the hips. Please see lumbar spine CT for details   regarding the lumbar spine. Incidental visualization of cholelithiasis. HISTORY:  lower back pain, multiple spinal surgeries   Dose reduction technique: All CT scans at this facility are performed using dose reduction optimization   technique as appropriate on the exam including the following: Automated exposure   control, adjustment of the MA and/or KV according to patient size of use of   iterative reconstructive technique. .      TECHNIQUE: Noncontrast CT of the lumbar spine with multiplanar reconstructions.        COMPARISON: 1/22/2015   LIMITATIONS: None      FRACTURES: None   ALIGNMENT: Extensive thoracic-lumbar fixation with transpedicular screws at   T10-L1, a left unilateral transpedicular screw at L2, bilateral transpedicular   screws at L3, a right unilateral transpedicular screw at L4, and bilateral   transpedicular screws at L5-S1. Intervening spinal rods. Alignment is grossly   anatomic. No obvious hardware complication      VERTEBRAL BODIES: Diffuse degenerative changes of the endplates and with areas   of osteophyte formation as on prior. Posterior osteophytes most prominent at   T12-L1. DISC SPACES: Baseline degenerative disc disease. Disc spacers at L1-2, L2-3,   L3-4, L4-5. POSTERIOR ELEMENTS: Normal   SPINAL CANAL: Normal   SACROILIAC JOINT: Visualized portions unremarkable   PARASPINAL SOFT TISSUES: Normal      OTHER: Ectatic and diffusely calcified aorta without adi aneurysm      IMPRESSION: Postoperative changes as described without obvious hardware   complication or acute abnormality. Baseline degenerative disc disease and   arthritic change throughout the spine. CT PELV WO CONT   Final Result   Normal noncontrast CT of the pelvis. HISTORY:  lower back pain, multiple spinal surgeries   Dose reduction technique: All CT scans at this facility are performed using dose reduction optimization   technique as appropriate on the exam including the following: Automated exposure   control, adjustment of the MA and/or KV according to patient size of use of   iterative reconstructive technique. .      TECHNIQUE: CT of the pelvis without contrast   COMPARISON: None   LIMITATIONS: None   BLADDER: Normal   RETROPERITONEUM/AORTA: Dense aortic calcifications circumferentially but without   adi caliber abnormality. BOWEL/MESENTERY: Normal   APPENDIX: Identified and normal   PERITONEAL CAVITY: Normal   REPRODUCTIVE ORGANS: Normal   BONE/TISSUES: There is a nondisplaced fracture through the left inferior pubic   ramus. Slight adjacent bruising/swelling noted along the medial surface of the   fracture. No measurable hematoma.   Advanced degenerative change of both hips   characterized by decreased joint space, osteophytosis about the femoral head and   necks, and subchondral sclerosis and cystic change involving the articular   surfaces. OTHER: Incidental note of a dependent gallstone. IMPRESSION: Nondisplaced fracture of the left inferior pubic ramus. advanced   degenerative changes of the hips. Please see lumbar spine CT for details   regarding the lumbar spine. Incidental visualization of cholelithiasis. HISTORY:  lower back pain, multiple spinal surgeries   Dose reduction technique: All CT scans at this facility are performed using dose reduction optimization   technique as appropriate on the exam including the following: Automated exposure   control, adjustment of the MA and/or KV according to patient size of use of   iterative reconstructive technique. .      TECHNIQUE: Noncontrast CT of the lumbar spine with multiplanar reconstructions. COMPARISON: 1/22/2015   LIMITATIONS: None      FRACTURES: None   ALIGNMENT: Extensive thoracic-lumbar fixation with transpedicular screws at   T10-L1, a left unilateral transpedicular screw at L2, bilateral transpedicular   screws at L3, a right unilateral transpedicular screw at L4, and bilateral   transpedicular screws at L5-S1. Intervening spinal rods. Alignment is grossly   anatomic. No obvious hardware complication      VERTEBRAL BODIES: Diffuse degenerative changes of the endplates and with areas   of osteophyte formation as on prior. Posterior osteophytes most prominent at   T12-L1. DISC SPACES: Baseline degenerative disc disease. Disc spacers at L1-2, L2-3,   L3-4, L4-5.    POSTERIOR ELEMENTS: Normal   SPINAL CANAL: Normal   SACROILIAC JOINT: Visualized portions unremarkable   PARASPINAL SOFT TISSUES: Normal      OTHER: Ectatic and diffusely calcified aorta without adi aneurysm      IMPRESSION: Postoperative changes as described without obvious hardware   complication or acute abnormality. Baseline degenerative disc disease and   arthritic change throughout the spine. XR SPINE LUMB 2 OR 3 V   Final Result   Osteoporosis and extensive postoperative changes. Negative for   acute process. XR HIP LT W OR WO PELV 2-3 VWS   Final Result   Negative for acute process. CT CHEST W WO CONT F/U    (Results Pending)       Assessment:     Active Problems:    Inferior pubic ramus fracture ([address] (8/30/2021)      Fall (8/31/2021)      Moderate malnutrition ([address] (9/3/2021)      Esophageal dysmotility (9/3/2021)      Aspiration pneumonia ([address] (9/3/2021)        Plan:   1. Esophageal dysmotility      -EGD on 10/9/2020 which showed poor motility of esophagus. 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 - Barium esophagram on 5/3/21 esophageal dysmotility, no obstructions, no reflux.         -continue PPI    S/P EGD 9/2/21: Dyskinesia of esophagus/ Poor clearance of th food from the esophagus, Unspecified chronic gastritis without bleeding. Diet per speech Pathology  Follow up in the office in 2 weeks  S/P EGD 9/3/21 dyskinesia of esophagus with peg tube placement  CT Chest to evaluate for possible aspiration pending   EGD in the am for peg tube placement  2. Left hip Fracture      -Ortho has been consulted  3. Diabetes mellitus, type II       -Continue Lantus 20 units nightly  Sliding scale insulin. Managed by primary. 4. UTI      -on Zosyn      -pending urine culture  5. Peg tube Placement 9/3/21     May use PEG tube in 6 hours     Bolster mild compression to skin wall,    Clean wound with peroxide    apply triple antibiotic crream twice a day. Diet per speech pathology . Dietary consult for feed, formula and volume. Thank you for allowing me to participate in this patients care  Plan discussed with Dr. Khari James and he approves.     Signed By: Marylin Macedo NP     September 3, 2021

## 2021-09-03 NOTE — ANESTHESIA PREPROCEDURE EVALUATION
Relevant Problems   RESPIRATORY SYSTEM   (+) COPD (chronic obstructive pulmonary disease) (HCC)   (+) COPD exacerbation (HCC)      RENAL FAILURE   (+) UPJ (ureteropelvic junction) obstruction       Anesthetic History   No history of anesthetic complications            Review of Systems / Medical History  Patient summary reviewed, nursing notes reviewed and pertinent labs reviewed    Pulmonary    COPD    Sleep apnea        Comments: FORMER SMOKER   Neuro/Psych         Psychiatric history (SUICIDAL THOUGHTS. )    Comments: Hearing loss. Fibromyalgia Cardiovascular    Hypertension          CAD and hyperlipidemia      Comments: EKG (1-2-21) Normal sinus rhythm   Possible Left atrial enlargement   Septal infarct (cited on or before 26-DEC-2019)   ST & T wave abnormality, consider inferior ischemia   ST & T wave abnormality, consider anterior ischemia   Abnormal ECG   When compared with ECG of 19-FEB-2020 15:40,   Questionable change in initial forces of Septal leads   T wave inversion more evident in Inferior leads   GI/Hepatic/Renal     GERD    Renal disease: stones       Endo/Other    Diabetes    Arthritis    Comments: Neurogenic bladder. Torn rotator cuff Other Findings   Comments: SCOLIOSIS  DEGENERATIVE DISC DISEASE.           Physical Exam    Airway  Mallampati: II  TM Distance: > 6 cm  Neck ROM: normal range of motion   Mouth opening: Normal     Cardiovascular               Dental    Dentition: Poor dentition     Pulmonary      Decreased breath sounds           Abdominal         Other Findings            Anesthetic Plan    ASA: 3  Anesthesia type: total IV anesthesia          Induction: Intravenous  Anesthetic plan and risks discussed with: Patient

## 2021-09-03 NOTE — PROGRESS NOTES
Patient received PEG tube this morning. Patient will have feedings started today at 4pm.  CM inquired about bed availability at [facility] in [place], [place]. CM awaiting response.

## 2021-09-03 NOTE — PROGRESS NOTES
CM reviewed medical chart. Plan is for patient to discharge to [facility] tomorrow. Awaiting room number. CM team will continue to follow case for discharge planning purposes.

## 2021-09-04 VITALS
SYSTOLIC BLOOD PRESSURE: 107 MMHG | HEIGHT: 66 IN | DIASTOLIC BLOOD PRESSURE: 62 MMHG | RESPIRATION RATE: 16 BRPM | TEMPERATURE: 98.1 F | BODY MASS INDEX: 20.62 KG/M2 | HEART RATE: 74 BPM | OXYGEN SATURATION: 96 % | WEIGHT: 128.31 LBS

## 2021-09-04 LAB
ALBUMIN SERPL-MCNC: 2.5 G/DL (ref 3.5–5)
ALBUMIN/GLOB SERPL: 0.7 {RATIO} (ref 1.1–2.2)
ALP SERPL-CCNC: 77 U/L (ref 45–117)
ALT SERPL-CCNC: 12 U/L (ref 12–78)
ANION GAP SERPL CALC-SCNC: 5 MMOL/L (ref 5–15)
AST SERPL W P-5'-P-CCNC: 9 U/L (ref 15–37)
BASOPHILS # BLD: 0 K/UL (ref 0–0.1)
BASOPHILS NFR BLD: 1 % (ref 0–1)
BILIRUB SERPL-MCNC: 0.5 MG/DL (ref 0.2–1)
BUN SERPL-MCNC: 10 MG/DL (ref 6–20)
BUN/CREAT SERPL: 15 (ref 12–20)
CA-I BLD-MCNC: 8.5 MG/DL (ref 8.5–10.1)
CHLORIDE SERPL-SCNC: 104 MMOL/L (ref 97–108)
CO2 SERPL-SCNC: 33 MMOL/L (ref 21–32)
CREAT SERPL-MCNC: 0.68 MG/DL (ref 0.55–1.02)
DIFFERENTIAL METHOD BLD: ABNORMAL
EOSINOPHIL # BLD: 0.1 K/UL (ref 0–0.4)
EOSINOPHIL NFR BLD: 2 % (ref 0–7)
ERYTHROCYTE [DISTWIDTH] IN BLOOD BY AUTOMATED COUNT: 13.2 % (ref 11.5–14.5)
GLOBULIN SER CALC-MCNC: 3.4 G/DL (ref 2–4)
GLUCOSE BLD STRIP.AUTO-MCNC: 102 MG/DL (ref 65–117)
GLUCOSE BLD STRIP.AUTO-MCNC: 135 MG/DL (ref 65–117)
GLUCOSE BLD STRIP.AUTO-MCNC: 65 MG/DL (ref 65–117)
GLUCOSE BLD STRIP.AUTO-MCNC: 95 MG/DL (ref 65–117)
GLUCOSE SERPL-MCNC: 95 MG/DL (ref 65–100)
HCT VFR BLD AUTO: 39.8 % (ref 35–47)
HGB BLD-MCNC: 12.8 G/DL (ref 11.5–16)
IMM GRANULOCYTES # BLD AUTO: 0 K/UL (ref 0–0.04)
IMM GRANULOCYTES NFR BLD AUTO: 0 % (ref 0–0.5)
LYMPHOCYTES # BLD: 1 K/UL (ref 0.8–3.5)
LYMPHOCYTES NFR BLD: 16 % (ref 12–49)
MCH RBC QN AUTO: 30.3 PG (ref 26–34)
MCHC RBC AUTO-ENTMCNC: 32.2 G/DL (ref 30–36.5)
MCV RBC AUTO: 94.3 FL (ref 80–99)
MONOCYTES # BLD: 0.4 K/UL (ref 0–1)
MONOCYTES NFR BLD: 6 % (ref 5–13)
NEUTS SEG # BLD: 4.6 K/UL (ref 1.8–8)
NEUTS SEG NFR BLD: 75 % (ref 32–75)
NRBC # BLD: 0 K/UL (ref 0–0.01)
NRBC BLD-RTO: 0 PER 100 WBC
PERFORMED BY, TECHID: ABNORMAL
PERFORMED BY, TECHID: NORMAL
PLATELET # BLD AUTO: 133 K/UL (ref 150–400)
PMV BLD AUTO: 11.9 FL (ref 8.9–12.9)
POTASSIUM SERPL-SCNC: 4 MMOL/L (ref 3.5–5.1)
PROT SERPL-MCNC: 5.9 G/DL (ref 6.4–8.2)
RBC # BLD AUTO: 4.22 M/UL (ref 3.8–5.2)
SODIUM SERPL-SCNC: 142 MMOL/L (ref 136–145)
WBC # BLD AUTO: 6.1 K/UL (ref 3.6–11)

## 2021-09-04 PROCEDURE — 74011250636 HC RX REV CODE- 250/636: Performed by: PHYSICIAN ASSISTANT

## 2021-09-04 PROCEDURE — 74011000258 HC RX REV CODE- 258: Performed by: PHYSICIAN ASSISTANT

## 2021-09-04 PROCEDURE — 74011250637 HC RX REV CODE- 250/637: Performed by: INTERNAL MEDICINE

## 2021-09-04 PROCEDURE — 74011250637 HC RX REV CODE- 250/637: Performed by: PHYSICIAN ASSISTANT

## 2021-09-04 PROCEDURE — 97535 SELF CARE MNGMENT TRAINING: CPT

## 2021-09-04 PROCEDURE — 94760 N-INVAS EAR/PLS OXIMETRY 1: CPT

## 2021-09-04 PROCEDURE — 85025 COMPLETE CBC W/AUTO DIFF WBC: CPT

## 2021-09-04 PROCEDURE — 82962 GLUCOSE BLOOD TEST: CPT

## 2021-09-04 PROCEDURE — 74011000250 HC RX REV CODE- 250: Performed by: INTERNAL MEDICINE

## 2021-09-04 PROCEDURE — 77010033678 HC OXYGEN DAILY

## 2021-09-04 PROCEDURE — 36415 COLL VENOUS BLD VENIPUNCTURE: CPT

## 2021-09-04 PROCEDURE — 97530 THERAPEUTIC ACTIVITIES: CPT

## 2021-09-04 PROCEDURE — 74011250636 HC RX REV CODE- 250/636: Performed by: INTERNAL MEDICINE

## 2021-09-04 PROCEDURE — 80053 COMPREHEN METABOLIC PANEL: CPT

## 2021-09-04 RX ORDER — PRAMIPEXOLE DIHYDROCHLORIDE 1 MG/1
1 TABLET ORAL
Qty: 30 TABLET | Refills: 1 | Status: SHIPPED | OUTPATIENT
Start: 2021-09-04

## 2021-09-04 RX ORDER — PANTOPRAZOLE SODIUM 40 MG/1
40 GRANULE, DELAYED RELEASE ORAL
Qty: 30 EACH | Refills: 1 | Status: SHIPPED | OUTPATIENT
Start: 2021-09-05

## 2021-09-04 RX ORDER — MONTELUKAST SODIUM 10 MG/1
10 TABLET ORAL DAILY
Qty: 30 TABLET | Refills: 1 | Status: SHIPPED | OUTPATIENT
Start: 2021-09-05

## 2021-09-04 RX ORDER — ERGOCALCIFEROL 1.25 MG/1
50000 CAPSULE ORAL
Qty: 30 CAPSULE | Refills: 1 | Status: SHIPPED | OUTPATIENT
Start: 2021-09-05

## 2021-09-04 RX ORDER — FLUOXETINE HYDROCHLORIDE 20 MG/1
20 CAPSULE ORAL DAILY
Qty: 30 CAPSULE | Refills: 0 | Status: SHIPPED | OUTPATIENT
Start: 2021-09-05

## 2021-09-04 RX ORDER — CLONAZEPAM 0.5 MG/1
0.5 TABLET ORAL 3 TIMES DAILY
Qty: 30 TABLET | Refills: 0 | Status: SHIPPED | OUTPATIENT
Start: 2021-09-04

## 2021-09-04 RX ORDER — ATORVASTATIN CALCIUM 20 MG/1
20 TABLET, FILM COATED ORAL DAILY
Qty: 30 TABLET | Refills: 0 | Status: SHIPPED | OUTPATIENT
Start: 2021-09-05

## 2021-09-04 RX ORDER — OXYBUTYNIN CHLORIDE 5 MG/1
5 TABLET ORAL 2 TIMES DAILY
Qty: 30 TABLET | Refills: 1 | Status: SHIPPED | OUTPATIENT
Start: 2021-09-04 | End: 2021-11-11 | Stop reason: SDUPTHER

## 2021-09-04 RX ADMIN — PIPERACILLIN AND TAZOBACTAM 3.38 G: 3; .375 INJECTION, POWDER, LYOPHILIZED, FOR SOLUTION INTRAVENOUS at 03:47

## 2021-09-04 RX ADMIN — GLUCAGON HYDROCHLORIDE 1 MG: KIT at 21:11

## 2021-09-04 RX ADMIN — PIPERACILLIN AND TAZOBACTAM 3.38 G: 3; .375 INJECTION, POWDER, LYOPHILIZED, FOR SOLUTION INTRAVENOUS at 13:04

## 2021-09-04 RX ADMIN — ATORVASTATIN CALCIUM 20 MG: 20 TABLET, FILM COATED ORAL at 09:39

## 2021-09-04 RX ADMIN — TRAMADOL HYDROCHLORIDE 50 MG: 50 TABLET, FILM COATED ORAL at 09:39

## 2021-09-04 RX ADMIN — FLUOXETINE 20 MG: 20 CAPSULE ORAL at 09:39

## 2021-09-04 RX ADMIN — Medication 10 ML: at 13:05

## 2021-09-04 RX ADMIN — CLONAZEPAM 0.5 MG: 0.5 TABLET ORAL at 16:42

## 2021-09-04 RX ADMIN — MIDODRINE HYDROCHLORIDE 5 MG: 5 TABLET ORAL at 13:07

## 2021-09-04 RX ADMIN — OXYBUTYNIN CHLORIDE 5 MG: 5 TABLET ORAL at 09:39

## 2021-09-04 RX ADMIN — MONTELUKAST 10 MG: 10 TABLET, FILM COATED ORAL at 09:39

## 2021-09-04 RX ADMIN — Medication 10 ML: at 05:49

## 2021-09-04 RX ADMIN — PANTOPRAZOLE SODIUM 40 MG: 40 GRANULE, DELAYED RELEASE ORAL at 09:39

## 2021-09-04 RX ADMIN — ASPIRIN 81 MG: 81 TABLET, COATED ORAL at 09:39

## 2021-09-04 RX ADMIN — MIDODRINE HYDROCHLORIDE 5 MG: 5 TABLET ORAL at 16:42

## 2021-09-04 RX ADMIN — MIDODRINE HYDROCHLORIDE 5 MG: 5 TABLET ORAL at 09:39

## 2021-09-04 NOTE — PROGRESS NOTES
DC Plan: [facility]       Report to call:  [phone]  Transportation will need be set up for a 7pm . [facility] can only admit pt between 7pm-11pm.    ANNA MARIE messaged [facility] via [facility] to see if pt can discharge to their facility today. Awaiting response.     ___________________________________    Cm received confirmation from attending pt can discharge. [facility] can accept pt today. Cm spoke with pt's family - Jose Glez and notified her of discharge to [facility] rehab today. Cm updated pt's primary nurse. Cm set up transportation with [employer]. Pt to be picked up at 7pm.     Discharge plan of care/case management plan validated with provider's discharge order.

## 2021-09-04 NOTE — DISCHARGE INSTRUCTIONS
Patient Education        Anxiety Disorder: Care Instructions  Your Care Instructions     Anxiety is a normal reaction to stress. Difficult situations can cause you to have symptoms such as sweaty palms and a nervous feeling. In an anxiety disorder, the symptoms are far more severe. Constant worry, muscle tension, trouble sleeping, nausea and diarrhea, and other symptoms can make normal daily activities difficult or impossible. These symptoms may occur for no reason, and they can affect your work, school, or social life. Medicines, counseling, and self-care can all help. Follow-up care is a key part of your treatment and safety. Be sure to make and go to all appointments, and call your doctor if you are having problems. It's also a good idea to know your test results and keep a list of the medicines you take. How can you care for yourself at home? · Take medicines exactly as directed. Call your doctor if you think you are having a problem with your medicine. · Go to your counseling sessions and follow-up appointments. · Recognize and accept your anxiety. Then, when you are in a situation that makes you anxious, say to yourself, \"This is not an emergency. I feel uncomfortable, but I am not in danger. I can keep going even if I feel anxious. \"  · Be kind to your body:  ? Relieve tension with exercise or a massage. ? Get enough rest.  ? Avoid alcohol, caffeine, nicotine, and illegal drugs. They can increase your anxiety level and cause sleep problems. ? Learn and do relaxation techniques. See below for more about these techniques. · Engage your mind. Get out and do something you enjoy. Go to a funny movie, or take a walk or hike. Plan your day. Having too much or too little to do can make you anxious. · Keep a record of your symptoms. Discuss your fears with a good friend or family member, or join a support group for people with similar problems. Talking to others sometimes relieves stress.   · Get involved in social groups, or volunteer to help others. Being alone sometimes makes things seem worse than they are. · Get at least 30 minutes of exercise on most days of the week to relieve stress. Walking is a good choice. You also may want to do other activities, such as running, swimming, cycling, or playing tennis or team sports. Relaxation techniques  Do relaxation exercises 10 to 20 minutes a day. You can play soothing, relaxing music while you do them, if you wish. · Tell others in your house that you are going to do your relaxation exercises. Ask them not to disturb you. · Find a comfortable place, away from all distractions and noise. · Lie down on your back, or sit with your back straight. · Focus on your breathing. Make it slow and steady. · Breathe in through your nose. Breathe out through either your nose or mouth. · Breathe deeply, filling up the area between your navel and your rib cage. Breathe so that your belly goes up and down. · Do not hold your breath. · Breathe like this for 5 to 10 minutes. Notice the feeling of calmness throughout your whole body. As you continue to breathe slowly and deeply, relax by doing the following for another 5 to 10 minutes:  · Tighten and relax each muscle group in your body. You can begin at your toes and work your way up to your head. · Imagine your muscle groups relaxing and becoming heavy. · Empty your mind of all thoughts. · Let yourself relax more and more deeply. · Become aware of the state of calmness that surrounds you. · When your relaxation time is over, you can bring yourself back to alertness by moving your fingers and toes and then your hands and feet and then stretching and moving your entire body. Sometimes people fall asleep during relaxation, but they usually wake up shortly afterward. · Always give yourself time to return to full alertness before you drive a car or do anything that might cause an accident if you are not fully alert.  Never play a relaxation tape while you drive a car. When should you call for help? Call 911 anytime you think you may need emergency care. For example, call if:    · You feel you cannot stop from hurting yourself or someone else. Keep the numbers for these national suicide hotlines: 7-029-780-TALK (0-937.471.4132) and 5-629-ILXZGQN (1-296.157.2264). If you or someone you know talks about suicide or feeling hopeless, get help right away. Watch closely for changes in your health, and be sure to contact your doctor if:    · You have anxiety or fear that affects your life.     · You have symptoms of anxiety that are new or different from those you had before. Where can you learn more? Go to http://www.medeiros.com/  Enter P754 in the search box to learn more about \"Anxiety Disorder: Care Instructions. \"  Current as of: September 23, 2020               Content Version: 12.8  © 2006-2021 [employer]. Care instructions adapted under license by [employer] (which disclaims liability or warranty for this information). If you have questions about a medical condition or this instruction, always ask your healthcare professional. [place] [address] any warranty or liability for your use of this information. Patient Education        Aspiration Pneumonia: Care Instructions  Your Care Instructions     Aspiration pneumonia is an inflammation of the lungs. It may occur after you breathe in large amounts of foreign material, such as food, liquid, vomit, or mucus. Aspiration may happen because of a health problem that makes it hard to swallow. These problems include stroke or seizure. Pneumonia makes it hard to breathe. Follow-up care is a key part of your treatment and safety. Be sure to make and go to all appointments, and call your doctor if you are having problems.  It's also a good idea to know your test results and keep a list of the medicines you take.  How can you care for yourself at home? To help with swallowing   · You may need to do exercises to train your muscles to work together to help you swallow. You may also need to learn how to position your body or how to put food in your mouth to be able to swallow better. · You may need to change the foods you eat. Your doctor may tell you to eat certain foods and liquids to make swallowing easier. · You may need to change how you prepare foods. For example, you may need to add thickeners to some liquids, or puree certain foods in a . To help with pneumonia   · Take your antibiotics as directed. Do not stop taking them just because you feel better. You need to take the full course of antibiotics. · Take your medicines exactly as prescribed. For example, your doctor may have given you medicine that makes breathing easier. Call your doctor if you think you are having a problem with your medicine. · Get plenty of rest and sleep. You may feel weak and tired for a while, but your energy level will improve with time. · Take care of your cough so you can rest. A cough that brings up mucus from your lungs is common with pneumonia. It is one way your body gets rid of the infection. But if coughing keeps you from resting or causes severe fatigue and chest-wall pain, talk to your doctor. He or she may suggest that you take a medicine to reduce the cough. · Use a humidifier to increase the moisture in the air. Dry air makes coughing worse. Follow the instructions for cleaning the machine. · Do not smoke, and avoid others' smoke. Smoke will make your cough last longer. If you need help quitting, talk to your doctor about stop-smoking programs and medicines. These can increase your chances of quitting for good. · Take an over-the-counter pain medicine, such as acetaminophen (Tylenol), ibuprofen (Advil, Motrin), or naproxen (Aleve) to help reduce fever and reduce chest pain caused by coughing.  Read and follow all instructions on the label. · Do not take two or more pain medicines at the same time unless the doctor told you to. Many pain medicines have acetaminophen, which is Tylenol. Too much acetaminophen (Tylenol) can be harmful. When should you call for help? Call 911 anytime you think you may need emergency care. For example, call if:    · You have severe trouble breathing. Call your doctor now or seek immediate medical care if:    · You have a new or higher fever.     · You have new or worse trouble breathing.     · You cough up blood.     · You are dizzy or lightheaded, or you feel like you may faint. Watch closely for changes in your health, and be sure to contact your doctor if:    · You do not get better as expected.     · You are coughing more deeply or more often. Where can you learn more? Go to http://www.medeiros.com/  Enter D757 in the search box to learn more about \"Aspiration Pneumonia: Care Instructions. \"  Current as of: October 26, 2020               Content Version: 12.8  © 2006-2021 [employer] Care instructions adapted under license by [employer] (which disclaims liability or warranty for this information). If you have questions about a medical condition or this instruction, always ask your healthcare professional. [address] any warranty or liability for your use of this information.

## 2021-09-04 NOTE — DISCHARGE SUMMARY
Admit date: 8/29/2021   Admitting Provider: Jessica Lan MD    Discharge date: 9/4/2021  Discharging Provider: Lauryn King PA-C      * Admission Diagnoses: Inferior pubic ramus fracture [facility]) Belvin Hun  Fall [W19. XXXA]    * Discharge Diagnoses:    Hospital Problems as of 9/4/2021 Date Reviewed: 8/13/2021        Codes Class Noted - Resolved POA    Moderate malnutrition ([facility] [address] ICD-10-CM: E44.0  ICD-9-CM: 263.0  9/3/2021 - Present Unknown        Esophageal dysmotility ICD-10-CM: K22.4  ICD-9-CM: 530.5  9/3/2021 - Present Unknown        Asp[facility] pneumonia ([facility] ICD-10-CM: J69.0  ICD-9-CM: 507.0  9/3/2021 - Present Unknown        Fall ICD-10-CM: W19. Reneecristiana Yadav  ICD-9-CM: E888.9  8/31/2021 - Present Unknown        Inferior pubic ramus fracture [facility]) ICD-10-CM: M57.590O  ICD-9-CM: 808.2  8/30/2021 - Present Unknown              * Hospital Course: This is a 77-year-old female admitted on 8/29/2021 after a ground-level fall at home. This resulted in a nondisplaced fracture of the left inferior pubic ramus with advanced degenerative changes of the hips. UA is consistent with a urinary tract infection. Urine culture no growth. Started patient on Rocephin and changed to Zosyn due to aspiration pneumonia concern. Patient will be evaluated by PT\OT and set up for rehab services as an outpatient and most likely require placement. [facility]. History of esophageal dysmotility and unable to swallow at this point. GI consultation, Dr. Sue Han primary gastroenterologist.  Speech evaluation at this point has failed, n.p.o. EGD with chronic gastritis, biopsy taken. Esophageal dysmotility noted. Recommendation is for PEG. PEG placed on 9/3/2021. Dietitian consult for tube feedings. Patient be discharged on Augmentin twice daily for 5 additional days. Patient's blood pressure was noted to be low and improved on midodrine and will continue that medication.   When patient is tolerating PEG feedings will be [facility]. Discharge instructions provided to the patient and Ms. Martir Diaz. Tube feedings:Initiate Jevity 1.5 via PEG at 215ml, 5x daily  TIMED 6am, 10am, 2pm, 4pm, and 6pm  Flush with 165ml after each feed, or per MD    1. Ground-level fall  Will benefit from rehab and ongoing ambulation with roller walker     2. Inferior pubic ramus fracture, left  Nondisplaced on CT  Orthopedic consultation no surgical intervention recommended  PT OT for rehab     3. Diabetes mellitus, type II  Continue Lantus 20 units nightly  Sliding scale insulin     4.  Peripheral neuropathy  Stable     5. Hyperlipidemia  Continue Lipitor     6. Urinary tract infection  Urine culture with no growth  UA consistent with uncomplicated urinary tract infection  Rocephin changed to Zosyn     7.  Esophageal dysmotility  Unable to swallow at this point  Speech evaluation, n.p.o. failed speech assessment  EGD with esophageal dysmotility, gastric antritis, chronic biopsy taken  Recommendation for PEG, PEG inserted on 9/3/2021  Consult GI, Dr. Denice Alfred     8. Concern for aspiration pneumonia  Esophageal dysmotility and possible aspiration risk  Change antibiotics to Zosyn as white count is increasing  Chest x-ray with no obvious consolidation consistent with aspiration pneumonia     9. Moderate protein malnutrition  Nutritional consultation  Speech consultation for supplementation  PEG tube placement    10.   Aspiration pneumonia  Continue Augmentin as an outpatient    * Procedures: PEG and EGD  Procedure(s):  PERCUTANEOUS ENDOSCOPIC GASTROSTOMY TUBE INSERTION  ESOPHAGOGASTRODUODENOSCOPY (EGD)      Consults:   Gastroenterology and orthopedic consultation    Significant Diagnostic Studies: As discussed in hospital course    Discharge Exam:  Visit Vitals  BP (!) 105/56 (BP 1 Location: Left upper arm, BP Patient Position: At rest)   Pulse 64   Temp 98.2 °F (36.8 °C)   Resp 16   Ht 5' 6\" (1.676 m)   Wt 58.2 kg (128 lb 4.9 oz)   LMP  (LMP Unknown)   SpO2 98%   BMI 20.71 kg/m²     PHYSICAL EXAM:  Constitutional: Alert in no acute distress   HEENT: Sclerae anicteric, The neck is supple. Cardiovascular: Regular rate and rhythm. No murmurs, gallops, or rubs. [place] Respiratory: Clear breath sounds with no wheezes, rales, or rhonchi. GI: Abdomen nondistended, soft, and nontender. Normal active bowel sounds. PEG in place  Rectal: Deferred   Musculoskeletal: No pitting edema of the lower legs. Extremities have good range of motion. Tenderness in the left pelvis. Neurological:  Patient is alert and oriented. Cranial nerves II-XII intact  Psychiatric: Mood appears appropriate with judgement intact. Lymphatic: No cervical or supraclavicular adenopathy. Skin: No rashes or breakdown of the skin      * Discharge Condition: stable  * Disposition: Rehab facility    Discharge Medications:  Current Discharge Medication List      START taking these medications    Details   midodrine (PROAMATINE) 5 mg tablet Take 1 Tablet by mouth three (3) times daily (with meals) for 30 days. Qty: 90 Tablet, Refills: 0  Start date: 9/3/2021, End date: 10/3/2021      traMADoL (ULTRAM) 50 mg tablet Take 1 Tablet by mouth every six (6) hours as needed for Pain for up to 3 days. Max Daily Amount: 200 mg. Qty: 20 Tablet, Refills: 0  Start date: 9/3/2021, End date: 9/6/2021    Associated Diagnoses: Closed fracture of inferior pubic ramus, unspecified laterality, initial encounter ([facility])      amoxicillin-clavulanate (Augmentin) 500-125 mg per tablet Take 1 Tablet by mouth daily. Qty: 10 Tablet, Refills: 0  Start date: 9/3/2021         CONTINUE these medications which have CHANGED    Details   aspirin delayed-release 81 mg tablet Take 1 Tablet by mouth daily. Qty: 30 Tablet, Refills: 1  Start date: 9/3/2021         CONTINUE these medications which have NOT CHANGED    Details   albuterol (PROVENTIL VENTOLIN) 2.5 mg /3 mL (0.083 %) nebu by Nebulization route. cholecalciferol (VITAMIN D3) (50,000 UNITS /1250 MCG) capsule Take  by mouth every seven (7) days. insulin glargine U-300 conc (TOUJEO) 300 unit/mL (1.5 mL) inpn pen 20 Units by SubCUTAneous route nightly. clonazePAM (KlonoPIN) 0.5 mg tablet Take 0.5 mg by mouth three (3) times daily. oxybutynin (DITROPAN) 5 mg tablet Take 5 mg by mouth two (2) times a day. atorvastatin (LIPITOR) 20 mg tablet Take 20 mg by mouth daily. FLUoxetine (PROZAC) 40 mg capsule Take 20 mg by mouth daily. montelukast (SINGULAIR) 10 mg tablet Take 10 mg by mouth daily. pramipexole (MIRAPEX) 0.5 mg tablet Take 1 mg by mouth nightly. ipratropium-albuterol (COMBIVENT)  mcg/actuation inhaler Take 1 Puff by inhalation every six (6) hours as needed for Wheezing or Shortness of Breath. Indications: CHRONIC OBSTRUCTIVE PULMONARY DISEASE WITH BRONCHOSPASMS             * Follow-up Care/Patient Instructions:   Activity: Activity as tolerated  Diet: Cardiac Diet   Initiate Jevity 1.5 via PEG at 215ml, 5x daily  TIMED 6am, 10am, 2pm, 4pm, and 6pm  Flush with 165ml after each feed, or per MD  Wound Care: None needed    Follow-up Information     Follow up With Specialties Details Why Contact Info    Jesse Carroll MD Family Medicine In 1 week  [address]  [address] [place] [address]      Elliott Vick MD Gastroenterology In 1 week  [address] [place] [address]       Kayla Contreras MD Orthopedic Surgery In 2 weeks  [address]  [phone]            Discharge summary greater than 35 minutes spent with the patient performing discharge instructions, medication review and physical exam    Signed:  Rand Lester PA-C  9/4/2021  11:07 AM

## 2021-09-04 NOTE — PROGRESS NOTES
Problem: Self Care Deficits Care Plan (Adult)  Goal: *Acute Goals and Plan of Care (Insert Text)  Description: Pt will be MI sup<->sit in prep for EOB ADL's  Pt will be MI  LB dressing EOB level  Pt will be MI  sit<-> prep for toilet transfer  Pt will be MI  toilet transfer with LRAD  Pt will be MI  toileting/cloth mgmt LRAD  Pt will be MI  grooming standing sink  Pt will be MI bathing sitting/standing sink LRAD  Pt will be MI solange UE HEP in prep for self care tasks      Outcome: Progressing Towards Goal   OCCUPATIONAL THERAPY TREATMENT  Patient: Kala Kessler (89 y.o. female)  Date: 9/4/2021  Diagnosis: Inferior pubic ramus fracture ([address] Yaima Little  Fall [W19. XXXA] <principal problem not specified>  Procedure(s) (LRB):  PERCUTANEOUS ENDOSCOPIC GASTROSTOMY TUBE INSERTION (N/A)  ESOPHAGOGASTRODUODENOSCOPY (EGD) (N/A) 1 Day Post-Op  Precautions: FALL  Chart, occupational therapy assessment, plan of care, and goals were reviewed. ASSESSMENT  Patient continues with skilled OT services and is progressing towards goals.   Patient received semi supine in bed upon SKINNER'S arrival, on 3L of oxygen via NC and agreeable to work with therapist. Patient very HOE. , speak in R ear. Patient reporting she needs to get cleaned.  Patient required SBA x1 for bed mobility, SBA supine to sit EOB, sit to supine using bed rail, and scooting with increased time 2/2 decrease activity tolerance and pain. Seated EOB, patient required Min A for bathing( back and L foot) as she wiped herself with CHG wipes with instruction. Seated EOB , patient required mod A for donning L sock but able to don R sock. Patient sat EOB for 20 minutes with good dynamic/static sitting. Patient requested to go to bathroom,  STS with SBA, using Rw/gait belt for bed to toilet transfer with CGA and vc's for correct technique for safety. SBA for toileting and cloth mgmt.  Increased time 2/2 toileting issues and staff shortage (therapist stayed with patient for safety). CGA toilet to chair tf with vc's for navigating RW and slow pace. .  Patient left resting comfortably in supine with PT. Patient would benefit from continued skilled Occupational services while at [facility] in order to increase safety and independence with self care and functional tranfers/mobility. Recommend at discharge to  IRF  when medically appropriate.      Current Level of Function Impacting Discharge (ADLs): SBA/set-up for toileting,UB dressing, bathing     Other factors to consider for discharge: Time of onset, medical prognosis/diagnosis, severity of deficits, PLOF, home environment, and family support               PLAN :  Patient continues to benefit from skilled intervention to address the above impairments. Continue treatment per established plan of care. to address goals. Recommend next OT session: standing tolerance at sink    Recommendation for discharge: (in order for the patient to meet his/her long term goals)  IRF    This discharge recommendation:  Has been made in collaboration with the attending provider and/or case management    IF patient discharges home will need the following DME: TBD       SUBJECTIVE:   Patient stated I need to get cleaned up.     OBJECTIVE DATA SUMMARY:   Cognitive/Behavioral Status:  Neurologic State: Alert  Orientation Level: Oriented X4  Cognition: Appropriate decision making             Functional Mobility and Transfers for ADLs:  Bed Mobility:  Supine to Sit: Stand-by assistance  Sit to Supine: Stand-by assistance  Scooting: Stand-by assistance    Transfers:  Sit to Stand: Stand-by assistance  Functional Transfers  Toilet Transfer : Contact guard assistance       Balance:  Sitting: Intact  Sitting - Static: Good (unsupported)  Sitting - Dynamic: Good (unsupported)  Standing: Impaired; With support  Standing - Static: Good;Constant support  Standing - Dynamic : Fair;Constant support    ADL Intervention:            Upper Body Bathing  Bathing Assistance: Minimum assistance (back)  Position Performed: Seated edge of bed    Lower Body Bathing  Bathing Assistance: Minimum assistance  Lower Body : Minimum assistance  Position Performed: Seated in chair (L foot)    Upper Body Dressing Assistance  Dressing Assistance: Stand-by assistance;Set-up  Hospital Gown: Stand-by assistance;Set-up    Lower Body Dressing Assistance  Dressing Assistance: Moderate assistance  Socks: Moderate assistance (L foot)  Position Performed: Seated edge of bed    Toileting  Toileting Assistance: Stand-by assistance;Set-up  Bladder Hygiene: Stand-by assistance;Set-up  Bowel Hygiene: Stand-by assistance;Set-up  Clothing Management: Stand-by assistance         Pain:  8/10 back, L pelvic area    Activity Tolerance:   Fair and requires frequent rest breaks  Please refer to the flowsheet for vital signs taken during this treatment. After treatment patient left in no apparent distress:   Supine in bed, Call bell within reach and Side rails x 3, PT    COMMUNICATION/COLLABORATION:   The patients plan of care was discussed with: Physical therapist and Registered nurse.      SUSANNE Sánchez  Time Calculation: 72 mins

## 2021-09-04 NOTE — ROUTINE PROCESS
Bedside and Verbal shift change report given to  Rashawn Coburn RN (oncoming nurse) by George Jesus RN(offgoing nurse). Report included the following information SBAR, Kardex, MAR, Accordion, Recent Results, Med Rec Status and Quality Measures.

## 2021-09-04 NOTE — DISCHARGE SUMMARY
Admit date: 8/29/2021   Admitting Provider: Gabby Garcia MD    Discharge date: 9/4/2021  Discharging Provider: Anuj Carranza PA-C      * Admission Diagnoses: Inferior pubic ramus fracture [facility]) Bita Hernandez  Fall [W19. XXXA]    * Discharge Diagnoses:    Hospital Problems as of 9/4/2021 Date Reviewed: 8/13/2021        Codes Class Noted - Resolved POA    Moderate malnutrition ([facility] ICD-10-CM: E44.0  ICD-9-CM: 263.0  9/3/2021 - Present Unknown        Esophageal dysmotility ICD-10-CM: K22.4  ICD-9-CM: 530.5  9/3/2021 - Present Unknown        Aspiration pneumonia ([facility] ICD-10-CM: J69.0  ICD-9-CM: 507.0  9/3/2021 - Present Unknown        Fall ICD-10-CM: W19. Jose Martin Reusing  ICD-9-CM: E888.9  8/31/2021 - Present Unknown        Inferior pubic ramus fracture [facility]) ICD-10-CM: D03.915B  ICD-9-CM: 808.2  8/30/2021 - Present Unknown              * Hospital Course: This is a 75-year-old female admitted on 8/29/2021 after a ground-level fall at home. This resulted in a nondisplaced fracture of the left inferior pubic ramus with advanced degenerative changes of the hips. UA is consistent with a urinary tract infection. Urine culture no growth. Started patient on Rocephin and changed to Zosyn due to aspiration pneumonia concern. Patient will be evaluated by PT\OT and set up for rehab services as an outpatient and most likely require placement. [facility] History of esophageal dysmotility and unable to swallow at this point. GI consultation, Dr. Lizz Whalen primary gastroenterologist.  Speech evaluation at this point has failed, n.p.o. EGD with chronic gastritis, biopsy taken. Esophageal dysmotility noted. Recommendation is for PEG. PEG placed on 9/3/2021. Dietitian consult for tube feedings. Patient be discharged on Augmentin twice daily for 5 additional days. Patient's blood pressure was noted to be low and improved on midodrine and will continue that medication.   When patient is tolerating PEG feedings will be released to [facility]. Discharge instructions provided to the patient and Ms. Lino Ferguson. Tube feedings:Initiate Jevity 1.5 via PEG at 215ml, 5x daily  TIMED 6am, 10am, 2pm, 4pm, and 6pm  Flush with 165ml after each feed, or per MD    1. Ground-level fall  Will benefit from rehab and ongoing ambulation with roller walker     2. Inferior pubic ramus fracture, left  Nondisplaced on CT  Orthopedic consultation no surgical intervention recommended  PT OT for rehab     3. Diabetes mellitus, type II  Continue Lantus 20 units nightly  Sliding scale insulin     4.  Peripheral neuropathy  Stable     5. Hyperlipidemia  Continue Lipitor     6. Urinary tract infection  Urine culture with no growth  UA consistent with uncomplicated urinary tract infection  Rocephin changed to Zosyn     7.  Esophageal dysmotility  Unable to swallow at this point  Speech evaluation, n.p.o. failed speech assessment  EGD with esophageal dysmotility, gastric antritis, chronic biopsy taken  Recommendation for PEG, PEG inserted on 9/3/2021  Consult GI, Dr. Cheyanne Judd     8. Concern for aspiration pneumonia  Esophageal dysmotility and possible aspiration risk  Change antibiotics to Zosyn as white count is increasing  Chest x-ray with no obvious consolidation consistent with aspiration pneumonia     9. Moderate protein malnutrition  Nutritional consultation  Speech consultation for supplementation  PEG tube placement    10.   Aspiration pneumonia  Continue Augmentin as an outpatient    * Procedures: PEG and EGD  Procedure(s):  PERCUTANEOUS ENDOSCOPIC GASTROSTOMY TUBE INSERTION  ESOPHAGOGASTRODUODENOSCOPY (EGD)      Consults:   Gastroenterology and orthopedic consultation    Significant Diagnostic Studies: As discussed in hospital course    Discharge Exam:  Visit Vitals  BP (!) 105/56 (BP 1 Location: Left upper arm, BP Patient Position: At rest)   Pulse 64   Temp 98.2 °F (36.8 °C)   Resp 16   Ht 5' 6\" (1.676 m)   Wt 58.2 kg (128 lb 4.9 oz)   LMP  (LMP Unknown)   SpO2 98%   BMI 20.71 kg/m²     PHYSICAL EXAM:  Constitutional: Alert in no acute distress   HEENT: Sclerae anicteric, The neck is supple. Cardiovascular: Regular rate and rhythm. No murmurs, gallops, or rubs. Nadyne Kuldeep Respiratory: Clear breath sounds with no wheezes, rales, or rhonchi. GI: Abdomen nondistended, soft, and nontender. Normal active bowel sounds. PEG in place  Rectal: Deferred   Musculoskeletal: No pitting edema of the lower legs. Extremities have good range of motion. Tenderness in the left pelvis. Neurological:  Patient is alert and oriented. Cranial nerves II-XII intact  Psychiatric: Mood appears appropriate with judgement intact. Lymphatic: No cervical or supraclavicular adenopathy. Skin: No rashes or breakdown of the skin      * Discharge Condition: stable  * Disposition: Rehab facility    Discharge Medications:  Current Discharge Medication List      START taking these medications    Details   ergocalciferol (ERGOCALCIFEROL) 1,250 mcg (50,000 unit) capsule 1 Capsule by Per G Tube route every Sunday. Qty: 30 Capsule, Refills: 1  Start date: 9/5/2021      pantoprazole (PROTONIX) 40 mg granules for oral suspension 40 mg by Per G Tube route Daily (before breakfast). Qty: 30 Each, Refills: 1  Start date: 9/5/2021      midodrine (PROAMATINE) 5 mg tablet Take 1 Tablet by mouth three (3) times daily (with meals) for 30 days. Qty: 90 Tablet, Refills: 0  Start date: 9/3/2021, End date: 10/3/2021      traMADoL (ULTRAM) 50 mg tablet Take 1 Tablet by mouth every six (6) hours as needed for Pain for up to 3 days. Max Daily Amount: 200 mg. Qty: 20 Tablet, Refills: 0  Start date: 9/3/2021, End date: 9/6/2021    Associated Diagnoses: Closed fracture of inferior pubic ramus, unspecified laterality, initial encounter ([facility])      amoxicillin-clavulanate (Augmentin) 500-125 mg per tablet Take 1 Tablet by mouth daily.   Qty: 10 Tablet, Refills: 0  Start date: 9/3/2021 CONTINUE these medications which have CHANGED    Details   atorvastatin (LIPITOR) 20 mg tablet 1 Tablet by Per G Tube route daily. Qty: 30 Tablet, Refills: 0  Start date: 9/5/2021      clonazePAM (KlonoPIN) 0.5 mg tablet Take 1 Tablet by mouth three (3) times daily. Max Daily Amount: 1.5 mg.  Qty: 30 Tablet, Refills: 0  Start date: 9/4/2021    Associated Diagnoses: Anxiety      FLUoxetine (PROzac) 20 mg capsule 1 Capsule by Per G Tube route daily. Qty: 30 Capsule, Refills: 0  Start date: 9/5/2021      montelukast (SINGULAIR) 10 mg tablet 1 Tablet by PEG Tube route daily. Qty: 30 Tablet, Refills: 1  Start date: 9/5/2021      oxybutynin (DITROPAN) 5 mg tablet 1 Tablet by Per G Tube route two (2) times a day. Qty: 30 Tablet, Refills: 1  Start date: 9/4/2021      pramipexole (MIRAPEX) 1 mg tablet Take 1 Tablet by mouth nightly. Qty: 30 Tablet, Refills: 1  Start date: 9/4/2021      aspirin delayed-release 81 mg tablet Take 1 Tablet by mouth daily. Qty: 30 Tablet, Refills: 1  Start date: 9/3/2021         CONTINUE these medications which have NOT CHANGED    Details   albuterol (PROVENTIL VENTOLIN) 2.5 mg /3 mL (0.083 %) nebu by Nebulization route. insulin glargine U-300 conc (TOUJEO) 300 unit/mL (1.5 mL) inpn pen 20 Units by SubCUTAneous route nightly. ipratropium-albuterol (COMBIVENT)  mcg/actuation inhaler Take 1 Puff by inhalation every six (6) hours as needed for Wheezing or Shortness of Breath. Indications: CHRONIC OBSTRUCTIVE PULMONARY DISEASE WITH BRONCHOSPASMS         STOP taking these medications       cholecalciferol (VITAMIN D3) (50,000 UNITS /1250 MCG) capsule Comments:   Reason for Stopping:               * Follow-up Care/Patient Instructions:   Activity: Activity as tolerated  Diet: Cardiac Diet   Initiate Jevity 1.5 via PEG at 215ml, 5x daily  TIMED 6am, 10am, 2pm, 4pm, and 6pm  Flush with 165ml after each feed, or per MD  Wound Care: None needed    Follow-up Information     Follow up With Specialties Details Why Contact Info    Idalia Rosales MD Family Medicine In 1 week  [address]  [address] [facility] [address]      Kevyn Coronado MD Gastroenterology In 1 week  [address]       Ben Lyon MD Orthopedic Surgery In 2 weeks  [address] [address]  [phone]            Discharge summary greater than 35 minutes spent with the patient performing discharge instructions, medication review and physical exam    Signed:  Lauryn King PA-C  9/4/2021  11:07 AM

## 2021-09-04 NOTE — PROGRESS NOTES
Problem: Mobility Impaired (Adult and Pediatric)  Goal: *Acute Goals and Plan of Care (Insert Text)  Description: Pt will be I with LE HEP in 7 days. Pt will perform bed mobility with mod I in 7 days. Pt will perform transfers with mod I in 7 days. Pt will amb -100 feet with LRAD safely with mod I in 7 days. Pt will ascend/descend 4 steps with B handrails and CGA in 7 days to safely enter home. Outcome: Progressing Towards Goal   PHYSICAL THERAPY TREATMENT  Patient: Dave Paz (56 y.o. female)  Date: 9/4/2021  Diagnosis: Inferior pubic ramus fracture ([address] Alaina Donath  Fall [W19. XXXA] <principal problem not specified>  Procedure(s) (LRB):  PERCUTANEOUS ENDOSCOPIC GASTROSTOMY TUBE INSERTION (N/A)  ESOPHAGOGASTRODUODENOSCOPY (EGD) (N/A) 1 Day Post-Op  Precautions:    Chart, physical therapy assessment, plan of care and goals were reviewed. ASSESSMENT  Patient continues with skilled PT services and is progressing towards goals. Pt agreeable to PT treatment today. Upon entering room, pt was finishing up with OT treatment. Pt reporting 8/10 pain in pelvis today. Pt required SBA for bed mobility and transfers today and increased time due to pain. Pt sat EOB to perform LE exercises to requiring increased time and cueing to go through full ROM. Pt then amb 40 ft around the room with RW and CGA for safety. Pt amb with slow, small steps with increased pain and fatigue noted during treatment. Pt requiring cues to increase step length and keep walker close to her during gait and help from therapist to manage lines. Pt fatigued at 40 ft and requesting to go back to bed. Pt positioned in L SL in the bed with HOB elevated. Pt would benefit from continued skilled PT services to improve LE strength, balance, overall functional mobility and gait. Recommend d/c to IRF upon discharge.      Other factors to consider for discharge: impaired mobility, level of assist         PLAN :  Patient continues to benefit from skilled intervention to address the above impairments. Continue treatment per established plan of care. to address goals. Recommendation for discharge: (in order for the patient to meet his/her long term goals)  IRF    This discharge recommendation:  Has been made in collaboration with the attending provider and/or case management    IF patient discharges home will need the following DME: none       SUBJECTIVE:   Patient stated I would like to walk.     OBJECTIVE DATA SUMMARY:   Critical Behavior:  Neurologic State: Alert  Orientation Level: Oriented X4  Cognition: Appropriate decision making     Functional Mobility Training:  Bed Mobility:     Supine to Sit: Stand-by assistance  Sit to Supine: Stand-by assistance  Scooting: Stand-by assistance        Transfers:  Sit to Stand: Stand-by assistance  Stand to Sit: Stand-by assistance                             Balance:  Sitting: Intact  Sitting - Static: Good (unsupported)  Sitting - Dynamic: Good (unsupported)  Standing: Impaired; With support  Standing - Static: Good;Constant support  Standing - Dynamic : Fair;Constant support  Ambulation/Gait Training:  Distance (ft): 40 Feet (ft)  Assistive Device: Walker, rolling;Gait belt  Ambulation - Level of Assistance: Contact guard assistance                       Speed/Loren: Slow;Shuffled  Step Length: Right shortened;Left shortened                        Therapeutic Exercises:   Seated marches, LAQ, ankle pumps x 10 each  Pain Ratin/10 in pelvis    Activity Tolerance:   Fair and requires rest breaks  Please refer to the flowsheet for vital signs taken during this treatment.     After treatment patient left in no apparent distress:   Supine in bed, Call bell within reach, and Bed / chair alarm activated    COMMUNICATION/COLLABORATION:   The patients plan of care was discussed with: Occupational therapy assistant and Registered nurse       Harlan Hubbard   Time Calculation: 24 mins

## 2021-09-05 ENCOUNTER — HOSPITAL ENCOUNTER (OUTPATIENT)
Dept: LAB | Age: 71
Discharge: HOME | End: 2021-09-05

## 2021-09-05 LAB
ALBUMIN SERPL-MCNC: 2.3 G/DL (ref 3.5–5)
ALBUMIN/GLOB SERPL: 0.8 {RATIO} (ref 1.1–2.2)
ALP SERPL-CCNC: 72 U/L (ref 45–117)
ALT SERPL-CCNC: 10 U/L (ref 12–78)
ANION GAP SERPL CALC-SCNC: 4 MMOL/L (ref 5–15)
AST SERPL W P-5'-P-CCNC: 7 U/L (ref 15–37)
BASOPHILS # BLD: 0 K/UL (ref 0–0.1)
BASOPHILS NFR BLD: 0 % (ref 0–1)
BILIRUB SERPL-MCNC: 0.4 MG/DL (ref 0.2–1)
BUN SERPL-MCNC: 13 MG/DL (ref 6–20)
BUN/CREAT SERPL: 22 (ref 12–20)
CA-I BLD-MCNC: 8.7 MG/DL (ref 8.5–10.1)
CHLORIDE SERPL-SCNC: 103 MMOL/L (ref 97–108)
CO2 SERPL-SCNC: 34 MMOL/L (ref 21–32)
CREAT SERPL-MCNC: 0.58 MG/DL (ref 0.55–1.02)
DIFFERENTIAL METHOD BLD: ABNORMAL
EOSINOPHIL # BLD: 0.1 K/UL (ref 0–0.4)
EOSINOPHIL NFR BLD: 2 % (ref 0–7)
ERYTHROCYTE [DISTWIDTH] IN BLOOD BY AUTOMATED COUNT: 13.2 % (ref 11.5–14.5)
GLOBULIN SER CALC-MCNC: 3 G/DL (ref 2–4)
GLUCOSE SERPL-MCNC: 168 MG/DL (ref 65–100)
HCT VFR BLD AUTO: 35.9 % (ref 35–47)
HGB BLD-MCNC: 11.6 G/DL (ref 11.5–16)
IMM GRANULOCYTES # BLD AUTO: 0 K/UL (ref 0–0.04)
IMM GRANULOCYTES NFR BLD AUTO: 0 % (ref 0–0.5)
LYMPHOCYTES # BLD: 1.4 K/UL (ref 0.8–3.5)
LYMPHOCYTES NFR BLD: 25 % (ref 12–49)
MCH RBC QN AUTO: 30.8 PG (ref 26–34)
MCHC RBC AUTO-ENTMCNC: 32.3 G/DL (ref 30–36.5)
MCV RBC AUTO: 95.2 FL (ref 80–99)
MONOCYTES # BLD: 0.5 K/UL (ref 0–1)
MONOCYTES NFR BLD: 10 % (ref 5–13)
NEUTS SEG # BLD: 3.3 K/UL (ref 1.8–8)
NEUTS SEG NFR BLD: 63 % (ref 32–75)
NRBC # BLD: 0 K/UL (ref 0–0.01)
NRBC BLD-RTO: 0 PER 100 WBC
PLATELET # BLD AUTO: 139 K/UL (ref 150–400)
PMV BLD AUTO: 12 FL (ref 8.9–12.9)
POTASSIUM SERPL-SCNC: 4.1 MMOL/L (ref 3.5–5.1)
PROT SERPL-MCNC: 5.3 G/DL (ref 6.4–8.2)
RBC # BLD AUTO: 3.77 M/UL (ref 3.8–5.2)
SODIUM SERPL-SCNC: 141 MMOL/L (ref 136–145)
WBC # BLD AUTO: 5.4 K/UL (ref 3.6–11)

## 2021-09-05 PROCEDURE — 80053 COMPREHEN METABOLIC PANEL: CPT

## 2021-09-05 PROCEDURE — 85025 COMPLETE CBC W/AUTO DIFF WBC: CPT

## 2021-09-05 NOTE — PROGRESS NOTES
Discharge plan of care/case management plan validated with provider discharge order. Discharged to [employer] in [address] staff. Staff arrived with family member in patients room. Personal items packed by family member and taken along with patient. IV access removed.

## 2021-09-05 NOTE — PROGRESS NOTES
Report called out to Ayleen Pride at [facility]. . Report included the following information SBAR, Accordion, Recent Results, Med Rec Status and Quality Measures. Opportunity given for questions. Awaiting for transportation for patient transfer to receiving facility.

## 2021-09-10 ENCOUNTER — HOSPITAL ENCOUNTER (OUTPATIENT)
Dept: LAB | Age: 71
Discharge: HOME | End: 2021-09-10

## 2021-09-10 LAB
ANION GAP SERPL CALC-SCNC: 5 MMOL/L (ref 5–15)
BASOPHILS # BLD: 0 K/UL (ref 0–0.1)
BASOPHILS NFR BLD: 0 % (ref 0–1)
BUN SERPL-MCNC: 22 MG/DL (ref 6–20)
BUN/CREAT SERPL: 34 (ref 12–20)
CA-I BLD-MCNC: 9.5 MG/DL (ref 8.5–10.1)
CHLORIDE SERPL-SCNC: 97 MMOL/L (ref 97–108)
CO2 SERPL-SCNC: 32 MMOL/L (ref 21–32)
CREAT SERPL-MCNC: 0.65 MG/DL (ref 0.55–1.02)
DIFFERENTIAL METHOD BLD: ABNORMAL
EOSINOPHIL # BLD: 0.1 K/UL (ref 0–0.4)
EOSINOPHIL NFR BLD: 1 % (ref 0–7)
ERYTHROCYTE [DISTWIDTH] IN BLOOD BY AUTOMATED COUNT: 13.2 % (ref 11.5–14.5)
GLUCOSE SERPL-MCNC: 113 MG/DL (ref 65–100)
HCT VFR BLD AUTO: 42 % (ref 35–47)
HGB BLD-MCNC: 13.3 G/DL (ref 11.5–16)
IMM GRANULOCYTES # BLD AUTO: 0 K/UL (ref 0–0.04)
IMM GRANULOCYTES NFR BLD AUTO: 0 % (ref 0–0.5)
LYMPHOCYTES # BLD: 1.2 K/UL (ref 0.8–3.5)
LYMPHOCYTES NFR BLD: 12 % (ref 12–49)
MCH RBC QN AUTO: 30.4 PG (ref 26–34)
MCHC RBC AUTO-ENTMCNC: 31.7 G/DL (ref 30–36.5)
MCV RBC AUTO: 95.9 FL (ref 80–99)
MONOCYTES # BLD: 0.6 K/UL (ref 0–1)
MONOCYTES NFR BLD: 6 % (ref 5–13)
NEUTS SEG # BLD: 7.9 K/UL (ref 1.8–8)
NEUTS SEG NFR BLD: 81 % (ref 32–75)
NRBC # BLD: 0 K/UL (ref 0–0.01)
NRBC BLD-RTO: 0 PER 100 WBC
PLATELET # BLD AUTO: 257 K/UL (ref 150–400)
PMV BLD AUTO: 11.7 FL (ref 8.9–12.9)
POTASSIUM SERPL-SCNC: 4.8 MMOL/L (ref 3.5–5.1)
RBC # BLD AUTO: 4.38 M/UL (ref 3.8–5.2)
SODIUM SERPL-SCNC: 134 MMOL/L (ref 136–145)
WBC # BLD AUTO: 9.8 K/UL (ref 3.6–11)

## 2021-09-10 PROCEDURE — 80048 BASIC METABOLIC PNL TOTAL CA: CPT

## 2021-09-10 PROCEDURE — 85025 COMPLETE CBC W/AUTO DIFF WBC: CPT

## 2021-09-22 ENCOUNTER — TRANSCRIBE ORDER (OUTPATIENT)
Dept: SCHEDULING | Age: 71
End: 2021-09-22

## 2021-09-22 ENCOUNTER — HOSPITAL ENCOUNTER (OUTPATIENT)
Dept: CT IMAGING | Age: 71
Discharge: HOME | End: 2021-09-22
Attending: INTERNAL MEDICINE
Payer: MEDICARE

## 2021-09-22 DIAGNOSIS — R10.9 ABDOMINAL PAIN: Primary | ICD-10-CM

## 2021-09-22 DIAGNOSIS — R10.9 ABDOMINAL PAIN: ICD-10-CM

## 2021-09-22 PROCEDURE — 74011000636 HC RX REV CODE- 636: Performed by: INTERNAL MEDICINE

## 2021-09-22 PROCEDURE — 74170 CT ABD WO CNTRST FLWD CNTRST: CPT

## 2021-09-22 RX ADMIN — IOPAMIDOL 100 ML: 755 INJECTION, SOLUTION INTRAVENOUS at 14:27

## 2021-09-23 ENCOUNTER — HOSPITAL ENCOUNTER (OUTPATIENT)
Dept: LAB | Age: 71
Discharge: HOME | End: 2021-09-23

## 2021-09-23 ENCOUNTER — TRANSCRIBE ORDER (OUTPATIENT)
Dept: REGISTRATION | Age: 71
End: 2021-09-23

## 2021-09-23 DIAGNOSIS — L03.90 CELLULITIS: ICD-10-CM

## 2021-09-23 DIAGNOSIS — L03.90 CELLULITIS: Primary | ICD-10-CM

## 2021-11-11 ENCOUNTER — TELEPHONE (OUTPATIENT)
Dept: UROLOGY | Age: 71
End: 2021-11-11

## 2021-11-11 ENCOUNTER — OFFICE VISIT (OUTPATIENT)
Dept: UROLOGY | Age: 71
End: 2021-11-11
Payer: MEDICARE

## 2021-11-11 VITALS
BODY MASS INDEX: 21.34 KG/M2 | OXYGEN SATURATION: 98 % | RESPIRATION RATE: 22 BRPM | SYSTOLIC BLOOD PRESSURE: 147 MMHG | WEIGHT: 125 LBS | TEMPERATURE: 98.5 F | HEIGHT: 64 IN | DIASTOLIC BLOOD PRESSURE: 80 MMHG | HEART RATE: 82 BPM

## 2021-11-11 DIAGNOSIS — B49 FUNGUS PRESENT IN URINE: ICD-10-CM

## 2021-11-11 DIAGNOSIS — N39.8 VOIDING DYSFUNCTION: ICD-10-CM

## 2021-11-11 DIAGNOSIS — N13.5 UPJ (URETEROPELVIC JUNCTION) OBSTRUCTION: ICD-10-CM

## 2021-11-11 DIAGNOSIS — N30.00 ACUTE CYSTITIS WITHOUT HEMATURIA: ICD-10-CM

## 2021-11-11 DIAGNOSIS — N39.46 MIXED STRESS AND URGE URINARY INCONTINENCE: Primary | ICD-10-CM

## 2021-11-11 LAB
BILIRUB UR QL STRIP: NEGATIVE
GLUCOSE UR-MCNC: NEGATIVE MG/DL
KETONES P FAST UR STRIP-MCNC: NEGATIVE MG/DL
PH UR STRIP: 6 [PH] (ref 4.6–8)
PROT UR QL STRIP: NEGATIVE
SP GR UR STRIP: 1.02 (ref 1–1.03)
UA UROBILINOGEN AMB POC: NORMAL (ref 0.2–1)
URINALYSIS CLARITY POC: CLEAR
URINALYSIS COLOR POC: YELLOW
URINE BLOOD POC: NORMAL
URINE LEUKOCYTES POC: NORMAL
URINE NITRITES POC: NEGATIVE

## 2021-11-11 PROCEDURE — G8432 DEP SCR NOT DOC, RNG: HCPCS | Performed by: UROLOGY

## 2021-11-11 PROCEDURE — G8400 PT W/DXA NO RESULTS DOC: HCPCS | Performed by: UROLOGY

## 2021-11-11 PROCEDURE — 81003 URINALYSIS AUTO W/O SCOPE: CPT | Performed by: UROLOGY

## 2021-11-11 PROCEDURE — 99214 OFFICE O/P EST MOD 30 MIN: CPT | Performed by: UROLOGY

## 2021-11-11 PROCEDURE — G8420 CALC BMI NORM PARAMETERS: HCPCS | Performed by: UROLOGY

## 2021-11-11 PROCEDURE — G8536 NO DOC ELDER MAL SCRN: HCPCS | Performed by: UROLOGY

## 2021-11-11 PROCEDURE — G8427 DOCREV CUR MEDS BY ELIG CLIN: HCPCS | Performed by: UROLOGY

## 2021-11-11 RX ORDER — OXYBUTYNIN CHLORIDE 5 MG/1
5 TABLET ORAL 2 TIMES DAILY
Qty: 180 TABLET | Refills: 3 | Status: SHIPPED | OUTPATIENT
Start: 2021-11-11 | End: 2022-03-21

## 2021-11-11 NOTE — PROGRESS NOTES
1. Have you been to the ER, urgent care clinic since your last visit? Hospitalized since your last visit? [facility]    2. Have you seen or consulted any other health care providers outside of the [address] [place] [address] since your last visit? Include any pap smears or colon screening.  PCP  Chief Complaint   Patient presents with    Follow-up    Enuresis    Recurrent UTI     Visit Vitals  BP (!) 147/80 (BP 1 Location: Left arm, BP Patient Position: Sitting, BP Cuff Size: Adult)   Pulse 82   Temp 98.5 °F (36.9 °C) (Temporal)   Resp 22   Ht 5' 4\" (1.626 m)   Wt 125 lb (56.7 kg)   LMP  (LMP Unknown)   SpO2 98%   BMI 21.46 kg/m²

## 2021-11-11 NOTE — ASSESSMENT & PLAN NOTE
Most likely urge incontinence and neurogenic bladder. She has reasonable continence until she waits too long. She was advised on timed voiding.

## 2021-11-11 NOTE — ASSESSMENT & PLAN NOTE
Renal function is normal.  Her left kidney is poorly functioning. No obvious consequence of this at this point.

## 2021-11-11 NOTE — ASSESSMENT & PLAN NOTE
She has poor sensation of full bladder and likely waits too long. She will continue oxybutynin but is instructed on timed voiding at least every 4 hours.

## 2021-11-11 NOTE — PROGRESS NOTES
HISTORY OF PRESENT ILLNESS  Francie Quiles is a 66 y.o. female. Chief Complaint   Patient presents with    Follow-up    Enuresis    Recurrent UTI     Ms. Beata Galvez is followed for history of her UPJ obstruction. She is status post robotic left pyeloplasty on 11/10/2020. On 12/7/2020 a nuclear medicine scan showed an atrophic left kidney with reduced perfusion and uptake. Her creatinine has been stable and within normal range. She is also seen for her urinary incontinence. She wears a depends at all times and leaks often with some hesitancy and straining. This is very bothersome to her. Cystoscopic evaluation revealed chronic UTI. She also has had several fungal urinary tract infections in the past.  Her most recent cultures showed evidence of clearing. UA today with blood and leukocytes. Further problems with urinary infections. He does get urgency and is on oxybutynin. Her daughter states that she does not void often and can wait too long. She can have an accident and leaks quite a bit. She does have multiple neurogenic issues and likely has a neurogenic bladder. She has not had she was in the hospital in September after breaking part of her pelvis. Per the discharge summary she had inferior pubic ramus fracture. She is recovering well. Chronic Conditions Addressed Today     1. Mixed stress and urge urinary incontinence - Primary     Overview      3/3/21: She has chronic leakage. It has been a long time. She has some hesitancy and urgency. She has to strain at times. She wears a depends all the time. She uses 2-3. She leaks with cough or sneeze. She has urgency. 5/28/21: appears to have chronic UTI on cysto. Fungal infection.             Current Assessment & Plan       Most likely urge incontinence and neurogenic bladder. She has reasonable continence until she waits too long. She was advised on timed voiding.           Relevant Orders     CULTURE, URINE     AMB POC URINALYSIS DIP STICK AUTO W/O MICRO (Completed)    2. UPJ (ureteropelvic junction) obstruction     Overview        She has a left sided ureteral stent managed previously by Dr. Ana Dawson. Stent secondary to chronic left UPJ obstruction with stent changes q 3 months. Last changed 6/2/2020. She has been having stents several years 5-6 per her recollection. She is s/p cystoscopy, left retrograde pyelogram, left ureteral stent exchange on 9/14/2020. Findings: left UPJ obstruction with low-lying left kidney. She is s/p cystoscopy, retrograde pyelograms, robotic left pyeloplasty, left ureteral stent exchange on 11/10/2020. Pathology significant for fibromuscular tissue with chronic inflammation. No evidence of epithelial neoplasm.      12/7/2020: NM study showed: Atrophic left kidney has reduced perfusion and uptake relative to the right. Post Lasix stimulation there is no change in the appearance of shallow curve representing left kidney excretion. No downward slope is identified to allow exclusion of obstructive hydronephrosis. Differential perfusion; left, 37.3%   right, 62.7%. Differential uptake; left, 19.3%   right, 80.8%. Creatinine on 1/6/2021 was 0.84. Creatinine on 3/8/2021 (via Dina Paredes MD) was 1.39  Creatinine 8/30/21 was 0.82  Creatinine 9/5/21 was 0.58  Creatinine 9/10/21 was 0.65               Current Assessment & Plan       Renal function is normal.  Her left kidney is poorly functioning. No obvious consequence of this at this point. 3. Acute cystitis     Overview      History of UTI and fungal UTI. Urine culture 8/18/2020: klebsiella oxytoca. Urine culture 10/22/2020: mixed greg. Urine culture 11/5/2020: no growth. Urine culture 11/9/2020: no growth. Urine culture 1/11/2021: klebsiella pneumonia and klebsiella oxytoca (scanned result).     Urine culture 3/3/21: Mixed urogenital greg, greater than 100,000 colony forming units per mL.    5/28/21 sample with yeast. Diflucan x 14 days. 6/28/21: repeat evaluation showed no yeast or mold isolated after 1 week. 7/16/21: no growth on culture; no fungal growth on culture. 8/30/21: mixed greg on culture, 80K cfu/ml. 8/31/21: no growth on culture. Current Assessment & Plan       No recurrent UTI at this point. Relevant Orders     CULTURE, URINE     AMB POC URINALYSIS DIP STICK AUTO W/O MICRO (Completed)    4. Voiding dysfunction     Overview      3/3/21: She has chronic leakage. It has been a long time. She has some hesitancy and urgency. She has to strain at times. She wears a depends all the time. She uses 2-3. She leaks with cough or sneeze. She has urgency. She feels pressure not burning. She can sit 30 minutes to empty her bladder.      5/28/21: apparent chronic UTI on cystoscopic evaluation. 8/13/21: chronic leakage which is very bothersome. She had evidence of chronic infection and inflammation in May on cysto.           Current Assessment & Plan       She has poor sensation of full bladder and likely waits too long. She will continue oxybutynin but is instructed on timed voiding at least every 4 hours. 5. RESOLVED: Fungus present in urine     Overview      She had moderate growth of candida albicans on 10/13/2020 culture. She was treated with Diflucan. 11/24/2020: no growth on culture, no mold or yeast.    5/28/21: Yeast in cystoscopic urine sample; no yeast culture completed. RX for Diflucan x 14 days. 6/23/21 culture: no fungal growth. Relevant Orders     CULTURE, URINE     AMB POC URINALYSIS DIP STICK AUTO W/O MICRO (Completed)     CULTURE, FUNGUS               Patient denies the symptoms of COVID-19 per routine screening guidelines.   ROS    Past Medical History:  PMHx (including negatives):  has a past medical history of Arthritis, Bacterial meningitis, CAD (coronary artery disease), Chronic obstructive pulmonary disease ([address] Chronic pain, DDD (degenerative disc disease), cervical, Diabetes ([facility] Environmental allergies, Esophageal disorder, Fibromyalgia, GERD (gastroesophageal reflux disease), Hypercholesterolemia, Hypertension, Kidney stones, Macular degeneration, Mixed stress and urge urinary incontinence (7/2/2020), Neurogenic bladder (7/2/2020), Psychiatric disorder, Rheumatoid arthritis ([facility] Scoliosis, Shingles, Sleep apnea, Suicidal thoughts, Torn rotator cuff, and Urinary tract infection without hematuria (7/2/2020). PSurgHx:  has a past surgical history that includes neurological procedure unlisted; pr breast surgery procedure unlisted; pr cystoscopy,insert ureteral stent (5/8/2018, 7/11/2017, 2/9/2019, 10/11/2016, 6/7/2016, 1/14/2016, 7/14/2015); hx joint replacement surgery; hx orthopaedic; hx orthopaedic; hx carpal tunnel release; hx lumbar fusion; hx dilation and curettage; colonoscopy (N/A, 10/9/2020); hx urological; hx urological (09/14/2020); hx urological (Left, 09/14/2020); hx urological (Left, 09/14/2020); hx urological (11/09/2020); hx urological (11/09/2020); hx urological (Left, 11/09/2020); hx urological (Left, 11/09/2020); hx urological (11/24/2020); hx urological (05/28/2021); and hx gi (09/2021). PSocHx:  reports that she quit smoking about 16 years ago. She quit after 55.00 years of use. She has never used smokeless tobacco. She reports current alcohol use. She reports that she does not use drugs. Physical Exam    ASSESSMENT and PLAN  Diagnoses and all orders for this visit:    1. Mixed stress and urge urinary incontinence  Assessment & Plan:   Most likely urge incontinence and neurogenic bladder. She has reasonable continence until she waits too long. She was advised on timed voiding. Orders:  -     CULTURE, URINE  -     AMB POC URINALYSIS DIP STICK AUTO W/O MICRO    2. Acute cystitis without hematuria  Assessment & Plan:   No recurrent UTI at this point.     Orders:  -     CULTURE, URINE  -     AMB POC URINALYSIS DIP STICK AUTO W/O MICRO    3. Voiding dysfunction  Assessment & Plan:   She has poor sensation of full bladder and likely waits too long. She will continue oxybutynin but is instructed on timed voiding at least every 4 hours. 4. Fungus present in urine  -     CULTURE, URINE  -     AMB POC URINALYSIS DIP STICK AUTO W/O MICRO  -     CULTURE, FUNGUS    5. UPJ (ureteropelvic junction) obstruction  Assessment & Plan:   Renal function is normal.  Her left kidney is poorly functioning. No obvious consequence of this at this point. Follow-up and Dispositions    · Return in about 1 year (around 11/11/2022).          Viet Dooley MD

## 2021-11-11 NOTE — LETTER
11/11/2021    Patient: Juancho Bachelor   YOB: 1950   Date of Visit: 11/11/2021     Bharati Noe MD  [place] [address]  Via Fax: [phone]    Dear Bharati Noe MD,      Thank you for referring Ms. Juan Jose Robertson to [place] [address] for evaluation. My notes for this consultation are attached. If you have questions, please do not hesitate to call me. I look forward to following your patient along with you.       Sincerely,    Vu Mckeon MD

## 2021-11-18 LAB
BACTERIA UR CULT: ABNORMAL
FUNGUS SPEC CULT: ABNORMAL

## 2021-11-21 RX ORDER — FLUCONAZOLE 200 MG/1
200 TABLET ORAL
Qty: 3 TABLET | Refills: 0 | Status: SHIPPED | OUTPATIENT
Start: 2021-11-21 | End: 2021-11-28

## 2021-12-10 ENCOUNTER — TRANSCRIBE ORDER (OUTPATIENT)
Dept: SCHEDULING | Age: 71
End: 2021-12-10

## 2021-12-10 DIAGNOSIS — R10.9 ABDOMINAL PAIN: Primary | ICD-10-CM

## 2022-01-20 ENCOUNTER — TELEPHONE (OUTPATIENT)
Dept: UROLOGY | Age: 72
End: 2022-01-20

## 2022-01-20 NOTE — TELEPHONE ENCOUNTER
Pt called because he thinks she has an infection but not sure what but know she  shes having urinary retention and pressure when she gos the call hung up so I went ahead and called her emergency contact since she is hard of hearing and made her an appointment for 1:45 with mrs monahan

## 2022-03-18 PROBLEM — N30.00 ACUTE CYSTITIS: Status: ACTIVE | Noted: 2020-08-18

## 2022-03-18 PROBLEM — S32.599A INFERIOR PUBIC RAMUS FRACTURE (HCC): Status: ACTIVE | Noted: 2021-08-30

## 2022-03-18 PROBLEM — K22.4 ESOPHAGEAL DYSMOTILITY: Status: ACTIVE | Noted: 2021-09-03

## 2022-03-18 PROBLEM — N39.8 VOIDING DYSFUNCTION: Status: ACTIVE | Noted: 2021-04-05

## 2022-03-19 PROBLEM — N13.5 UPJ (URETEROPELVIC JUNCTION) OBSTRUCTION: Status: ACTIVE | Noted: 2020-08-18
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
## 2022-03-19 PROBLEM — E44.0 MODERATE MALNUTRITION (HCC): Status: ACTIVE | Noted: 2021-09-03

## 2022-03-19 PROBLEM — J44.9 COPD (CHRONIC OBSTRUCTIVE PULMONARY DISEASE) (HCC): Status: ACTIVE | Noted: 2020-08-17

## 2022-03-19 PROBLEM — N39.46 MIXED STRESS AND URGE URINARY INCONTINENCE: Status: ACTIVE | Noted: 2020-07-02

## 2022-03-19 PROBLEM — J69.0 ASPIRATION PNEUMONIA (HCC): Status: ACTIVE | Noted: 2021-09-03

## 2022-03-19 PROBLEM — J44.1 COPD EXACERBATION (HCC): Status: ACTIVE | Noted: 2021-01-02

## 2022-03-21 RX ORDER — OXYBUTYNIN CHLORIDE 5 MG/1
TABLET ORAL
Qty: 180 TABLET | Refills: 2 | Status: SHIPPED | OUTPATIENT
Start: 2022-03-21

## 2022-05-23 ENCOUNTER — TRANSCRIBE ORDER (OUTPATIENT)
Dept: SCHEDULING | Age: 72
End: 2022-05-23

## 2022-05-23 DIAGNOSIS — Z12.31 SCREENING MAMMOGRAM FOR HIGH-RISK PATIENT: Primary | ICD-10-CM

## 2022-05-25 NOTE — PROGRESS NOTES
HISTORY OF PRESENT ILLNESS  Araceli Delgado is a 66 y.o. female. Chief Complaint   Patient presents with    Follow-up    Recurrent UTI     possible      Past Medical History:  PMHx (including negatives):  has a past medical history of Arthritis, Bacterial meningitis, CAD (coronary artery disease), Chronic obstructive pulmonary disease ([address] Chronic pain, DDD (degenerative disc disease), cervical, Diabetes ([address] Environmental allergies, Esophageal disorder, Fibromyalgia, GERD (gastroesophageal reflux disease), Hypercholesterolemia, Hypertension, Kidney stones, Macular degeneration, Mixed stress and urge urinary incontinence (7/2/2020), Neurogenic bladder (7/2/2020), Psychiatric disorder, Rheumatoid arthritis ([address] Scoliosis, Shingles, Sleep apnea, Suicidal thoughts, Torn rotator cuff, and Urinary tract infection without hematuria (7/2/2020). PSurgHx:  has a past surgical history that includes neurological procedure unlisted; pr breast surgery procedure unlisted; pr cystoscopy,insert ureteral stent (5/8/2018, 7/11/2017, 2/9/2019, 10/11/2016, 6/7/2016, 1/14/2016, 7/14/2015); hx joint replacement surgery; hx orthopaedic; hx orthopaedic; hx carpal tunnel release; hx lumbar fusion; hx dilation and curettage; colonoscopy (N/A, 10/9/2020); hx urological; hx urological (09/14/2020); hx urological (Left, 09/14/2020); hx urological (Left, 09/14/2020); hx urological (11/09/2020); hx urological (11/09/2020); hx urological (Left, 11/09/2020); hx urological (Left, 11/09/2020); hx urological (11/24/2020); hx urological (05/28/2021); and hx gi (09/2021). PSocHx:  reports that she quit smoking about 17 years ago. She quit after 55.00 years of use. She has never used smokeless tobacco. She reports current alcohol use. She reports that she does not use drugs. HX of UTI symptoms. Recent cultures have been positive only for fungal infection. Last documented bacterial infection was 1/2021.     She has bladder pressure, and bad odor but no dysuria. She was in the hospital for a fall and hip fracture 6-12 months ago and says she did not resume her diabetic medication. She says she used to be on insulin. She went to rehab. UA today consistent with UTI and glycosuria. She has issues with chronic leakage. She has hesitancy and urgency. She wears protective underwear at all times. She had evaluation with cysto, CMG, Uroflow. She was found to have urge incontinence with NGB. She has been on oxybutynin but was also instructed on timed voiding also. She has dark urine with a foul odor and some suprapubic pressure and increased frequency. She thinks she has had recent yeast infection. She is overall feeling weak and fatigued. Chronic Conditions Addressed Today     1. Mixed stress and urge urinary incontinence     Overview      3/3/21: She has chronic leakage. It has been a long time. She has some hesitancy and urgency. She has to strain at times. She wears a depends all the time. She uses 2-3. She leaks with cough or sneeze. She has urgency. 5/28/21: appears to have chronic UTI on cysto. Fungal infection. 11/11/21: most likely urge incontinence with NGB. She has reasonable continence unless she waits too long. Timed voiding is important.             Current Assessment & Plan        She has reasonable continence until she waits too long. She was advised on timed voiding. 2. UPJ (ureteropelvic junction) obstruction     Overview        She has a left sided ureteral stent managed previously by Dr. Smiley Heimlich. Stent secondary to chronic left UPJ obstruction with stent changes q 3 months. Last changed 6/2/2020. She has been having stents several years 5-6 per her recollection. She is s/p cystoscopy, left retrograde pyelogram, left ureteral stent exchange on 9/14/2020. Findings: left UPJ obstruction with low-lying left kidney.     She is s/p cystoscopy, retrograde pyelograms, robotic left pyeloplasty, left ureteral stent exchange on 11/10/2020. Pathology significant for fibromuscular tissue with chronic inflammation. No evidence of epithelial neoplasm.      12/7/2020: NM study showed: Atrophic left kidney has reduced perfusion and uptake relative to the right. Post Lasix stimulation there is no change in the appearance of shallow curve representing left kidney excretion. No downward slope is identified to allow exclusion of obstructive hydronephrosis. Differential perfusion; left, 37.3%   right, 62.7%. Differential uptake; left, 19.3%   right, 80.8%. Creatinine on 1/6/2021 was 0.84. Creatinine on 3/8/2021 (via Elizabeth Boyd MD) was 1.39  Creatinine 8/30/21 was 0.82  Creatinine 9/5/21 was 0.58  Creatinine 9/10/21 was 0.65    11/11/21: renal function is normal; left kidney is poorly functioning. No obvious consequence at this time.               Current Assessment & Plan      She had a chronic left UPJ obstruction Mander stenting for many years. She underwent a pyeloplasty 11/10/2020. She really has poor renal function on that side, less than 20% of her overall function. If she has colonization of the left kidney, it may be a source of infection. I would consider a left nephrectomy once her diabetes is better controlled. 3. Acute cystitis - Primary     Overview      History of UTI and fungal UTI. Urine culture 8/18/2020: klebsiella oxytoca. Urine culture 10/22/2020: mixed greg. Urine culture 11/5/2020: no growth. Urine culture 11/9/2020: no growth. Urine culture 1/11/2021: klebsiella pneumonia and klebsiella oxytoca (scanned result). Urine culture 3/3/21: Mixed urogenital greg, greater than 100,000 colony forming units per mL.    5/28/21 sample with yeast. Diflucan x 14 days. 6/28/21: repeat evaluation showed no yeast or mold isolated after 1 week. 7/16/21: no growth on culture; no fungal growth on culture.     8/30/21: mixed greg on culture, 80K cfu/ml. 8/31/21: no growth on culture. Current Assessment & Plan      Uncontrolled diabetes, glycosuria and UTI. I will treat her UTI however she may recur soon if her diabetes is not better managed. She says she has a PCP appt in early June. Relevant Medications     trimethoprim-sulfamethoxazole (BACTRIM DS, SEPTRA DS) 160-800 mg per tablet     fluconazole (DIFLUCAN) 200 mg tablet     Other Relevant Orders     AMB POC URINALYSIS DIP STICK AUTO W/O MICRO (Completed)     CULTURE, FUNGUS     CULTURE, URINE     URINALYSIS W/MICROSCOPIC    4. Voiding dysfunction     Overview      3/3/21: She has chronic leakage. It has been a long time. She has some hesitancy and urgency. She has to strain at times. She wears a depends all the time. She uses 2-3. She leaks with cough or sneeze. She has urgency. She feels pressure not burning. She can sit 30 minutes to empty her bladder.      5/28/21: apparent chronic UTI on cystoscopic evaluation. 8/13/21: chronic leakage which is very bothersome. She had evidence of chronic infection and inflammation in May on cysto.     11/11/21: poor sensation of full bladder. She probably waits too long to empty. She can continue oxybutynin but is instructed on timed voiding at minimum of q 4 hours. Current Assessment & Plan       Decreased awareness of a full bladder. She continues oxybutynin but needs to do timed voiding                    ROS  Patient denies the symptoms of COVID-19 per routine screening guidelines. Physical Exam    ASSESSMENT and PLAN  Diagnoses and all orders for this visit:    1. Acute cystitis without hematuria  Assessment & Plan:  Uncontrolled diabetes, glycosuria and UTI. I will treat her UTI however she may recur soon if her diabetes is not better managed. She says she has a PCP appt in early June.       Orders:  -     AMB POC URINALYSIS DIP STICK AUTO W/O MICRO  -     CULTURE, FUNGUS  -     CULTURE, URINE  -     URINALYSIS W/MICROSCOPIC    2. Voiding dysfunction  Assessment & Plan:   Decreased awareness of a full bladder. She continues oxybutynin but needs to do timed voiding      3. Mixed stress and urge urinary incontinence  Assessment & Plan:    She has reasonable continence until she waits too long. She was advised on timed voiding. 4. UPJ (ureteropelvic junction) obstruction  Assessment & Plan:  She had a chronic left UPJ obstruction Mander stenting for many years. She underwent a pyeloplasty 11/10/2020. She really has poor renal function on that side, less than 20% of her overall function. If she has colonization of the left kidney, it may be a source of infection. I would consider a left nephrectomy once her diabetes is better controlled. Other orders  -     trimethoprim-sulfamethoxazole (BACTRIM DS, SEPTRA DS) 160-800 mg per tablet; Take 1 Tablet by mouth two (2) times a day. -     fluconazole (DIFLUCAN) 200 mg tablet; Take 1 Tablet by mouth daily for 7 days. FDA advises cautious prescribing of oral fluconazole in pregnancy. Follow-up and Dispositions    · Return in about 2 months (around 7/27/2022). Follow-up and Disposition History         Mindi Nevescristiana may have a reminder for a \"due or due soon\" health maintenance. The patient has been encouraged to contact their primary care provider for follow-up on this health maintenance or other necessary and/or routine health screening.      Nancy Marr MD

## 2022-05-27 ENCOUNTER — HOSPITAL ENCOUNTER (OUTPATIENT)
Dept: MAMMOGRAPHY | Age: 72
Discharge: HOME | End: 2022-05-27
Attending: FAMILY MEDICINE
Payer: MEDICARE

## 2022-05-27 ENCOUNTER — OFFICE VISIT (OUTPATIENT)
Dept: UROLOGY | Age: 72
End: 2022-05-27
Payer: MEDICARE

## 2022-05-27 VITALS
TEMPERATURE: 97.8 F | SYSTOLIC BLOOD PRESSURE: 137 MMHG | OXYGEN SATURATION: 100 % | HEART RATE: 77 BPM | WEIGHT: 125 LBS | HEIGHT: 64 IN | BODY MASS INDEX: 21.34 KG/M2 | DIASTOLIC BLOOD PRESSURE: 71 MMHG

## 2022-05-27 DIAGNOSIS — N39.46 MIXED STRESS AND URGE URINARY INCONTINENCE: ICD-10-CM

## 2022-05-27 DIAGNOSIS — N13.5 UPJ (URETEROPELVIC JUNCTION) OBSTRUCTION: ICD-10-CM

## 2022-05-27 DIAGNOSIS — Z12.31 SCREENING MAMMOGRAM FOR HIGH-RISK PATIENT: ICD-10-CM

## 2022-05-27 DIAGNOSIS — N39.8 VOIDING DYSFUNCTION: ICD-10-CM

## 2022-05-27 DIAGNOSIS — N30.00 ACUTE CYSTITIS WITHOUT HEMATURIA: Primary | ICD-10-CM

## 2022-05-27 LAB
BILIRUB UR QL: NEGATIVE
GLUCOSE UR-MCNC: 500 MG/DL
KETONES P FAST UR STRIP-MCNC: NEGATIVE MG/DL
PH UR STRIP: 7 [PH] (ref 4.6–8)
PROT UR QL STRIP: NEGATIVE
SP GR UR STRIP: 1.01 (ref 1–1.03)
UA UROBILINOGEN AMB POC: NORMAL (ref 0.2–1)
URINALYSIS CLARITY POC: NORMAL
URINALYSIS COLOR POC: YELLOW
URINE BLOOD POC: NORMAL
URINE LEUKOCYTES POC: NORMAL
URINE NITRITES POC: NEGATIVE

## 2022-05-27 PROCEDURE — G8400 PT W/DXA NO RESULTS DOC: HCPCS | Performed by: NURSE PRACTITIONER

## 2022-05-27 PROCEDURE — G9899 SCRN MAM PERF RSLTS DOC: HCPCS | Performed by: NURSE PRACTITIONER

## 2022-05-27 PROCEDURE — G8427 DOCREV CUR MEDS BY ELIG CLIN: HCPCS | Performed by: NURSE PRACTITIONER

## 2022-05-27 PROCEDURE — G8536 NO DOC ELDER MAL SCRN: HCPCS | Performed by: NURSE PRACTITIONER

## 2022-05-27 PROCEDURE — 81003 URINALYSIS AUTO W/O SCOPE: CPT | Performed by: NURSE PRACTITIONER

## 2022-05-27 PROCEDURE — 77063 BREAST TOMOSYNTHESIS BI: CPT

## 2022-05-27 PROCEDURE — 99214 OFFICE O/P EST MOD 30 MIN: CPT | Performed by: NURSE PRACTITIONER

## 2022-05-27 PROCEDURE — G8432 DEP SCR NOT DOC, RNG: HCPCS | Performed by: NURSE PRACTITIONER

## 2022-05-27 PROCEDURE — G8420 CALC BMI NORM PARAMETERS: HCPCS | Performed by: NURSE PRACTITIONER

## 2022-05-27 RX ORDER — SULFAMETHOXAZOLE AND TRIMETHOPRIM 800; 160 MG/1; MG/1
1 TABLET ORAL 2 TIMES DAILY
Qty: 14 TABLET | Refills: 0 | Status: SHIPPED | OUTPATIENT
Start: 2022-05-27 | End: 2022-05-27

## 2022-05-27 RX ORDER — SULFAMETHOXAZOLE AND TRIMETHOPRIM 800; 160 MG/1; MG/1
1 TABLET ORAL 2 TIMES DAILY
Qty: 14 TABLET | Refills: 0 | Status: SHIPPED | OUTPATIENT
Start: 2022-05-27 | End: 2022-06-03 | Stop reason: ALTCHOICE

## 2022-05-27 RX ORDER — FLUCONAZOLE 200 MG/1
200 TABLET ORAL DAILY
Qty: 7 TABLET | Refills: 0 | Status: SHIPPED | OUTPATIENT
Start: 2022-05-27 | End: 2022-05-27

## 2022-05-27 RX ORDER — FLUCONAZOLE 200 MG/1
200 TABLET ORAL DAILY
Qty: 7 TABLET | Refills: 0 | Status: SHIPPED | OUTPATIENT
Start: 2022-05-27 | End: 2022-06-03

## 2022-05-27 NOTE — LETTER
5/27/2022    Patient: Lian Faustin   YOB: 1950   Date of Visit: 5/27/2022     Winter Madison MD  [facility] [address]  [facility] [address] [facility] [address]  Via Fax: [phone]    Dear Winter Madison MD,      Thank you for referring Ms. Arpan Ramírez to [place] [address] for evaluation. My notes for this consultation are attached. If you have questions, please do not hesitate to call me. I look forward to following your patient along with you.       Sincerely,    Horacio Su NP

## 2022-05-27 NOTE — PROGRESS NOTES
Chief Complaint   Patient presents with    Follow-up    Recurrent UTI     possible        PHQ-9 score is    Negative    Vitals:    05/27/22 1346   BP: 137/71   Pulse: 77   Temp: 97.8 °F (36.6 °C)   TempSrc: Temporal   SpO2: 100%   Weight: 125 lb (56.7 kg)   Height: 5' 4\" (1.626 m)   PainSc:   0 - No pain      1. \"Have you been to the ER, urgent care clinic since your last visit? Hospitalized since your last visit? \" No    2. \"Have you seen or consulted any other health care providers outside of the [address] [place] [address] since your last visit? \" No     3. For patients aged 39-70: Has the patient had a colonoscopy / FIT/ Cologuard? No      If the patient is female:    4. For patients aged 41-77: Has the patient had a mammogram within the past 2 years? No      5. For patients aged 21-65: Has the patient had a pap smear?  No

## 2022-05-27 NOTE — ASSESSMENT & PLAN NOTE
She had a chronic left UPJ obstruction Mander stenting for many years. She underwent a pyeloplasty 11/10/2020. She really has poor renal function on that side, less than 20% of her overall function. If she has colonization of the left kidney, it may be a source of infection. I would consider a left nephrectomy once her diabetes is better controlled.

## 2022-05-27 NOTE — ASSESSMENT & PLAN NOTE
Uncontrolled diabetes, glycosuria and UTI. I will treat her UTI however she may recur soon if her diabetes is not better managed. She says she has a PCP appt in early June.

## 2022-06-03 DIAGNOSIS — N30.00 ACUTE CYSTITIS WITHOUT HEMATURIA: Primary | ICD-10-CM

## 2022-06-03 LAB
APPEARANCE UR: ABNORMAL
BACTERIA #/AREA URNS HPF: ABNORMAL /[HPF]
BACTERIA UR CULT: ABNORMAL
BACTERIA UR CULT: ABNORMAL
BILIRUB UR QL STRIP: NEGATIVE
CASTS URNS QL MICRO: ABNORMAL /LPF
COLOR UR: YELLOW
EPI CELLS #/AREA URNS HPF: ABNORMAL /HPF (ref 0–10)
GLUCOSE UR QL STRIP: ABNORMAL
HGB UR QL STRIP: NEGATIVE
KETONES UR QL STRIP: NEGATIVE
LEUKOCYTE ESTERASE UR QL STRIP: ABNORMAL
MICRO URNS: ABNORMAL
NITRITE UR QL STRIP: NEGATIVE
PH UR STRIP: 6.5 [PH] (ref 5–7.5)
PROT UR QL STRIP: NEGATIVE
RBC #/AREA URNS HPF: ABNORMAL /HPF (ref 0–2)
SP GR UR STRIP: 1.01 (ref 1–1.03)
UROBILINOGEN UR STRIP-MCNC: 0.2 MG/DL (ref 0.2–1)
WBC #/AREA URNS HPF: >30 /HPF (ref 0–5)

## 2022-06-03 RX ORDER — NITROFURANTOIN 25; 75 MG/1; MG/1
100 CAPSULE ORAL 2 TIMES DAILY
Qty: 14 CAPSULE | Refills: 0 | Status: SHIPPED | OUTPATIENT
Start: 2022-06-03 | End: 2022-06-10

## 2022-06-03 NOTE — PROGRESS NOTES
RX needs to be changed. Let's treat with nitrofurantoin. I sent RX- please call patient. Bactrim is not clinically effective.

## 2022-06-09 LAB — FUNGUS SPEC CULT: ABNORMAL

## 2022-06-10 ENCOUNTER — TELEPHONE (OUTPATIENT)
Dept: UROLOGY | Age: 72
End: 2022-06-10

## 2022-06-10 RX ORDER — FLUCONAZOLE 200 MG/1
200 TABLET ORAL DAILY
Qty: 7 TABLET | Refills: 0 | Status: SHIPPED | OUTPATIENT
Start: 2022-06-10 | End: 2022-06-17

## 2022-06-10 NOTE — TELEPHONE ENCOUNTER
----- Message from Jt Quiñonez NP sent at 6/10/2022  9:59 AM EDT -----  Treated with fluconazole x 7 days. I'm going to extend for 7 more if you can let her know. She is poorly controlled diabetic. At risk for recurrent UTI. Thanks!

## 2022-06-10 NOTE — PROGRESS NOTES
Treated with fluconazole x 7 days. I'm going to extend for 7 more if you can let her know. She is poorly controlled diabetic. At risk for recurrent UTI. Thanks!

## 2022-07-03 ENCOUNTER — HOSPITAL ENCOUNTER (EMERGENCY)
Age: 72
Discharge: HOME | End: 2022-07-03
Attending: EMERGENCY MEDICINE
Payer: MEDICARE

## 2022-07-03 VITALS
RESPIRATION RATE: 16 BRPM | TEMPERATURE: 97.7 F | WEIGHT: 138 LBS | HEART RATE: 62 BPM | DIASTOLIC BLOOD PRESSURE: 57 MMHG | BODY MASS INDEX: 23.56 KG/M2 | SYSTOLIC BLOOD PRESSURE: 102 MMHG | OXYGEN SATURATION: 95 % | HEIGHT: 64 IN

## 2022-07-03 DIAGNOSIS — K94.23 PEG TUBE MALFUNCTION (HCC): Primary | ICD-10-CM

## 2022-07-03 PROCEDURE — 99282 EMERGENCY DEPT VISIT SF MDM: CPT

## 2022-07-03 NOTE — ED TRIAGE NOTES
Pt states she woke up and saw the balloon from her PEG tube and disc was displaced, when she got up to use the bathroom it fell on the floor.  Tube came out approximately 10am, site appears to be closed

## 2022-07-04 NOTE — ED PROVIDER NOTES
EMERGENCY DEPARTMENT HISTORY AND PHYSICAL EXAM      Date: 7/3/2022  Patient Name: Jayesh Felix    History of Presenting Illness     Chief Complaint   Patient presents with    Feeding Tube Problem       History Provided By: Patient    HPI: Jayesh Felix, 66 y.o. female with a significant past medical history as noted below presents to the ED with dislodged PEG tube. Patient states that PEG tube noted to be dislodged at approximately 10 AM this morning. Was placed approximately 1 year ago by Dr. Lanette Winter. Patient has no other complaints. There are no other complaints, changes, or physical findings at this time. PCP: Dioni Jacobsen MD    No current facility-administered medications on file prior to encounter. Current Outpatient Medications on File Prior to Encounter   Medication Sig Dispense Refill    oxybutynin (DITROPAN) 5 mg tablet TAKE 1 TABLET VIA G-TUBE TWICE A  Tablet 2    atorvastatin (LIPITOR) 20 mg tablet 1 Tablet by Per G Tube route daily. 30 Tablet 0    ergocalciferol (ERGOCALCIFEROL) 1,250 mcg (50,000 unit) capsule 1 Capsule by Per G Tube route every Sunday. 30 Capsule 1    clonazePAM (KlonoPIN) 0.5 mg tablet Take 1 Tablet by mouth three (3) times daily. Max Daily Amount: 1.5 mg. 30 Tablet 0    FLUoxetine (PROzac) 20 mg capsule 1 Capsule by Per G Tube route daily. 30 Capsule 0    montelukast (SINGULAIR) 10 mg tablet 1 Tablet by PEG Tube route daily. 30 Tablet 1    pramipexole (MIRAPEX) 1 mg tablet Take 1 Tablet by mouth nightly. 30 Tablet 1    pantoprazole (PROTONIX) 40 mg granules for oral suspension 40 mg by Per G Tube route Daily (before breakfast). 30 Each 1    aspirin delayed-release 81 mg tablet Take 1 Tablet by mouth daily. 30 Tablet 1    amoxicillin-clavulanate (Augmentin) 500-125 mg per tablet Take 1 Tablet by mouth daily. 10 Tablet 0    albuterol (PROVENTIL VENTOLIN) 2.5 mg /3 mL (0.083 %) nebu by Nebulization route.       insulin glargine U-300 conc (TOUJEO) 300 unit/mL (1.5 mL) inpn pen 20 Units by SubCUTAneous route nightly.          Past History     Past Medical History:  Past Medical History:   Diagnosis Date    Arthritis     Bacterial meningitis     Hx of    CAD (coronary artery disease)     Chronic obstructive pulmonary disease (HCC)     Chronic pain     DDD (degenerative disc disease), cervical     Diabetes (HCC)     Environmental allergies     Esophageal disorder     Fibromyalgia     GERD (gastroesophageal reflux disease)     Hypercholesterolemia     Hypertension     Kidney stones     hx of    Macular degeneration     Mixed stress and urge urinary incontinence 7/2/2020    Neurogenic bladder 7/2/2020    Psychiatric disorder     Rheumatoid arthritis ([address]     Scoliosis     Shingles     Hx of    Sleep apnea     pt states uses CPAP    Suicidal thoughts     pt stated during PAT visit , she has hx of suicidal thoughts age 17's, pt stated never attempted and further thoughts of suicide since and none that day    Torn rotator cuff     pt states on right side    Urinary tract infection without hematuria 7/2/2020       Past Surgical History:  Past Surgical History:   Procedure Laterality Date    COLONOSCOPY N/A 10/9/2020    COLONOSCOPY performed by Didi Alicea MD at [address] [facility] HX BREAST BIOPSY Left     HX CARPAL TUNNEL RELEASE      HX DILATION AND CURETTAGE      HX GI  09/2021    PEG Tube    HX JOINT REPLACEMENT SURGERY      knee both sides    HX LUMBAR FUSION      HX ORTHOPAEDIC      total knee replacements    HX ORTHOPAEDIC      back surgery     HX UROLOGICAL      stent in ureter    HX UROLOGICAL  09/14/2020    Cystoscopy    HX UROLOGICAL Left 09/14/2020    retrograde pyelogram    HX UROLOGICAL Left 09/14/2020    ureteral stent exchange    HX UROLOGICAL  11/09/2020    Cystoscopy    HX UROLOGICAL  11/09/2020    retrograde pyelograms    HX UROLOGICAL Left 11/09/2020    robotic left pyeloplasty    HX UROLOGICAL Left 2020    ureteral stent exchange    HX UROLOGICAL  2020    cystoscopy stent removal     HX UROLOGICAL  2021    CYSTO    NEUROLOGICAL PROCEDURE UNLISTED      back surg    TN BREAST SURGERY PROCEDURE UNLISTED      TN CYSTOSCOPY,INSERT URETERAL STENT  2018, 2017, 2019, 10/11/2016, 2016, 2016, 2015       Family History:  Family History   Problem Relation Age of Onset    Hypertension Mother     Heart Disease Mother     Diabetes Mother     Liver Disease Mother     Diabetes Father     Heart Disease Father     Heart Disease Brother     Diabetes Brother     COPD Brother     Liver Disease Brother     Hypertension Sister     Elevated Lipids Sister        Social History:  Social History     Tobacco Use    Smoking status: Former Smoker     Years: 55.00     Quit date: 2005     Years since quittin.1    Smokeless tobacco: Never Used    Tobacco comment: pt sates quit 7 years ago   Vaping Use    Vaping Use: Never used   Substance Use Topics    Alcohol use: Yes     Comment: special occasions    Drug use: Never       Allergies: Allergies   Allergen Reactions    Codeine Other (comments)     hallucinations       Review of Systems   Review of Systems   Constitutional: Negative for chills and fever. HENT: Negative for congestion and sore throat. Eyes: Negative for pain and visual disturbance. Respiratory: Negative for cough and shortness of breath. Cardiovascular: Negative for chest pain and palpitations. Gastrointestinal: Negative for constipation, diarrhea, nausea and vomiting. Genitourinary: Negative for dysuria and frequency. Musculoskeletal: Negative for arthralgias and myalgias. Skin: Negative for color change and rash. Neurological: Negative for dizziness, weakness, light-headedness and headaches. Psychiatric/Behavioral: Negative for dysphoric mood and sleep disturbance.        Physical Exam   Physical Exam  Constitutional: Appearance: Normal appearance. HENT:      Head: Normocephalic and atraumatic. Right Ear: External ear normal.      Left Ear: External ear normal.      Nose: Nose normal.      Mouth/Throat:      Mouth: Mucous membranes are moist.   Eyes:      Extraocular Movements: Extraocular movements intact. Conjunctiva/sclera: Conjunctivae normal.   Cardiovascular:      Rate and Rhythm: Normal rate and regular rhythm. Pulses: Normal pulses. Heart sounds: Normal heart sounds. Pulmonary:      Effort: Pulmonary effort is normal.      Breath sounds: Normal breath sounds. Abdominal:      General: Abdomen is flat. There is no distension. Palpations: Abdomen is soft. Tenderness: There is no abdominal tenderness. Comments: PEG stoma clean, dry without evidence of infection. Unable to pass PEG tube suspect secondary to length of time tube has been dislodged. Musculoskeletal:         General: Normal range of motion. Cervical back: Normal range of motion. Skin:     General: Skin is warm and dry. Capillary Refill: Capillary refill takes less than 2 seconds. Neurological:      General: No focal deficit present. Mental Status: She is alert and oriented to person, place, and time. Mental status is at baseline. Psychiatric:         Mood and Affect: Mood normal.         Behavior: Behavior normal.         Lab and Diagnostic Study Results   Labs -   No results found for this or any previous visit (from the past 12 hour(s)). Radiologic Studies -   @lastxrresult@  CT Results  (Last 48 hours)    None        CXR Results  (Last 48 hours)    None          Medical Decision Making and ED Course   - I am the first provider for this patient. I reviewed the vital signs, available nursing notes, past medical history, past surgical history, family history and social history. - Initial assessment performed.  The patients presenting problems have been discussed, and they are in agreement with the care plan formulated and outlined with them. I have encouraged them to ask questions as they arise throughout their visit. Vital Signs-Reviewed the patient's vital signs. Patient Vitals for the past 12 hrs:   Temp Pulse Resp BP SpO2   07/03/22 1405 97.7 °F (36.5 °C) 62 16 (!) 102/57 95 %       Differential Diagnosis & Medical Decision Making Provider Note:   75-year-old female presenting for evaluation of PEG tube dislodgment. On exam, patient abdomen soft, nontender, nondistended without guarding, rebound, rigidity or skin infection. Unable to replace PEG tube in the emergency department. As patient is not PEG tube dependent and does tolerate p.o. we will have patient follow-up with Dr. Marlon Plasencia and endoscopy on 7/5/2022. Case discussed with Dr. Marlon Plasencia. Patient instructed to be n.p.o. at midnight. [facility]     ED Course:        Procedures   Performed by: Apollo Sidhu DO  Procedures      Disposition   Disposition: Condition stable  DC- Adult Discharges: All of the diagnostic tests were reviewed and questions answered. Diagnosis, care plan and treatment options were discussed. The patient understands the instructions and will follow up as directed. The patients results have been reviewed with them. They have been counseled regarding their diagnosis. The patient verbally convey understanding and agreement of the signs, symptoms, diagnosis, treatment and prognosis and additionally agrees to follow up as recommended with their PCP in 24 - 48 hours. They also agree with the care-plan and convey that all of their questions have been answered. I have also put together some discharge instructions for them that include: 1) educational information regarding their diagnosis, 2) how to care for their diagnosis at home, as well a 3) list of reasons why they would want to return to the ED prior to their follow-up appointment, should their condition change.   DC-The patient was given verbal follow-up instructions  Discharged    DISCHARGE PLAN:  1. Cannot display discharge medications since this patient is not currently admitted. 2.   Follow-up Information     Follow up With Specialties Details Why [facility]    [address] [facility] EMERGENCY DEPT Emergency Medicine  As needed, If symptoms worsen [address] [facility] [id number] [phone]    Endoscopy Suite at [address] [place] [address] on 7/5/2022 At 8 am. Nothing to eat after midnight tomorrow         3. Return to ED if worse   4. Discharge Medication List as of 7/3/2022  4:15 PM         Remove if admitted/transferred    Diagnosis/Clinical Impression     Clinical Impression:   1. PEG tube malfunction [facility])        Attestations: Jacquelin Miner, DO    Please note that this dictation was completed with [employer], the computer voice recognition software. Quite often unanticipated grammatical, syntax, homophones, and other interpretive errors are inadvertently transcribed by the computer software. Please disregard these errors. Please excuse any errors that have escaped final proofreading. Thank you. ambulatory

## 2022-07-05 ENCOUNTER — APPOINTMENT (OUTPATIENT)
Dept: ENDOSCOPY | Age: 72
End: 2022-07-05
Attending: INTERNAL MEDICINE
Payer: MEDICARE

## 2022-07-05 ENCOUNTER — ANESTHESIA EVENT (OUTPATIENT)
Dept: ENDOSCOPY | Age: 72
End: 2022-07-05
Payer: MEDICARE

## 2022-07-05 ENCOUNTER — ANESTHESIA (OUTPATIENT)
Dept: ENDOSCOPY | Age: 72
End: 2022-07-05
Payer: MEDICARE

## 2022-07-05 ENCOUNTER — HOSPITAL ENCOUNTER (OUTPATIENT)
Age: 72
Discharge: HOME | End: 2022-07-05
Attending: INTERNAL MEDICINE | Admitting: INTERNAL MEDICINE
Payer: MEDICARE

## 2022-07-05 VITALS
RESPIRATION RATE: 18 BRPM | HEART RATE: 56 BPM | BODY MASS INDEX: 23.56 KG/M2 | DIASTOLIC BLOOD PRESSURE: 66 MMHG | OXYGEN SATURATION: 93 % | WEIGHT: 138 LBS | SYSTOLIC BLOOD PRESSURE: 117 MMHG | HEIGHT: 64 IN | TEMPERATURE: 97.4 F

## 2022-07-05 PROBLEM — K94.23 PEG TUBE MALFUNCTION (HCC): Status: ACTIVE | Noted: 2022-07-05

## 2022-07-05 LAB
GLUCOSE BLD STRIP.AUTO-MCNC: 100 MG/DL (ref 65–117)
PERFORMED BY, TECHID: NORMAL

## 2022-07-05 PROCEDURE — 76040000019: Performed by: INTERNAL MEDICINE

## 2022-07-05 PROCEDURE — 82962 GLUCOSE BLOOD TEST: CPT

## 2022-07-05 PROCEDURE — 2709999900 HC NON-CHARGEABLE SUPPLY: Performed by: INTERNAL MEDICINE

## 2022-07-05 PROCEDURE — 77030005122 HC CATH GASTMY PEG BSC -B: Performed by: INTERNAL MEDICINE

## 2022-07-05 PROCEDURE — 76060000031 HC ANESTHESIA FIRST 0.5 HR: Performed by: INTERNAL MEDICINE

## 2022-07-05 PROCEDURE — 74011250636 HC RX REV CODE- 250/636: Performed by: ANESTHESIOLOGY

## 2022-07-05 PROCEDURE — 74011250636 HC RX REV CODE- 250/636: Performed by: INTERNAL MEDICINE

## 2022-07-05 RX ORDER — SODIUM CHLORIDE, SODIUM LACTATE, POTASSIUM CHLORIDE, CALCIUM CHLORIDE 600; 310; 30; 20 MG/100ML; MG/100ML; MG/100ML; MG/100ML
50 INJECTION, SOLUTION INTRAVENOUS CONTINUOUS
Status: DISCONTINUED | OUTPATIENT
Start: 2022-07-05 | End: 2022-07-05 | Stop reason: HOSPADM

## 2022-07-05 RX ORDER — SODIUM CHLORIDE, SODIUM LACTATE, POTASSIUM CHLORIDE, CALCIUM CHLORIDE 600; 310; 30; 20 MG/100ML; MG/100ML; MG/100ML; MG/100ML
INJECTION, SOLUTION INTRAVENOUS
Status: DISCONTINUED | OUTPATIENT
Start: 2022-07-05 | End: 2022-07-05 | Stop reason: HOSPADM

## 2022-07-05 RX ORDER — PROPOFOL 10 MG/ML
INJECTION, EMULSION INTRAVENOUS AS NEEDED
Status: DISCONTINUED | OUTPATIENT
Start: 2022-07-05 | End: 2022-07-05 | Stop reason: HOSPADM

## 2022-07-05 RX ORDER — ALBUTEROL SULFATE 90 UG/1
2 AEROSOL, METERED RESPIRATORY (INHALATION)
COMMUNITY

## 2022-07-05 RX ORDER — ACETAMINOPHEN 500 MG
TABLET ORAL
COMMUNITY

## 2022-07-05 RX ADMIN — SODIUM CHLORIDE, POTASSIUM CHLORIDE, SODIUM LACTATE AND CALCIUM CHLORIDE: 600; 310; 30; 20 INJECTION, SOLUTION INTRAVENOUS at 09:49

## 2022-07-05 RX ADMIN — PROPOFOL 30 MG: 10 INJECTION, EMULSION INTRAVENOUS at 09:58

## 2022-07-05 RX ADMIN — PROPOFOL 20 MG: 10 INJECTION, EMULSION INTRAVENOUS at 10:00

## 2022-07-05 RX ADMIN — PROPOFOL 70 MG: 10 INJECTION, EMULSION INTRAVENOUS at 09:55

## 2022-07-05 RX ADMIN — SODIUM CHLORIDE, POTASSIUM CHLORIDE, SODIUM LACTATE AND CALCIUM CHLORIDE 50 ML/HR: 600; 310; 30; 20 INJECTION, SOLUTION INTRAVENOUS at 09:30

## 2022-07-05 NOTE — ANESTHESIA POSTPROCEDURE EVALUATION
Procedure(s):  PERCUTANEOUS ENDOSCOPIC GASTROSTOMY TUBE INSERTION. MAC    Anesthesia Post Evaluation        Patient location during evaluation: bedside (Endoscopy suite)  Patient participation: complete - patient cannot participate  Level of consciousness: sleepy but conscious  Pain score: 0  Pain management: adequate  Airway patency: patent  Anesthetic complications: no  Cardiovascular status: acceptable  Respiratory status: acceptable and nasal cannula  Hydration status: acceptable  Comments: This patient remained on the stretcher. The patient was handed off to the endoscopy nursing team.  All questions regarding pre-, intra-, and postoperative care were answered.   Post anesthesia nausea and vomiting:  none      INITIAL Post-op Vital signs:   Vitals Value Taken Time   /66 07/05/22 1033   Temp 36.3 °C (97.4 °F) 07/05/22 1017   Pulse 56 07/05/22 1033   Resp 18 07/05/22 1033   SpO2 93 % 07/05/22 1033

## 2022-07-05 NOTE — PROGRESS NOTES
Received patient in SDS from Endo. PEG tube site clean, dry and intact. Patient alert and oriented, vital signs stable. Patient states pain is a 9 out of 10, pain is all over and patient states she deals with this same pain daily.

## 2022-07-05 NOTE — PERIOP NOTES
Patient alert and oriented x4, VS stable, 9/10 generalized pain. Niece at bedside. Bed in low position, call bell within reach. Patient states okay to review and give discharge instructions to Catalina Tran ([email or web address]).

## 2022-07-05 NOTE — PROGRESS NOTES
Discharge education given to patient and to Adelaide middleton. Adelaide Elaine gave back verbal understanding of discharge education and instructions. Patient taken to ride's car via wheelchair.

## 2022-07-05 NOTE — ANESTHESIA PREPROCEDURE EVALUATION
Relevant Problems   No relevant active problems       Anesthetic History   No history of anesthetic complications            Review of Systems / Medical History  Patient summary reviewed, nursing notes reviewed and pertinent labs reviewed    Pulmonary    COPD    Sleep apnea           Neuro/Psych         Psychiatric history     Cardiovascular    Hypertension          CAD      Comments: EKG (1-2-21) Normal sinus rhythm   Possible Left atrial enlargement   Septal infarct (cited on or before 26-DEC-2019)   ST & T wave abnormality, consider inferior ischemia   ST & T wave abnormality, consider anterior ischemia   Abnormal ECG   When compared with ECG of 19-FEB-2020 15:40,   Questionable change in initial forces of Septal leads   T wave inversion more evident in Inferior leads    GI/Hepatic/Renal     GERD          Comments: DYSPHAGIA.   Endo/Other    Diabetes    Arthritis     Other Findings            Physical Exam    Airway  Mallampati: II  TM Distance: 4 - 6 cm  Neck ROM: normal range of motion        Cardiovascular    Rhythm: regular  Rate: normal         Dental      Comments: Partial.    Pulmonary  Breath sounds clear to auscultation               Abdominal  GI exam deferred       Other Findings            Anesthetic Plan    ASA: 3  Anesthesia type: MAC          Induction: Intravenous  Anesthetic plan and risks discussed with: Patient

## 2022-07-27 ENCOUNTER — TELEPHONE (OUTPATIENT)
Dept: UROLOGY | Age: 72
End: 2022-07-27

## 2022-07-27 NOTE — TELEPHONE ENCOUNTER
She is on my schedule on Friday, but Rena wanted to see her to discuss/consider nephrectomy if diabetes was better controlled. She should be moved to his schedule, or re-scheduled.     Thanks,    [employer]

## 2022-07-29 ENCOUNTER — OFFICE VISIT (OUTPATIENT)
Dept: UROLOGY | Age: 72
End: 2022-07-29
Payer: MEDICARE

## 2022-07-29 ENCOUNTER — TELEPHONE (OUTPATIENT)
Dept: UROLOGY | Age: 72
End: 2022-07-29

## 2022-07-29 VITALS
BODY MASS INDEX: 24.24 KG/M2 | SYSTOLIC BLOOD PRESSURE: 106 MMHG | HEIGHT: 64 IN | DIASTOLIC BLOOD PRESSURE: 59 MMHG | HEART RATE: 80 BPM | WEIGHT: 142 LBS

## 2022-07-29 DIAGNOSIS — N13.5 UPJ (URETEROPELVIC JUNCTION) OBSTRUCTION: Primary | ICD-10-CM

## 2022-07-29 DIAGNOSIS — N39.46 MIXED STRESS AND URGE URINARY INCONTINENCE: ICD-10-CM

## 2022-07-29 DIAGNOSIS — N30.00 ACUTE CYSTITIS WITHOUT HEMATURIA: ICD-10-CM

## 2022-07-29 DIAGNOSIS — N39.8 VOIDING DYSFUNCTION: ICD-10-CM

## 2022-07-29 LAB
BILIRUB UR QL: NEGATIVE
GLUCOSE UR-MCNC: NEGATIVE MG/DL
KETONES P FAST UR STRIP-MCNC: NEGATIVE MG/DL
PH UR STRIP: 7 [PH] (ref 4.6–8)
PROT UR QL STRIP: NEGATIVE
SP GR UR STRIP: 1.02 (ref 1–1.03)
UA UROBILINOGEN AMB POC: NORMAL (ref 0.2–1)
URINALYSIS CLARITY POC: NORMAL
URINALYSIS COLOR POC: NORMAL
URINE BLOOD POC: NORMAL
URINE LEUKOCYTES POC: NORMAL
URINE NITRITES POC: NEGATIVE

## 2022-07-29 PROCEDURE — 81003 URINALYSIS AUTO W/O SCOPE: CPT | Performed by: UROLOGY

## 2022-07-29 PROCEDURE — G9899 SCRN MAM PERF RSLTS DOC: HCPCS | Performed by: UROLOGY

## 2022-07-29 PROCEDURE — G8536 NO DOC ELDER MAL SCRN: HCPCS | Performed by: UROLOGY

## 2022-07-29 PROCEDURE — 99214 OFFICE O/P EST MOD 30 MIN: CPT | Performed by: UROLOGY

## 2022-07-29 PROCEDURE — G8420 CALC BMI NORM PARAMETERS: HCPCS | Performed by: UROLOGY

## 2022-07-29 PROCEDURE — G8427 DOCREV CUR MEDS BY ELIG CLIN: HCPCS | Performed by: UROLOGY

## 2022-07-29 PROCEDURE — G8400 PT W/DXA NO RESULTS DOC: HCPCS | Performed by: UROLOGY

## 2022-07-29 PROCEDURE — G8432 DEP SCR NOT DOC, RNG: HCPCS | Performed by: UROLOGY

## 2022-07-29 RX ORDER — CANDESARTAN 8 MG/1
TABLET ORAL DAILY
COMMUNITY

## 2022-07-29 RX ORDER — FUROSEMIDE 20 MG/1
TABLET ORAL DAILY
COMMUNITY

## 2022-07-29 RX ORDER — SULFAMETHOXAZOLE AND TRIMETHOPRIM 800; 160 MG/1; MG/1
1 TABLET ORAL 2 TIMES DAILY
Qty: 6 TABLET | Refills: 0 | Status: SHIPPED
Start: 2022-07-29 | End: 2022-08-04 | Stop reason: SINTOL

## 2022-07-29 NOTE — PROGRESS NOTES
Chief Complaint   Patient presents with    Follow-up    Bladder Infection    Urinary Incontinence     1. Have you been to the ER, urgent care clinic since your last visit? Hospitalized since your last visit? No    2. Have you seen or consulted any other health care providers outside of the [address] [place] [address] since your last visit? Include any pap smears or colon screening.  No    Visit Vitals  BP (!) 106/59 (BP 1 Location: Right upper arm, BP Patient Position: Sitting, BP Cuff Size: Large adult)   Pulse 80   Ht 5' 4\" (1.626 m)   Wt 142 lb (64.4 kg)   LMP  (LMP Unknown)   BMI 24.37 kg/m²

## 2022-07-29 NOTE — PROGRESS NOTES
HISTORY OF PRESENT ILLNESS  Tina Guzmán is a 66 y.o. female. Chief Complaint   Patient presents with    Follow-up    Bladder Infection    Urinary Incontinence       Past Medical History:  PMHx (including negatives):  has a past medical history of Arthritis, Bacterial meningitis, CAD (coronary artery disease), Chronic obstructive pulmonary disease ([address] Chronic pain, DDD (degenerative disc disease), cervical, Diabetes ([address] Environmental allergies, Esophageal disorder, Fibromyalgia, GERD (gastroesophageal reflux disease), Hypercholesterolemia, Hypertension, Kidney stones, Macular degeneration, Mixed stress and urge urinary incontinence (7/2/2020), Neurogenic bladder (7/2/2020), Psychiatric disorder, Rheumatoid arthritis ([address] Scoliosis, Shingles, Sleep apnea, Suicidal thoughts, Torn rotator cuff, and Urinary tract infection without hematuria (7/2/2020). PSurgHx:  has a past surgical history that includes neurological procedure unlisted; pr breast surgery procedure unlisted; pr cystoscopy,insert ureteral stent (5/8/2018, 7/11/2017, 2/9/2019, 10/11/2016, 6/7/2016, 1/14/2016, 7/14/2015); hx joint replacement surgery; hx orthopaedic; hx orthopaedic; hx carpal tunnel release; hx lumbar fusion; hx dilation and curettage; colonoscopy (N/A, 10/9/2020); hx urological; hx urological (09/14/2020); hx urological (Left, 09/14/2020); hx urological (Left, 09/14/2020); hx urological (11/09/2020); hx urological (11/09/2020); hx urological (Left, 11/09/2020); hx urological (Left, 11/09/2020); hx urological (11/24/2020); hx urological (05/28/2021); hx gi (09/2021); and hx breast biopsy (Left). PSocHx:  reports that she quit smoking about 17 years ago. She has never used smokeless tobacco. She reports current alcohol use. She reports that she does not use drugs. Routine follow up; discussion around possible left nephrectomy due to chronic UTI and left kidney as a possible source of infection.   Diabetic control has also been an issue. Incontinent with NGB. She has reasonable continence but can sometimes wait too long. She has been educated on timed voiding. She leaks daytime and nighttime. She uses a feeding tube and hydrates through it. She takes in 64 oz in a cup, and 40-50oz tid via the tube. Intermittent UTI (fungal and bacterial). She feels better on antibiotics. 5/27/22 culture with candida albicans and E faecalis. She was treated for both. She has a hard time with mobility as well. She uses a cane. Chronic Conditions Addressed Today       1. Mixed stress and urge urinary incontinence     Overview      3/3/21: She has chronic leakage. It has been a long time. She has some hesitancy and urgency. She has to strain at times. She wears a depends all the time. She uses 2-3. She leaks with cough or sneeze. She has urgency. 5/28/21: appears to have chronic UTI on cysto. Fungal infection. 11/11/21: most likely urge incontinence with NGB. She has reasonable continence unless she waits too long. Timed voiding is important. 2. UPJ (ureteropelvic junction) obstruction - Primary     Overview        She has a left sided ureteral stent managed previously by Dr. Desir Kemi. Stent secondary to chronic left UPJ obstruction with stent changes q 3 months. Last changed 6/2/2020. She has been having stents several years 5-6 per her recollection. She is s/p cystoscopy, left retrograde pyelogram, left ureteral stent exchange on 9/14/2020. Findings: left UPJ obstruction with low-lying left kidney. She is s/p cystoscopy, retrograde pyelograms, robotic left pyeloplasty, left ureteral stent exchange on 11/10/2020. Pathology significant for fibromuscular tissue with chronic inflammation. No evidence of epithelial neoplasm. 12/7/2020: NM study showed: Atrophic left kidney has reduced perfusion and uptake relative to the right.  Post Lasix stimulation there is no change in the appearance of shallow curve representing left kidney excretion. No downward slope is identified to allow exclusion of obstructive hydronephrosis. Differential perfusion; left, 37.3%   right, 62.7%. Differential uptake; left, 19.3%   right, 80.8%. Creatinine on 1/6/2021 was 0.84. Creatinine on 3/8/2021 (via Missael Miguel MD) was 1.39  Creatinine 8/30/21 was 0.82  Creatinine 9/5/21 was 0.58  Creatinine 9/10/21 was 0.65    11/11/21: renal function is normal; left kidney is poorly functioning. No obvious consequence at this time. 3. Acute cystitis     Overview      History of UTI and fungal UTI. Urine culture 8/18/2020: klebsiella oxytoca. Urine culture 10/22/2020: mixed greg. Urine culture 11/5/2020: no growth. Urine culture 11/9/2020: no growth. Urine culture 1/11/2021: klebsiella pneumonia and klebsiella oxytoca (scanned result). Urine culture 3/3/21: Mixed urogenital greg, greater than 100,000 colony forming units per mL.    5/28/21 sample with yeast. Diflucan x 14 days. 6/28/21: repeat evaluation showed no yeast or mold isolated after 1 week. 7/16/21: no growth on culture; no fungal growth on culture. 8/30/21: mixed greg on culture, 80K cfu/ml. 8/31/21: no growth on culture. 4. Voiding dysfunction     Overview      3/3/21: She has chronic leakage. It has been a long time. She has some hesitancy and urgency. She has to strain at times. She wears a depends all the time. She uses 2-3. She leaks with cough or sneeze. She has urgency. She feels pressure not burning. She can sit 30 minutes to empty her bladder. 5/28/21: apparent chronic UTI on cystoscopic evaluation. 8/13/21: chronic leakage which is very bothersome. She had evidence of chronic infection and inflammation in May on cysto. 11/11/21: poor sensation of full bladder. She probably waits too long to empty. She can continue oxybutynin but is instructed on timed voiding at minimum of q 4 hours. ROS  Patient denies the symptoms of COVID-19 per routine screening guidelines. Physical Exam  Vitals reviewed. Constitutional:       General: She is not in acute distress. Appearance: Normal appearance. She is obese. She is not ill-appearing, toxic-appearing or diaphoretic. HENT:      Head: Normocephalic and atraumatic. Nose: Nose normal.   Eyes:      Conjunctiva/sclera: Conjunctivae normal.      Pupils: Pupils are equal, round, and reactive to light. Pulmonary:      Effort: Pulmonary effort is normal. No respiratory distress. Breath sounds: Normal breath sounds. Abdominal:      General: Abdomen is flat. Comments: PEG tube     Musculoskeletal:      Cervical back: Normal range of motion. Neurological:      General: No focal deficit present. Mental Status: She is alert and oriented to person, place, and time. Psychiatric:         Mood and Affect: Mood normal.       ASSESSMENT and PLAN  Diagnoses and all orders for this visit:    1. UPJ (ureteropelvic junction) obstruction  Assessment & Plan:   She has a poorly functioning left kidney. She has chronic infections. I want to repeat a renal scan. If it is not functioning well, we discussed nephrectomy. It is uncertain but it may be a source of infection. Plan on renal scan. Orders:  -     AMB POC URINALYSIS DIP STICK AUTO W/O MICRO  -     NM RENAL FLOW/FUNC WO PHARM SNGL; Future    2. Voiding dysfunction  Assessment & Plan:   She has poor sensation of a full bladder. She is on oxybutynin. She has been instructed on timed voiding. She tries to do that. Orders:  -     AMB POC URINALYSIS DIP STICK AUTO W/O MICRO  -     CULTURE, URINE    3. Mixed stress and urge urinary incontinence  Assessment & Plan:   She has high fluid intake but also has leakage. She is doing timed voiding. She can double void. She has not taken her Lasix. She still takes oxybutynin. Orders:  -     CULTURE, URINE    4. Acute cystitis without hematuria  Assessment & Plan:  Urine with possible infection today. Rx'd 3 days Bactrim      Other orders  -     trimethoprim-sulfamethoxazole (BACTRIM DS, SEPTRA DS) 160-800 mg per tablet; Take 1 Tablet by mouth two (2) times a day. Follow-up and Dispositions    Return for Follow up after testing. Tina Guzmán may have a reminder for a \"due or due soon\" health maintenance. The patient has been encouraged to contact their primary care provider for follow-up on this health maintenance or other necessary and/or routine health screening.      Temitope Baum MD

## 2022-07-29 NOTE — ASSESSMENT & PLAN NOTE
She has poor sensation of a full bladder. She is on oxybutynin. She has been instructed on timed voiding. She tries to do that.

## 2022-07-29 NOTE — TELEPHONE ENCOUNTER
Made patient aware at the end of her appointment today if she doesn't hear anything from the scheduling team for her renal scan, to give [phone] a call- gave her that slip, and once that is scheduled to give us a call so we can set up her follow up

## 2022-07-29 NOTE — ASSESSMENT & PLAN NOTE
She has high fluid intake but also has leakage. She is doing timed voiding. She can double void. She has not taken her Lasix. She still takes oxybutynin.

## 2022-07-29 NOTE — LETTER
7/29/2022    Patient: Juancho Bachelor   YOB: 1950   Date of Visit: 7/29/2022     Bharati Noe MD  [facility] [address] [address]  Via Fax: [phone]    Dear Bharati Noe MD,      Thank you for referring Ms. Juan Jose Robertson to [place] [address] for evaluation. My notes for this consultation are attached. If you have questions, please do not hesitate to call me. I look forward to following your patient along with you.       Sincerely,    Vu Mckeon MD

## 2022-07-29 NOTE — ASSESSMENT & PLAN NOTE
She has a poorly functioning left kidney. She has chronic infections. I want to repeat a renal scan. If it is not functioning well, we discussed nephrectomy. It is uncertain but it may be a source of infection. Plan on renal scan.

## 2022-08-03 ENCOUNTER — TELEPHONE (OUTPATIENT)
Dept: UROLOGY | Age: 72
End: 2022-08-03

## 2022-08-03 NOTE — TELEPHONE ENCOUNTER
Boris Delarosa left a voice msg on pt's behalf stating they think the Doe Nurse is making pt extremely nauseated and would like to know if another med could be sent to pharmacy.   Boris Delarosa [phone]

## 2022-08-04 LAB — BACTERIA UR CULT: ABNORMAL

## 2022-08-04 RX ORDER — CIPROFLOXACIN 500 MG/1
500 TABLET ORAL 2 TIMES DAILY
Qty: 6 TABLET | Refills: 0 | Status: SHIPPED | OUTPATIENT
Start: 2022-08-04 | End: 2022-08-07

## 2022-08-04 NOTE — TELEPHONE ENCOUNTER
She should've now completed the 3 day course of Bactrim by now. If she was not able to do that, I will send something else. Can you clarify how much she took?

## 2022-08-04 NOTE — TELEPHONE ENCOUNTER
Spoke with Davi Lowery pt EC she stated that pt only took the antibiotic for one and a haf days due to her temperature being 101 and her vomiting .  She asked could another one be sent in

## 2022-08-04 NOTE — TELEPHONE ENCOUNTER
I have sent in another 3 day prescription. She should take is as directed and read all package inserts.

## 2022-08-09 NOTE — PERIOP NOTES
Spoke with patient's medical power of , Yadi Mckee, and she will sign for the patient's discharge paperwork after the procedure.

## 2022-08-15 ENCOUNTER — ANESTHESIA (OUTPATIENT)
Dept: ENDOSCOPY | Age: 72
End: 2022-08-15
Payer: MEDICARE

## 2022-08-15 ENCOUNTER — ANESTHESIA EVENT (OUTPATIENT)
Dept: ENDOSCOPY | Age: 72
End: 2022-08-15
Payer: MEDICARE

## 2022-08-15 ENCOUNTER — HOSPITAL ENCOUNTER (OUTPATIENT)
Age: 72
Setting detail: OUTPATIENT SURGERY
Discharge: HOME | End: 2022-08-15
Attending: INTERNAL MEDICINE | Admitting: INTERNAL MEDICINE
Payer: MEDICARE

## 2022-08-15 ENCOUNTER — APPOINTMENT (OUTPATIENT)
Dept: ENDOSCOPY | Age: 72
End: 2022-08-15
Attending: INTERNAL MEDICINE
Payer: MEDICARE

## 2022-08-15 VITALS
SYSTOLIC BLOOD PRESSURE: 133 MMHG | BODY MASS INDEX: 21.66 KG/M2 | TEMPERATURE: 97.6 F | WEIGHT: 130 LBS | HEIGHT: 65 IN | DIASTOLIC BLOOD PRESSURE: 69 MMHG | HEART RATE: 62 BPM | RESPIRATION RATE: 18 BRPM | OXYGEN SATURATION: 94 %

## 2022-08-15 LAB
GLUCOSE BLD STRIP.AUTO-MCNC: 131 MG/DL (ref 65–117)
PERFORMED BY, TECHID: ABNORMAL

## 2022-08-15 PROCEDURE — 2709999900 HC NON-CHARGEABLE SUPPLY: Performed by: INTERNAL MEDICINE

## 2022-08-15 PROCEDURE — 74011250636 HC RX REV CODE- 250/636: Performed by: ANESTHESIOLOGY

## 2022-08-15 PROCEDURE — 76040000009: Performed by: INTERNAL MEDICINE

## 2022-08-15 PROCEDURE — 74011250636 HC RX REV CODE- 250/636: Performed by: INTERNAL MEDICINE

## 2022-08-15 PROCEDURE — 82962 GLUCOSE BLOOD TEST: CPT

## 2022-08-15 PROCEDURE — 76060000034 HC ANESTHESIA 1.5 TO 2 HR: Performed by: INTERNAL MEDICINE

## 2022-08-15 RX ORDER — SODIUM CHLORIDE 9 MG/ML
20 INJECTION, SOLUTION INTRAVENOUS CONTINUOUS
Status: DISCONTINUED | OUTPATIENT
Start: 2022-08-15 | End: 2022-08-15 | Stop reason: HOSPADM

## 2022-08-15 RX ORDER — PROPOFOL 10 MG/ML
INJECTION, EMULSION INTRAVENOUS AS NEEDED
Status: DISCONTINUED | OUTPATIENT
Start: 2022-08-15 | End: 2022-08-15 | Stop reason: HOSPADM

## 2022-08-15 RX ADMIN — PROPOFOL 50 MG: 10 INJECTION, EMULSION INTRAVENOUS at 09:52

## 2022-08-15 RX ADMIN — PROPOFOL 50 MG: 10 INJECTION, EMULSION INTRAVENOUS at 09:46

## 2022-08-15 RX ADMIN — PROPOFOL 50 MG: 10 INJECTION, EMULSION INTRAVENOUS at 09:48

## 2022-08-15 RX ADMIN — SODIUM CHLORIDE 20 ML/HR: 9 INJECTION, SOLUTION INTRAVENOUS at 08:19

## 2022-08-15 NOTE — ANESTHESIA PREPROCEDURE EVALUATION
Relevant Problems   RESPIRATORY SYSTEM   (+) Aspiration pneumonia (HCC)   (+) COPD (chronic obstructive pulmonary disease) (HCC)   (+) COPD exacerbation (HCC)      RENAL FAILURE   (+) UPJ (ureteropelvic junction) obstruction       Anesthetic History               Review of Systems / Medical History      Pulmonary    COPD    Sleep apnea           Neuro/Psych         Psychiatric history     Cardiovascular    Hypertension          CAD         GI/Hepatic/Renal     GERD           Endo/Other    Diabetes: type 2    Arthritis     Other Findings              Physical Exam    Airway  Mallampati: II    Neck ROM: normal range of motion   Mouth opening: Normal     Cardiovascular    Rhythm: regular  Rate: normal         Dental         Pulmonary  Breath sounds clear to auscultation               Abdominal         Other Findings            Anesthetic Plan    ASA: 3  Anesthesia type: MAC and total IV anesthesia          Induction: Intravenous  Anesthetic plan and risks discussed with: Patient

## 2022-08-15 NOTE — PROGRESS NOTES
IV removed-- tip intact, no redness, swelling or bleeding noted. VSS. Patient in no acute distress. DC instructions reviewed with  Anca Yan     -- verbalized understanding. Patient wheeled out to private vehicle-- no distress noted.

## 2022-08-15 NOTE — ANESTHESIA POSTPROCEDURE EVALUATION
Procedure(s):  COLONOSCOPY.    MAC, total IV anesthesia    Anesthesia Post Evaluation      Multimodal analgesia: multimodal analgesia not used between 6 hours prior to anesthesia start to PACU discharge  Patient location during evaluation: bedside (Endoscopy suite)  Patient participation: complete - patient cannot participate  Level of consciousness: sleepy but conscious  Pain score: 0  Pain management: adequate  Airway patency: patent  Anesthetic complications: no  Cardiovascular status: acceptable  Respiratory status: acceptable and nasal cannula  Hydration status: acceptable  Comments: This patient remained on the stretcher. The patient was handed off to the endoscopy nursing team.  All questions regarding pre-, intra-, and postoperative care were answered. Post anesthesia nausea and vomiting:  none      INITIAL Post-op Vital signs: No vitals data found for the desired time range.

## 2022-09-12 ENCOUNTER — HOSPITAL ENCOUNTER (OUTPATIENT)
Dept: NUCLEAR MEDICINE | Age: 72
Discharge: HOME | End: 2022-09-12
Attending: UROLOGY
Payer: MEDICARE

## 2022-09-12 DIAGNOSIS — N13.5 UPJ (URETEROPELVIC JUNCTION) OBSTRUCTION: ICD-10-CM

## 2022-09-12 PROCEDURE — 78708 K FLOW/FUNCT IMAGE W/DRUG: CPT

## 2022-09-12 PROCEDURE — 78707 K FLOW/FUNCT IMAGE W/O DRUG: CPT

## 2022-09-12 PROCEDURE — 74011250636 HC RX REV CODE- 250/636

## 2022-09-12 RX ORDER — BETIATIDE 1 MG/1
9.4 INJECTION, POWDER, LYOPHILIZED, FOR SOLUTION INTRAVENOUS
Status: COMPLETED | OUTPATIENT
Start: 2022-09-12 | End: 2022-09-12

## 2022-09-12 RX ORDER — FUROSEMIDE 10 MG/ML
INJECTION INTRAMUSCULAR; INTRAVENOUS
Status: COMPLETED
Start: 2022-09-12 | End: 2022-09-12

## 2022-09-12 RX ADMIN — BETIATIDE 9.4 MILLICURIE: 1 INJECTION, POWDER, LYOPHILIZED, FOR SOLUTION INTRAVENOUS at 12:15

## 2022-09-12 RX ADMIN — FUROSEMIDE 40 MG: 10 INJECTION, SOLUTION INTRAMUSCULAR; INTRAVENOUS at 12:15

## 2022-09-19 NOTE — PROGRESS NOTES
HISTORY OF PRESENT ILLNESS  Johnny Sharpe is a 74 y.o. female. Chief Complaint   Patient presents with    Follow-up    Results    UPJ Obstruction     Past Medical History:  PMHx (including negatives):  has a past medical history of Arthritis, Bacterial meningitis, CAD (coronary artery disease), Chronic obstructive pulmonary disease ([address] Chronic pain, DDD (degenerative disc disease), cervical, Diabetes ([address] Environmental allergies, Esophageal disorder, Fibromyalgia, GERD (gastroesophageal reflux disease), Hypercholesterolemia, Hypertension, Kidney stones, Macular degeneration, Mixed stress and urge urinary incontinence (7/2/2020), Neurogenic bladder (7/2/2020), Psychiatric disorder, Rheumatoid arthritis ([address] Scoliosis, Shingles, Sleep apnea, Suicidal thoughts, Torn rotator cuff, and Urinary tract infection without hematuria (7/2/2020). PSurgHx:  has a past surgical history that includes neurological procedure unlisted; pr breast surgery procedure unlisted; pr cystoscopy,insert ureteral stent (5/8/2018, 7/11/2017, 2/9/2019, 10/11/2016, 6/7/2016, 1/14/2016, 7/14/2015); hx joint replacement surgery; hx orthopaedic; hx orthopaedic; hx carpal tunnel release; hx lumbar fusion; hx dilation and curettage; colonoscopy (N/A, 10/9/2020); hx urological; hx urological (09/14/2020); hx urological (Left, 09/14/2020); hx urological (Left, 09/14/2020); hx urological (11/09/2020); hx urological (11/09/2020); hx urological (Left, 11/09/2020); hx urological (Left, 11/09/2020); hx urological (11/24/2020); hx urological (05/28/2021); hx gi (09/2021); hx breast biopsy (Left); and colonoscopy (N/A, 8/15/2022). PSocHx:  reports that she quit smoking about 17 years ago. Her smoking use included cigarettes. She has never used smokeless tobacco. She reports current alcohol use. She reports that she does not use drugs. She is here with her niece Abdelrahman Alejo. Her diabetic control is improving. HbA1c 7 now. PEG last year. She can take PO meds. She is s/p left pyeloplasty 11/2020. Incontinent with NGB. She wears pads. She has reasonable continence but can sometimes wait too long. She doesn't get to the bathroom in time. Her niece thinks if she did timed voiding she would be dry. She does not do this. The patient thinks she voids more often. She sits and waits on the toilet to try to finish more at the end. She has nighttime and daytime issues. She uses a feeding tube and hydrates through that as well. She is getting more hydration via the tube. Her niece thinks she is healthier. 12/7/2020: NM study showed: Atrophic left kidney has reduced perfusion and uptake relative to the right. Post Lasix stimulation there is no change in the appearance of shallow curve representing left kidney excretion. No downward slope is identified to allow exclusion of obstructive hydronephrosis. Differential perfusion; left, 37.3%   right, 62.7%. Differential uptake; left, 19.3%   right, 80.8%. NM renal scan on 9/12/22: split function equals 80% on the right and 20% on the left. The left renal function curve is flattened. The right renal function curve has an obstructive appearance. There is mild right hydronephrosis. Split renal function:   Right kidney: 80 %  Left kidney: 20 %     Time to peak activity:  Right kidney: 22 minutes  Left kidney: 19 minutes     Post Lasix peak to 1/2 peak times:  Right kidney: 29 minutes  Left kidney: not achieved. She is here today to review those results. Creatinine has been stable, in a normal range. Chronic Conditions Addressed Today       1. UPJ (ureteropelvic junction) obstruction - Primary     Overview        She has a left sided ureteral stent managed previously by Dr. Tiffany Chauhan. Stent secondary to chronic left UPJ obstruction with stent changes q 3 months. Last changed 6/2/2020. She has been having stents several years 5-6 per her recollection.      She is s/p cystoscopy, left retrograde pyelogram, left ureteral stent exchange on 9/14/2020. Findings: left UPJ obstruction with low-lying left kidney. She is s/p cystoscopy, retrograde pyelograms, robotic left pyeloplasty, left ureteral stent exchange on 11/10/2020. Pathology significant for fibromuscular tissue with chronic inflammation. No evidence of epithelial neoplasm. 12/7/2020: NM study showed: Atrophic left kidney has reduced perfusion and uptake relative to the right. Post Lasix stimulation there is no change in the appearance of shallow curve representing left kidney excretion. No downward slope is identified to allow exclusion of obstructive hydronephrosis. Differential perfusion; left, 37.3%   right, 62.7%. Differential uptake; left, 19.3%   right, 80.8%. Creatinine on 1/6/2021 was 0.84. Creatinine on 3/8/2021 (via Nya Bautista MD) was 1.39  Creatinine 8/30/21 was 0.82  Creatinine 9/5/21 was 0.58  Creatinine 9/10/21 was 0.65    11/11/21: renal function is normal; left kidney is poorly functioning. No obvious consequence at this time. 5/27/22: if she has colonization of the left kidney, this may be a source of ongoing infection. Consider nephrectomy once diabetic control is improved. NM renal scan on 9/12/22: split function equals 80% on the right and 20% on the left. The left renal function curve is flattened. The right renal function curve has an obstructive appearance. There is mild right hydronephrosis. 2. Acute cystitis without hematuria     Overview      History of UTI and fungal UTI. Urine culture 8/18/2020: klebsiella oxytoca. Urine culture 10/22/2020: mixed greg. Urine culture 11/5/2020: no growth. Urine culture 11/9/2020: no growth. Urine culture 1/11/2021: klebsiella pneumonia and klebsiella oxytoca (scanned result). Urine culture 3/3/21: Mixed urogenital greg, greater than 100,000 colony forming units per mL.    5/28/21 sample with yeast. Diflucan x 14 days. 6/28/21: repeat evaluation showed no yeast or mold isolated after 1 week. 7/16/21: no growth on culture; no fungal growth on culture. 8/30/21: mixed greg on culture, 80K cfu/ml. 8/31/21: no growth on culture. 11/11/21: candida albicans.    5/27/22: esme albicans    7/29/22: serratia liquefaciens                 Review of Systems   HENT:  Positive for hearing loss. All other systems reviewed and are negative. Patient denies the symptoms of COVID-19 per routine screening guidelines. Physical Exam  Constitutional:       General: She is not in acute distress. ASSESSMENT and PLAN  Diagnoses and all orders for this visit:    1. UPJ (ureteropelvic junction) obstruction  Assessment & Plan:   Her left renal function is stable but poor at 20%. She has slow washout which is stable. Left kidney is atrophic. No obvious pyelonephritis. Continue observation  She had persistent lower tract infections. Risk factors include incontinence, diabetes and debility. Orders:  -     AMB POC URINALYSIS DIP STICK AUTO W/O MICRO  -     URINALYSIS W/MICROSCOPIC  -     CULTURE, URINE    2. Acute cystitis without hematuria  Assessment & Plan:  She has had Candida and Serratia in the urine in the past.  No symptoms of pyelonephritis. Urinalysis today concerning for infection. We will treat if positive. Consider ID referral if Candida or difficult organism. Orders:  -     AMB POC URINALYSIS DIP STICK AUTO W/O MICRO  -     URINALYSIS W/MICROSCOPIC  -     CULTURE, URINE    3. Voiding dysfunction  Assessment & Plan:  Neurogenic bladder. She manages with diapers. She is not been as compliant with timed voiding. 4. Mixed stress and urge urinary incontinence  Assessment & Plan:   More urge related when she postpones urination. Stable               Jayesh Felix may have a reminder for a \"due or due soon\" health maintenance.  The patient has been encouraged to contact their primary care provider for follow-up on this health maintenance or other necessary and/or routine health screening.      Dina Segura MD

## 2022-09-21 ENCOUNTER — TRANSCRIBE ORDER (OUTPATIENT)
Dept: SCHEDULING | Age: 72
End: 2022-09-21

## 2022-09-21 DIAGNOSIS — M79.605 LEFT LEG PAIN: Primary | ICD-10-CM

## 2022-09-23 ENCOUNTER — TELEPHONE (OUTPATIENT)
Dept: UROLOGY | Age: 72
End: 2022-09-23

## 2022-09-23 ENCOUNTER — OFFICE VISIT (OUTPATIENT)
Dept: UROLOGY | Age: 72
End: 2022-09-23
Payer: MEDICARE

## 2022-09-23 VITALS
DIASTOLIC BLOOD PRESSURE: 70 MMHG | SYSTOLIC BLOOD PRESSURE: 126 MMHG | BODY MASS INDEX: 21.66 KG/M2 | HEIGHT: 65 IN | WEIGHT: 130 LBS | HEART RATE: 82 BPM | OXYGEN SATURATION: 100 % | TEMPERATURE: 97.8 F

## 2022-09-23 DIAGNOSIS — N39.46 MIXED STRESS AND URGE URINARY INCONTINENCE: ICD-10-CM

## 2022-09-23 DIAGNOSIS — N13.5 UPJ (URETEROPELVIC JUNCTION) OBSTRUCTION: Primary | ICD-10-CM

## 2022-09-23 DIAGNOSIS — N30.00 ACUTE CYSTITIS WITHOUT HEMATURIA: ICD-10-CM

## 2022-09-23 DIAGNOSIS — N39.8 VOIDING DYSFUNCTION: ICD-10-CM

## 2022-09-23 LAB
BILIRUB UR QL: NEGATIVE
GLUCOSE UR-MCNC: NEGATIVE MG/DL
KETONES P FAST UR STRIP-MCNC: NEGATIVE MG/DL
PH UR STRIP: 7 [PH] (ref 4.6–8)
PROT UR QL STRIP: NORMAL
SP GR UR STRIP: 1.01 (ref 1–1.03)
UA UROBILINOGEN AMB POC: NORMAL (ref 0.2–1)
URINALYSIS CLARITY POC: CLEAR
URINALYSIS COLOR POC: YELLOW
URINE BLOOD POC: NORMAL
URINE LEUKOCYTES POC: NORMAL
URINE NITRITES POC: NEGATIVE

## 2022-09-23 PROCEDURE — G8420 CALC BMI NORM PARAMETERS: HCPCS | Performed by: UROLOGY

## 2022-09-23 PROCEDURE — 3017F COLORECTAL CA SCREEN DOC REV: CPT | Performed by: UROLOGY

## 2022-09-23 PROCEDURE — G9899 SCRN MAM PERF RSLTS DOC: HCPCS | Performed by: UROLOGY

## 2022-09-23 PROCEDURE — 81003 URINALYSIS AUTO W/O SCOPE: CPT | Performed by: UROLOGY

## 2022-09-23 PROCEDURE — G8400 PT W/DXA NO RESULTS DOC: HCPCS | Performed by: UROLOGY

## 2022-09-23 PROCEDURE — G8427 DOCREV CUR MEDS BY ELIG CLIN: HCPCS | Performed by: UROLOGY

## 2022-09-23 PROCEDURE — 1123F ACP DISCUSS/DSCN MKR DOCD: CPT | Performed by: UROLOGY

## 2022-09-23 PROCEDURE — G8510 SCR DEP NEG, NO PLAN REQD: HCPCS | Performed by: UROLOGY

## 2022-09-23 PROCEDURE — 99214 OFFICE O/P EST MOD 30 MIN: CPT | Performed by: UROLOGY

## 2022-09-23 PROCEDURE — 1090F PRES/ABSN URINE INCON ASSESS: CPT | Performed by: UROLOGY

## 2022-09-23 PROCEDURE — G8536 NO DOC ELDER MAL SCRN: HCPCS | Performed by: UROLOGY

## 2022-09-23 PROCEDURE — 1101F PT FALLS ASSESS-DOCD LE1/YR: CPT | Performed by: UROLOGY

## 2022-09-23 NOTE — ASSESSMENT & PLAN NOTE
She has had Candida and Serratia in the urine in the past.  No symptoms of pyelonephritis. Urinalysis today concerning for infection. We will treat if positive. Consider ID referral if Candida or difficult organism.

## 2022-09-23 NOTE — ASSESSMENT & PLAN NOTE
Her left renal function is stable but poor at 20%. She has slow washout which is stable. Left kidney is atrophic. No obvious pyelonephritis. Continue observation  She had persistent lower tract infections. Risk factors include incontinence, diabetes and debility.

## 2022-09-23 NOTE — PROGRESS NOTES
Chief Complaint   Patient presents with    Follow-up    Results    UPJ Obstruction       PHQ-9 score is    Negative    Vitals:    09/23/22 0912   BP: 126/70   Pulse: 82   Temp: 97.8 °F (36.6 °C)   TempSrc: Temporal   SpO2: 100%   Weight: 130 lb (59 kg)   Height: 5' 5\" (1.651 m)   PainSc:   0 - No pain        1. \"Have you been to the ER, urgent care clinic since your last visit? Hospitalized since your last visit? \" No    2. \"Have you seen or consulted any other health care providers outside of the [address] [place] [address] since your last visit? \" No     3. For patients aged 39-70: Has the patient had a colonoscopy / FIT/ Cologuard? No      If the patient is female:    4. For patients aged 41-77: Has the patient had a mammogram within the past 2 years? No      5. For patients aged 21-65: Has the patient had a pap smear?  No

## 2022-09-23 NOTE — TELEPHONE ENCOUNTER
based off visit on 9/22/22 pt was to come back in 1 year 2023 lvm for pt jorgeece letting her know pt did not need to come to this appt

## 2022-09-23 NOTE — LETTER
9/23/2022    Patient: Susan Fitzpatrick   YOB: 1950   Date of Visit: 9/23/2022     Joel Real MD  [facility] [address] [address]  Via Fax: [phone]    Dear Joel Real MD,      Thank you for referring Ms. Ta Barbour to [place] [address] for evaluation. My notes for this consultation are attached. If you have questions, please do not hesitate to call me. I look forward to following your patient along with you.       Sincerely,    Sandra Lux MD

## 2022-09-26 ENCOUNTER — HOSPITAL ENCOUNTER (OUTPATIENT)
Dept: NON INVASIVE DIAGNOSTICS | Age: 72
Discharge: HOME | End: 2022-09-26
Attending: FAMILY MEDICINE
Payer: MEDICARE

## 2022-09-26 DIAGNOSIS — M79.605 LEFT LEG PAIN: ICD-10-CM

## 2022-09-26 PROCEDURE — 93971 EXTREMITY STUDY: CPT

## 2022-09-28 LAB
APPEARANCE UR: ABNORMAL
BACTERIA #/AREA URNS HPF: ABNORMAL /[HPF]
BACTERIA UR CULT: NORMAL
BILIRUB UR QL STRIP: NEGATIVE
CASTS URNS QL MICRO: ABNORMAL /LPF
COLOR UR: YELLOW
EPI CELLS #/AREA URNS HPF: ABNORMAL /HPF (ref 0–10)
GLUCOSE UR QL STRIP: NEGATIVE
HGB UR QL STRIP: ABNORMAL
KETONES UR QL STRIP: NEGATIVE
LEUKOCYTE ESTERASE UR QL STRIP: ABNORMAL
MICRO URNS: ABNORMAL
NITRITE UR QL STRIP: NEGATIVE
PH UR STRIP: 6.5 [PH] (ref 5–7.5)
PROT UR QL STRIP: ABNORMAL
RBC #/AREA URNS HPF: ABNORMAL /HPF (ref 0–2)
SP GR UR STRIP: 1.01 (ref 1–1.03)
UROBILINOGEN UR STRIP-MCNC: 0.2 MG/DL (ref 0.2–1)
WBC #/AREA URNS HPF: >30 /HPF (ref 0–5)

## 2022-09-30 ENCOUNTER — TELEPHONE (OUTPATIENT)
Dept: UROLOGY | Age: 72
End: 2022-09-30

## 2022-09-30 ENCOUNTER — TRANSCRIBE ORDER (OUTPATIENT)
Dept: SCHEDULING | Age: 72
End: 2022-09-30

## 2022-09-30 DIAGNOSIS — R59.9 LYMPH NODES ENLARGED: Primary | ICD-10-CM

## 2022-09-30 DIAGNOSIS — R93.89 ABNORMAL RADIOLOGICAL FINDINGS IN SKIN AND SUBCUTANEOUS TISSUE: ICD-10-CM

## 2022-09-30 NOTE — TELEPHONE ENCOUNTER
Spoke with pt niece who wanted to know if Dr. Trevino was sending something in for her aunt based off her urine culture she had done at her visit on 9/23

## 2022-10-06 ENCOUNTER — OFFICE VISIT (OUTPATIENT)
Dept: UROLOGY | Age: 72
End: 2022-10-06
Payer: MEDICARE

## 2022-10-06 VITALS — HEIGHT: 64 IN | BODY MASS INDEX: 24.07 KG/M2 | WEIGHT: 141 LBS

## 2022-10-06 DIAGNOSIS — N30.00 ACUTE CYSTITIS WITHOUT HEMATURIA: Primary | ICD-10-CM

## 2022-10-06 LAB
BILIRUB UR QL: NEGATIVE
GLUCOSE UR-MCNC: NEGATIVE MG/DL
KETONES P FAST UR STRIP-MCNC: NEGATIVE MG/DL
PH UR STRIP: 5.5 [PH] (ref 4.6–8)
PROT UR QL STRIP: NORMAL
SP GR UR STRIP: 1.02 (ref 1–1.03)
UA UROBILINOGEN AMB POC: NORMAL (ref 0.2–1)
URINALYSIS CLARITY POC: CLEAR
URINALYSIS COLOR POC: YELLOW
URINE BLOOD POC: NORMAL
URINE LEUKOCYTES POC: NORMAL
URINE NITRITES POC: POSITIVE

## 2022-10-06 PROCEDURE — 99213 OFFICE O/P EST LOW 20 MIN: CPT | Performed by: NURSE PRACTITIONER

## 2022-10-06 PROCEDURE — G9899 SCRN MAM PERF RSLTS DOC: HCPCS | Performed by: NURSE PRACTITIONER

## 2022-10-06 PROCEDURE — G8536 NO DOC ELDER MAL SCRN: HCPCS | Performed by: NURSE PRACTITIONER

## 2022-10-06 PROCEDURE — 1101F PT FALLS ASSESS-DOCD LE1/YR: CPT | Performed by: NURSE PRACTITIONER

## 2022-10-06 PROCEDURE — G8420 CALC BMI NORM PARAMETERS: HCPCS | Performed by: NURSE PRACTITIONER

## 2022-10-06 PROCEDURE — G8400 PT W/DXA NO RESULTS DOC: HCPCS | Performed by: NURSE PRACTITIONER

## 2022-10-06 PROCEDURE — 1123F ACP DISCUSS/DSCN MKR DOCD: CPT | Performed by: NURSE PRACTITIONER

## 2022-10-06 PROCEDURE — 3017F COLORECTAL CA SCREEN DOC REV: CPT | Performed by: NURSE PRACTITIONER

## 2022-10-06 PROCEDURE — 81003 URINALYSIS AUTO W/O SCOPE: CPT | Performed by: NURSE PRACTITIONER

## 2022-10-06 PROCEDURE — G8427 DOCREV CUR MEDS BY ELIG CLIN: HCPCS | Performed by: NURSE PRACTITIONER

## 2022-10-06 PROCEDURE — G8432 DEP SCR NOT DOC, RNG: HCPCS | Performed by: NURSE PRACTITIONER

## 2022-10-06 PROCEDURE — 1090F PRES/ABSN URINE INCON ASSESS: CPT | Performed by: NURSE PRACTITIONER

## 2022-10-06 RX ORDER — CIPROFLOXACIN 500 MG/1
500 TABLET ORAL 2 TIMES DAILY
Qty: 20 TABLET | Refills: 0 | Status: SHIPPED | OUTPATIENT
Start: 2022-10-06 | End: 2022-10-16

## 2022-10-06 NOTE — PROGRESS NOTES
Chief Complaint   Patient presents with    Follow-up    Recurrent UTI     1. Have you been to the ER, urgent care clinic since your last visit? Hospitalized since your last visit? No    2. Have you seen or consulted any other health care providers outside of the [address] [place] [address] since your last visit? Include any pap smears or colon screening.  No  Visit Vitals  Ht 5' 4\" (1.626 m)   Wt 141 lb (64 kg)   LMP  (LMP Unknown)   BMI 24.20 kg/m²

## 2022-10-06 NOTE — PROGRESS NOTES
HISTORY OF PRESENT ILLNESS  Leeroy Montero is a 74 y.o. female. Chief Complaint   Patient presents with    Follow-up    Recurrent UTI     Past Medical History:  PMHx (including negatives):  has a past medical history of Arthritis, Bacterial meningitis, CAD (coronary artery disease), Chronic obstructive pulmonary disease ([address] Chronic pain, DDD (degenerative disc disease), cervical, Diabetes ([address] Environmental allergies, Esophageal disorder, Fibromyalgia, GERD (gastroesophageal reflux disease), Hypercholesterolemia, Hypertension, Kidney stones, Macular degeneration, Mixed stress and urge urinary incontinence (7/2/2020), Neurogenic bladder (7/2/2020), Psychiatric disorder, Rheumatoid arthritis ([address] Scoliosis, Shingles, Sleep apnea, Suicidal thoughts, Torn rotator cuff, and Urinary tract infection without hematuria (7/2/2020). PSurgHx:  has a past surgical history that includes neurological procedure unlisted; pr breast surgery procedure unlisted; pr cystoscopy,insert ureteral stent (5/8/2018, 7/11/2017, 2/9/2019, 10/11/2016, 6/7/2016, 1/14/2016, 7/14/2015); hx joint replacement surgery; hx orthopaedic; hx orthopaedic; hx carpal tunnel release; hx lumbar fusion; hx dilation and curettage; colonoscopy (N/A, 10/9/2020); hx urological; hx urological (09/14/2020); hx urological (Left, 09/14/2020); hx urological (Left, 09/14/2020); hx urological (11/09/2020); hx urological (11/09/2020); hx urological (Left, 11/09/2020); hx urological (Left, 11/09/2020); hx urological (11/24/2020); hx urological (05/28/2021); hx gi (09/2021); hx breast biopsy (Left); and colonoscopy (N/A, 8/15/2022). PSocHx:  reports that she quit smoking about 17 years ago. Her smoking use included cigarettes. She has never used smokeless tobacco. She reports current alcohol use. She reports that she does not use drugs. Pt feels like she has UTI.   She felt that way at her last visit date on 9/23/22 but culture showed mixed growth in low colony counts. No yeast on microscopic analysis. She is wetting herself more often. Her urine has a strong odor, she thinks it \"follows\" her, even after she has bathed. She is hydrating well via her PEG tube. She can ensure that she is getting enough fluids now. She has suprapubic pressure and discomfort. Mild dysuria. Last cysto evaluation in 2020 did not show polyps or any additional bladder or urethral abnormalities. She has a CT abdomen/pelvis pending for other reasons. Chronic Conditions Addressed Today       1. Acute cystitis without hematuria - Primary     Overview      History of UTI and fungal UTI. Urine culture 8/18/2020: klebsiella oxytoca. Urine culture 10/22/2020: mixed greg. Urine culture 11/5/2020: no growth. Urine culture 11/9/2020: no growth. Urine culture 1/11/2021: klebsiella pneumonia and klebsiella oxytoca (scanned result). Urine culture 3/3/21: Mixed urogenital greg, greater than 100,000 colony forming units per mL.    5/28/21 sample with yeast. Diflucan x 14 days. 6/28/21: repeat evaluation showed no yeast or mold isolated after 1 week. 7/16/21: no growth on culture; no fungal growth on culture. 8/30/21: mixed greg on culture, 80K cfu/ml. 8/31/21: no growth on culture. 11/11/21: candida albicans.    5/27/22: esme albicans    7/29/22: serratia liquefaciens          Current Assessment & Plan      Possible UTI today. She has blood and leukocytes. She is symptomatic. She may have a fungal infection. Her last few cultures have reflected mostly fungal infections. I will RX for Cipro x 10 days now. We will send urine for fungal and bacterial culture. Adjust medications as tolerated. For fungal infection I would consider referral to ID.           Relevant Medications     ciprofloxacin HCl (CIPRO) 500 mg tablet     Other Relevant Orders     AMB POC URINALYSIS DIP STICK AUTO W/O MICRO     CULTURE, URINE     CULTURE, FUNGUS Review of Systems   Constitutional:  Negative for chills and fever. Gastrointestinal:  Positive for abdominal pain. Negative for nausea and vomiting. Suprapubic discomfort   Genitourinary:  Positive for urgency. Negative for dysuria, flank pain and hematuria. Increased incontinence. Musculoskeletal:  Negative for back pain. Patient denies the symptoms of COVID-19 per routine screening guidelines. Physical Exam  Constitutional:       General: She is not in acute distress. Musculoskeletal:      Comments: Uses a cane   Skin:     General: Skin is warm and dry. Neurological:      Mental Status: She is alert and oriented to person, place, and time. ASSESSMENT and PLAN  Diagnoses and all orders for this visit:    1. Acute cystitis without hematuria  Assessment & Plan:  Possible UTI today. She has blood and leukocytes. She is symptomatic. She may have a fungal infection. Her last few cultures have reflected mostly fungal infections. I will RX for Cipro x 10 days now. We will send urine for fungal and bacterial culture. Adjust medications as tolerated. For fungal infection I would consider referral to ID. Orders:  -     AMB POC URINALYSIS DIP STICK AUTO W/O MICRO  -     CULTURE, URINE  -     CULTURE, FUNGUS  -     ciprofloxacin HCl (CIPRO) 500 mg tablet; Take 1 Tablet by mouth two (2) times a day for 10 days. Luigi Queen may have a reminder for a \"due or due soon\" health maintenance. The patient has been encouraged to contact their primary care provider for follow-up on this health maintenance or other necessary and/or routine health screening.      Shahid Wolfe NP

## 2022-10-06 NOTE — ASSESSMENT & PLAN NOTE
Possible UTI today. She has blood and leukocytes. She is symptomatic. She may have a fungal infection. Her last few cultures have reflected mostly fungal infections. I will RX for Cipro x 10 days now. We will send urine for fungal and bacterial culture. Adjust medications as tolerated. For fungal infection I would consider referral to ID.

## 2022-10-18 LAB
BACTERIA UR CULT: ABNORMAL
FUNGUS SPEC CULT: NORMAL

## 2022-10-18 RX ORDER — SULFAMETHOXAZOLE AND TRIMETHOPRIM 800; 160 MG/1; MG/1
1 TABLET ORAL 2 TIMES DAILY
Qty: 20 TABLET | Refills: 0 | Status: SHIPPED | OUTPATIENT
Start: 2022-10-18 | End: 2022-10-28

## 2022-10-18 NOTE — PROGRESS NOTES
Please notify patient; no fungal UTI. There is a bacterial UTI. Cipro was prescribed, but has intermediate susceptibility. I sent Bactrim to the pharmacy for her to take. Please take all 10 days and that should clear any infection that is lingering if Cipro was not enough. She should be aware of potentia for med-med interaction and hyperkalemia (notify us for symptoms like palpitations, shortness of breath, chest pain, nausea, vomiting, etc).

## 2022-10-25 ENCOUNTER — HOSPITAL ENCOUNTER (OUTPATIENT)
Dept: CT IMAGING | Age: 72
Discharge: HOME | End: 2022-10-25
Attending: FAMILY MEDICINE
Payer: MEDICARE

## 2022-10-25 DIAGNOSIS — R93.89 ABNORMAL RADIOLOGICAL FINDINGS IN SKIN AND SUBCUTANEOUS TISSUE: ICD-10-CM

## 2022-10-25 DIAGNOSIS — R59.9 LYMPH NODES ENLARGED: ICD-10-CM

## 2022-10-25 PROCEDURE — 74011000636 HC RX REV CODE- 636: Performed by: FAMILY MEDICINE

## 2022-10-25 PROCEDURE — 74177 CT ABD & PELVIS W/CONTRAST: CPT

## 2022-10-25 RX ADMIN — IOPAMIDOL 100 ML: 755 INJECTION, SOLUTION INTRAVENOUS at 11:08

## 2022-11-01 ENCOUNTER — OFFICE VISIT (OUTPATIENT)
Dept: ENT CLINIC | Age: 72
End: 2022-11-01

## 2022-11-01 ENCOUNTER — OFFICE VISIT (OUTPATIENT)
Dept: UROLOGY | Age: 72
End: 2022-11-01
Payer: MEDICARE

## 2022-11-01 VITALS
BODY MASS INDEX: 23.32 KG/M2 | DIASTOLIC BLOOD PRESSURE: 70 MMHG | SYSTOLIC BLOOD PRESSURE: 125 MMHG | WEIGHT: 140 LBS | HEIGHT: 65 IN | RESPIRATION RATE: 16 BRPM | HEART RATE: 80 BPM | OXYGEN SATURATION: 99 %

## 2022-11-01 VITALS
BODY MASS INDEX: 24.07 KG/M2 | TEMPERATURE: 97.8 F | OXYGEN SATURATION: 100 % | WEIGHT: 141 LBS | DIASTOLIC BLOOD PRESSURE: 59 MMHG | SYSTOLIC BLOOD PRESSURE: 113 MMHG | HEART RATE: 80 BPM | HEIGHT: 64 IN

## 2022-11-01 DIAGNOSIS — H90.3 SENSORINEURAL HEARING LOSS (SNHL) OF BOTH EARS: Primary | ICD-10-CM

## 2022-11-01 DIAGNOSIS — H91.93 DECREASED HEARING, BILATERAL: ICD-10-CM

## 2022-11-01 DIAGNOSIS — N30.90 RECURRENT CYSTITIS: Primary | ICD-10-CM

## 2022-11-01 DIAGNOSIS — N39.8 VOIDING DYSFUNCTION: ICD-10-CM

## 2022-11-01 DIAGNOSIS — H90.3 SENSORINEURAL HEARING LOSS (SNHL), BILATERAL: Primary | ICD-10-CM

## 2022-11-01 LAB
BILIRUB UR QL: NEGATIVE
GLUCOSE UR-MCNC: NEGATIVE MG/DL
KETONES P FAST UR STRIP-MCNC: NEGATIVE MG/DL
PH UR STRIP: 6.5 [PH] (ref 4.6–8)
PROT UR QL STRIP: NEGATIVE
SP GR UR STRIP: 1.02 (ref 1–1.03)
UA UROBILINOGEN AMB POC: NORMAL (ref 0.2–1)
URINALYSIS CLARITY POC: CLEAR
URINALYSIS COLOR POC: YELLOW
URINE BLOOD POC: NORMAL
URINE LEUKOCYTES POC: NEGATIVE
URINE NITRITES POC: NEGATIVE

## 2022-11-01 PROCEDURE — 99203 OFFICE O/P NEW LOW 30 MIN: CPT | Performed by: OTOLARYNGOLOGY

## 2022-11-01 PROCEDURE — 3017F COLORECTAL CA SCREEN DOC REV: CPT | Performed by: NURSE PRACTITIONER

## 2022-11-01 PROCEDURE — 1101F PT FALLS ASSESS-DOCD LE1/YR: CPT | Performed by: NURSE PRACTITIONER

## 2022-11-01 PROCEDURE — 1090F PRES/ABSN URINE INCON ASSESS: CPT | Performed by: OTOLARYNGOLOGY

## 2022-11-01 PROCEDURE — 1090F PRES/ABSN URINE INCON ASSESS: CPT | Performed by: NURSE PRACTITIONER

## 2022-11-01 PROCEDURE — G8432 DEP SCR NOT DOC, RNG: HCPCS | Performed by: OTOLARYNGOLOGY

## 2022-11-01 PROCEDURE — G8427 DOCREV CUR MEDS BY ELIG CLIN: HCPCS | Performed by: OTOLARYNGOLOGY

## 2022-11-01 PROCEDURE — G9899 SCRN MAM PERF RSLTS DOC: HCPCS | Performed by: NURSE PRACTITIONER

## 2022-11-01 PROCEDURE — 99213 OFFICE O/P EST LOW 20 MIN: CPT | Performed by: NURSE PRACTITIONER

## 2022-11-01 PROCEDURE — 3017F COLORECTAL CA SCREEN DOC REV: CPT | Performed by: OTOLARYNGOLOGY

## 2022-11-01 PROCEDURE — G8427 DOCREV CUR MEDS BY ELIG CLIN: HCPCS | Performed by: NURSE PRACTITIONER

## 2022-11-01 PROCEDURE — G8420 CALC BMI NORM PARAMETERS: HCPCS | Performed by: OTOLARYNGOLOGY

## 2022-11-01 PROCEDURE — G8432 DEP SCR NOT DOC, RNG: HCPCS | Performed by: NURSE PRACTITIONER

## 2022-11-01 PROCEDURE — G8536 NO DOC ELDER MAL SCRN: HCPCS | Performed by: NURSE PRACTITIONER

## 2022-11-01 PROCEDURE — G9899 SCRN MAM PERF RSLTS DOC: HCPCS | Performed by: OTOLARYNGOLOGY

## 2022-11-01 PROCEDURE — 1123F ACP DISCUSS/DSCN MKR DOCD: CPT | Performed by: NURSE PRACTITIONER

## 2022-11-01 PROCEDURE — G8420 CALC BMI NORM PARAMETERS: HCPCS | Performed by: NURSE PRACTITIONER

## 2022-11-01 PROCEDURE — 1101F PT FALLS ASSESS-DOCD LE1/YR: CPT | Performed by: OTOLARYNGOLOGY

## 2022-11-01 PROCEDURE — G8400 PT W/DXA NO RESULTS DOC: HCPCS | Performed by: OTOLARYNGOLOGY

## 2022-11-01 PROCEDURE — 92557 COMPREHENSIVE HEARING TEST: CPT | Performed by: AUDIOLOGIST

## 2022-11-01 PROCEDURE — G8536 NO DOC ELDER MAL SCRN: HCPCS | Performed by: OTOLARYNGOLOGY

## 2022-11-01 PROCEDURE — G8400 PT W/DXA NO RESULTS DOC: HCPCS | Performed by: NURSE PRACTITIONER

## 2022-11-01 PROCEDURE — 81003 URINALYSIS AUTO W/O SCOPE: CPT | Performed by: NURSE PRACTITIONER

## 2022-11-01 PROCEDURE — 1123F ACP DISCUSS/DSCN MKR DOCD: CPT | Performed by: OTOLARYNGOLOGY

## 2022-11-01 RX ORDER — NITROFURANTOIN 25; 75 MG/1; MG/1
100 CAPSULE ORAL DAILY
Qty: 30 CAPSULE | Refills: 2 | Status: SHIPPED | OUTPATIENT
Start: 2022-11-01 | End: 2022-12-01

## 2022-11-01 RX ORDER — CEPHALEXIN 500 MG/1
500 CAPSULE ORAL 4 TIMES DAILY
Qty: 40 CAPSULE | Refills: 0 | Status: CANCELLED | OUTPATIENT
Start: 2022-11-01 | End: 2022-11-11

## 2022-11-01 RX ORDER — CEPHALEXIN 250 MG/1
250 CAPSULE ORAL DAILY
Qty: 30 CAPSULE | Refills: 2 | Status: CANCELLED | OUTPATIENT
Start: 2022-11-01

## 2022-11-01 NOTE — ASSESSMENT & PLAN NOTE
Small blood on UA today; no additional findings. Plan for micro/culture. Will start her on a course of prophylactic abx with follow up in 3 months. She does have voiding dysfunction which is likely contributory. Hydrate well, empty often, continue with timed voiding.

## 2022-11-01 NOTE — ASSESSMENT & PLAN NOTE
She has known voiding dysfunction and poor sensation of a full bladder with likely overflow incontinence. All contributory factors to UTI. She is reminded to work on timed voiding. CT this month confirms this finding; notes diverticula; not previously seen on cystoscopic evaluation.

## 2022-11-01 NOTE — PROGRESS NOTES
Rick Toledo, a 74y.o. year old female, was seen in ENT clinic today for a hearing evaluation on referral from Dr. Eduarda Lim. Patient complains of bilateral hearing loss. She reports she has worn hearing aids for apprximately 10 years with little perceived benefit; her most recent audiogram is 2-3 years ago when she was fit with her most recent pair of hearing aids. Upon observation today, hearing aids are RICs coupled to custom embedded power molds. Patient appeared to struggle to understand speech today and benefited greatly from lip reading. Otoscopy: normal external ear canals and visible tympanic membranes, bilaterally. Tympanometry: RE unable to obtain  LE unable to obtain    SRT: RE Speech Reception Threshold (SRT) was obtained at 80 dBHL LE Speech Reception Threshold (SRT) was obtained at 95 dBHL    WRS: RE Poor in quiet when words were presented at 105 dBHL. (Equipment limits)  WRS: LE Poor in quiet when words were presented at 115 dBHL. (Equipment limits)    Pure tone audiometry:  RE: flat severe sensorineural hearing loss  LE: severe to profound sensorineural hearing loss    Sensorineural hearing loss, bilaterally    Impressions:  hearing loss requiring medical/otologic and audiologic follow-up    Plan:  Follow-up with ENT. Refer for cochlear implant evaluation.      Francheska Orlando   Doctor of Audiology

## 2022-11-01 NOTE — PROGRESS NOTES
HISTORY OF PRESENT ILLNESS  Laron Temple is a 74 y.o. female. Chief Complaint   Patient presents with    Urinary Odor    Recurrent UTI       Past Medical History:  PMHx (including negatives):  has a past medical history of Arthritis, Bacterial meningitis, CAD (coronary artery disease), Chronic obstructive pulmonary disease ([address] Chronic pain, DDD (degenerative disc disease), cervical, Diabetes ([address] Environmental allergies, Esophageal disorder, Fibromyalgia, GERD (gastroesophageal reflux disease), Hypercholesterolemia, Hypertension, Kidney stones, Macular degeneration, Mixed stress and urge urinary incontinence (7/2/2020), Neurogenic bladder (7/2/2020), Psychiatric disorder, Rheumatoid arthritis ([address] Scoliosis, Shingles, Sleep apnea, Suicidal thoughts, Torn rotator cuff, and Urinary tract infection without hematuria (7/2/2020). PSurgHx:  has a past surgical history that includes neurological procedure unlisted; pr breast surgery procedure unlisted; pr cystoscopy,insert ureteral stent (5/8/2018, 7/11/2017, 2/9/2019, 10/11/2016, 6/7/2016, 1/14/2016, 7/14/2015); hx joint replacement surgery; hx orthopaedic; hx orthopaedic; hx carpal tunnel release; hx lumbar fusion; hx dilation and curettage; colonoscopy (N/A, 10/9/2020); hx urological; hx urological (09/14/2020); hx urological (Left, 09/14/2020); hx urological (Left, 09/14/2020); hx urological (11/09/2020); hx urological (11/09/2020); hx urological (Left, 11/09/2020); hx urological (Left, 11/09/2020); hx urological (11/24/2020); hx urological (05/28/2021); hx gi (09/2021); hx breast biopsy (Left); and colonoscopy (N/A, 8/15/2022). PSocHx:  reports that she quit smoking about 17 years ago. Her smoking use included cigarettes. She has never used smokeless tobacco. She reports current alcohol use. She reports that she does not use drugs. Pt feels like she has UTI. She was seen and treated on 10/6/2022 for acute cystitis.   Staphylococcus lugdunensis on culture. She was placed on Ciprofloxacin (intermediate susceptibility) and then switched to Bactrim on 10/18/22. She should have finished that on 10/28/22. She is wetting herself more often. Her urine has a strong odor, she thinks it \"follows\" her, even after she has bathed. She is continuing to hydrate via her PEG tube. She has suprapubic pressure and discomfort. Mild dysuria. Last cysto evaluation in 2020 did not show polyps or any additional bladder or urethral abnormalities. She has a CT abdomen/pelvis done this month. There is no significant change in persistent left greater than right hydronephrosis to the ureteropelvic junctions with no evident obstructing mass or stone. There is heterogeneous left renal parenchymal atrophy and cortical thinning. No enhancing mass lesions. Distended with no evident mass or calculus and small anteriorly  located diverticula. Chronic Conditions Addressed Today       1. Recurrent cystitis - Primary     Overview      History of UTI and fungal UTI. Urine culture 8/18/2020: klebsiella oxytoca. Urine culture 1/11/2021: klebsiella pneumonia and klebsiella oxytoca (scanned result). Urine culture 3/3/21: Mixed urogenital greg, greater than 100,000 colony forming units per mL.    5/28/21 sample with yeast.     11/11/21: candida albicans.    5/27/22: candida albicans.    7/29/22: serratia liquefaciens. 10/6/22: Staphylococcus lugdunensis           Current Assessment & Plan      Small blood on UA today; no additional findings. Plan for micro/culture. Will start her on a course of prophylactic abx with follow up in 3 months. She does have voiding dysfunction which is likely contributory. Hydrate well, empty often, continue with timed voiding.           Relevant Medications     nitrofurantoin, macrocrystal-monohydrate, (MACROBID) 100 mg capsule     Other Relevant Orders     CULTURE, URINE     AMB POC URINALYSIS DIP STICK AUTO W/O MICRO URINALYSIS W/MICROSCOPIC    2. Voiding dysfunction     Overview      3/3/21: She has chronic leakage. It has been a long time. She has some hesitancy and urgency. She has to strain at times. She wears a depends all the time. She uses 2-3. She leaks with cough or sneeze. She has urgency. She feels pressure not burning. She can sit 30 minutes to empty her bladder. 5/28/21: apparent chronic UTI on cystoscopic evaluation. 8/13/21: chronic leakage which is very bothersome. She had evidence of chronic infection and inflammation in May on cysto. 11/11/21: poor sensation of full bladder. She probably waits too long to empty. She can continue oxybutynin but is instructed on timed voiding at minimum of q 4 hours. Current Assessment & Plan      She has known voiding dysfunction and poor sensation of a full bladder with likely overflow incontinence. All contributory factors to UTI. She is reminded to work on timed voiding. CT this month confirms this finding; notes diverticula; not previously seen on cystoscopic evaluation. Review of Systems   Constitutional:  Negative for chills and fever. Gastrointestinal:  Negative for nausea and vomiting. Genitourinary:  Positive for dysuria, frequency and urgency. Negative for hematuria. Suprapubic pressure and foul odor   Musculoskeletal:  Positive for back pain, joint pain and myalgias. Patient denies the symptoms of COVID-19 per routine screening guidelines. Physical Exam    ASSESSMENT and PLAN  Diagnoses and all orders for this visit:    1. Recurrent cystitis  Assessment & Plan:  Small blood on UA today; no additional findings. Plan for micro/culture. Will start her on a course of prophylactic abx with follow up in 3 months. She does have voiding dysfunction which is likely contributory. Hydrate well, empty often, continue with timed voiding.     Orders:  -     CULTURE, URINE  -     AMB POC URINALYSIS DIP STICK AUTO W/O MICRO  -     URINALYSIS W/MICROSCOPIC  -     nitrofurantoin, macrocrystal-monohydrate, (MACROBID) 100 mg capsule; Take 1 Capsule by mouth daily for 30 days. 2. Voiding dysfunction  Assessment & Plan:  She has known voiding dysfunction and poor sensation of a full bladder with likely overflow incontinence. All contributory factors to UTI. She is reminded to work on timed voiding. CT this month confirms this finding; notes diverticula; not previously seen on cystoscopic evaluation. Follow-up and Dispositions    Return in about 3 months (around 2/1/2023) for with Dr. Nicole Rodriguez. Elisa Terry may have a reminder for a \"due or due soon\" health maintenance. The patient has been encouraged to contact their primary care provider for follow-up on this health maintenance or other necessary and/or routine health screening.      Darek Bañuelos NP

## 2022-11-01 NOTE — Clinical Note
Can we arrange referral to Dr Amalia Mckeon or [id number] otology for consideration for cochlear implant

## 2022-11-01 NOTE — PROGRESS NOTES
Chief Complaint   Patient presents with    Urinary Odor    Recurrent UTI       PHQ-9 score is    Negative    Vitals:    11/01/22 1310   BP: (!) 113/59   Pulse: 80   Temp: 97.8 °F (36.6 °C)   TempSrc: Temporal   SpO2: 100%   Weight: 141 lb (64 kg)   Height: 5' 4\" (1.626 m)   PainSc:   0 - No pain        1. \"Have you been to the ER, urgent care clinic since your last visit? Hospitalized since your last visit? \" No    2. \"Have you seen or consulted any other health care providers outside of the [address] [place] [address] since your last visit? \" No     3. For patients aged 39-70: Has the patient had a colonoscopy / FIT/ Cologuard? No      If the patient is female:    4. For patients aged 41-77: Has the patient had a mammogram within the past 2 years? No      5. For patients aged 21-65: Has the patient had a pap smear?  No

## 2022-11-01 NOTE — PROGRESS NOTES
Chief Complaint   Patient presents with    Ear Fullness     Visit Vitals  /70   Pulse 80   Resp 16   Ht 5' 5\" (1.651 m)   Wt 140 lb (63.5 kg)   LMP  (LMP Unknown)   SpO2 99%   BMI 23.30 kg/m²

## 2022-11-01 NOTE — PROGRESS NOTES
Otolaryngology-Head and Neck Surgery  New Patient Visit     Patient: Rufus Garg  YOB: 1950  MRN: [id number]  Date of Service: 11/1/2022    Chief Complaint:   Chief Complaint   Patient presents with    Ear Fullness         History of Present Illness: Rufus Garg is a 74y.o. year old female who presents today for discussion of hearing loss    Has chronic hearing loss, ongoing for years  Has had hearing aids but does not find them to be that helpful    Without hearing aids, is not able to hear anything    With aids, can hear sound but has a hard time understanding clarity of speech    Possible some ear lancing surgeries as a child      Past Medical History:  Past Medical History:   Diagnosis Date    Arthritis     Bacterial meningitis     Hx of    CAD (coronary artery disease)     Chronic obstructive pulmonary disease (HCC)     Chronic pain     DDD (degenerative disc disease), cervical     Diabetes ([address]     Environmental allergies     Esophageal disorder     Fibromyalgia     GERD (gastroesophageal reflux disease)     Hypercholesterolemia     Hypertension     Kidney stones     hx of    Macular degeneration     Mixed stress and urge urinary incontinence 7/2/2020    Neurogenic bladder 7/2/2020    Psychiatric disorder     Rheumatoid arthritis ([address]     Scoliosis     Shingles     Hx of    Sleep apnea     pt states uses CPAP    Suicidal thoughts     pt stated during PAT visit , she has hx of suicidal thoughts age 17's, pt stated never attempted and further thoughts of suicide since and none that day    Torn rotator cuff     pt states on right side    Urinary tract infection without hematuria 7/2/2020       Past Surgical History:   Past Surgical History:   Procedure Laterality Date    COLONOSCOPY N/A 10/9/2020    COLONOSCOPY performed by Hugo Bernard MD at [address] [place] [address] N/A 8/15/2022    COLONOSCOPY performed by Hugo Bernard MD at [facility] Left     HX CARPAL TUNNEL RELEASE      HX DILATION AND CURETTAGE      HX GI  09/2021    PEG Tube    HX JOINT REPLACEMENT SURGERY      knee both sides    HX LUMBAR FUSION      HX ORTHOPAEDIC      total knee replacements    HX ORTHOPAEDIC      back surgery     HX UROLOGICAL      stent in ureter    HX UROLOGICAL  09/14/2020    Cystoscopy    HX UROLOGICAL Left 09/14/2020    retrograde pyelogram    HX UROLOGICAL Left 09/14/2020    ureteral stent exchange    HX UROLOGICAL  11/09/2020    Cystoscopy    HX UROLOGICAL  11/09/2020    retrograde pyelograms    HX UROLOGICAL Left 11/09/2020    robotic left pyeloplasty    HX UROLOGICAL Left 11/09/2020    ureteral stent exchange    HX UROLOGICAL  11/24/2020    cystoscopy stent removal     HX UROLOGICAL  05/28/2021    CYSTO    NEUROLOGICAL PROCEDURE UNLISTED      back surg    CT BREAST SURGERY PROCEDURE UNLISTED      CT CYSTOSCOPY,INSERT URETERAL STENT  5/8/2018, 7/11/2017, 2/9/2019, 10/11/2016, 6/7/2016, 1/14/2016, 7/14/2015       Medications:   Current Outpatient Medications   Medication Instructions    acetaminophen (TYLENOL) 500 mg tablet Oral, EVERY 6 HOURS AS NEEDED    albuterol (PROVENTIL HFA, VENTOLIN HFA, PROAIR HFA) 90 mcg/actuation inhaler 2 Puffs, Inhalation, EVERY 6 HOURS AS NEEDED    albuterol (PROVENTIL VENTOLIN) 2.5 mg /3 mL (0.083 %) nebu Nebulization    aspirin delayed-release 81 mg, Oral, DAILY    atorvastatin (LIPITOR) 20 mg, Per G Tube, DAILY    candesartan (ATACAND) 8 mg tablet Oral, DAILY    clonazePAM (KLONOPIN) 0.5 mg, Oral, 3 TIMES DAILY    ergocalciferol (ERGOCALCIFEROL) 50,000 Units, Per G Tube, EVERY SUNDAY    FLUoxetine (PROZAC) 20 mg, Per G Tube, DAILY    furosemide (LASIX) 20 mg tablet Oral, DAILY    montelukast (SINGULAIR) 10 mg, PEG Tube, DAILY    nitrofurantoin, macrocrystal-monohydrate, (MACROBID) 100 mg capsule 100 mg, Oral, DAILY    oxybutynin (DITROPAN) 5 mg tablet TAKE 1 TABLET VIA G-TUBE TWICE A DAY    pantoprazole (PROTONIX) 40 mg, Per G Tube, DAILY BEFORE BREAKFAST    pramipexole (MIRAPEX) 1 mg, Oral, EVERY BEDTIME       Allergies: Allergies   Allergen Reactions    Codeine Other (comments)     hallucinations       Social History:   Social History     Tobacco Use    Smoking status: Former     Years: 55.00     Types: Cigarettes     Quit date: 2005     Years since quittin.5    Smokeless tobacco: Never    Tobacco comments:     pt sates quit 7 years ago   Vaping Use    Vaping Use: Never used   Substance Use Topics    Alcohol use: Yes     Comment: special occasions    Drug use: Never        Family History:  Family History   Problem Relation Age of Onset    Hypertension Mother     Heart Disease Mother     Diabetes Mother     Liver Disease Mother     Diabetes Father     Heart Disease Father     Heart Disease Brother     Diabetes Brother     COPD Brother     Liver Disease Brother     Hypertension Sister     Elevated Lipids Sister        Review of Systems:    Consitutional: denies fever, excessive weight gain or loss. Eyes: denies diplopia, eye pain. Integumentary: denies new concerning skin lesions. Ears, Nose, Mouth, Throat: denies except as per HPI. Endocrine: denies hot or cold intolerance, increased thirst.  Respiratory: denies cough, hemoptysis, wheezing  Gastrointestinal: denies trouble swallowing, nausea, emesis, regurgitation  Musculoskeletal: denies muscle weakness or wasting  Cardiovascular: denies chest pain, shortness of breath  Neurologic: denies seizures, numbness or tingling, syncope  Hematologic: denies easy bleeding or bruising    Physical Examination:   Vitals:    22 1414   BP: 125/70   Pulse: 80   Resp: 16   Height: 5' 5\" (1.651 m)   Weight: 140 lb (63.5 kg)   SpO2: 99%        General: Comfortable, pleasant, appears stated age. VERY  hard of hearing  Voice: Strong, speaking in full sentences, no stridor. Face: No masses or lesions, facial strength symmetric   Ears: External ears unremarkable. Bilateral ear canal clear.  Tympanic membrane clear and intact, with visible landmarks. Clear middle ear space  Nose: External nose unremarkable. Dorsum midline. Anterior rhinoscopy demonstrates no lesions. Septum midline. Turbinates without hypertrophy. Oral Cavity / Oropharynx: No trismus. Mucosa pink and moist. No lesions. Tongue is midline and mobile. Palate elevates symmetrically. Uvula midline. Tonsils unremarkable. Base of tongue soft. Floor of mouth soft. Neck: Supple. No adenopathy. Thyroid unremarkable. Palpable laryngeal landmarks. Full neck range of motion   Neurologic: CN II - XI intact. Antalgic gait. Very hard of hearing            Assessment and Plan:   Severe to profound bilateral SNHL  - Discussed with patient audiogram  - Reviewed that hearing aids are really going to be of minimal benefit at this point  - Will refer to [facility] for discussion of cochlear implantation  - She does have several chronic health issues so candidacy for surgery will also need to be considered  - Communication largely via typing due to severity of patient hearing loss     The patient was instructed to return to clinic if no improvement or progression of symptoms. Signs to watch out for reviewed.       Corie Johnston MD   [facility]  [address] [place] [address] [address]  [facility]  Office Phone: [phone]

## 2022-11-06 LAB
APPEARANCE UR: CLEAR
BACTERIA #/AREA URNS HPF: ABNORMAL /[HPF]
BACTERIA UR CULT: NO GROWTH
BILIRUB UR QL STRIP: NEGATIVE
CASTS URNS QL MICRO: ABNORMAL /LPF
COLOR UR: YELLOW
EPI CELLS #/AREA URNS HPF: ABNORMAL /HPF (ref 0–10)
GLUCOSE UR QL STRIP: NEGATIVE
HGB UR QL STRIP: ABNORMAL
KETONES UR QL STRIP: NEGATIVE
LEUKOCYTE ESTERASE UR QL STRIP: ABNORMAL
MICRO URNS: ABNORMAL
NITRITE UR QL STRIP: NEGATIVE
PH UR STRIP: 6.5 [PH] (ref 5–7.5)
PROT UR QL STRIP: NEGATIVE
RBC #/AREA URNS HPF: ABNORMAL /HPF (ref 0–2)
SP GR UR STRIP: 1.02 (ref 1–1.03)
UROBILINOGEN UR STRIP-MCNC: 0.2 MG/DL (ref 0.2–1)
WBC #/AREA URNS HPF: ABNORMAL /HPF (ref 0–5)

## 2022-11-06 NOTE — PROGRESS NOTES
She was started on prophylactic abx at visit date. No growth on current culture. Follow up as planned.

## 2022-12-13 RX ORDER — OXYBUTYNIN CHLORIDE 5 MG/1
TABLET ORAL
Qty: 180 TABLET | Refills: 1 | Status: SHIPPED | OUTPATIENT
Start: 2022-12-13

## 2023-01-25 NOTE — PROGRESS NOTES
HISTORY OF PRESENT ILLNESS  Radha Newman is a 74 y.o. female. Chief Complaint   Patient presents with    Follow-up    Recurrent UTI     Past Medical History:  PMHx (including negatives):  has a past medical history of Arthritis, Bacterial meningitis, CAD (coronary artery disease), Chronic obstructive pulmonary disease ([address] Chronic pain, DDD (degenerative disc disease), cervical, Diabetes ([address] Environmental allergies, Esophageal disorder, Fibromyalgia, GERD (gastroesophageal reflux disease), Hypercholesterolemia, Hypertension, Kidney stones, Macular degeneration, Mixed stress and urge urinary incontinence (7/2/2020), Neurogenic bladder (7/2/2020), Psychiatric disorder, Rheumatoid arthritis ([address] Scoliosis, Shingles, Sleep apnea, Suicidal thoughts, Torn rotator cuff, and Urinary tract infection without hematuria (7/2/2020). PSurgHx:  has a past surgical history that includes pr unlisted neurological/neuromuscular dx px; pr unlisted procedure breast; pr cysto w/insert ureteral stent (5/8/2018, 7/11/2017, 2/9/2019, 10/11/2016, 6/7/2016, 1/14/2016, 7/14/2015); hx joint replacement surgery; hx orthopaedic; hx orthopaedic; hx carpal tunnel release; hx lumbar fusion; hx dilation and curettage; colonoscopy (N/A, 10/9/2020); hx urological; hx urological (09/14/2020); hx urological (Left, 09/14/2020); hx urological (Left, 09/14/2020); hx urological (11/09/2020); hx urological (11/09/2020); hx urological (Left, 11/09/2020); hx urological (Left, 11/09/2020); hx urological (11/24/2020); hx urological (05/28/2021); hx gi (09/2021); hx breast biopsy (Left); and colonoscopy (N/A, 8/15/2022). PSocHx:  reports that she has been smoking cigarettes. She has a 13.75 pack-year smoking history. She has never used smokeless tobacco. She reports current alcohol use. She reports that she does not use drugs. HX of recurrent UTI (fungal and bacterial).  Symptoms include wetting herself more often, a strong odor that she thinks \"follows\" her, even after she has bathed, dysuria, increased frequency and urgency. She was started on prophylactic nitrofurantoin in November, 2022. She is here today in follow up. She does hydrate well via PEG tube. Chronic incontinence with mobility issues. She has been advised on timed voiding. UPJ obstruction with atrophic left kidney. She is s/p left pyeloplasty with no real improvement in renal function. We have considered left nephrectomy but there is no obvious pyelonephritis. She was content to observe. Chronic Conditions Addressed Today       1. Mixed stress and urge urinary incontinence     Overview      She has chronic leakage. It has been a long time. She wears a depends all the time. She uses 2-3. She leaks with cough or sneeze. She has urgency. She has been advised on timed voiding. She has mobility issues. Relevant Orders     AMB POC URINALYSIS DIP STICK AUTO W/O MICRO    2. UPJ (ureteropelvic junction) obstruction     Overview        She has a left sided ureteral stent managed previously by Dr. Harry Willett. Stent secondary to chronic left UPJ obstruction with stent changes q 3 months. Last changed 6/2/2020. She has been having stents several years 5-6 per her recollection. She is s/p cystoscopy, left retrograde pyelogram, left ureteral stent exchange on 9/14/2020. Findings: left UPJ obstruction with low-lying left kidney. She is s/p cystoscopy, retrograde pyelograms, robotic left pyeloplasty, left ureteral stent exchange on 11/10/2020. Pathology significant for fibromuscular tissue with chronic inflammation. No evidence of epithelial neoplasm. 12/7/2020: NM study showed: Atrophic left kidney has reduced perfusion and uptake relative to the right. Post Lasix stimulation there is no change in the appearance of shallow curve representing left kidney excretion.  No downward slope is identified to allow exclusion of obstructive hydronephrosis. Differential perfusion; left, 37.3%   right, 62.7%. Differential uptake; left, 19.3%   right, 80.8%. Creatinine on 1/6/2021 was 0.84. Creatinine on 3/8/2021 (via Rekha Beasley MD) was 1.39  Creatinine 8/30/21 was 0.82  Creatinine 9/5/21 was 0.58  Creatinine 9/10/21 was 0.65    11/11/21: renal function is normal; left kidney is poorly functioning. No obvious consequence at this time. 5/27/22: if she has colonization of the left kidney, this may be a source of ongoing infection. Consider nephrectomy once diabetic control is improved. NM renal scan on 9/12/22: split function equals 80% on the right and 20% on the left. The left renal function curve is flattened. The right renal function curve has an obstructive appearance. There is mild right hydronephrosis. 9/23/22: slow washout, stable. No obvious pyelonephritis. Observe with recurrent or persistent UTI. Current Assessment & Plan       H/o decreased renal function on the left. No new pains         3. Recurrent cystitis - Primary     Overview      History of UTI and fungal UTI. Urine culture 8/18/2020: klebsiella oxytoca. Urine culture 1/11/2021: klebsiella pneumonia and klebsiella oxytoca (scanned result). Urine culture 3/3/21: Mixed urogenital greg, greater than 100,000 colony forming units per mL.    5/28/21 sample with yeast.     11/11/21: candida albicans.    5/27/22: candida albicans.    7/29/22: serratia liquefaciens. 10/6/22: Staphylococcus lugdunensis     11/1/22: started on prophylactic abx; nitrofurantoin 100 mg daily x 90 days. Current Assessment & Plan       She did well on macrobid. We will switch to doxycycline for the next 90 days. Relevant Orders     AMB POC URINALYSIS DIP STICK AUTO W/O MICRO    4. Esophageal dysmotility     Current Assessment & Plan       She is eating better and has gained some weight. PEG feedings as well.                   ROS  Patient denies the symptoms of COVID-19 per routine screening guidelines. Physical Exam    ASSESSMENT and PLAN  Diagnoses and all orders for this visit:    1. Recurrent cystitis  Assessment & Plan:   She did well on macrobid. We will switch to doxycycline for the next 90 days. Orders:  -     AMB POC URINALYSIS DIP STICK AUTO W/O MICRO    2. UPJ (ureteropelvic junction) obstruction  Assessment & Plan:   H/o decreased renal function on the left. No new pains      3. Mixed stress and urge urinary incontinence  -     AMB POC URINALYSIS DIP STICK AUTO W/O MICRO    4. Esophageal dysmotility  Assessment & Plan:   She is eating better and has gained some weight. PEG feedings as well. Other orders  -     doxycycline (ADOXA) 100 mg tablet; Take 1 Tablet by mouth daily. Follow-up and Dispositions    Return in about 3 months (around 5/3/2023). Elsie Mcgee may have a reminder for a \"due or due soon\" health maintenance. The patient has been encouraged to contact their primary care provider for follow-up on this health maintenance or other necessary and/or routine health screening. Chari Vargas MD     Please note that portions of this note were completed with Dragon dictation, the computer voice recognition software. Quite often unanticipated grammatical, syntax, homophones, and other interpretive errors are inadvertently transcribed by the computer software. Please disregard these errors and any other errors that may have escaped final proofreading. Thank you.

## 2023-02-01 DIAGNOSIS — N30.90 RECURRENT CYSTITIS: ICD-10-CM

## 2023-02-01 RX ORDER — NITROFURANTOIN 25; 75 MG/1; MG/1
CAPSULE ORAL
Qty: 30 CAPSULE | Refills: 1 | OUTPATIENT
Start: 2023-02-01

## 2023-02-01 NOTE — TELEPHONE ENCOUNTER
RX not refilled at this time as therapy is complete. Was a 3 month RX. She has a visit 2/3/23 with Dr. Conor Peace. They can decide at that time if she will need to stay on antibiotic therapy and if so, likely to switch to another antibiotic to avoid resistance patterns. Can you let patient know?

## 2023-02-03 ENCOUNTER — OFFICE VISIT (OUTPATIENT)
Dept: UROLOGY | Age: 73
End: 2023-02-03
Payer: MEDICARE

## 2023-02-03 VITALS — HEIGHT: 64 IN | BODY MASS INDEX: 24.92 KG/M2 | WEIGHT: 146 LBS

## 2023-02-03 DIAGNOSIS — N30.90 RECURRENT CYSTITIS: Primary | ICD-10-CM

## 2023-02-03 DIAGNOSIS — K22.4 ESOPHAGEAL DYSMOTILITY: ICD-10-CM

## 2023-02-03 DIAGNOSIS — N39.46 MIXED STRESS AND URGE URINARY INCONTINENCE: ICD-10-CM

## 2023-02-03 DIAGNOSIS — N13.5 UPJ (URETEROPELVIC JUNCTION) OBSTRUCTION: ICD-10-CM

## 2023-02-03 LAB
BILIRUB UR QL: NEGATIVE
GLUCOSE UR-MCNC: NEGATIVE MG/DL
KETONES P FAST UR STRIP-MCNC: NEGATIVE MG/DL
PH UR STRIP: 5.5 [PH] (ref 4.6–8)
PROT UR QL STRIP: NEGATIVE
SP GR UR STRIP: 1.03 (ref 1–1.03)
UA UROBILINOGEN AMB POC: NORMAL (ref 0.2–1)
URINALYSIS CLARITY POC: CLEAR
URINALYSIS COLOR POC: YELLOW
URINE BLOOD POC: NORMAL
URINE LEUKOCYTES POC: NORMAL
URINE NITRITES POC: NEGATIVE

## 2023-02-03 RX ORDER — DOXYCYCLINE 100 MG/1
100 TABLET ORAL DAILY
Qty: 90 TABLET | Refills: 0 | Status: SHIPPED | OUTPATIENT
Start: 2023-02-03

## 2023-02-03 NOTE — PROGRESS NOTES
Chief Complaint   Patient presents with    Follow-up    Recurrent UTI     1. Have you been to the ER, urgent care clinic since your last visit? Hospitalized since your last visit? No    2. Have you seen or consulted any other health care providers outside of the [address] [place] [address] since your last visit? Include any pap smears or colon screening.  No  Visit Vitals  Ht 5' 4\" (1.626 m)   Wt 146 lb (66.2 kg)   LMP  (LMP Unknown)   BMI 25.06 kg/m²

## 2023-02-03 NOTE — LETTER
2/3/2023    Patient: Laron Temple   YOB: 1950   Date of Visit: 2/3/2023     Rekha Beasley MD [facility] [address] [address]  Via Fax: [phone]    Dear Rekha Beasley MD,      Thank you for referring Ms. Amando James to [place] [address] for evaluation. My notes for this consultation are attached. If you have questions, please do not hesitate to call me. I look forward to following your patient along with you.       Sincerely,    Herb Hopkins MD

## 2023-03-27 RX ORDER — OXYBUTYNIN CHLORIDE 5 MG/1
TABLET ORAL
Qty: 180 TABLET | Refills: 0 | Status: SHIPPED | OUTPATIENT
Start: 2023-03-27

## 2023-04-13 NOTE — TELEPHONE ENCOUNTER
Left voice mail 

## 2023-05-05 ENCOUNTER — OFFICE VISIT (OUTPATIENT)
Dept: UROLOGY | Age: 73
End: 2023-05-05

## 2023-05-05 VITALS — HEIGHT: 64 IN | TEMPERATURE: 97.8 F | BODY MASS INDEX: 24.92 KG/M2 | OXYGEN SATURATION: 100 % | WEIGHT: 146 LBS

## 2023-05-05 DIAGNOSIS — N30.90 RECURRENT CYSTITIS: Primary | ICD-10-CM

## 2023-05-05 DIAGNOSIS — N30.20 CYSTITIS, CHRONIC: ICD-10-CM

## 2023-05-05 DIAGNOSIS — N39.46 MIXED STRESS AND URGE URINARY INCONTINENCE: ICD-10-CM

## 2023-05-05 DIAGNOSIS — N13.5 UPJ (URETEROPELVIC JUNCTION) OBSTRUCTION: ICD-10-CM

## 2023-05-05 DIAGNOSIS — N39.8 VOIDING DYSFUNCTION: ICD-10-CM

## 2023-05-05 LAB
BILIRUB UR QL: NEGATIVE
GLUCOSE UR-MCNC: 500 MG/DL
KETONES P FAST UR STRIP-MCNC: NEGATIVE MG/DL
PH UR STRIP: 6 [PH] (ref 4.6–8)
PROT UR QL STRIP: NEGATIVE
SP GR UR STRIP: 1.02 (ref 1–1.03)
UA UROBILINOGEN AMB POC: NORMAL (ref 0.2–1)
URINALYSIS CLARITY POC: CLEAR
URINALYSIS COLOR POC: YELLOW
URINE BLOOD POC: NORMAL
URINE LEUKOCYTES POC: NEGATIVE
URINE NITRITES POC: NEGATIVE

## 2023-05-05 RX ORDER — NITROFURANTOIN 25; 75 MG/1; MG/1
100 CAPSULE ORAL DAILY
Qty: 90 CAPSULE | Refills: 1 | Status: SHIPPED | OUTPATIENT
Start: 2023-05-05 | End: 2023-05-05

## 2023-05-05 RX ORDER — CEPHALEXIN 500 MG/1
500 CAPSULE ORAL DAILY
Qty: 90 CAPSULE | Refills: 1 | Status: SHIPPED | OUTPATIENT
Start: 2023-05-05

## 2023-05-24 ENCOUNTER — TRANSCRIBE ORDERS (OUTPATIENT)
Facility: HOSPITAL | Age: 73
End: 2023-05-24

## 2023-05-24 DIAGNOSIS — Z12.31 ENCOUNTER FOR SCREENING MAMMOGRAM FOR MALIGNANT NEOPLASM OF BREAST: Primary | ICD-10-CM

## 2023-05-30 ENCOUNTER — HOSPITAL ENCOUNTER (OUTPATIENT)
Facility: HOSPITAL | Age: 73
Discharge: HOME | End: 2023-06-02
Attending: FAMILY MEDICINE
Payer: MEDICARE

## 2023-05-30 DIAGNOSIS — Z12.31 ENCOUNTER FOR SCREENING MAMMOGRAM FOR MALIGNANT NEOPLASM OF BREAST: ICD-10-CM

## 2023-05-30 PROCEDURE — 77063 BREAST TOMOSYNTHESIS BI: CPT

## 2023-06-15 ENCOUNTER — HOSPITAL ENCOUNTER (OUTPATIENT)
Facility: HOSPITAL | Age: 73
Setting detail: SPECIMEN
Discharge: HOME | End: 2023-06-18

## 2023-06-15 LAB
ANION GAP SERPL CALC-SCNC: 0 MMOL/L (ref 5–15)
BUN SERPL-MCNC: 23 MG/DL (ref 6–20)
BUN/CREAT SERPL: 29 (ref 12–20)
CA-I BLD-MCNC: 9.1 MG/DL (ref 8.5–10.1)
CHLORIDE SERPL-SCNC: 100 MMOL/L (ref 97–108)
CO2 SERPL-SCNC: 40 MMOL/L (ref 21–32)
CREAT SERPL-MCNC: 0.8 MG/DL (ref 0.55–1.02)
GLUCOSE SERPL-MCNC: 157 MG/DL (ref 65–100)
POTASSIUM SERPL-SCNC: 4.7 MMOL/L (ref 3.5–5.1)
SODIUM SERPL-SCNC: 140 MMOL/L (ref 136–145)

## 2023-06-15 PROCEDURE — 80048 BASIC METABOLIC PNL TOTAL CA: CPT

## 2023-06-15 PROCEDURE — 36415 COLL VENOUS BLD VENIPUNCTURE: CPT

## 2023-06-20 ENCOUNTER — HOSPITAL ENCOUNTER (OUTPATIENT)
Facility: HOSPITAL | Age: 73
Setting detail: SPECIMEN
Discharge: HOME | End: 2023-06-23

## 2023-06-20 LAB
APPEARANCE UR: ABNORMAL
BACTERIA URNS QL MICRO: NEGATIVE /HPF
BILIRUB UR QL: NEGATIVE
COLOR UR: ABNORMAL
EPITH CASTS URNS QL MICRO: ABNORMAL /LPF
GLUCOSE UR STRIP.AUTO-MCNC: NEGATIVE MG/DL
HGB UR QL STRIP: ABNORMAL
KETONES UR QL STRIP.AUTO: NEGATIVE MG/DL
LEUKOCYTE ESTERASE UR QL STRIP.AUTO: ABNORMAL
NITRITE UR QL STRIP.AUTO: POSITIVE
OTHER: ABNORMAL
PH UR STRIP: 6 (ref 5–8)
PROT UR STRIP-MCNC: >300 MG/DL
RBC #/AREA URNS HPF: >100 /HPF (ref 0–5)
SP GR UR REFRACTOMETRY: 1.01 (ref 1–1.03)
UROBILINOGEN UR QL STRIP.AUTO: 0.1 EU/DL (ref 0.1–1)
WBC URNS QL MICRO: >100 /HPF (ref 0–4)

## 2023-06-20 PROCEDURE — 87077 CULTURE AEROBIC IDENTIFY: CPT

## 2023-06-20 PROCEDURE — 81001 URINALYSIS AUTO W/SCOPE: CPT

## 2023-06-20 PROCEDURE — 87086 URINE CULTURE/COLONY COUNT: CPT

## 2023-06-20 PROCEDURE — 87186 SC STD MICRODIL/AGAR DIL: CPT

## 2023-06-23 LAB
BACTERIA SPEC CULT: ABNORMAL
COLONY COUNT, CNT: ABNORMAL
Lab: ABNORMAL

## 2023-11-02 PROCEDURE — 99285 EMERGENCY DEPT VISIT HI MDM: CPT

## 2023-11-02 ASSESSMENT — PAIN SCALES - GENERAL: PAINLEVEL_OUTOF10: 9

## 2023-11-02 ASSESSMENT — PAIN - FUNCTIONAL ASSESSMENT: PAIN_FUNCTIONAL_ASSESSMENT: 0-10

## 2023-11-03 ENCOUNTER — APPOINTMENT (OUTPATIENT)
Facility: HOSPITAL | Age: 73
DRG: 871 | End: 2023-11-03
Payer: MEDICARE

## 2023-11-03 ENCOUNTER — HOSPITAL ENCOUNTER (INPATIENT)
Facility: HOSPITAL | Age: 73
LOS: 6 days | Discharge: NURSING, REHAB OR LONG-TERM CARE | DRG: 871 | End: 2023-11-09
Attending: STUDENT IN AN ORGANIZED HEALTH CARE EDUCATION/TRAINING PROGRAM | Admitting: INTERNAL MEDICINE
Payer: MEDICARE

## 2023-11-03 DIAGNOSIS — E83.42 HYPOMAGNESEMIA: ICD-10-CM

## 2023-11-03 DIAGNOSIS — R65.20 SEVERE SEPSIS (HCC): Primary | ICD-10-CM

## 2023-11-03 DIAGNOSIS — J44.1 COPD WITH ACUTE EXACERBATION (HCC): ICD-10-CM

## 2023-11-03 DIAGNOSIS — A41.9 SEVERE SEPSIS (HCC): Primary | ICD-10-CM

## 2023-11-03 DIAGNOSIS — R09.02 HYPOXIA: ICD-10-CM

## 2023-11-03 DIAGNOSIS — N30.00 ACUTE CYSTITIS WITHOUT HEMATURIA: ICD-10-CM

## 2023-11-03 DIAGNOSIS — N10 ACUTE PYELONEPHRITIS: ICD-10-CM

## 2023-11-03 DIAGNOSIS — R33.9 RETENTION OF URINE: ICD-10-CM

## 2023-11-03 DIAGNOSIS — J18.9 PNEUMONIA DUE TO INFECTIOUS ORGANISM, UNSPECIFIED LATERALITY, UNSPECIFIED PART OF LUNG: ICD-10-CM

## 2023-11-03 PROBLEM — N39.0 UTI (URINARY TRACT INFECTION): Status: ACTIVE | Noted: 2023-11-03

## 2023-11-03 LAB
ALBUMIN SERPL-MCNC: 3.5 G/DL (ref 3.5–5)
ALBUMIN/GLOB SERPL: 1.2 (ref 1.1–2.2)
ALP SERPL-CCNC: 101 U/L (ref 45–117)
ALT SERPL-CCNC: 25 U/L (ref 12–78)
ANION GAP SERPL CALC-SCNC: 8 MMOL/L (ref 5–15)
APPEARANCE UR: ABNORMAL
AST SERPL W P-5'-P-CCNC: 11 U/L (ref 15–37)
BACTERIA URNS QL MICRO: NEGATIVE /HPF
BASE EXCESS BLD CALC-SCNC: 6.2 MMOL/L
BASOPHILS # BLD: 0.1 K/UL (ref 0–0.1)
BASOPHILS NFR BLD: 0 % (ref 0–1)
BILIRUB SERPL-MCNC: 0.7 MG/DL (ref 0.2–1)
BILIRUB UR QL: NEGATIVE
BNP SERPL-MCNC: 660 PG/ML
BUN SERPL-MCNC: 18 MG/DL (ref 6–20)
BUN/CREAT SERPL: 22 (ref 12–20)
CA-I BLD-MCNC: 1.19 MMOL/L (ref 1.12–1.32)
CA-I BLD-MCNC: 8.8 MG/DL (ref 8.5–10.1)
CHLORIDE BLD-SCNC: 99 MMOL/L (ref 98–107)
CHLORIDE SERPL-SCNC: 102 MMOL/L (ref 97–108)
CO2 BLD-SCNC: 33 MMOL/L
CO2 SERPL-SCNC: 29 MMOL/L (ref 21–32)
COLOR UR: ABNORMAL
CREAT SERPL-MCNC: 0.82 MG/DL (ref 0.55–1.02)
CREAT UR-MCNC: 0.68 MG/DL (ref 0.6–1.3)
DIFFERENTIAL METHOD BLD: ABNORMAL
EOSINOPHIL # BLD: 0 K/UL (ref 0–0.4)
EOSINOPHIL NFR BLD: 0 % (ref 0–7)
ERYTHROCYTE [DISTWIDTH] IN BLOOD BY AUTOMATED COUNT: 14.3 % (ref 11.5–14.5)
EST. AVERAGE GLUCOSE BLD GHB EST-MCNC: 154 MG/DL
FLUAV AG NPH QL IA: NEGATIVE
FLUBV AG NOSE QL IA: NEGATIVE
GLOBULIN SER CALC-MCNC: 3 G/DL (ref 2–4)
GLUCOSE BLD STRIP.AUTO-MCNC: 170 MG/DL (ref 65–100)
GLUCOSE BLD STRIP.AUTO-MCNC: 179 MG/DL (ref 65–100)
GLUCOSE BLD STRIP.AUTO-MCNC: 183 MG/DL (ref 65–100)
GLUCOSE BLD STRIP.AUTO-MCNC: 219 MG/DL (ref 65–100)
GLUCOSE BLD STRIP.AUTO-MCNC: 230 MG/DL (ref 65–100)
GLUCOSE BLD STRIP.AUTO-MCNC: 237 MG/DL (ref 65–100)
GLUCOSE BLD STRIP.AUTO-MCNC: 259 MG/DL (ref 65–100)
GLUCOSE SERPL-MCNC: 204 MG/DL (ref 65–100)
GLUCOSE UR STRIP.AUTO-MCNC: 50 MG/DL
HBA1C MFR BLD: 7 % (ref 4–5.6)
HCO3 BLD-SCNC: 33.3 MMOL/L (ref 19–28)
HCT VFR BLD AUTO: 37 % (ref 35–47)
HGB BLD-MCNC: 12.1 G/DL (ref 11.5–16)
HGB UR QL STRIP: ABNORMAL
IMM GRANULOCYTES # BLD AUTO: 0.1 K/UL (ref 0–0.04)
IMM GRANULOCYTES NFR BLD AUTO: 1 % (ref 0–0.5)
KETONES UR QL STRIP.AUTO: NEGATIVE MG/DL
LACTATE BLD-SCNC: 1.78 MMOL/L (ref 0.4–2)
LACTATE BLD-SCNC: 2.41 MMOL/L (ref 0.4–2)
LEUKOCYTE ESTERASE UR QL STRIP.AUTO: ABNORMAL
LYMPHOCYTES # BLD: 0.2 K/UL (ref 0.8–3.5)
LYMPHOCYTES NFR BLD: 2 % (ref 12–49)
MAGNESIUM SERPL-MCNC: 1 MG/DL (ref 1.6–2.4)
MCH RBC QN AUTO: 34.7 PG (ref 26–34)
MCHC RBC AUTO-ENTMCNC: 32.7 G/DL (ref 30–36.5)
MCV RBC AUTO: 106 FL (ref 80–99)
MONOCYTES # BLD: 0.7 K/UL (ref 0–1)
MONOCYTES NFR BLD: 5 % (ref 5–13)
MUCOUS THREADS URNS QL MICRO: ABNORMAL /LPF
NEUTS SEG # BLD: 12.9 K/UL (ref 1.8–8)
NEUTS SEG NFR BLD: 92 % (ref 32–75)
NITRITE UR QL STRIP.AUTO: POSITIVE
NRBC # BLD: 0 K/UL (ref 0–0.01)
NRBC BLD-RTO: 0 PER 100 WBC
PCO2 BLD: 57.4 MMHG (ref 35–45)
PERFORMED BY:: ABNORMAL
PH BLD: 7.37 (ref 7.35–7.45)
PH UR STRIP: 5 (ref 5–8)
PLATELET # BLD AUTO: 187 K/UL (ref 150–400)
PMV BLD AUTO: 11.5 FL (ref 8.9–12.9)
PO2 BLD: <27 MMHG (ref 75–100)
POTASSIUM BLD-SCNC: 4.3 MMOL/L (ref 3.5–5.5)
POTASSIUM SERPL-SCNC: 4.2 MMOL/L (ref 3.5–5.1)
PROCALCITONIN SERPL-MCNC: 0.2 NG/ML
PROT SERPL-MCNC: 6.5 G/DL (ref 6.4–8.2)
PROT UR STRIP-MCNC: 100 MG/DL
RBC # BLD AUTO: 3.49 M/UL (ref 3.8–5.2)
RBC #/AREA URNS HPF: ABNORMAL /HPF (ref 0–5)
SARS-COV-2 RDRP RESP QL NAA+PROBE: NOT DETECTED
SERVICE CMNT-IMP: ABNORMAL
SODIUM BLD-SCNC: 140 MMOL/L (ref 136–145)
SODIUM SERPL-SCNC: 139 MMOL/L (ref 136–145)
SP GR UR REFRACTOMETRY: 1.01 (ref 1–1.03)
SPECIMEN SITE: ABNORMAL
SPECIMEN SITE: ABNORMAL
TROPONIN I SERPL HS-MCNC: 18 NG/L (ref 0–51)
URINE CULTURE IF INDICATED: ABNORMAL
UROBILINOGEN UR QL STRIP.AUTO: 0.1 EU/DL (ref 0.1–1)
WBC # BLD AUTO: 14 K/UL (ref 3.6–11)
WBC URNS QL MICRO: >100 /HPF (ref 0–4)

## 2023-11-03 PROCEDURE — 87040 BLOOD CULTURE FOR BACTERIA: CPT

## 2023-11-03 PROCEDURE — 94640 AIRWAY INHALATION TREATMENT: CPT

## 2023-11-03 PROCEDURE — 83735 ASSAY OF MAGNESIUM: CPT

## 2023-11-03 PROCEDURE — 84484 ASSAY OF TROPONIN QUANT: CPT

## 2023-11-03 PROCEDURE — 80047 BASIC METABLC PNL IONIZED CA: CPT

## 2023-11-03 PROCEDURE — 87635 SARS-COV-2 COVID-19 AMP PRB: CPT

## 2023-11-03 PROCEDURE — 6360000004 HC RX CONTRAST MEDICATION: Performed by: STUDENT IN AN ORGANIZED HEALTH CARE EDUCATION/TRAINING PROGRAM

## 2023-11-03 PROCEDURE — 96375 TX/PRO/DX INJ NEW DRUG ADDON: CPT

## 2023-11-03 PROCEDURE — 2580000003 HC RX 258: Performed by: INTERNAL MEDICINE

## 2023-11-03 PROCEDURE — 96365 THER/PROPH/DIAG IV INF INIT: CPT

## 2023-11-03 PROCEDURE — 84132 ASSAY OF SERUM POTASSIUM: CPT

## 2023-11-03 PROCEDURE — 84295 ASSAY OF SERUM SODIUM: CPT

## 2023-11-03 PROCEDURE — 6360000002 HC RX W HCPCS: Performed by: INTERNAL MEDICINE

## 2023-11-03 PROCEDURE — 87804 INFLUENZA ASSAY W/OPTIC: CPT

## 2023-11-03 PROCEDURE — 74018 RADEX ABDOMEN 1 VIEW: CPT

## 2023-11-03 PROCEDURE — 87449 NOS EACH ORGANISM AG IA: CPT

## 2023-11-03 PROCEDURE — 2700000000 HC OXYGEN THERAPY PER DAY

## 2023-11-03 PROCEDURE — 83880 ASSAY OF NATRIURETIC PEPTIDE: CPT

## 2023-11-03 PROCEDURE — 82962 GLUCOSE BLOOD TEST: CPT

## 2023-11-03 PROCEDURE — 84145 PROCALCITONIN (PCT): CPT

## 2023-11-03 PROCEDURE — 94761 N-INVAS EAR/PLS OXIMETRY MLT: CPT

## 2023-11-03 PROCEDURE — 36415 COLL VENOUS BLD VENIPUNCTURE: CPT

## 2023-11-03 PROCEDURE — 80053 COMPREHEN METABOLIC PANEL: CPT

## 2023-11-03 PROCEDURE — 83036 HEMOGLOBIN GLYCOSYLATED A1C: CPT

## 2023-11-03 PROCEDURE — 87086 URINE CULTURE/COLONY COUNT: CPT

## 2023-11-03 PROCEDURE — 85025 COMPLETE CBC W/AUTO DIFF WBC: CPT

## 2023-11-03 PROCEDURE — 82330 ASSAY OF CALCIUM: CPT

## 2023-11-03 PROCEDURE — 74177 CT ABD & PELVIS W/CONTRAST: CPT

## 2023-11-03 PROCEDURE — 82803 BLOOD GASES ANY COMBINATION: CPT

## 2023-11-03 PROCEDURE — 82947 ASSAY GLUCOSE BLOOD QUANT: CPT

## 2023-11-03 PROCEDURE — 6370000000 HC RX 637 (ALT 250 FOR IP): Performed by: STUDENT IN AN ORGANIZED HEALTH CARE EDUCATION/TRAINING PROGRAM

## 2023-11-03 PROCEDURE — 2580000003 HC RX 258: Performed by: STUDENT IN AN ORGANIZED HEALTH CARE EDUCATION/TRAINING PROGRAM

## 2023-11-03 PROCEDURE — 6360000002 HC RX W HCPCS: Performed by: STUDENT IN AN ORGANIZED HEALTH CARE EDUCATION/TRAINING PROGRAM

## 2023-11-03 PROCEDURE — 83605 ASSAY OF LACTIC ACID: CPT

## 2023-11-03 PROCEDURE — 71045 X-RAY EXAM CHEST 1 VIEW: CPT

## 2023-11-03 PROCEDURE — 1100000000 HC RM PRIVATE

## 2023-11-03 PROCEDURE — 81001 URINALYSIS AUTO W/SCOPE: CPT

## 2023-11-03 RX ORDER — METHYLPREDNISOLONE SODIUM SUCCINATE 40 MG/ML
40 INJECTION, POWDER, LYOPHILIZED, FOR SOLUTION INTRAMUSCULAR; INTRAVENOUS EVERY 12 HOURS
Status: DISCONTINUED | OUTPATIENT
Start: 2023-11-03 | End: 2023-11-09 | Stop reason: HOSPADM

## 2023-11-03 RX ORDER — SODIUM CHLORIDE 0.9 % (FLUSH) 0.9 %
5-40 SYRINGE (ML) INJECTION PRN
Status: DISCONTINUED | OUTPATIENT
Start: 2023-11-03 | End: 2023-11-04

## 2023-11-03 RX ORDER — ACETAMINOPHEN 325 MG/1
650 TABLET ORAL EVERY 6 HOURS PRN
Status: DISCONTINUED | OUTPATIENT
Start: 2023-11-03 | End: 2023-11-09 | Stop reason: HOSPADM

## 2023-11-03 RX ORDER — VALSARTAN 80 MG/1
80 TABLET ORAL DAILY
Status: DISCONTINUED | OUTPATIENT
Start: 2023-11-03 | End: 2023-11-03

## 2023-11-03 RX ORDER — FLUOXETINE 10 MG/1
20 CAPSULE ORAL DAILY
Status: DISCONTINUED | OUTPATIENT
Start: 2023-11-03 | End: 2023-11-03

## 2023-11-03 RX ORDER — PANTOPRAZOLE SODIUM 40 MG/1
40 TABLET, DELAYED RELEASE ORAL
Status: DISCONTINUED | OUTPATIENT
Start: 2023-11-03 | End: 2023-11-09 | Stop reason: HOSPADM

## 2023-11-03 RX ORDER — MAGNESIUM SULFATE IN WATER 40 MG/ML
2000 INJECTION, SOLUTION INTRAVENOUS PRN
Status: DISCONTINUED | OUTPATIENT
Start: 2023-11-03 | End: 2023-11-09 | Stop reason: HOSPADM

## 2023-11-03 RX ORDER — POLYETHYLENE GLYCOL 3350 17 G/17G
17 POWDER, FOR SOLUTION ORAL DAILY PRN
Status: DISCONTINUED | OUTPATIENT
Start: 2023-11-03 | End: 2023-11-09 | Stop reason: HOSPADM

## 2023-11-03 RX ORDER — OXYBUTYNIN CHLORIDE 5 MG/1
5 TABLET ORAL 2 TIMES DAILY
Status: DISCONTINUED | OUTPATIENT
Start: 2023-11-03 | End: 2023-11-09 | Stop reason: HOSPADM

## 2023-11-03 RX ORDER — MONTELUKAST SODIUM 10 MG/1
10 TABLET ORAL DAILY
Status: DISCONTINUED | OUTPATIENT
Start: 2023-11-03 | End: 2023-11-03

## 2023-11-03 RX ORDER — MONTELUKAST SODIUM 10 MG/1
10 TABLET ORAL NIGHTLY
Status: DISCONTINUED | OUTPATIENT
Start: 2023-11-03 | End: 2023-11-09 | Stop reason: HOSPADM

## 2023-11-03 RX ORDER — LEVOFLOXACIN 5 MG/ML
750 INJECTION, SOLUTION INTRAVENOUS ONCE
Status: COMPLETED | OUTPATIENT
Start: 2023-11-03 | End: 2023-11-03

## 2023-11-03 RX ORDER — DULOXETIN HYDROCHLORIDE 30 MG/1
60 CAPSULE, DELAYED RELEASE ORAL DAILY
Status: DISCONTINUED | OUTPATIENT
Start: 2023-11-03 | End: 2023-11-09 | Stop reason: HOSPADM

## 2023-11-03 RX ORDER — ATORVASTATIN CALCIUM 20 MG/1
20 TABLET, FILM COATED ORAL DAILY
Status: DISCONTINUED | OUTPATIENT
Start: 2023-11-03 | End: 2023-11-09 | Stop reason: HOSPADM

## 2023-11-03 RX ORDER — ONDANSETRON 2 MG/ML
4 INJECTION INTRAMUSCULAR; INTRAVENOUS EVERY 6 HOURS PRN
Status: DISCONTINUED | OUTPATIENT
Start: 2023-11-03 | End: 2023-11-09 | Stop reason: HOSPADM

## 2023-11-03 RX ORDER — SODIUM CHLORIDE, SODIUM LACTATE, POTASSIUM CHLORIDE, CALCIUM CHLORIDE 600; 310; 30; 20 MG/100ML; MG/100ML; MG/100ML; MG/100ML
INJECTION, SOLUTION INTRAVENOUS CONTINUOUS
Status: DISPENSED | OUTPATIENT
Start: 2023-11-03 | End: 2023-11-03

## 2023-11-03 RX ORDER — SODIUM CHLORIDE 9 MG/ML
INJECTION, SOLUTION INTRAVENOUS PRN
Status: DISCONTINUED | OUTPATIENT
Start: 2023-11-03 | End: 2023-11-09 | Stop reason: HOSPADM

## 2023-11-03 RX ORDER — POTASSIUM CHLORIDE 7.45 MG/ML
10 INJECTION INTRAVENOUS PRN
Status: DISCONTINUED | OUTPATIENT
Start: 2023-11-03 | End: 2023-11-09 | Stop reason: HOSPADM

## 2023-11-03 RX ORDER — 0.9 % SODIUM CHLORIDE 0.9 %
30 INTRAVENOUS SOLUTION INTRAVENOUS ONCE
Status: COMPLETED | OUTPATIENT
Start: 2023-11-03 | End: 2023-11-03

## 2023-11-03 RX ORDER — FUROSEMIDE 40 MG/1
20 TABLET ORAL DAILY
Status: DISCONTINUED | OUTPATIENT
Start: 2023-11-03 | End: 2023-11-03

## 2023-11-03 RX ORDER — IPRATROPIUM BROMIDE AND ALBUTEROL SULFATE 2.5; .5 MG/3ML; MG/3ML
1 SOLUTION RESPIRATORY (INHALATION) EVERY 4 HOURS PRN
Status: DISCONTINUED | OUTPATIENT
Start: 2023-11-03 | End: 2023-11-09 | Stop reason: HOSPADM

## 2023-11-03 RX ORDER — MAGNESIUM SULFATE IN WATER 40 MG/ML
4000 INJECTION, SOLUTION INTRAVENOUS
Status: COMPLETED | OUTPATIENT
Start: 2023-11-03 | End: 2023-11-03

## 2023-11-03 RX ORDER — PRAMIPEXOLE DIHYDROCHLORIDE 1 MG/1
1 TABLET ORAL NIGHTLY
Status: DISCONTINUED | OUTPATIENT
Start: 2023-11-03 | End: 2023-11-09 | Stop reason: HOSPADM

## 2023-11-03 RX ORDER — SODIUM CHLORIDE 0.9 % (FLUSH) 0.9 %
5-40 SYRINGE (ML) INJECTION EVERY 12 HOURS SCHEDULED
Status: DISCONTINUED | OUTPATIENT
Start: 2023-11-03 | End: 2023-11-04

## 2023-11-03 RX ORDER — ONDANSETRON 4 MG/1
4 TABLET, ORALLY DISINTEGRATING ORAL EVERY 8 HOURS PRN
Status: DISCONTINUED | OUTPATIENT
Start: 2023-11-03 | End: 2023-11-09 | Stop reason: HOSPADM

## 2023-11-03 RX ORDER — FOLIC ACID 1 MG/1
1 TABLET ORAL DAILY
Status: DISCONTINUED | OUTPATIENT
Start: 2023-11-03 | End: 2023-11-09 | Stop reason: HOSPADM

## 2023-11-03 RX ORDER — POTASSIUM CHLORIDE 20 MEQ/1
40 TABLET, EXTENDED RELEASE ORAL PRN
Status: DISCONTINUED | OUTPATIENT
Start: 2023-11-03 | End: 2023-11-09 | Stop reason: HOSPADM

## 2023-11-03 RX ORDER — ACETAMINOPHEN 650 MG/1
650 SUPPOSITORY RECTAL EVERY 6 HOURS PRN
Status: DISCONTINUED | OUTPATIENT
Start: 2023-11-03 | End: 2023-11-09 | Stop reason: HOSPADM

## 2023-11-03 RX ORDER — BUDESONIDE 0.5 MG/2ML
0.5 INHALANT ORAL
Status: DISCONTINUED | OUTPATIENT
Start: 2023-11-03 | End: 2023-11-09 | Stop reason: HOSPADM

## 2023-11-03 RX ORDER — TRAMADOL HYDROCHLORIDE 50 MG/1
50 TABLET ORAL EVERY 12 HOURS PRN
COMMUNITY
Start: 2023-10-31 | End: 2023-11-10

## 2023-11-03 RX ORDER — INSULIN LISPRO 100 [IU]/ML
0-8 INJECTION, SOLUTION INTRAVENOUS; SUBCUTANEOUS
Status: DISCONTINUED | OUTPATIENT
Start: 2023-11-03 | End: 2023-11-08

## 2023-11-03 RX ORDER — ENOXAPARIN SODIUM 100 MG/ML
40 INJECTION SUBCUTANEOUS DAILY
Status: DISCONTINUED | OUTPATIENT
Start: 2023-11-03 | End: 2023-11-09 | Stop reason: HOSPADM

## 2023-11-03 RX ORDER — FLUOXETINE HYDROCHLORIDE 20 MG/1
40 CAPSULE ORAL DAILY
Status: DISCONTINUED | OUTPATIENT
Start: 2023-11-03 | End: 2023-11-09 | Stop reason: HOSPADM

## 2023-11-03 RX ORDER — INSULIN LISPRO 100 [IU]/ML
0-4 INJECTION, SOLUTION INTRAVENOUS; SUBCUTANEOUS NIGHTLY
Status: DISCONTINUED | OUTPATIENT
Start: 2023-11-03 | End: 2023-11-08

## 2023-11-03 RX ORDER — IPRATROPIUM BROMIDE AND ALBUTEROL SULFATE 2.5; .5 MG/3ML; MG/3ML
1 SOLUTION RESPIRATORY (INHALATION)
Status: COMPLETED | OUTPATIENT
Start: 2023-11-03 | End: 2023-11-03

## 2023-11-03 RX ORDER — LANOLIN ALCOHOL/MO/W.PET/CERES
400 CREAM (GRAM) TOPICAL
Status: DISPENSED | OUTPATIENT
Start: 2023-11-03 | End: 2023-11-03

## 2023-11-03 RX ORDER — ASPIRIN 81 MG/1
81 TABLET, CHEWABLE ORAL DAILY
Status: DISCONTINUED | OUTPATIENT
Start: 2023-11-03 | End: 2023-11-09 | Stop reason: HOSPADM

## 2023-11-03 RX ORDER — CLONAZEPAM 0.5 MG/1
0.5 TABLET ORAL EVERY 8 HOURS PRN
Status: DISCONTINUED | OUTPATIENT
Start: 2023-11-03 | End: 2023-11-09 | Stop reason: HOSPADM

## 2023-11-03 RX ADMIN — IPRATROPIUM BROMIDE AND ALBUTEROL SULFATE 1 DOSE: 2.5; .5 SOLUTION RESPIRATORY (INHALATION) at 00:18

## 2023-11-03 RX ADMIN — INSULIN LISPRO 0 UNITS: 100 INJECTION, SOLUTION INTRAVENOUS; SUBCUTANEOUS at 20:28

## 2023-11-03 RX ADMIN — SODIUM CHLORIDE 1800 ML: 9 INJECTION, SOLUTION INTRAVENOUS at 00:29

## 2023-11-03 RX ADMIN — SODIUM CHLORIDE, POTASSIUM CHLORIDE, SODIUM LACTATE AND CALCIUM CHLORIDE: 600; 310; 30; 20 INJECTION, SOLUTION INTRAVENOUS at 05:25

## 2023-11-03 RX ADMIN — SODIUM CHLORIDE, PRESERVATIVE FREE 10 ML: 5 INJECTION INTRAVENOUS at 20:28

## 2023-11-03 RX ADMIN — MAGNESIUM OXIDE TAB 400 MG (241.3 MG ELEMENTAL MG) 400 MG: 400 (241.3 MG) TAB at 01:59

## 2023-11-03 RX ADMIN — METHYLPREDNISOLONE SODIUM SUCCINATE 40 MG: 40 INJECTION INTRAMUSCULAR; INTRAVENOUS at 16:59

## 2023-11-03 RX ADMIN — SODIUM CHLORIDE, PRESERVATIVE FREE 20 ML: 5 INJECTION INTRAVENOUS at 09:06

## 2023-11-03 RX ADMIN — BUDESONIDE 500 MCG: 0.5 INHALANT ORAL at 07:49

## 2023-11-03 RX ADMIN — IPRATROPIUM BROMIDE AND ALBUTEROL SULFATE 1 DOSE: 2.5; .5 SOLUTION RESPIRATORY (INHALATION) at 00:19

## 2023-11-03 RX ADMIN — MAGNESIUM SULFATE HEPTAHYDRATE 4000 MG: 40 INJECTION, SOLUTION INTRAVENOUS at 01:59

## 2023-11-03 RX ADMIN — METHYLPREDNISOLONE SODIUM SUCCINATE 125 MG: 125 INJECTION INTRAMUSCULAR; INTRAVENOUS at 00:27

## 2023-11-03 RX ADMIN — METHYLPREDNISOLONE SODIUM SUCCINATE 40 MG: 40 INJECTION INTRAMUSCULAR; INTRAVENOUS at 05:39

## 2023-11-03 RX ADMIN — BUDESONIDE 500 MCG: 0.5 INHALANT ORAL at 19:44

## 2023-11-03 RX ADMIN — MAGNESIUM OXIDE TAB 400 MG (241.3 MG ELEMENTAL MG) 400 MG: 400 (241.3 MG) TAB at 02:59

## 2023-11-03 RX ADMIN — CEFTRIAXONE SODIUM 2000 MG: 2 INJECTION, POWDER, FOR SOLUTION INTRAMUSCULAR; INTRAVENOUS at 00:21

## 2023-11-03 RX ADMIN — IOPAMIDOL 100 ML: 755 INJECTION, SOLUTION INTRAVENOUS at 01:45

## 2023-11-03 RX ADMIN — LEVOFLOXACIN 750 MG: 5 INJECTION, SOLUTION INTRAVENOUS at 00:29

## 2023-11-03 RX ADMIN — IPRATROPIUM BROMIDE AND ALBUTEROL SULFATE 1 DOSE: 2.5; .5 SOLUTION RESPIRATORY (INHALATION) at 00:15

## 2023-11-03 RX ADMIN — ENOXAPARIN SODIUM 40 MG: 100 INJECTION SUBCUTANEOUS at 09:07

## 2023-11-03 ASSESSMENT — PAIN SCALES - GENERAL
PAINLEVEL_OUTOF10: 0
PAINLEVEL_OUTOF10: 0

## 2023-11-03 ASSESSMENT — ENCOUNTER SYMPTOMS
SHORTNESS OF BREATH: 1
BACK PAIN: 0

## 2023-11-03 NOTE — CARE COORDINATION
3827: Chart reviewed. Per notes; patient with G-Tube, on IV ABX, O2 and IV Steroids. Dietitian, GI and Urology consulted. DCP assessment to be completed on unit.     CM will continue to follow patient and recs of medical team.

## 2023-11-03 NOTE — CARE COORDINATION
11/03/23 0940   Service Assessment   Patient Orientation Alert and Oriented   Cognition Alert   History Provided By Patient   Primary Caregiver [address] Family Members   Patient's Healthcare Decision Maker is: Legal Next of [facility]   PCP Verified by CM Yes   Last Visit to PCP Within last 3 months   Prior Functional Level Assistance with the following:;Bathing;Dressing; Toileting;Cooking;Housework; Shopping;Mobility   Current Functional Level Assistance with the following:;Bathing;Dressing; Toileting;Cooking;Housework; Shopping;Mobility   Can patient return to prior living arrangement Yes   Ability to make needs known: Good   Family able to assist with home care needs: Yes   Would you like for me to discuss the discharge plan with any other family members/significant others, and if so, who? Yes  (Niece, Charo Ellison)   Financial Resources [employer] None   Social/Functional History   Lives With Family  (Niece and Sister)   Type of [address] [place] [address] One level  (4 steps to the entrance)   Bathroom Shower/Tub Shower chair with back   Home Equipment Cane;Rollator;Walker, rolling;Oxygen   [employer] Help From [facility] Discharge   Transition of Care Consult (CM Consult) Discharge Planning   Confirm Follow Up Transport Family     0940: CM met with patient at bedside to complete DCP. Demos verified as accurate. Patient reports living with her niece, Eileen Be and sister. Patient relays receiving assistance with ADLS, as needed. DME: cane, rollator, rolling walker, shower chair and oxygen via [facility]. Patient reports receiving home health and SNF services in the past. Medications obtained from [facility]. When medically stable for discharge, patient states Eileen Be will transport her home. CM will continue to follow patient and recs of medical team.    Current Dispo: Home with Family.

## 2023-11-03 NOTE — ED TRIAGE NOTES
Presents with fever, chills, low o2 sats and dysuria since 1900. Pt on home oxygen but arrives with oxygen not on. Niece reports had hallucinations earlier today. +neurogenic bladder, [place] with hearing aids. Temp was 100.8 at home pta, was given tylenol.

## 2023-11-03 NOTE — ED PROVIDER NOTES
[address] [facility] [address]  EMERGENCY DEPARTMENT ENCOUNTER NOTE    Date: 11/3/2023  Patient Name: Radha Simpson    History of Presenting Illness     Chief Complaint   Patient presents with    Fever    Chills    Dysuria       History obtained from: Patient    HPI: Radha Simpson, 74 y.o. female with past medical history as listed and reviewed below presents for AMS, SOB, and dysuria. She has a history of chronic COPD on 3 L nasal cannula at home, as well as chronic urinary retention requiring prophylactic antibiotics, Keflex, and azithromycin, as well as straight caths. Over the past 2 days, she has been having slowly worsening altered mental status, delirium, dysuria, and today was noted to be short of breath, wheezing, and hypoxic at 70% despite compliance with use of nasal cannula. She has lower abdominal pain. On arrival, she did meet SIRS criteria. She was febrile at home, got tylenol PTP.     Medical History   I reviewed the medical, surgical, family, and social history, as well as allergies:    PCP: Greer Moura MD    Past Medical History:  Past Medical History:   Diagnosis Date    Arthritis     Bacterial meningitis     Hx of    CAD (coronary artery disease)     Chronic obstructive pulmonary disease (HCC)     Chronic pain     DDD (degenerative disc disease), cervical     Diabetes ([address])     Environmental allergies     Esophageal disorder     Fibromyalgia     GERD (gastroesophageal reflux disease)     Hypercholesterolemia     Hypertension     Kidney stones     hx of    Macular degeneration     Mixed stress and urge urinary incontinence 7/2/2020    Neurogenic bladder 7/2/2020    Psychiatric disorder     Rheumatoid arthritis ([address])     Scoliosis     Shingles     Hx of    Sleep apnea     pt states uses CPAP    Suicidal thoughts     pt stated during PAT visit , she has hx of suicidal thoughts age 17's, pt stated never attempted and further thoughts of suicide since and

## 2023-11-03 NOTE — H&P
History & Physical    Primary Care Provider: Savanna Landa MD  Source of Information: Patient/family     Chief complaint:   Chief Complaint   Patient presents with    Fever    Chills    Dysuria        History of Presenting Illness:   Rebecca Saleem is a 74 y.o. female with PMH of COPD, CAD, diabetes complicatd by neurogenic bladder and ?neurogenic esophagus with G-tube, and other medical problems as below. Presented to the ED with chief complaint of fever and dysuria. Symptoms started few days ago, with fever and chills started today. Associated with foul smelling urine. No flank pain, nausea or vomiting. She has neurogenic bladder and does not self-cath regularly. Family also noticed that she has been having intermittent wheezing and non-productive cough. Reports using 3L NC at home, however work of breathing has not been good since contracted COVID-19 2 months ago. She again has wheezing tonight. She also has G-tube for her feeding but she does not use it regularly. Family points out that she noticed some black color discoloration of the G-tube. In the ED, noted tachycardic and hypoxic requiring 4L NC. Lab showed leukocytosis, elevated LA. Chest X-ray no acute finding. Urinalysis suggestive of UTI. Wheezing on ED arrival, treated with DuoNeb. CT abdomen pending.        Chart review: none         Past Medical History:   Diagnosis Date    Arthritis     Bacterial meningitis     Hx of    CAD (coronary artery disease)     Chronic obstructive pulmonary disease (HCC)     Chronic pain     DDD (degenerative disc disease), cervical     Diabetes (HCC)     Environmental allergies     Esophageal disorder     Fibromyalgia     GERD (gastroesophageal reflux disease)     Hypercholesterolemia     Hypertension     Kidney stones     hx of    Macular degeneration     Mixed stress and urge urinary incontinence 7/2/2020    Neurogenic bladder 7/2/2020    Psychiatric disorder     Rheumatoid arthritis ([facility])

## 2023-11-04 LAB
ANION GAP SERPL CALC-SCNC: 6 MMOL/L (ref 5–15)
BACTERIA SPEC CULT: NORMAL
BASOPHILS # BLD: 0 K/UL (ref 0–0.1)
BASOPHILS NFR BLD: 0 % (ref 0–1)
BUN SERPL-MCNC: 21 MG/DL (ref 6–20)
BUN/CREAT SERPL: 31 (ref 12–20)
CA-I BLD-MCNC: 8.5 MG/DL (ref 8.5–10.1)
CHLORIDE SERPL-SCNC: 105 MMOL/L (ref 97–108)
CO2 SERPL-SCNC: 30 MMOL/L (ref 21–32)
COLONY COUNT, CNT: NORMAL
COLONY COUNT, CNT: NORMAL
CREAT SERPL-MCNC: 0.68 MG/DL (ref 0.55–1.02)
DIFFERENTIAL METHOD BLD: ABNORMAL
EOSINOPHIL # BLD: 0 K/UL (ref 0–0.4)
EOSINOPHIL NFR BLD: 0 % (ref 0–7)
ERYTHROCYTE [DISTWIDTH] IN BLOOD BY AUTOMATED COUNT: 14 % (ref 11.5–14.5)
GLUCOSE BLD STRIP.AUTO-MCNC: 157 MG/DL (ref 65–100)
GLUCOSE BLD STRIP.AUTO-MCNC: 189 MG/DL (ref 65–100)
GLUCOSE BLD STRIP.AUTO-MCNC: 192 MG/DL (ref 65–100)
GLUCOSE BLD STRIP.AUTO-MCNC: 273 MG/DL (ref 65–100)
GLUCOSE SERPL-MCNC: 196 MG/DL (ref 65–100)
HCT VFR BLD AUTO: 37.2 % (ref 35–47)
HGB BLD-MCNC: 11.9 G/DL (ref 11.5–16)
IMM GRANULOCYTES # BLD AUTO: 0.1 K/UL (ref 0–0.04)
IMM GRANULOCYTES NFR BLD AUTO: 1 % (ref 0–0.5)
LACTATE SERPL-SCNC: 2.2 MMOL/L (ref 0.4–2)
LYMPHOCYTES # BLD: 0.2 K/UL (ref 0.8–3.5)
LYMPHOCYTES NFR BLD: 2 % (ref 12–49)
Lab: NORMAL
MAGNESIUM SERPL-MCNC: 1.8 MG/DL (ref 1.6–2.4)
MCH RBC QN AUTO: 33.5 PG (ref 26–34)
MCHC RBC AUTO-ENTMCNC: 32 G/DL (ref 30–36.5)
MCV RBC AUTO: 104.8 FL (ref 80–99)
MONOCYTES # BLD: 0.1 K/UL (ref 0–1)
MONOCYTES NFR BLD: 1 % (ref 5–13)
NEUTS SEG # BLD: 9.2 K/UL (ref 1.8–8)
NEUTS SEG NFR BLD: 96 % (ref 32–75)
NRBC # BLD: 0 K/UL (ref 0–0.01)
NRBC BLD-RTO: 0 PER 100 WBC
PERFORMED BY:: ABNORMAL
PHOSPHATE SERPL-MCNC: 2.5 MG/DL (ref 2.6–4.7)
PLATELET # BLD AUTO: 194 K/UL (ref 150–400)
PMV BLD AUTO: 10.9 FL (ref 8.9–12.9)
POTASSIUM SERPL-SCNC: 4.6 MMOL/L (ref 3.5–5.1)
RBC # BLD AUTO: 3.55 M/UL (ref 3.8–5.2)
SODIUM SERPL-SCNC: 141 MMOL/L (ref 136–145)
WBC # BLD AUTO: 9.6 K/UL (ref 3.6–11)

## 2023-11-04 PROCEDURE — 84100 ASSAY OF PHOSPHORUS: CPT

## 2023-11-04 PROCEDURE — 6360000002 HC RX W HCPCS: Performed by: INTERNAL MEDICINE

## 2023-11-04 PROCEDURE — 83735 ASSAY OF MAGNESIUM: CPT

## 2023-11-04 PROCEDURE — 99222 1ST HOSP IP/OBS MODERATE 55: CPT | Performed by: UROLOGY

## 2023-11-04 PROCEDURE — 1100000000 HC RM PRIVATE

## 2023-11-04 PROCEDURE — 6370000000 HC RX 637 (ALT 250 FOR IP): Performed by: NURSE PRACTITIONER

## 2023-11-04 PROCEDURE — 83605 ASSAY OF LACTIC ACID: CPT

## 2023-11-04 PROCEDURE — 92610 EVALUATE SWALLOWING FUNCTION: CPT

## 2023-11-04 PROCEDURE — 82962 GLUCOSE BLOOD TEST: CPT

## 2023-11-04 PROCEDURE — 80048 BASIC METABOLIC PNL TOTAL CA: CPT

## 2023-11-04 PROCEDURE — 94640 AIRWAY INHALATION TREATMENT: CPT

## 2023-11-04 PROCEDURE — 85025 COMPLETE CBC W/AUTO DIFF WBC: CPT

## 2023-11-04 PROCEDURE — 36415 COLL VENOUS BLD VENIPUNCTURE: CPT

## 2023-11-04 PROCEDURE — 2580000003 HC RX 258: Performed by: NURSE PRACTITIONER

## 2023-11-04 PROCEDURE — 6370000000 HC RX 637 (ALT 250 FOR IP): Performed by: INTERNAL MEDICINE

## 2023-11-04 PROCEDURE — 2580000003 HC RX 258: Performed by: INTERNAL MEDICINE

## 2023-11-04 RX ORDER — DIPHENHYDRAMINE HYDROCHLORIDE AND LIDOCAINE HYDROCHLORIDE AND ALUMINUM HYDROXIDE AND MAGNESIUM HYDRO
5 KIT 4 TIMES DAILY PRN
Status: DISCONTINUED | OUTPATIENT
Start: 2023-11-04 | End: 2023-11-09 | Stop reason: HOSPADM

## 2023-11-04 RX ORDER — SODIUM CHLORIDE 9 MG/ML
INJECTION, SOLUTION INTRAVENOUS CONTINUOUS
Status: DISCONTINUED | OUTPATIENT
Start: 2023-11-04 | End: 2023-11-09 | Stop reason: HOSPADM

## 2023-11-04 RX ADMIN — CLONAZEPAM 0.5 MG: 0.5 TABLET ORAL at 01:02

## 2023-11-04 RX ADMIN — SODIUM CHLORIDE, PRESERVATIVE FREE 10 ML: 5 INJECTION INTRAVENOUS at 09:40

## 2023-11-04 RX ADMIN — SODIUM CHLORIDE: 9 INJECTION, SOLUTION INTRAVENOUS at 17:22

## 2023-11-04 RX ADMIN — CEFTRIAXONE SODIUM 1000 MG: 1 INJECTION, POWDER, FOR SOLUTION INTRAMUSCULAR; INTRAVENOUS at 00:44

## 2023-11-04 RX ADMIN — INSULIN LISPRO 4 UNITS: 100 INJECTION, SOLUTION INTRAVENOUS; SUBCUTANEOUS at 12:28

## 2023-11-04 RX ADMIN — CLONAZEPAM 0.5 MG: 0.5 TABLET ORAL at 09:55

## 2023-11-04 RX ADMIN — DIPHENHYDRAMINE HYDROCHLORIDE AND LIDOCAINE HYDROCHLORIDE AND ALUMINUM HYDROXIDE AND MAGNESIUM HYDRO 5 ML: KIT at 18:38

## 2023-11-04 RX ADMIN — AZITHROMYCIN MONOHYDRATE 500 MG: 500 INJECTION, POWDER, LYOPHILIZED, FOR SOLUTION INTRAVENOUS at 23:37

## 2023-11-04 RX ADMIN — CEFTRIAXONE SODIUM 1000 MG: 1 INJECTION, POWDER, FOR SOLUTION INTRAMUSCULAR; INTRAVENOUS at 23:39

## 2023-11-04 RX ADMIN — CLONAZEPAM 0.5 MG: 0.5 TABLET ORAL at 21:14

## 2023-11-04 RX ADMIN — METHYLPREDNISOLONE SODIUM SUCCINATE 40 MG: 40 INJECTION INTRAMUSCULAR; INTRAVENOUS at 17:22

## 2023-11-04 RX ADMIN — METHYLPREDNISOLONE SODIUM SUCCINATE 40 MG: 40 INJECTION INTRAMUSCULAR; INTRAVENOUS at 05:08

## 2023-11-04 RX ADMIN — AZITHROMYCIN MONOHYDRATE 500 MG: 500 INJECTION, POWDER, LYOPHILIZED, FOR SOLUTION INTRAVENOUS at 00:45

## 2023-11-04 RX ADMIN — ENOXAPARIN SODIUM 40 MG: 100 INJECTION SUBCUTANEOUS at 09:40

## 2023-11-04 RX ADMIN — BUDESONIDE 500 MCG: 0.5 INHALANT ORAL at 21:17

## 2023-11-04 ASSESSMENT — ENCOUNTER SYMPTOMS
BACK PAIN: 0
SHORTNESS OF BREATH: 1
BACK PAIN: 1

## 2023-11-05 ENCOUNTER — APPOINTMENT (OUTPATIENT)
Facility: HOSPITAL | Age: 73
DRG: 871 | End: 2023-11-05
Payer: MEDICARE

## 2023-11-05 LAB
ANION GAP SERPL CALC-SCNC: 5 MMOL/L (ref 5–15)
BASOPHILS # BLD: 0 K/UL (ref 0–0.1)
BASOPHILS NFR BLD: 0 % (ref 0–1)
BUN SERPL-MCNC: 30 MG/DL (ref 6–20)
BUN/CREAT SERPL: 31 (ref 12–20)
CA-I BLD-MCNC: 9.2 MG/DL (ref 8.5–10.1)
CHLORIDE SERPL-SCNC: 102 MMOL/L (ref 97–108)
CO2 SERPL-SCNC: 31 MMOL/L (ref 21–32)
CREAT SERPL-MCNC: 0.96 MG/DL (ref 0.55–1.02)
CRP SERPL-MCNC: 1.63 MG/DL (ref 0–0.6)
DIFFERENTIAL METHOD BLD: ABNORMAL
EOSINOPHIL # BLD: 0 K/UL (ref 0–0.4)
EOSINOPHIL NFR BLD: 0 % (ref 0–7)
ERYTHROCYTE [DISTWIDTH] IN BLOOD BY AUTOMATED COUNT: 14 % (ref 11.5–14.5)
GLUCOSE BLD STRIP.AUTO-MCNC: 138 MG/DL (ref 65–100)
GLUCOSE BLD STRIP.AUTO-MCNC: 213 MG/DL (ref 65–100)
GLUCOSE BLD STRIP.AUTO-MCNC: 268 MG/DL (ref 65–100)
GLUCOSE BLD STRIP.AUTO-MCNC: 274 MG/DL (ref 65–100)
GLUCOSE BLD STRIP.AUTO-MCNC: 320 MG/DL (ref 65–100)
GLUCOSE SERPL-MCNC: 213 MG/DL (ref 65–100)
HCT VFR BLD AUTO: 38.7 % (ref 35–47)
HGB BLD-MCNC: 12.3 G/DL (ref 11.5–16)
IMM GRANULOCYTES # BLD AUTO: 0 K/UL (ref 0–0.04)
IMM GRANULOCYTES NFR BLD AUTO: 0 % (ref 0–0.5)
LACTATE SERPL-SCNC: 4.2 MMOL/L (ref 0.4–2)
LYMPHOCYTES # BLD: 1 K/UL (ref 0.8–3.5)
LYMPHOCYTES NFR BLD: 9 % (ref 12–49)
MCH RBC QN AUTO: 34.6 PG (ref 26–34)
MCHC RBC AUTO-ENTMCNC: 31.8 G/DL (ref 30–36.5)
MCV RBC AUTO: 109 FL (ref 80–99)
MONOCYTES # BLD: 0.4 K/UL (ref 0–1)
MONOCYTES NFR BLD: 3 % (ref 5–13)
NEUTS SEG # BLD: 9.3 K/UL (ref 1.8–8)
NEUTS SEG NFR BLD: 88 % (ref 32–75)
NRBC # BLD: 0 K/UL (ref 0–0.01)
NRBC BLD-RTO: 0 PER 100 WBC
PERFORMED BY:: ABNORMAL
PLATELET # BLD AUTO: 226 K/UL (ref 150–400)
PMV BLD AUTO: 11.5 FL (ref 8.9–12.9)
POTASSIUM SERPL-SCNC: 4.7 MMOL/L (ref 3.5–5.1)
PROCALCITONIN SERPL-MCNC: 0.05 NG/ML
RBC # BLD AUTO: 3.55 M/UL (ref 3.8–5.2)
SODIUM SERPL-SCNC: 138 MMOL/L (ref 136–145)
WBC # BLD AUTO: 10.7 K/UL (ref 3.6–11)

## 2023-11-05 PROCEDURE — 99232 SBSQ HOSP IP/OBS MODERATE 35: CPT | Performed by: UROLOGY

## 2023-11-05 PROCEDURE — 80048 BASIC METABOLIC PNL TOTAL CA: CPT

## 2023-11-05 PROCEDURE — 6360000004 HC RX CONTRAST MEDICATION: Performed by: NURSE PRACTITIONER

## 2023-11-05 PROCEDURE — 6360000002 HC RX W HCPCS: Performed by: NURSE PRACTITIONER

## 2023-11-05 PROCEDURE — 36415 COLL VENOUS BLD VENIPUNCTURE: CPT

## 2023-11-05 PROCEDURE — 1100000000 HC RM PRIVATE

## 2023-11-05 PROCEDURE — 74018 RADEX ABDOMEN 1 VIEW: CPT

## 2023-11-05 PROCEDURE — 6360000002 HC RX W HCPCS: Performed by: INTERNAL MEDICINE

## 2023-11-05 PROCEDURE — 85025 COMPLETE CBC W/AUTO DIFF WBC: CPT

## 2023-11-05 PROCEDURE — 82962 GLUCOSE BLOOD TEST: CPT

## 2023-11-05 PROCEDURE — 6370000000 HC RX 637 (ALT 250 FOR IP): Performed by: NURSE PRACTITIONER

## 2023-11-05 PROCEDURE — 94761 N-INVAS EAR/PLS OXIMETRY MLT: CPT

## 2023-11-05 PROCEDURE — 71045 X-RAY EXAM CHEST 1 VIEW: CPT

## 2023-11-05 PROCEDURE — 6370000000 HC RX 637 (ALT 250 FOR IP): Performed by: INTERNAL MEDICINE

## 2023-11-05 PROCEDURE — 83605 ASSAY OF LACTIC ACID: CPT

## 2023-11-05 PROCEDURE — 86140 C-REACTIVE PROTEIN: CPT

## 2023-11-05 PROCEDURE — 2580000003 HC RX 258: Performed by: NURSE PRACTITIONER

## 2023-11-05 PROCEDURE — 84145 PROCALCITONIN (PCT): CPT

## 2023-11-05 PROCEDURE — 94640 AIRWAY INHALATION TREATMENT: CPT

## 2023-11-05 PROCEDURE — 2580000003 HC RX 258: Performed by: INTERNAL MEDICINE

## 2023-11-05 RX ORDER — FUROSEMIDE 10 MG/ML
40 INJECTION INTRAMUSCULAR; INTRAVENOUS ONCE
Status: COMPLETED | OUTPATIENT
Start: 2023-11-05 | End: 2023-11-05

## 2023-11-05 RX ADMIN — METHYLPREDNISOLONE SODIUM SUCCINATE 40 MG: 40 INJECTION INTRAMUSCULAR; INTRAVENOUS at 05:21

## 2023-11-05 RX ADMIN — PRAMIPEXOLE DIHYDROCHLORIDE 1 MG: 1 TABLET ORAL at 21:22

## 2023-11-05 RX ADMIN — BUDESONIDE 500 MCG: 0.5 INHALANT ORAL at 22:01

## 2023-11-05 RX ADMIN — METHYLPREDNISOLONE SODIUM SUCCINATE 40 MG: 40 INJECTION INTRAMUSCULAR; INTRAVENOUS at 16:55

## 2023-11-05 RX ADMIN — DIATRIZOATE MEGLUMINE AND DIATRIZOATE SODIUM 30 ML: 660; 100 LIQUID ORAL; RECTAL at 08:53

## 2023-11-05 RX ADMIN — INSULIN LISPRO 4 UNITS: 100 INJECTION, SOLUTION INTRAVENOUS; SUBCUTANEOUS at 11:05

## 2023-11-05 RX ADMIN — DIPHENHYDRAMINE HYDROCHLORIDE AND LIDOCAINE HYDROCHLORIDE AND ALUMINUM HYDROXIDE AND MAGNESIUM HYDRO 5 ML: KIT at 11:04

## 2023-11-05 RX ADMIN — SODIUM CHLORIDE: 9 INJECTION, SOLUTION INTRAVENOUS at 04:19

## 2023-11-05 RX ADMIN — INSULIN LISPRO 4 UNITS: 100 INJECTION, SOLUTION INTRAVENOUS; SUBCUTANEOUS at 21:28

## 2023-11-05 RX ADMIN — FUROSEMIDE 40 MG: 10 INJECTION, SOLUTION INTRAMUSCULAR; INTRAVENOUS at 09:05

## 2023-11-05 RX ADMIN — DULOXETINE HYDROCHLORIDE 60 MG: 30 CAPSULE, DELAYED RELEASE ORAL at 09:04

## 2023-11-05 RX ADMIN — CLONAZEPAM 0.5 MG: 0.5 TABLET ORAL at 11:04

## 2023-11-05 RX ADMIN — FOLIC ACID 1 MG: 1 TABLET ORAL at 09:06

## 2023-11-05 RX ADMIN — MONTELUKAST 10 MG: 10 TABLET, FILM COATED ORAL at 21:22

## 2023-11-05 RX ADMIN — CLONAZEPAM 0.5 MG: 0.5 TABLET ORAL at 21:28

## 2023-11-05 RX ADMIN — OXYBUTYNIN CHLORIDE 5 MG: 5 TABLET ORAL at 09:05

## 2023-11-05 RX ADMIN — ACETAMINOPHEN 650 MG: 325 TABLET ORAL at 21:28

## 2023-11-05 RX ADMIN — AZITHROMYCIN MONOHYDRATE 500 MG: 500 INJECTION, POWDER, LYOPHILIZED, FOR SOLUTION INTRAVENOUS at 23:34

## 2023-11-05 RX ADMIN — ENOXAPARIN SODIUM 40 MG: 100 INJECTION SUBCUTANEOUS at 09:05

## 2023-11-05 RX ADMIN — INSULIN LISPRO 4 UNITS: 100 INJECTION, SOLUTION INTRAVENOUS; SUBCUTANEOUS at 09:18

## 2023-11-05 RX ADMIN — ASPIRIN 81 MG CHEWABLE TABLET 81 MG: 81 TABLET CHEWABLE at 09:04

## 2023-11-05 RX ADMIN — IPRATROPIUM BROMIDE AND ALBUTEROL SULFATE 1 DOSE: 2.5; .5 SOLUTION RESPIRATORY (INHALATION) at 08:05

## 2023-11-05 RX ADMIN — OXYBUTYNIN CHLORIDE 5 MG: 5 TABLET ORAL at 21:22

## 2023-11-05 RX ADMIN — SODIUM CHLORIDE: 9 INJECTION, SOLUTION INTRAVENOUS at 16:55

## 2023-11-05 RX ADMIN — ATORVASTATIN CALCIUM 20 MG: 20 TABLET, FILM COATED ORAL at 09:04

## 2023-11-05 RX ADMIN — PANTOPRAZOLE SODIUM 40 MG: 40 TABLET, DELAYED RELEASE ORAL at 09:07

## 2023-11-05 RX ADMIN — FLUOXETINE 40 MG: 20 CAPSULE ORAL at 09:06

## 2023-11-05 RX ADMIN — BUDESONIDE 500 MCG: 0.5 INHALANT ORAL at 08:04

## 2023-11-05 RX ADMIN — ACETAMINOPHEN 650 MG: 325 TABLET ORAL at 09:04

## 2023-11-05 RX ADMIN — CEFTRIAXONE SODIUM 1000 MG: 1 INJECTION, POWDER, FOR SOLUTION INTRAMUSCULAR; INTRAVENOUS at 23:35

## 2023-11-05 RX ADMIN — INSULIN LISPRO 2 UNITS: 100 INJECTION, SOLUTION INTRAVENOUS; SUBCUTANEOUS at 16:55

## 2023-11-05 ASSESSMENT — PAIN DESCRIPTION - DESCRIPTORS: DESCRIPTORS: ACHING;HEAVINESS

## 2023-11-05 ASSESSMENT — PAIN DESCRIPTION - LOCATION: LOCATION: BACK

## 2023-11-05 ASSESSMENT — ENCOUNTER SYMPTOMS
BACK PAIN: 1
SHORTNESS OF BREATH: 1

## 2023-11-05 ASSESSMENT — PAIN DESCRIPTION - ORIENTATION: ORIENTATION: LOWER

## 2023-11-05 ASSESSMENT — PAIN SCALES - GENERAL: PAINLEVEL_OUTOF10: 6

## 2023-11-06 ENCOUNTER — APPOINTMENT (OUTPATIENT)
Facility: HOSPITAL | Age: 73
DRG: 871 | End: 2023-11-06
Payer: MEDICARE

## 2023-11-06 LAB
ANION GAP SERPL CALC-SCNC: 5 MMOL/L (ref 5–15)
BASOPHILS # BLD: 0 K/UL (ref 0–0.1)
BASOPHILS NFR BLD: 0 % (ref 0–1)
BUN SERPL-MCNC: 27 MG/DL (ref 6–20)
BUN/CREAT SERPL: 33 (ref 12–20)
CA-I BLD-MCNC: 8.2 MG/DL (ref 8.5–10.1)
CHLORIDE SERPL-SCNC: 102 MMOL/L (ref 97–108)
CO2 SERPL-SCNC: 32 MMOL/L (ref 21–32)
CREAT SERPL-MCNC: 0.82 MG/DL (ref 0.55–1.02)
CRP SERPL-MCNC: 0.88 MG/DL (ref 0–0.6)
DIFFERENTIAL METHOD BLD: ABNORMAL
EOSINOPHIL # BLD: 0 K/UL (ref 0–0.4)
EOSINOPHIL NFR BLD: 0 % (ref 0–7)
ERYTHROCYTE [DISTWIDTH] IN BLOOD BY AUTOMATED COUNT: 14 % (ref 11.5–14.5)
GLUCOSE BLD STRIP.AUTO-MCNC: 162 MG/DL (ref 65–100)
GLUCOSE BLD STRIP.AUTO-MCNC: 321 MG/DL (ref 65–100)
GLUCOSE BLD STRIP.AUTO-MCNC: 91 MG/DL (ref 65–100)
GLUCOSE SERPL-MCNC: 263 MG/DL (ref 65–100)
HCT VFR BLD AUTO: 37.8 % (ref 35–47)
HGB BLD-MCNC: 12 G/DL (ref 11.5–16)
IMM GRANULOCYTES # BLD AUTO: 0.1 K/UL (ref 0–0.04)
IMM GRANULOCYTES NFR BLD AUTO: 1 % (ref 0–0.5)
L PNEUMO1 AG UR QL IA: NEGATIVE
LACTATE SERPL-SCNC: 2.4 MMOL/L (ref 0.4–2)
LYMPHOCYTES # BLD: 1.2 K/UL (ref 0.8–3.5)
LYMPHOCYTES NFR BLD: 11 % (ref 12–49)
MCH RBC QN AUTO: 34 PG (ref 26–34)
MCHC RBC AUTO-ENTMCNC: 31.7 G/DL (ref 30–36.5)
MCV RBC AUTO: 107.1 FL (ref 80–99)
MONOCYTES # BLD: 0.7 K/UL (ref 0–1)
MONOCYTES NFR BLD: 6 % (ref 5–13)
NEUTS SEG # BLD: 8.7 K/UL (ref 1.8–8)
NEUTS SEG NFR BLD: 82 % (ref 32–75)
NRBC # BLD: 0.02 K/UL (ref 0–0.01)
NRBC BLD-RTO: 0.2 PER 100 WBC
PERFORMED BY:: ABNORMAL
PERFORMED BY:: ABNORMAL
PERFORMED BY:: NORMAL
PLATELET # BLD AUTO: 193 K/UL (ref 150–400)
PMV BLD AUTO: 11.3 FL (ref 8.9–12.9)
POTASSIUM SERPL-SCNC: 4.2 MMOL/L (ref 3.5–5.1)
PROCALCITONIN SERPL-MCNC: <0.05 NG/ML
RBC # BLD AUTO: 3.53 M/UL (ref 3.8–5.2)
SODIUM SERPL-SCNC: 139 MMOL/L (ref 136–145)
WBC # BLD AUTO: 10.6 K/UL (ref 3.6–11)

## 2023-11-06 PROCEDURE — 6360000002 HC RX W HCPCS: Performed by: INTERNAL MEDICINE

## 2023-11-06 PROCEDURE — 80048 BASIC METABOLIC PNL TOTAL CA: CPT

## 2023-11-06 PROCEDURE — 86140 C-REACTIVE PROTEIN: CPT

## 2023-11-06 PROCEDURE — 83605 ASSAY OF LACTIC ACID: CPT

## 2023-11-06 PROCEDURE — 6370000000 HC RX 637 (ALT 250 FOR IP): Performed by: NURSE PRACTITIONER

## 2023-11-06 PROCEDURE — A9562 TC99M MERTIATIDE: HCPCS | Performed by: PHYSICIAN ASSISTANT

## 2023-11-06 PROCEDURE — 36415 COLL VENOUS BLD VENIPUNCTURE: CPT

## 2023-11-06 PROCEDURE — 6360000002 HC RX W HCPCS: Performed by: PHYSICIAN ASSISTANT

## 2023-11-06 PROCEDURE — 2700000000 HC OXYGEN THERAPY PER DAY

## 2023-11-06 PROCEDURE — 78708 K FLOW/FUNCT IMAGE W/DRUG: CPT

## 2023-11-06 PROCEDURE — 6370000000 HC RX 637 (ALT 250 FOR IP): Performed by: PHYSICIAN ASSISTANT

## 2023-11-06 PROCEDURE — 2580000003 HC RX 258: Performed by: PHYSICIAN ASSISTANT

## 2023-11-06 PROCEDURE — 97165 OT EVAL LOW COMPLEX 30 MIN: CPT

## 2023-11-06 PROCEDURE — 82962 GLUCOSE BLOOD TEST: CPT

## 2023-11-06 PROCEDURE — 6370000000 HC RX 637 (ALT 250 FOR IP): Performed by: INTERNAL MEDICINE

## 2023-11-06 PROCEDURE — 2580000003 HC RX 258: Performed by: NURSE PRACTITIONER

## 2023-11-06 PROCEDURE — 85025 COMPLETE CBC W/AUTO DIFF WBC: CPT

## 2023-11-06 PROCEDURE — 97530 THERAPEUTIC ACTIVITIES: CPT

## 2023-11-06 PROCEDURE — 94761 N-INVAS EAR/PLS OXIMETRY MLT: CPT

## 2023-11-06 PROCEDURE — 84145 PROCALCITONIN (PCT): CPT

## 2023-11-06 PROCEDURE — 1100000000 HC RM PRIVATE

## 2023-11-06 PROCEDURE — 3430000000 HC RX DIAGNOSTIC RADIOPHARMACEUTICAL: Performed by: PHYSICIAN ASSISTANT

## 2023-11-06 PROCEDURE — 99232 SBSQ HOSP IP/OBS MODERATE 35: CPT | Performed by: NURSE PRACTITIONER

## 2023-11-06 PROCEDURE — 94640 AIRWAY INHALATION TREATMENT: CPT

## 2023-11-06 RX ORDER — FLUCONAZOLE 100 MG/1
200 TABLET ORAL DAILY
Status: DISCONTINUED | OUTPATIENT
Start: 2023-11-06 | End: 2023-11-09 | Stop reason: HOSPADM

## 2023-11-06 RX ORDER — INSULIN GLARGINE 100 [IU]/ML
15 INJECTION, SOLUTION SUBCUTANEOUS NIGHTLY
Status: DISCONTINUED | OUTPATIENT
Start: 2023-11-06 | End: 2023-11-09 | Stop reason: HOSPADM

## 2023-11-06 RX ORDER — FUROSEMIDE 10 MG/ML
INJECTION INTRAMUSCULAR; INTRAVENOUS
Status: DISPENSED
Start: 2023-11-06 | End: 2023-11-07

## 2023-11-06 RX ORDER — FUROSEMIDE 10 MG/ML
30 INJECTION INTRAMUSCULAR; INTRAVENOUS ONCE
Status: DISCONTINUED | OUTPATIENT
Start: 2023-11-06 | End: 2023-11-06

## 2023-11-06 RX ADMIN — OXYBUTYNIN CHLORIDE 5 MG: 5 TABLET ORAL at 08:27

## 2023-11-06 RX ADMIN — FOLIC ACID 1 MG: 1 TABLET ORAL at 08:27

## 2023-11-06 RX ADMIN — DIPHENHYDRAMINE HYDROCHLORIDE AND LIDOCAINE HYDROCHLORIDE AND ALUMINUM HYDROXIDE AND MAGNESIUM HYDRO 5 ML: KIT at 20:22

## 2023-11-06 RX ADMIN — PANTOPRAZOLE SODIUM 40 MG: 40 TABLET, DELAYED RELEASE ORAL at 05:54

## 2023-11-06 RX ADMIN — VANCOMYCIN HYDROCHLORIDE 1250 MG: 1.25 INJECTION, POWDER, LYOPHILIZED, FOR SOLUTION INTRAVENOUS at 10:50

## 2023-11-06 RX ADMIN — ASPIRIN 81 MG CHEWABLE TABLET 81 MG: 81 TABLET CHEWABLE at 08:27

## 2023-11-06 RX ADMIN — CLONAZEPAM 0.5 MG: 0.5 TABLET ORAL at 20:13

## 2023-11-06 RX ADMIN — INSULIN GLARGINE 15 UNITS: 100 INJECTION, SOLUTION SUBCUTANEOUS at 21:07

## 2023-11-06 RX ADMIN — MONTELUKAST 10 MG: 10 TABLET, FILM COATED ORAL at 20:10

## 2023-11-06 RX ADMIN — DULOXETINE HYDROCHLORIDE 60 MG: 30 CAPSULE, DELAYED RELEASE ORAL at 08:27

## 2023-11-06 RX ADMIN — INSULIN LISPRO 4 UNITS: 100 INJECTION, SOLUTION INTRAVENOUS; SUBCUTANEOUS at 21:07

## 2023-11-06 RX ADMIN — ACETAMINOPHEN 650 MG: 325 TABLET ORAL at 20:13

## 2023-11-06 RX ADMIN — ENOXAPARIN SODIUM 40 MG: 100 INJECTION SUBCUTANEOUS at 08:27

## 2023-11-06 RX ADMIN — METHYLPREDNISOLONE SODIUM SUCCINATE 40 MG: 40 INJECTION INTRAMUSCULAR; INTRAVENOUS at 17:19

## 2023-11-06 RX ADMIN — FLUOXETINE 40 MG: 20 CAPSULE ORAL at 08:27

## 2023-11-06 RX ADMIN — ATORVASTATIN CALCIUM 20 MG: 20 TABLET, FILM COATED ORAL at 08:27

## 2023-11-06 RX ADMIN — INSULIN LISPRO 4 UNITS: 100 INJECTION, SOLUTION INTRAVENOUS; SUBCUTANEOUS at 08:27

## 2023-11-06 RX ADMIN — FLUCONAZOLE 200 MG: 100 TABLET ORAL at 10:48

## 2023-11-06 RX ADMIN — BUDESONIDE 500 MCG: 0.5 INHALANT ORAL at 08:10

## 2023-11-06 RX ADMIN — TECHNESCAN TC 99M MERTIATIDE 9.78 MILLICURIE: 1 INJECTION, POWDER, LYOPHILIZED, FOR SOLUTION INTRAVENOUS at 14:15

## 2023-11-06 RX ADMIN — OXYBUTYNIN CHLORIDE 5 MG: 5 TABLET ORAL at 20:10

## 2023-11-06 RX ADMIN — SODIUM CHLORIDE: 9 INJECTION, SOLUTION INTRAVENOUS at 05:54

## 2023-11-06 RX ADMIN — METHYLPREDNISOLONE SODIUM SUCCINATE 40 MG: 40 INJECTION INTRAMUSCULAR; INTRAVENOUS at 05:50

## 2023-11-06 RX ADMIN — BUDESONIDE 500 MCG: 0.5 INHALANT ORAL at 21:58

## 2023-11-06 RX ADMIN — PRAMIPEXOLE DIHYDROCHLORIDE 1 MG: 1 TABLET ORAL at 20:09

## 2023-11-06 ASSESSMENT — PAIN DESCRIPTION - LOCATION: LOCATION: HIP;LEG

## 2023-11-06 ASSESSMENT — PAIN DESCRIPTION - DESCRIPTORS: DESCRIPTORS: ACHING

## 2023-11-06 ASSESSMENT — PAIN SCALES - GENERAL
PAINLEVEL_OUTOF10: 0
PAINLEVEL_OUTOF10: 0
PAINLEVEL_OUTOF10: 3
PAINLEVEL_OUTOF10: 0
PAINLEVEL_OUTOF10: 0

## 2023-11-06 ASSESSMENT — ENCOUNTER SYMPTOMS
SHORTNESS OF BREATH: 1
BACK PAIN: 1

## 2023-11-06 ASSESSMENT — PAIN DESCRIPTION - ORIENTATION: ORIENTATION: RIGHT

## 2023-11-06 NOTE — CARE COORDINATION
Chart reviewed, patient recc for SNF by OT, CM attempted to meet with patient to discuss, patient off the floor at this time, CM to follow up at a later time.

## 2023-11-07 ENCOUNTER — APPOINTMENT (OUTPATIENT)
Facility: HOSPITAL | Age: 73
DRG: 871 | End: 2023-11-07
Payer: MEDICARE

## 2023-11-07 LAB
ALBUMIN SERPL-MCNC: 2.6 G/DL (ref 3.5–5)
ALBUMIN/GLOB SERPL: 0.9 (ref 1.1–2.2)
ALP SERPL-CCNC: 92 U/L (ref 45–117)
ALT SERPL-CCNC: 33 U/L (ref 12–78)
ANION GAP SERPL CALC-SCNC: 2 MMOL/L (ref 5–15)
AST SERPL W P-5'-P-CCNC: 18 U/L (ref 15–37)
BASOPHILS # BLD: 0 K/UL (ref 0–0.1)
BASOPHILS NFR BLD: 0 % (ref 0–1)
BILIRUB SERPL-MCNC: 0.2 MG/DL (ref 0.2–1)
BUN SERPL-MCNC: 30 MG/DL (ref 6–20)
BUN/CREAT SERPL: 34 (ref 12–20)
CA-I BLD-MCNC: 8.4 MG/DL (ref 8.5–10.1)
CHLORIDE SERPL-SCNC: 98 MMOL/L (ref 97–108)
CO2 SERPL-SCNC: 37 MMOL/L (ref 21–32)
CREAT SERPL-MCNC: 0.88 MG/DL (ref 0.55–1.02)
CRP SERPL-MCNC: 0.48 MG/DL (ref 0–0.6)
DIFFERENTIAL METHOD BLD: ABNORMAL
EOSINOPHIL # BLD: 0 K/UL (ref 0–0.4)
EOSINOPHIL NFR BLD: 0 % (ref 0–7)
ERYTHROCYTE [DISTWIDTH] IN BLOOD BY AUTOMATED COUNT: 13.7 % (ref 11.5–14.5)
GLOBULIN SER CALC-MCNC: 2.8 G/DL (ref 2–4)
GLUCOSE BLD STRIP.AUTO-MCNC: 201 MG/DL (ref 65–100)
GLUCOSE BLD STRIP.AUTO-MCNC: 227 MG/DL (ref 65–100)
GLUCOSE BLD STRIP.AUTO-MCNC: 237 MG/DL (ref 65–100)
GLUCOSE BLD STRIP.AUTO-MCNC: 252 MG/DL (ref 65–100)
GLUCOSE SERPL-MCNC: 264 MG/DL (ref 65–100)
HCT VFR BLD AUTO: 36.2 % (ref 35–47)
HGB BLD-MCNC: 11.6 G/DL (ref 11.5–16)
IMM GRANULOCYTES # BLD AUTO: 0.1 K/UL (ref 0–0.04)
IMM GRANULOCYTES NFR BLD AUTO: 1 % (ref 0–0.5)
LACTATE SERPL-SCNC: 1.8 MMOL/L (ref 0.4–2)
LYMPHOCYTES # BLD: 0.7 K/UL (ref 0.8–3.5)
LYMPHOCYTES NFR BLD: 8 % (ref 12–49)
MCH RBC QN AUTO: 34.1 PG (ref 26–34)
MCHC RBC AUTO-ENTMCNC: 32 G/DL (ref 30–36.5)
MCV RBC AUTO: 106.5 FL (ref 80–99)
MONOCYTES # BLD: 0.5 K/UL (ref 0–1)
MONOCYTES NFR BLD: 6 % (ref 5–13)
NEUTS SEG # BLD: 7.2 K/UL (ref 1.8–8)
NEUTS SEG NFR BLD: 85 % (ref 32–75)
NRBC # BLD: 0 K/UL (ref 0–0.01)
NRBC BLD-RTO: 0 PER 100 WBC
PERFORMED BY:: ABNORMAL
PLATELET # BLD AUTO: 177 K/UL (ref 150–400)
PMV BLD AUTO: 11.2 FL (ref 8.9–12.9)
POTASSIUM SERPL-SCNC: 4.4 MMOL/L (ref 3.5–5.1)
PROCALCITONIN SERPL-MCNC: <0.05 NG/ML
PROT SERPL-MCNC: 5.4 G/DL (ref 6.4–8.2)
RBC # BLD AUTO: 3.4 M/UL (ref 3.8–5.2)
SODIUM SERPL-SCNC: 137 MMOL/L (ref 136–145)
WBC # BLD AUTO: 8.4 K/UL (ref 3.6–11)

## 2023-11-07 PROCEDURE — 84145 PROCALCITONIN (PCT): CPT

## 2023-11-07 PROCEDURE — 6360000002 HC RX W HCPCS: Performed by: INTERNAL MEDICINE

## 2023-11-07 PROCEDURE — 2580000003 HC RX 258: Performed by: INTERNAL MEDICINE

## 2023-11-07 PROCEDURE — 80053 COMPREHEN METABOLIC PANEL: CPT

## 2023-11-07 PROCEDURE — 51798 US URINE CAPACITY MEASURE: CPT

## 2023-11-07 PROCEDURE — 97116 GAIT TRAINING THERAPY: CPT

## 2023-11-07 PROCEDURE — 94640 AIRWAY INHALATION TREATMENT: CPT

## 2023-11-07 PROCEDURE — 36415 COLL VENOUS BLD VENIPUNCTURE: CPT

## 2023-11-07 PROCEDURE — 2700000000 HC OXYGEN THERAPY PER DAY

## 2023-11-07 PROCEDURE — 6370000000 HC RX 637 (ALT 250 FOR IP): Performed by: NURSE PRACTITIONER

## 2023-11-07 PROCEDURE — 97530 THERAPEUTIC ACTIVITIES: CPT

## 2023-11-07 PROCEDURE — 1100000000 HC RM PRIVATE

## 2023-11-07 PROCEDURE — 2580000003 HC RX 258: Performed by: PHYSICIAN ASSISTANT

## 2023-11-07 PROCEDURE — 94761 N-INVAS EAR/PLS OXIMETRY MLT: CPT

## 2023-11-07 PROCEDURE — 85025 COMPLETE CBC W/AUTO DIFF WBC: CPT

## 2023-11-07 PROCEDURE — 6370000000 HC RX 637 (ALT 250 FOR IP): Performed by: INTERNAL MEDICINE

## 2023-11-07 PROCEDURE — 82962 GLUCOSE BLOOD TEST: CPT

## 2023-11-07 PROCEDURE — 6370000000 HC RX 637 (ALT 250 FOR IP): Performed by: PHYSICIAN ASSISTANT

## 2023-11-07 PROCEDURE — 86140 C-REACTIVE PROTEIN: CPT

## 2023-11-07 PROCEDURE — 6360000002 HC RX W HCPCS: Performed by: PHYSICIAN ASSISTANT

## 2023-11-07 PROCEDURE — 83605 ASSAY OF LACTIC ACID: CPT

## 2023-11-07 PROCEDURE — 71045 X-RAY EXAM CHEST 1 VIEW: CPT

## 2023-11-07 PROCEDURE — 97161 PT EVAL LOW COMPLEX 20 MIN: CPT

## 2023-11-07 RX ADMIN — FLUCONAZOLE 200 MG: 100 TABLET ORAL at 08:40

## 2023-11-07 RX ADMIN — METHYLPREDNISOLONE SODIUM SUCCINATE 40 MG: 40 INJECTION INTRAMUSCULAR; INTRAVENOUS at 05:18

## 2023-11-07 RX ADMIN — ATORVASTATIN CALCIUM 20 MG: 20 TABLET, FILM COATED ORAL at 08:39

## 2023-11-07 RX ADMIN — INSULIN LISPRO 4 UNITS: 100 INJECTION, SOLUTION INTRAVENOUS; SUBCUTANEOUS at 08:42

## 2023-11-07 RX ADMIN — MONTELUKAST 10 MG: 10 TABLET, FILM COATED ORAL at 20:40

## 2023-11-07 RX ADMIN — ENOXAPARIN SODIUM 40 MG: 100 INJECTION SUBCUTANEOUS at 08:40

## 2023-11-07 RX ADMIN — BUDESONIDE 500 MCG: 0.5 INHALANT ORAL at 08:52

## 2023-11-07 RX ADMIN — VANCOMYCIN HYDROCHLORIDE 1250 MG: 1.25 INJECTION, POWDER, LYOPHILIZED, FOR SOLUTION INTRAVENOUS at 08:48

## 2023-11-07 RX ADMIN — INSULIN LISPRO 2 UNITS: 100 INJECTION, SOLUTION INTRAVENOUS; SUBCUTANEOUS at 12:08

## 2023-11-07 RX ADMIN — ASPIRIN 81 MG CHEWABLE TABLET 81 MG: 81 TABLET CHEWABLE at 08:39

## 2023-11-07 RX ADMIN — AZITHROMYCIN MONOHYDRATE 500 MG: 500 INJECTION, POWDER, LYOPHILIZED, FOR SOLUTION INTRAVENOUS at 00:10

## 2023-11-07 RX ADMIN — DULOXETINE HYDROCHLORIDE 60 MG: 30 CAPSULE, DELAYED RELEASE ORAL at 08:39

## 2023-11-07 RX ADMIN — METHYLPREDNISOLONE SODIUM SUCCINATE 40 MG: 40 INJECTION INTRAMUSCULAR; INTRAVENOUS at 18:00

## 2023-11-07 RX ADMIN — CLONAZEPAM 0.5 MG: 0.5 TABLET ORAL at 20:46

## 2023-11-07 RX ADMIN — FLUOXETINE 40 MG: 20 CAPSULE ORAL at 08:40

## 2023-11-07 RX ADMIN — CEFTRIAXONE SODIUM 1000 MG: 1 INJECTION, POWDER, FOR SOLUTION INTRAMUSCULAR; INTRAVENOUS at 00:09

## 2023-11-07 RX ADMIN — INSULIN LISPRO 2 UNITS: 100 INJECTION, SOLUTION INTRAVENOUS; SUBCUTANEOUS at 17:46

## 2023-11-07 RX ADMIN — PRAMIPEXOLE DIHYDROCHLORIDE 1 MG: 1 TABLET ORAL at 20:40

## 2023-11-07 RX ADMIN — BUDESONIDE 500 MCG: 0.5 INHALANT ORAL at 19:31

## 2023-11-07 RX ADMIN — PANTOPRAZOLE SODIUM 40 MG: 40 TABLET, DELAYED RELEASE ORAL at 05:18

## 2023-11-07 RX ADMIN — FOLIC ACID 1 MG: 1 TABLET ORAL at 08:39

## 2023-11-07 RX ADMIN — INSULIN LISPRO 0 UNITS: 100 INJECTION, SOLUTION INTRAVENOUS; SUBCUTANEOUS at 20:41

## 2023-11-07 RX ADMIN — INSULIN GLARGINE 15 UNITS: 100 INJECTION, SOLUTION SUBCUTANEOUS at 20:40

## 2023-11-07 RX ADMIN — OXYBUTYNIN CHLORIDE 5 MG: 5 TABLET ORAL at 20:40

## 2023-11-07 RX ADMIN — OXYBUTYNIN CHLORIDE 5 MG: 5 TABLET ORAL at 08:40

## 2023-11-07 ASSESSMENT — PAIN SCALES - GENERAL: PAINLEVEL_OUTOF10: 0

## 2023-11-07 NOTE — CARE COORDINATION
Patient recc for SNF at d/c, CM met with patient to discuss. Patient stated that CM needed to speak with her niece/caregiver, Kyra Singh [phone]. Patient stated that she would be agreeable to whatever Ms. Kyra Singh wished for, however wished to return home. Patient reported that shes been to [facility] multiple times in the recent past, no SNF history. CM contacted Ms. Kyra Singh to discuss SNF. She put her daughter, Mirlande Valdovinos, on the phone as well who is a hospice nurse, familiar with SNF facilities. After some discussion family would like to bring patient back home with no SNF, IRF, or HH as they feel patient's main issues are motivation and that she will not benefit from SNF at this time and that she refuses HH when she does have it set up. Current DCP is to return home with family once medically stable, no CM needs, however CM will continue to follow and monitor for needs.

## 2023-11-08 LAB
ALBUMIN SERPL-MCNC: 2.5 G/DL (ref 3.5–5)
ALBUMIN/GLOB SERPL: 1 (ref 1.1–2.2)
ALP SERPL-CCNC: 93 U/L (ref 45–117)
ALT SERPL-CCNC: 24 U/L (ref 12–78)
ANION GAP SERPL CALC-SCNC: 6 MMOL/L (ref 5–15)
AST SERPL W P-5'-P-CCNC: 10 U/L (ref 15–37)
BASOPHILS # BLD: 0 K/UL (ref 0–0.1)
BASOPHILS NFR BLD: 0 % (ref 0–1)
BILIRUB SERPL-MCNC: 0.2 MG/DL (ref 0.2–1)
BUN SERPL-MCNC: 26 MG/DL (ref 6–20)
BUN/CREAT SERPL: 36 (ref 12–20)
CA-I BLD-MCNC: 8 MG/DL (ref 8.5–10.1)
CHLORIDE SERPL-SCNC: 101 MMOL/L (ref 97–108)
CO2 SERPL-SCNC: 32 MMOL/L (ref 21–32)
CREAT SERPL-MCNC: 0.73 MG/DL (ref 0.55–1.02)
CRP SERPL-MCNC: 0.32 MG/DL (ref 0–0.6)
DIFFERENTIAL METHOD BLD: ABNORMAL
EOSINOPHIL # BLD: 0 K/UL (ref 0–0.4)
EOSINOPHIL NFR BLD: 0 % (ref 0–7)
ERYTHROCYTE [DISTWIDTH] IN BLOOD BY AUTOMATED COUNT: 13.8 % (ref 11.5–14.5)
EST. AVERAGE GLUCOSE BLD GHB EST-MCNC: 160 MG/DL
GLOBULIN SER CALC-MCNC: 2.4 G/DL (ref 2–4)
GLUCOSE BLD STRIP.AUTO-MCNC: 140 MG/DL (ref 65–100)
GLUCOSE BLD STRIP.AUTO-MCNC: 188 MG/DL (ref 65–100)
GLUCOSE BLD STRIP.AUTO-MCNC: 287 MG/DL (ref 65–100)
GLUCOSE BLD STRIP.AUTO-MCNC: 353 MG/DL (ref 65–100)
GLUCOSE SERPL-MCNC: 309 MG/DL (ref 65–100)
HBA1C MFR BLD: 7.2 % (ref 4–5.6)
HCT VFR BLD AUTO: 34.1 % (ref 35–47)
HGB BLD-MCNC: 11.3 G/DL (ref 11.5–16)
IMM GRANULOCYTES # BLD AUTO: 0.1 K/UL (ref 0–0.04)
IMM GRANULOCYTES NFR BLD AUTO: 1 % (ref 0–0.5)
LYMPHOCYTES # BLD: 0.5 K/UL (ref 0.8–3.5)
LYMPHOCYTES NFR BLD: 6 % (ref 12–49)
MCH RBC QN AUTO: 34.9 PG (ref 26–34)
MCHC RBC AUTO-ENTMCNC: 33.1 G/DL (ref 30–36.5)
MCV RBC AUTO: 105.2 FL (ref 80–99)
MONOCYTES # BLD: 0.5 K/UL (ref 0–1)
MONOCYTES NFR BLD: 5 % (ref 5–13)
NEUTS SEG # BLD: 8.6 K/UL (ref 1.8–8)
NEUTS SEG NFR BLD: 88 % (ref 32–75)
NRBC # BLD: 0 K/UL (ref 0–0.01)
NRBC BLD-RTO: 0 PER 100 WBC
PERFORMED BY:: ABNORMAL
PLATELET # BLD AUTO: 198 K/UL (ref 150–400)
PMV BLD AUTO: 11.8 FL (ref 8.9–12.9)
POTASSIUM SERPL-SCNC: 4.6 MMOL/L (ref 3.5–5.1)
PROCALCITONIN SERPL-MCNC: <0.05 NG/ML
PROT SERPL-MCNC: 4.9 G/DL (ref 6.4–8.2)
RBC # BLD AUTO: 3.24 M/UL (ref 3.8–5.2)
SODIUM SERPL-SCNC: 139 MMOL/L (ref 136–145)
VANCOMYCIN SERPL-MCNC: 11.6 UG/ML
WBC # BLD AUTO: 9.8 K/UL (ref 3.6–11)

## 2023-11-08 PROCEDURE — 86140 C-REACTIVE PROTEIN: CPT

## 2023-11-08 PROCEDURE — 2700000000 HC OXYGEN THERAPY PER DAY

## 2023-11-08 PROCEDURE — 6360000002 HC RX W HCPCS: Performed by: INTERNAL MEDICINE

## 2023-11-08 PROCEDURE — 6370000000 HC RX 637 (ALT 250 FOR IP): Performed by: PHYSICIAN ASSISTANT

## 2023-11-08 PROCEDURE — 2580000003 HC RX 258: Performed by: NURSE PRACTITIONER

## 2023-11-08 PROCEDURE — 6370000000 HC RX 637 (ALT 250 FOR IP): Performed by: NURSE PRACTITIONER

## 2023-11-08 PROCEDURE — 94761 N-INVAS EAR/PLS OXIMETRY MLT: CPT

## 2023-11-08 PROCEDURE — 94640 AIRWAY INHALATION TREATMENT: CPT

## 2023-11-08 PROCEDURE — 6370000000 HC RX 637 (ALT 250 FOR IP): Performed by: INTERNAL MEDICINE

## 2023-11-08 PROCEDURE — 2580000003 HC RX 258: Performed by: INTERNAL MEDICINE

## 2023-11-08 PROCEDURE — 82962 GLUCOSE BLOOD TEST: CPT

## 2023-11-08 PROCEDURE — 80202 ASSAY OF VANCOMYCIN: CPT

## 2023-11-08 PROCEDURE — 83036 HEMOGLOBIN GLYCOSYLATED A1C: CPT

## 2023-11-08 PROCEDURE — 84145 PROCALCITONIN (PCT): CPT

## 2023-11-08 PROCEDURE — 97530 THERAPEUTIC ACTIVITIES: CPT

## 2023-11-08 PROCEDURE — 85025 COMPLETE CBC W/AUTO DIFF WBC: CPT

## 2023-11-08 PROCEDURE — 80053 COMPREHEN METABOLIC PANEL: CPT

## 2023-11-08 PROCEDURE — 2580000003 HC RX 258: Performed by: PHYSICIAN ASSISTANT

## 2023-11-08 PROCEDURE — 6360000002 HC RX W HCPCS: Performed by: PHYSICIAN ASSISTANT

## 2023-11-08 PROCEDURE — 36415 COLL VENOUS BLD VENIPUNCTURE: CPT

## 2023-11-08 PROCEDURE — 1100000000 HC RM PRIVATE

## 2023-11-08 RX ORDER — DEXTROSE MONOHYDRATE 100 MG/ML
INJECTION, SOLUTION INTRAVENOUS CONTINUOUS PRN
Status: DISCONTINUED | OUTPATIENT
Start: 2023-11-08 | End: 2023-11-09 | Stop reason: HOSPADM

## 2023-11-08 RX ORDER — INSULIN LISPRO 100 [IU]/ML
0-16 INJECTION, SOLUTION INTRAVENOUS; SUBCUTANEOUS
Status: DISCONTINUED | OUTPATIENT
Start: 2023-11-08 | End: 2023-11-09 | Stop reason: HOSPADM

## 2023-11-08 RX ORDER — INSULIN LISPRO 100 [IU]/ML
0-4 INJECTION, SOLUTION INTRAVENOUS; SUBCUTANEOUS NIGHTLY
Status: DISCONTINUED | OUTPATIENT
Start: 2023-11-08 | End: 2023-11-09 | Stop reason: HOSPADM

## 2023-11-08 RX ORDER — GLUCAGON 1 MG/ML
1 KIT INJECTION PRN
Status: DISCONTINUED | OUTPATIENT
Start: 2023-11-08 | End: 2023-11-09 | Stop reason: HOSPADM

## 2023-11-08 RX ADMIN — PRAMIPEXOLE DIHYDROCHLORIDE 1 MG: 1 TABLET ORAL at 21:00

## 2023-11-08 RX ADMIN — INSULIN GLARGINE 15 UNITS: 100 INJECTION, SOLUTION SUBCUTANEOUS at 20:59

## 2023-11-08 RX ADMIN — MONTELUKAST 10 MG: 10 TABLET, FILM COATED ORAL at 21:00

## 2023-11-08 RX ADMIN — VANCOMYCIN HYDROCHLORIDE 1250 MG: 1.25 INJECTION, POWDER, LYOPHILIZED, FOR SOLUTION INTRAVENOUS at 08:40

## 2023-11-08 RX ADMIN — FLUOXETINE 40 MG: 20 CAPSULE ORAL at 08:39

## 2023-11-08 RX ADMIN — ENOXAPARIN SODIUM 40 MG: 100 INJECTION SUBCUTANEOUS at 08:39

## 2023-11-08 RX ADMIN — CLONAZEPAM 0.5 MG: 0.5 TABLET ORAL at 21:05

## 2023-11-08 RX ADMIN — BUDESONIDE 500 MCG: 0.5 INHALANT ORAL at 07:40

## 2023-11-08 RX ADMIN — METHYLPREDNISOLONE SODIUM SUCCINATE 40 MG: 40 INJECTION INTRAMUSCULAR; INTRAVENOUS at 17:27

## 2023-11-08 RX ADMIN — ASPIRIN 81 MG CHEWABLE TABLET 81 MG: 81 TABLET CHEWABLE at 08:39

## 2023-11-08 RX ADMIN — BUDESONIDE 500 MCG: 0.5 INHALANT ORAL at 21:09

## 2023-11-08 RX ADMIN — OXYBUTYNIN CHLORIDE 5 MG: 5 TABLET ORAL at 08:39

## 2023-11-08 RX ADMIN — SODIUM CHLORIDE: 9 INJECTION, SOLUTION INTRAVENOUS at 07:10

## 2023-11-08 RX ADMIN — METHYLPREDNISOLONE SODIUM SUCCINATE 40 MG: 40 INJECTION INTRAMUSCULAR; INTRAVENOUS at 05:25

## 2023-11-08 RX ADMIN — FOLIC ACID 1 MG: 1 TABLET ORAL at 08:39

## 2023-11-08 RX ADMIN — INSULIN LISPRO 16 UNITS: 100 INJECTION, SOLUTION INTRAVENOUS; SUBCUTANEOUS at 12:37

## 2023-11-08 RX ADMIN — OXYBUTYNIN CHLORIDE 5 MG: 5 TABLET ORAL at 21:00

## 2023-11-08 RX ADMIN — DULOXETINE HYDROCHLORIDE 60 MG: 30 CAPSULE, DELAYED RELEASE ORAL at 08:39

## 2023-11-08 RX ADMIN — ATORVASTATIN CALCIUM 20 MG: 20 TABLET, FILM COATED ORAL at 08:39

## 2023-11-08 RX ADMIN — PANTOPRAZOLE SODIUM 40 MG: 40 TABLET, DELAYED RELEASE ORAL at 05:25

## 2023-11-08 RX ADMIN — CEFTRIAXONE SODIUM 1000 MG: 1 INJECTION, POWDER, FOR SOLUTION INTRAMUSCULAR; INTRAVENOUS at 00:01

## 2023-11-08 RX ADMIN — AZITHROMYCIN MONOHYDRATE 500 MG: 500 INJECTION, POWDER, LYOPHILIZED, FOR SOLUTION INTRAVENOUS at 00:01

## 2023-11-08 RX ADMIN — INSULIN LISPRO 4 UNITS: 100 INJECTION, SOLUTION INTRAVENOUS; SUBCUTANEOUS at 08:40

## 2023-11-08 RX ADMIN — FLUCONAZOLE 200 MG: 100 TABLET ORAL at 08:39

## 2023-11-08 ASSESSMENT — PAIN SCALES - GENERAL
PAINLEVEL_OUTOF10: 0
PAINLEVEL_OUTOF10: 0

## 2023-11-08 NOTE — CONSULTS
Comprehensive Nutrition Assessment    Type and Reason for Visit:  Initial, Consult (TF order and management)    Nutrition Recommendations/Plan:   Rec'd speech consult prior to diet advancement   Once medically appropriate, rec'd initiate TF of Glucerna 1.5 via PEG at 125 ml bolus, advancing 125 ml per bolus as tolerated to goal rate of 250 ml bolus 4x/day  Flush 130 ml H2O prior and post bolus (8x/day)        Provides 1500 kcal (93%), 83 gm pro (108%), 1799 ml fluids (100%)  Monitor/document TF amount, tolerance, and Bms in I/Os   Check Phos; Replete Mg prior to initiation of TF - continue to monitor/replete prn     S/p diet advancement and tolerance, consider adjustment to home TF regimen:  Glucerna 1.5 via PEG at 237 ml bolus 3x/day   Flush 180 ml H2O prior and post bolus (6x/day)        Provides 1067 kcal (66%), 59 gm pro (78%), 1620 ml fluids (100%)     Malnutrition Assessment:  Malnutrition Status:  Mild malnutrition (11/03/23 1210)    Context:  Acute Illness     Findings of the 6 clinical characteristics of malnutrition:  Energy Intake:  No significant decrease in energy intake  Weight Loss:  No significant weight loss     Body Fat Loss:  No significant body fat loss     Muscle Mass Loss:  Mild muscle mass loss Clavicles (pectoralis & deltoids), Temples (temporalis)  Fluid Accumulation:  No significant fluid accumulation     Strength:  Not Performed    Nutrition Assessment:    Presents w/ CC of fever and dysuria. Hx of dysphagia w/ PEG tube - noted black discoloration per H&P - GI consult. RD consulted for TF order and management, see rec'd above. Home TF regimen as follows: 1 Carton of Glucerna 3x/day via syringe after \"meals\" (mainly snacks per pt - fruit, chicken salad, soup) w/ water flushes pre/post bolus - provides 1067 kcal, 59 gm pro.  Pt reports good tolerance of home regimen - occasional +N/V after bolus (none recently) w/ wt maintenance since initiation; reports wt loss prior to PEG placement 2-3
Gastroenterology Consult Note        Patient: Jerman Tijerina MRN: [id number]  SSN: [id number]    YOB: 1950  Age: 74 y.o. Sex: female      Subjective:      Jerman Tijerina is a 74 y.o. female who is being seen for PEG tube management,. Patient PEG tube placed about 2 years ago diabetes complicatd by neurogenic bladder and ?neurogenic esophagus with G-tube, and other medical problems as below. Presented to the ED with chief complaint of fever and dysuria , she has been treated for the COPD constipation, acute pyelonephritis, patient had difficulties with PEG tube .   No nausea, no GI bleeding  Past Medical History:   Diagnosis Date    Arthritis     Bacterial meningitis     Hx of    CAD (coronary artery disease)     Chronic obstructive pulmonary disease (HCC)     Chronic pain     DDD (degenerative disc disease), cervical     Diabetes (HCC)     Environmental allergies     Esophageal disorder     Fibromyalgia     GERD (gastroesophageal reflux disease)     Hypercholesterolemia     Hypertension     Kidney stones     hx of    Macular degeneration     Mixed stress and urge urinary incontinence 7/2/2020    Neurogenic bladder 7/2/2020    Psychiatric disorder     Rheumatoid arthritis ([address])     Scoliosis     Shingles     Hx of    Sleep apnea     pt states uses CPAP    Suicidal thoughts     pt stated during PAT visit , she has hx of suicidal thoughts age 17's, pt stated never attempted and further thoughts of suicide since and none that day    Torn rotator cuff     pt states on right side    Urinary tract infection without hematuria 7/2/2020     Past Surgical History:   Procedure Laterality Date    BREAST BIOPSY Left     benign    BREAST SURGERY      CARPAL TUNNEL RELEASE      COLONOSCOPY N/A 10/9/2020    COLONOSCOPY performed by Mathew Chavarria MD at [facility] N/A 8/15/2022    COLONOSCOPY performed by Mathew Chavarria MD at [facility]
Pulmonary/ CC Consult    Subjective:   Date of Consultation:  November 8, 2023  Referring Physician: Weston Gonzalez PA-C     Thank you for this consultation. Ms. Nicolasa Mae is known to me from outpatient follow-up. She has known history of COPD, chronic hypoxia on 3 L of oxygen at home. She has been an active smoker who quit smoking in the past but restarted smoking. She additionally has a history of dysphagia which required PEG tube placement. She has urinary retention for which she had been on prophylactic antibiotics in the past and at times has done straight caths. She is admitted to hospital because of shortness of breath and was diagnosed with urinary sepsis. Patient was seen by urology service. A nuclear medicine scan was done which showed poorly functioning left kidney with hydronephrosis. He is on antibiotics. Urine cultures are pending. Pulmonary consultation was requested for evaluation of shortness of breath and chronic hypoxia. Her admitting chest x-ray has shown patchy opacities in mid and lower right lung field along with small effusion. Seems to be from atelectasis. Patient is evaluated at bedside. She denies any high-grade fever or chills. She is alert and responding appropriately.       Patient Active Problem List   Diagnosis    Esophageal dysmotility    Voiding dysfunction    Inferior pubic ramus fracture (HCC)    COPD exacerbation (HCC)    Mixed stress and urge urinary incontinence    Moderate malnutrition (HCC)    COPD (chronic obstructive pulmonary disease) ([address])    Aspiration pneumonia ([address])    Fall    UPJ (ureteropelvic junction) obstruction    PEG tube malfunction (HCC)    Recurrent cystitis    Cystitis, chronic    UTI (urinary tract infection)     Past Medical History:   Diagnosis Date    Arthritis     Bacterial meningitis     Hx of    CAD (coronary artery disease)     Chronic obstructive pulmonary disease (HCC)     Chronic pain     DDD (degenerative disc disease),
UROLOGY CONSULT    [facility]    Patient: Farzana Dickey MRN: [id number]  SSN: [id number]    YOB: 1950  Age: 74 y.o. Sex: female          Date of Encounter:  November 4, 2023  ADMITTED: 11/3/2023  for Hypomagnesemia [E83.42]  UTI (urinary tract infection) [N39.0]  Retention of urine [R33.9]  Acute pyelonephritis [N10]  Hypoxia [R09.02]  COPD with acute exacerbation ([address]) [J44.1]  Severe sepsis ([address]) [A41.9, R65.20]  Pneumonia due to infectious organism, unspecified laterality, unspecified part of lung [J18.9]  Chief Complaint:  dysuria, suprapubic fullness, concern for UTI  Reason for consult: UTI with NGB         History of Present Illness:  Patient is a 74 y.o. female admitted 11/3/2023 to the hospital for Hypomagnesemia [E83.42]  UTI (urinary tract infection) [N39.0]  Retention of urine [R33.9]  Acute pyelonephritis [N10]  Hypoxia [R09.02]  COPD with acute exacerbation ([address]) [J44.1]  Severe sepsis ([address]) [A41.9, R65.20]  Pneumonia due to infectious organism, unspecified laterality, unspecified part of lung [J18.9]. She is well know to the Urology service. I last saw her in May. She has hx of UPJ obstruction in a non-functioning left kidney, chronic UTI (both bacterial and fungal), and mixed incontinence. I had previously discussed nephrectomy. CT this admission reviewed: there is no significant change in persistent left greater than right hydronephrosis. It may be slightly improved. There are findings to suggest cystitis. She has had renal scans that show reduced perfusion and uptake on the left side. She has not had any evidence of pyelonephritis or upper tract infection, but does have recurrent infection. She has deferred nephrectomy in lieu of continued observation. Renal function is stable and WNL. She feels better since admission. UA was significant for nitrites, leuk esterase, WBC, no bacteria. She is on Ceftriaxone.   She may
Father     Diabetes Father     Liver Disease Mother     Diabetes Mother     Hypertension Mother     Heart Disease Mother        Allergies   Allergen Reactions    Codeine Other (See Comments)     hallucinations        Prior to Admission medications    Medication Sig Start Date End Date Taking? Authorizing Provider   traMADol (ULTRAM) 50 MG tablet Take 1 tablet by mouth every 12 hours as needed. 10/31/23 11/10/23 Yes Provider, MD Anna Marie   acetaminophen (TYLENOL) 500 MG tablet Take by mouth every 6 hours as needed    Automatic Reconciliation, Ar   albuterol sulfate HFA (PROVENTIL;VENTOLIN;PROAIR) 108 (90 Base) MCG/ACT inhaler Inhale 2 puffs into the lungs every 6 hours as needed    Automatic Reconciliation, Ar   albuterol (PROVENTIL) (2.5 MG/3ML) 0.083% nebulizer solution Inhale into the lungs    Automatic Reconciliation, Ar   aspirin 81 MG EC tablet Take 1 tablet by mouth daily 9/3/21   Automatic Reconciliation, Ar   atorvastatin (LIPITOR) 20 MG tablet 1 tablet by Enteral route daily 9/5/21   Automatic Reconciliation, Ar   candesartan (ATACAND) 8 MG tablet Take by mouth daily    Automatic Reconciliation, Ar   clonazePAM (KLONOPIN) 0.5 MG tablet Take 1 tablet by mouth 3 times daily.  9/4/21   Automatic Reconciliation, Ar   doxycycline monohydrate (ADOXA) 100 MG tablet Take 1 tablet by mouth daily 2/3/23   Automatic Reconciliation, Ar   ergocalciferol (ERGOCALCIFEROL) 1.25 MG (81086 UT) capsule 1 capsule by Enteral route 9/5/21   Automatic Reconciliation, Ar   FLUoxetine (PROZAC) 20 MG capsule 1 capsule by Enteral route daily 9/5/21   Automatic Reconciliation, Ar   furosemide (LASIX) 20 MG tablet Take by mouth daily    Automatic Reconciliation, Ar   montelukast (SINGULAIR) 10 MG tablet 1 tablet by Enteral route daily 9/5/21   Automatic Reconciliation, Ar   oxybutynin (DITROPAN) 5 MG tablet TAKE 1 TABLET VIA G-TUBE TWICE A DAY 12/13/22   Automatic Reconciliation, Ar   pantoprazole sodium (PROTONIX) 40 MG PACK packet

## 2023-11-08 NOTE — CARE COORDINATION
CM received messages to contact patient's Zoila Eddy [phone]. CM called Ms. Chi Yusuf, she stated that her and her daughter spoke again regarding rehab and would like for her to get some SNF prior to returning home. First preference given was for [facility] then [facility] [place], referrals sent via careport, awaiting possible acceptance, patient will not require insurance auth prior to d/c.    CM continues to follow and monitor for needs.

## 2023-11-09 VITALS
WEIGHT: 132 LBS | SYSTOLIC BLOOD PRESSURE: 106 MMHG | HEIGHT: 64 IN | DIASTOLIC BLOOD PRESSURE: 65 MMHG | BODY MASS INDEX: 22.53 KG/M2 | RESPIRATION RATE: 20 BRPM | OXYGEN SATURATION: 97 % | TEMPERATURE: 99.3 F | HEART RATE: 80 BPM

## 2023-11-09 LAB
ALBUMIN SERPL-MCNC: 2.6 G/DL (ref 3.5–5)
ALBUMIN/GLOB SERPL: 1 (ref 1.1–2.2)
ALP SERPL-CCNC: 86 U/L (ref 45–117)
ALT SERPL-CCNC: 25 U/L (ref 12–78)
ANION GAP SERPL CALC-SCNC: 3 MMOL/L (ref 5–15)
AST SERPL W P-5'-P-CCNC: 12 U/L (ref 15–37)
BACTERIA SPEC CULT: NORMAL
BACTERIA SPEC CULT: NORMAL
BASOPHILS # BLD: 0 K/UL (ref 0–0.1)
BASOPHILS NFR BLD: 0 % (ref 0–1)
BILIRUB SERPL-MCNC: 0.2 MG/DL (ref 0.2–1)
BUN SERPL-MCNC: 27 MG/DL (ref 6–20)
BUN/CREAT SERPL: 35 (ref 12–20)
CA-I BLD-MCNC: 8.5 MG/DL (ref 8.5–10.1)
CHLORIDE SERPL-SCNC: 100 MMOL/L (ref 97–108)
CO2 SERPL-SCNC: 35 MMOL/L (ref 21–32)
CREAT SERPL-MCNC: 0.77 MG/DL (ref 0.55–1.02)
DIFFERENTIAL METHOD BLD: ABNORMAL
EOSINOPHIL # BLD: 0 K/UL (ref 0–0.4)
EOSINOPHIL NFR BLD: 0 % (ref 0–7)
ERYTHROCYTE [DISTWIDTH] IN BLOOD BY AUTOMATED COUNT: 14.1 % (ref 11.5–14.5)
GLOBULIN SER CALC-MCNC: 2.6 G/DL (ref 2–4)
GLUCOSE BLD STRIP.AUTO-MCNC: 179 MG/DL (ref 65–100)
GLUCOSE BLD STRIP.AUTO-MCNC: 267 MG/DL (ref 65–100)
GLUCOSE BLD STRIP.AUTO-MCNC: 339 MG/DL (ref 65–100)
GLUCOSE SERPL-MCNC: 270 MG/DL (ref 65–100)
HCT VFR BLD AUTO: 37.9 % (ref 35–47)
HGB BLD-MCNC: 12.4 G/DL (ref 11.5–16)
IMM GRANULOCYTES # BLD AUTO: 0.1 K/UL (ref 0–0.04)
IMM GRANULOCYTES NFR BLD AUTO: 1 % (ref 0–0.5)
LYMPHOCYTES # BLD: 0.8 K/UL (ref 0.8–3.5)
LYMPHOCYTES NFR BLD: 6 % (ref 12–49)
Lab: NORMAL
Lab: NORMAL
MCH RBC QN AUTO: 34.6 PG (ref 26–34)
MCHC RBC AUTO-ENTMCNC: 32.7 G/DL (ref 30–36.5)
MCV RBC AUTO: 105.9 FL (ref 80–99)
MONOCYTES # BLD: 0.8 K/UL (ref 0–1)
MONOCYTES NFR BLD: 6 % (ref 5–13)
NEUTS SEG # BLD: 11.8 K/UL (ref 1.8–8)
NEUTS SEG NFR BLD: 87 % (ref 32–75)
NRBC # BLD: 0 K/UL (ref 0–0.01)
NRBC BLD-RTO: 0 PER 100 WBC
PERFORMED BY:: ABNORMAL
PLATELET # BLD AUTO: 207 K/UL (ref 150–400)
PMV BLD AUTO: 11.6 FL (ref 8.9–12.9)
POTASSIUM SERPL-SCNC: 4.5 MMOL/L (ref 3.5–5.1)
PROT SERPL-MCNC: 5.2 G/DL (ref 6.4–8.2)
RBC # BLD AUTO: 3.58 M/UL (ref 3.8–5.2)
SODIUM SERPL-SCNC: 138 MMOL/L (ref 136–145)
WBC # BLD AUTO: 13.5 K/UL (ref 3.6–11)

## 2023-11-09 PROCEDURE — 6370000000 HC RX 637 (ALT 250 FOR IP): Performed by: INTERNAL MEDICINE

## 2023-11-09 PROCEDURE — 6360000002 HC RX W HCPCS: Performed by: INTERNAL MEDICINE

## 2023-11-09 PROCEDURE — 97530 THERAPEUTIC ACTIVITIES: CPT

## 2023-11-09 PROCEDURE — 94761 N-INVAS EAR/PLS OXIMETRY MLT: CPT

## 2023-11-09 PROCEDURE — 6370000000 HC RX 637 (ALT 250 FOR IP): Performed by: NURSE PRACTITIONER

## 2023-11-09 PROCEDURE — 2700000000 HC OXYGEN THERAPY PER DAY

## 2023-11-09 PROCEDURE — 2580000003 HC RX 258: Performed by: INTERNAL MEDICINE

## 2023-11-09 PROCEDURE — 6370000000 HC RX 637 (ALT 250 FOR IP): Performed by: PHYSICIAN ASSISTANT

## 2023-11-09 PROCEDURE — 80053 COMPREHEN METABOLIC PANEL: CPT

## 2023-11-09 PROCEDURE — 36415 COLL VENOUS BLD VENIPUNCTURE: CPT

## 2023-11-09 PROCEDURE — 2580000003 HC RX 258: Performed by: NURSE PRACTITIONER

## 2023-11-09 PROCEDURE — 94640 AIRWAY INHALATION TREATMENT: CPT

## 2023-11-09 PROCEDURE — 6360000002 HC RX W HCPCS: Performed by: PHYSICIAN ASSISTANT

## 2023-11-09 PROCEDURE — 82962 GLUCOSE BLOOD TEST: CPT

## 2023-11-09 PROCEDURE — 2580000003 HC RX 258: Performed by: PHYSICIAN ASSISTANT

## 2023-11-09 PROCEDURE — 85025 COMPLETE CBC W/AUTO DIFF WBC: CPT

## 2023-11-09 RX ORDER — LEVOFLOXACIN 500 MG/1
500 TABLET, FILM COATED ORAL DAILY
Status: SHIPPED | OUTPATIENT
Start: 2023-11-09 | End: 2023-11-12

## 2023-11-09 RX ORDER — IPRATROPIUM BROMIDE AND ALBUTEROL SULFATE 2.5; .5 MG/3ML; MG/3ML
3 SOLUTION RESPIRATORY (INHALATION) EVERY 4 HOURS PRN
Qty: 360 ML | Refills: 1 | Status: SHIPPED | OUTPATIENT
Start: 2023-11-09

## 2023-11-09 RX ORDER — INSULIN LISPRO 100 [IU]/ML
0-4 INJECTION, SOLUTION INTRAVENOUS; SUBCUTANEOUS NIGHTLY
Qty: 30 EACH | Refills: 0 | Status: SHIPPED | OUTPATIENT
Start: 2023-11-09

## 2023-11-09 RX ORDER — BUDESONIDE 0.5 MG/2ML
0.5 INHALANT ORAL
Qty: 60 EACH | Refills: 3 | Status: SHIPPED | OUTPATIENT
Start: 2023-11-09

## 2023-11-09 RX ORDER — INSULIN LISPRO 100 [IU]/ML
0-16 INJECTION, SOLUTION INTRAVENOUS; SUBCUTANEOUS
Qty: 90 EACH | Refills: 1 | Status: SHIPPED | OUTPATIENT
Start: 2023-11-09

## 2023-11-09 RX ORDER — FLUCONAZOLE 200 MG/1
200 TABLET ORAL DAILY
Qty: 10 TABLET | Refills: 0 | Status: SHIPPED | OUTPATIENT
Start: 2023-11-10 | End: 2023-11-20

## 2023-11-09 RX ADMIN — OXYBUTYNIN CHLORIDE 5 MG: 5 TABLET ORAL at 09:11

## 2023-11-09 RX ADMIN — BUDESONIDE 500 MCG: 0.5 INHALANT ORAL at 09:43

## 2023-11-09 RX ADMIN — PANTOPRAZOLE SODIUM 40 MG: 40 TABLET, DELAYED RELEASE ORAL at 06:18

## 2023-11-09 RX ADMIN — AZITHROMYCIN MONOHYDRATE 500 MG: 500 INJECTION, POWDER, LYOPHILIZED, FOR SOLUTION INTRAVENOUS at 00:12

## 2023-11-09 RX ADMIN — ASPIRIN 81 MG CHEWABLE TABLET 81 MG: 81 TABLET CHEWABLE at 09:12

## 2023-11-09 RX ADMIN — INSULIN LISPRO 8 UNITS: 100 INJECTION, SOLUTION INTRAVENOUS; SUBCUTANEOUS at 12:37

## 2023-11-09 RX ADMIN — METHYLPREDNISOLONE SODIUM SUCCINATE 40 MG: 40 INJECTION INTRAMUSCULAR; INTRAVENOUS at 06:18

## 2023-11-09 RX ADMIN — VANCOMYCIN HYDROCHLORIDE 1250 MG: 1.25 INJECTION, POWDER, LYOPHILIZED, FOR SOLUTION INTRAVENOUS at 09:12

## 2023-11-09 RX ADMIN — IPRATROPIUM BROMIDE AND ALBUTEROL SULFATE 1 DOSE: 2.5; .5 SOLUTION RESPIRATORY (INHALATION) at 09:43

## 2023-11-09 RX ADMIN — ATORVASTATIN CALCIUM 20 MG: 20 TABLET, FILM COATED ORAL at 09:12

## 2023-11-09 RX ADMIN — CLONAZEPAM 0.5 MG: 0.5 TABLET ORAL at 09:12

## 2023-11-09 RX ADMIN — CEFTRIAXONE SODIUM 1000 MG: 1 INJECTION, POWDER, FOR SOLUTION INTRAMUSCULAR; INTRAVENOUS at 00:10

## 2023-11-09 RX ADMIN — SODIUM CHLORIDE: 9 INJECTION, SOLUTION INTRAVENOUS at 00:09

## 2023-11-09 RX ADMIN — DULOXETINE HYDROCHLORIDE 60 MG: 30 CAPSULE, DELAYED RELEASE ORAL at 09:11

## 2023-11-09 RX ADMIN — ENOXAPARIN SODIUM 40 MG: 100 INJECTION SUBCUTANEOUS at 09:12

## 2023-11-09 RX ADMIN — FOLIC ACID 1 MG: 1 TABLET ORAL at 09:11

## 2023-11-09 RX ADMIN — FLUCONAZOLE 200 MG: 100 TABLET ORAL at 09:11

## 2023-11-09 RX ADMIN — FLUOXETINE 40 MG: 20 CAPSULE ORAL at 09:11

## 2023-11-09 ASSESSMENT — PAIN SCALES - GENERAL
PAINLEVEL_OUTOF10: 0
PAINLEVEL_OUTOF10: 0

## 2023-11-09 NOTE — CARE COORDINATION
CM spoke with attending, plan for d/c today. Patient is clear to d/c from  to [facility], room 1023, nurse report can be called to [phone]. CM contacted patient's Min Sang [phone], to notify, she is agreeable to d/c today, stated she will transport patient and can pick her up at 1:00 PM, attending and nurse notified. IMM delivered.

## 2025-01-27 NOTE — PROGRESS NOTES
Busy signal Dr. Amos's team, do need assistance from NP's?  This conversation was forwarded to us. Ancef added to pre op orders otherwise.

## (undated) DEVICE — GLOVE SURG SZ 7.5 L11.73IN FNGR THK9.8MIL STRW LTX POLYMER

## (undated) DEVICE — IMPLANTABLE DEVICE: Type: IMPLANTABLE DEVICE | Status: NON-FUNCTIONAL

## (undated) DEVICE — SUTURE SZ 0 27IN 5/8 CIR UR-6  TAPER PT VIOLET ABSRB VICRYL J603H

## (undated) DEVICE — Device

## (undated) DEVICE — VISUALIZATION SYSTEM: Brand: CLEARIFY

## (undated) DEVICE — MOUTHPIECE ENDOSCP 20X27MM --

## (undated) DEVICE — CANNULA NSL O2 AD 7 FT END-TIDAL CARBON DIOX VENTFLO

## (undated) DEVICE — BAG DRAINAGE CYSTO

## (undated) DEVICE — BASIC SINGLE BASIN-LF: Brand: MEDLINE INDUSTRIES, INC.

## (undated) DEVICE — INTENDED FOR TISSUE SEPARATION, AND OTHER PROCEDURES THAT REQUIRE A SHARP SURGICAL BLADE TO PUNCTURE OR CUT.: Brand: BARD-PARKER ® CARBON RIB-BACK BLADES

## (undated) DEVICE — COVER,MAYO STAND,STERILE: Brand: MEDLINE

## (undated) DEVICE — PAD,PREPPING,CUFFED,24X48,7",NONSTERILE: Brand: MEDLINE

## (undated) DEVICE — SOL IRR NACL 0.9% 500ML POUR --

## (undated) DEVICE — ARM DRAPE

## (undated) DEVICE — Device: Brand: JELCO

## (undated) DEVICE — THE ENDO CARRY-ON PROCEDURE KIT CONTAINS ALL OF THE SUPPLIES AND INFECTION PREVENTION PRODUCTS NEEDED FOR ENDOSCOPIC PROCEDURES: Brand: ENDO CARRY-ON PROCEDURE KIT

## (undated) DEVICE — AIRLIFE™ OXYGEN TUBING 7 FEET (2.1 M) CRUSH RESISTANT OXYGEN TUBING, VINYL TIPPED: Brand: AIRLIFE™

## (undated) DEVICE — LARGE HEM-O-LOK CLIP APPLIER: Brand: ENDOWRIST

## (undated) DEVICE — FORCEPS BX L240CM JAW DIA2.8MM L CAP W/ NDL MIC MESH TOOTH

## (undated) DEVICE — TROCAR: Brand: KII FIOS FIRST ENTRY

## (undated) DEVICE — SURGICAL PROCEDURE PACK CYSTO CHS

## (undated) DEVICE — OPEN-END URETERAL CATHETER: Brand: COOK

## (undated) DEVICE — SPONGE LAP SOFT 18X18 IN X RAY DETECTABLE

## (undated) DEVICE — COLUMN DRAPE

## (undated) DEVICE — SUT MONOCRYL PLUS UD 4-0 --

## (undated) DEVICE — 2, DISPOSABLE SUCTION/IRRIGATOR WITHOUT DISPOSABLE TIP: Brand: STRYKEFLOW

## (undated) DEVICE — VINYL ACETATE UROLOGICAL DRAINAGE BAG FOR GE/OEC SYSTEMS W/ 8MM PLUG - NON-STERILE: Brand: CUSTOM MEDICAL SPECIALTIES, INC.

## (undated) DEVICE — SOLUTION SCRB 0.04 CHG DYNA HEX

## (undated) DEVICE — SOLUTION SCRB 4% CHLORHEXADINE GNT 16OZ DYNAHEX

## (undated) DEVICE — SOL IRR STRL H2O 500ML STRL --

## (undated) DEVICE — SOL INJ STRL H2O 1000ML BG LF --

## (undated) DEVICE — MASK ANES INF SZ 2 PREM TAIL VLV INFL PRT UNSCENTED SGL PT

## (undated) DEVICE — GARMENT CMPR STD UNV CALF FLWT -- RN VENTILATE NS DISP 17- IN

## (undated) DEVICE — PAD POSITIONING WNG STD KIT W/BODY STRP LF DISP

## (undated) DEVICE — SEAL UNIV 5-8MM DISP BX/10 -- DA VINCI XI - SNGL USE

## (undated) DEVICE — REDUCER CANN ENDOWRIST 12-8MM -- DA VINCI XI - SNGL USE

## (undated) DEVICE — SYR LR LCK 1ML GRAD NSAF 30ML --

## (undated) DEVICE — AMPLATZ ULTRA STIFF WIRE GUIDE: Brand: AMPLATZ

## (undated) DEVICE — LAMINECTOMY ARM CRADLE FOAM POSITIONER: Brand: CARDINAL HEALTH

## (undated) DEVICE — PREP SKN CHLRAPRP 26ML TNT -- CONVERT TO ITEM 373320

## (undated) DEVICE — TRAY URIN CATH PED 16FR BLLN 5CC INDWL STR TIP INF CTRL

## (undated) DEVICE — [HIGH FLOW INSUFFLATOR,  DO NOT USE IF PACKAGE IS DAMAGED,  KEEP DRY,  KEEP AWAY FROM SUNLIGHT,  PROTECT FROM HEAT AND RADIOACTIVE SOURCES.]: Brand: PNEUMOSURE

## (undated) DEVICE — MASK 14X6.5MM O2 PVC ADLT ADPT -- 2 ENTRY PORT ELAS STRP NSE CL

## (undated) DEVICE — CLIP LIG ABSRB HEM-LOK LG PUR --

## (undated) DEVICE — INSUFFLATION NEEDLE TO ESTABLISH PNEUMOPERITONEUM.: Brand: INSUFFLATION NEEDLE

## (undated) DEVICE — KIT GASTMY PERC PEG PULL 20FR -- ENDOVIVE BX/2

## (undated) DEVICE — GAUZE,SPONGE,4"X4",16PLY,STRL,LF,10/TRAY: Brand: MEDLINE

## (undated) DEVICE — SOL INJ SOD CL 0.9% 1000ML BG --

## (undated) DEVICE — CYSTO PACK: Brand: MEDLINE INDUSTRIES, INC.

## (undated) DEVICE — DERMABOND SKIN ADH 0.7ML -- DERMABOND ADVANCED 12/BX

## (undated) DEVICE — SUT VCRL + 2-0 27IN SH UD --

## (undated) DEVICE — TUBING SUCTION UNIV 3/16 INX20 FT W/ SCALLOPED CONN STRL

## (undated) DEVICE — ENDOSCOPIC KIT 1.1+ DE BOWL

## (undated) DEVICE — SUTURE ABSORBABLE MONOFILAMENT 4-0 RB1 27 IN UD MONOCRYL + MCP214H

## (undated) DEVICE — LAPAROSCOPIC CHOLE PACK: Brand: MEDLINE INDUSTRIES, INC.

## (undated) DEVICE — TURNOVER KIT OR CUST CMB FLD GEN LF

## (undated) DEVICE — REM POLYHESIVE ADULT PATIENT RETURN ELECTRODE: Brand: VALLEYLAB

## (undated) DEVICE — COVER MPLR TIP CRV SCIS ACC DA VINCI

## (undated) DEVICE — 3M™ DURAPORE™ SURGICAL TAPE 1538-3, 3 INCH X 10 YARD (7,5CM X 9,1M), 4 ROLLS/BOX: Brand: 3M™ DURAPORE™

## (undated) DEVICE — ENDO KIT 1: Brand: MEDLINE INDUSTRIES, INC.

## (undated) DEVICE — FCPS RAD JAW 4LC 240CM W/NDL -- BX/20 RADIAL JAW 4

## (undated) DEVICE — DRAPE,REIN 53X77,STERILE: Brand: MEDLINE

## (undated) DEVICE — LAPAROSCOPIC SCISSORS: Brand: EPIX LAPAROSCOPIC SCISSORS

## (undated) DEVICE — MARYLAND BIPOLAR FORCEPS: Brand: ENDOWRIST

## (undated) DEVICE — BOWL UTIL 16OZ STRL --

## (undated) DEVICE — TOWEL,OR,DSP,ST,BLUE,DLX,6/PK,12PK/CS: Brand: MEDLINE